# Patient Record
Sex: MALE | Race: WHITE | NOT HISPANIC OR LATINO | URBAN - METROPOLITAN AREA
[De-identification: names, ages, dates, MRNs, and addresses within clinical notes are randomized per-mention and may not be internally consistent; named-entity substitution may affect disease eponyms.]

---

## 2019-04-11 ENCOUNTER — INPATIENT (INPATIENT)
Facility: HOSPITAL | Age: 79
LOS: 0 days | Discharge: HOME | DRG: 812 | End: 2019-04-12
Attending: INTERNAL MEDICINE | Admitting: INTERNAL MEDICINE
Payer: COMMERCIAL

## 2019-04-11 VITALS
WEIGHT: 193.35 LBS | SYSTOLIC BLOOD PRESSURE: 148 MMHG | TEMPERATURE: 98 F | DIASTOLIC BLOOD PRESSURE: 76 MMHG | OXYGEN SATURATION: 100 % | HEIGHT: 68 IN | RESPIRATION RATE: 20 BRPM | HEART RATE: 83 BPM

## 2019-04-11 DIAGNOSIS — I10 ESSENTIAL (PRIMARY) HYPERTENSION: ICD-10-CM

## 2019-04-11 DIAGNOSIS — Z29.9 ENCOUNTER FOR PROPHYLACTIC MEASURES, UNSPECIFIED: ICD-10-CM

## 2019-04-11 DIAGNOSIS — E03.9 HYPOTHYROIDISM, UNSPECIFIED: ICD-10-CM

## 2019-04-11 DIAGNOSIS — D64.9 ANEMIA, UNSPECIFIED: ICD-10-CM

## 2019-04-11 DIAGNOSIS — R19.5 OTHER FECAL ABNORMALITIES: ICD-10-CM

## 2019-04-11 DIAGNOSIS — E11.9 TYPE 2 DIABETES MELLITUS WITHOUT COMPLICATIONS: ICD-10-CM

## 2019-04-11 LAB
ALBUMIN SERPL ELPH-MCNC: 4.4 G/DL — SIGNIFICANT CHANGE UP (ref 3.3–5)
ALP SERPL-CCNC: 44 U/L — SIGNIFICANT CHANGE UP (ref 40–120)
ALT FLD-CCNC: 12 U/L — SIGNIFICANT CHANGE UP (ref 10–45)
ANION GAP SERPL CALC-SCNC: 16 MMOL/L — SIGNIFICANT CHANGE UP (ref 5–17)
APTT BLD: 29.7 SEC — SIGNIFICANT CHANGE UP (ref 27.5–36.3)
AST SERPL-CCNC: 17 U/L — SIGNIFICANT CHANGE UP (ref 10–40)
BASOPHILS # BLD AUTO: 0 K/UL — SIGNIFICANT CHANGE UP (ref 0–0.2)
BASOPHILS NFR BLD AUTO: 0.3 % — SIGNIFICANT CHANGE UP (ref 0–2)
BILIRUB SERPL-MCNC: 0.1 MG/DL — LOW (ref 0.2–1.2)
BLD GP AB SCN SERPL QL: NEGATIVE — SIGNIFICANT CHANGE UP
BUN SERPL-MCNC: 20 MG/DL — SIGNIFICANT CHANGE UP (ref 7–23)
CALCIUM SERPL-MCNC: 9.8 MG/DL — SIGNIFICANT CHANGE UP (ref 8.4–10.5)
CHLORIDE SERPL-SCNC: 102 MMOL/L — SIGNIFICANT CHANGE UP (ref 96–108)
CO2 SERPL-SCNC: 24 MMOL/L — SIGNIFICANT CHANGE UP (ref 22–31)
CREAT SERPL-MCNC: 1 MG/DL — SIGNIFICANT CHANGE UP (ref 0.5–1.3)
EOSINOPHIL # BLD AUTO: 0.2 K/UL — SIGNIFICANT CHANGE UP (ref 0–0.5)
EOSINOPHIL NFR BLD AUTO: 5.7 % — SIGNIFICANT CHANGE UP (ref 0–6)
GLUCOSE BLDC GLUCOMTR-MCNC: 99 MG/DL — SIGNIFICANT CHANGE UP (ref 70–99)
GLUCOSE SERPL-MCNC: 89 MG/DL — SIGNIFICANT CHANGE UP (ref 70–99)
HCT VFR BLD CALC: 21.7 % — LOW (ref 39–50)
HGB BLD-MCNC: 7.1 G/DL — LOW (ref 13–17)
INR BLD: 1.2 RATIO — HIGH (ref 0.88–1.16)
LYMPHOCYTES # BLD AUTO: 1.3 K/UL — SIGNIFICANT CHANGE UP (ref 1–3.3)
LYMPHOCYTES # BLD AUTO: 29.3 % — SIGNIFICANT CHANGE UP (ref 13–44)
MCHC RBC-ENTMCNC: 28.3 PG — SIGNIFICANT CHANGE UP (ref 27–34)
MCHC RBC-ENTMCNC: 32.7 GM/DL — SIGNIFICANT CHANGE UP (ref 32–36)
MCV RBC AUTO: 86.7 FL — SIGNIFICANT CHANGE UP (ref 80–100)
MONOCYTES # BLD AUTO: 0.4 K/UL — SIGNIFICANT CHANGE UP (ref 0–0.9)
MONOCYTES NFR BLD AUTO: 9.3 % — SIGNIFICANT CHANGE UP (ref 2–14)
NEUTROPHILS # BLD AUTO: 2.4 K/UL — SIGNIFICANT CHANGE UP (ref 1.8–7.4)
NEUTROPHILS NFR BLD AUTO: 55.4 % — SIGNIFICANT CHANGE UP (ref 43–77)
OB PNL STL: POSITIVE
PLATELET # BLD AUTO: 180 K/UL — SIGNIFICANT CHANGE UP (ref 150–400)
POTASSIUM SERPL-MCNC: 4.2 MMOL/L — SIGNIFICANT CHANGE UP (ref 3.5–5.3)
POTASSIUM SERPL-SCNC: 4.2 MMOL/L — SIGNIFICANT CHANGE UP (ref 3.5–5.3)
PROT SERPL-MCNC: 7 G/DL — SIGNIFICANT CHANGE UP (ref 6–8.3)
PROTHROM AB SERPL-ACNC: 13.7 SEC — HIGH (ref 10–12.9)
RBC # BLD: 2.5 M/UL — LOW (ref 4.2–5.8)
RBC # FLD: 15.8 % — HIGH (ref 10.3–14.5)
RH IG SCN BLD-IMP: POSITIVE — SIGNIFICANT CHANGE UP
SODIUM SERPL-SCNC: 142 MMOL/L — SIGNIFICANT CHANGE UP (ref 135–145)
WBC # BLD: 4.3 K/UL — SIGNIFICANT CHANGE UP (ref 3.8–10.5)
WBC # FLD AUTO: 4.3 K/UL — SIGNIFICANT CHANGE UP (ref 3.8–10.5)

## 2019-04-11 PROCEDURE — 99285 EMERGENCY DEPT VISIT HI MDM: CPT | Mod: GC

## 2019-04-11 PROCEDURE — 74177 CT ABD & PELVIS W/CONTRAST: CPT | Mod: 26

## 2019-04-11 PROCEDURE — 99223 1ST HOSP IP/OBS HIGH 75: CPT

## 2019-04-11 RX ORDER — LANOLIN ALCOHOL/MO/W.PET/CERES
3 CREAM (GRAM) TOPICAL AT BEDTIME
Qty: 0 | Refills: 0 | Status: DISCONTINUED | OUTPATIENT
Start: 2019-04-11 | End: 2019-04-12

## 2019-04-11 RX ORDER — LEVOTHYROXINE SODIUM 125 MCG
50 TABLET ORAL DAILY
Qty: 0 | Refills: 0 | Status: DISCONTINUED | OUTPATIENT
Start: 2019-04-11 | End: 2019-04-12

## 2019-04-11 RX ORDER — LOSARTAN POTASSIUM 100 MG/1
50 TABLET, FILM COATED ORAL DAILY
Qty: 0 | Refills: 0 | Status: DISCONTINUED | OUTPATIENT
Start: 2019-04-12 | End: 2019-04-12

## 2019-04-11 RX ORDER — DOXAZOSIN MESYLATE 4 MG
2 TABLET ORAL AT BEDTIME
Qty: 0 | Refills: 0 | Status: DISCONTINUED | OUTPATIENT
Start: 2019-04-11 | End: 2019-04-12

## 2019-04-11 RX ORDER — HYDROCHLOROTHIAZIDE 25 MG
12.5 TABLET ORAL DAILY
Qty: 0 | Refills: 0 | Status: DISCONTINUED | OUTPATIENT
Start: 2019-04-12 | End: 2019-04-12

## 2019-04-11 RX ORDER — FENOFIBRATE,MICRONIZED 130 MG
48 CAPSULE ORAL DAILY
Qty: 0 | Refills: 0 | Status: DISCONTINUED | OUTPATIENT
Start: 2019-04-11 | End: 2019-04-12

## 2019-04-11 RX ADMIN — Medication 2 MILLIGRAM(S): at 22:49

## 2019-04-11 NOTE — H&P ADULT - ASSESSMENT
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

## 2019-04-11 NOTE — H&P ADULT - PROBLEM SELECTOR PLAN 2
--extensive GI work up 2 weeks ago unrevealing  --discuss with GI in the morning regarding plans for repeat endoscopy  --regular diet for now as not immediate plan for work up and has been tolerating well

## 2019-04-11 NOTE — ED PROVIDER NOTE - ATTENDING CONTRIBUTION TO CARE
sent in for hb 7, has had full outpt w/u incl upper / lower endoscopy, capsule.   non-tender abdomen, at bedside w res rectal - brown/dark stool  h/h. transfuse to 7-8. likely admit.

## 2019-04-11 NOTE — H&P ADULT - HISTORY OF PRESENT ILLNESS
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 Patient with admission for anemia and GI bleed at the end of March 2019 at [facility]. Had dark stools and slowly downtrending anemia. Had extensive work up including EGD/colonoscopy/capsule/double balloon enteroscopy that was unrevealing. Received a few units of transfusion in the past few months. Since discharge, he has had slowly downtrending H/H, losing about 1 gram per week. He reports feeling fatigued and achy. After ambulating long distances, he becomes SOB.     Per heme note, he has a history of afib but he and his son deny this history. Not currently taking A/C or aspirin.

## 2019-04-11 NOTE — H&P ADULT - NSHPPHYSICALEXAM_GEN_ALL_CORE
Vital Signs Last 24 Hrs  T(C): 36.7 (04-11-19 @ 20:11)  T(F): 98.1 (04-11-19 @ 20:11), Max: 98.1 (04-11-19 @ 20:11)  HR: 77 (04-11-19 @ 20:11) (77 - 83)  BP: 149/71 (04-11-19 @ 20:11)  BP(mean): --  RR: 20 (04-11-19 @ 20:11) (20 - 20)  SpO2: 100% (04-11-19 @ 20:11) (100% - 100%)  Wt(kg): --    PHYSICAL EXAM:  GENERAL: NAD, well-developed  HEAD:  Atraumatic, Normocephalic  EYES: EOMI, PERRLA, conjunctiva and sclera clear  NECK: Supple, No JVD  CHEST/LUNG: Clear to auscultation bilaterally; No wheeze  HEART: Regular rate and rhythm, systolic murmur; No rubs, or gallops  ABDOMEN: Soft, Nontender, Nondistended; Bowel sounds present  EXTREMITIES:  2+ Peripheral Pulses, No clubbing, cyanosis, or edema  PSYCH: AAOx3  NEUROLOGY: non-focal  SKIN: No rashes or lesions, pale

## 2019-04-11 NOTE — ED ADULT NURSE NOTE - OBJECTIVE STATEMENT
Pt presents to ED for transfusion after being found to have a low h/h, which is usual for him.  He notes dyspnea on exertion, fatigue and dark stools.  He has had endoscopies in the past but none have revealed source of bleeding.  Conjunctive pink, cap refill is wnl.  Skin warm and dry.  Denies n/v/d.

## 2019-04-11 NOTE — ED PROVIDER NOTE - PHYSICAL EXAMINATION
Gen: NAD, AOx3, able to make needs known, non-toxic  Head: NCAT  HEENT: EOMI, oral mucosa moist, normal conjunctiva  Lung: CTAB, no respiratory distress, no wheezes/rhonchi/rales B/L, speaking in full sentences  CV: RRR, no murmurs  Abd: soft, NTND, no guarding, no CVA tenderness  MSK: no visible deformities  Neuro: No focal sensory or motor deficits  Skin: Warm, well perfused  Psych: normal affect    Rectal exam chaperoned by Dr. Forrest. Stool grossly brown. Sample sent for guiac testing. Gen: NAD, AOx3, able to make needs known, non-toxic  Head: NCAT  HEENT: EOMI, oral mucosa moist, normal conjunctiva  Lung: CTAB, no respiratory distress, no wheezes/rhonchi/rales B/L, speaking in full sentences  CV: RRR, systolic murmur  Abd: soft, NTND, no guarding, no CVA tenderness  MSK: no visible deformities  Neuro: No focal sensory or motor deficits  Skin: Warm, well perfused  Psych: normal affect    Rectal exam chaperoned by Dr. Forrest. Stool grossly brown. Sample sent for guiac testing.

## 2019-04-11 NOTE — H&P ADULT - NSHPLABSRESULTS_GEN_ALL_CORE
EKG, labs, and imaging personally reviewed and interpreted.     LABS:                        7.1    4.3   )-----------( 180      ( 11 Apr 2019 16:59 )             21.7     142  |  102  |  20  ----------------------------<  89  4.2   |  24  |  1.00    Ca    9.8      11 Apr 2019 16:59    TPro  7.0  /  Alb  4.4  /  TBili  0.1<L>  /  DBili  x   /  AST  17  /  ALT  12  /  AlkPhos  44  04-11    PT/INR - ( 11 Apr 2019 16:59 )   PT: 13.7 sec;   INR: 1.20 ratio    PTT - ( 11 Apr 2019 16:59 )  PTT:29.7 sec    Occult Blood, Feces (04.11.19 @ 19:28)    Occult Blood, Feces: Positive    RADIOLOGY & ADDITIONAL TESTS:  Imaging Personally Reviewed:    EXAM:  CT ABDOMEN AND PELVIS IC                        PROCEDURE DATE:  04/11/2019    ******PRELIMINARY REPORT******        INTERPRETATION:  No CT evidence of GI bleed. No emergent findings.    Consultant(s) Notes Reviewed:  Yes  Care Discussed with Consultants/Other Providers: Yes  Outpatient and prior hospitalization records reviewed: Yes

## 2019-04-11 NOTE — ED PROVIDER NOTE - CLINICAL SUMMARY MEDICAL DECISION MAKING FREE TEXT BOX
79 y/o M w/ PMH of HTN, HLD, DM, hypothyroid, anemia, GI bleed presenting w/ likely symptomatic anemia. Reportedly anemic in outpatient setting, will confirm with labs today. Given pt is symptomatic he will likely require transfusion and admission for further work up. CBC, CMP, coags, T&S. Reassess.

## 2019-04-11 NOTE — ED CLERICAL - NS ED CLERK NOTE PRE-ARRIVAL INFORMATION; ADDITIONAL PRE-ARRIVAL INFORMATION
CC/Reason For referral:   anemia, gi bleed. [facility]  Preferred Consultant(if applicable):  Who admits for you (if needed):  dr alonzo  Do you have documents you would like to fax over?  no  Would you still like to speak to an ED attending?  please call after patient is seen

## 2019-04-11 NOTE — H&P ADULT - PROBLEM SELECTOR PLAN 1
--likely from occult GI bleed, appreciate heme recs.   --transfusing 2 units pRBC  --repeat CBC in the AM  --CT A/P performed, prelim read: no sign of active bleeding. Although done with contrast, f/u official read to rule out RP bleed.

## 2019-04-11 NOTE — H&P ADULT - NSICDXPASTMEDICALHX_GEN_ALL_CORE_FT
PAST MEDICAL HISTORY:  Gastrointestinal bleeding     HLD (hyperlipidemia)     HTN (hypertension)     Hypothyroidism     T2DM (type 2 diabetes mellitus)

## 2019-04-11 NOTE — H&P ADULT - NSHPREVIEWOFSYSTEMS_GEN_ALL_CORE
Review of Systems:   CONSTITUTIONAL: +fatigue; No fever, weight loss  EYES: No eye pain, visual disturbances, or discharge  ENMT:  No difficulty hearing, tinnitus, vertigo; No sinus or throat pain  NECK: No pain or stiffness  RESPIRATORY: No cough, wheezing, chills or hemoptysis; No shortness of breath  CARDIOVASCULAR: +MARTIN with strenuous activity; No chest pain, palpitations, dizziness, or leg swelling  GASTROINTESTINAL: +rare dark stool, no hematochezia. No abdominal or epigastric pain. No nausea, vomiting, or hematemesis; No diarrhea or constipation.  GENITOURINARY: No dysuria, frequency, hematuria, or incontinence  NEUROLOGICAL: No headaches, memory loss, loss of strength, numbness, or tremors  SKIN: No itching, burning, rashes, or lesions   LYMPH NODES: No enlarged glands  ENDOCRINE: No heat or cold intolerance; No hair loss  MUSCULOSKELETAL: No joint pain or swelling; No muscle, back, or extremity pain  PSYCHIATRIC: No depression, anxiety, mood swings, or difficulty sleeping  HEME/LYMPH: No easy bruising, or bleeding gums

## 2019-04-11 NOTE — ED ADULT TRIAGE NOTE - CHIEF COMPLAINT QUOTE
pt sent from md office for blood transfusion pt has weakness with sob 3 month hx of gi bleed with multiple transfusions

## 2019-04-11 NOTE — ED PROVIDER NOTE - PROGRESS NOTE DETAILS
Dr. Gabe العلي, PGY-1: Discussed pt w/ Dr. Humphries who requested 2 units PRBCs as well as a CT A/P to eval for poss etiology of anemia. Informed pt of this information. Pt consented for transfusion.

## 2019-04-11 NOTE — CONSULT NOTE ADULT - SUBJECTIVE AND OBJECTIVE BOX
78 male seen in initial consultation today for iron deficiency //recent multiple transfusions.    HPI  8 years ago  SB avm s/p cauterization//transfusion prbcs           transfusions 2/2019--[facility]            recent admit [facility]  hgb 6.6/ferritin-13         GI workup 3/2019--did not reveal source of GI bleeding despite stool guaiac +                workup included  EGD/colonoscopy/VCE/ double balloon enteroscopy-done 3/22/19          hgb past 2 weeks decreased 8.5 -7.8( 4/8/19)-7.0 in office today//weakness/sob/abd pain    PMH  HTN,DM,lilids.A fib-not on ac, hypothyroid  NKDA  meds-metformin. synthroid,cardura, cozaar, hctz, fenofibrate  SH-  rare etoh//never cigarrettes    A/P  symptomatic iron deficient anemia  recent extensive Gi workup-negative but progressive anemia           h/o SB avm 8 years ago--cauterized    plan  transfuse 2 prbcs--check cbc in am           abd/pelvic ct          has outpatient appt. Mon 4/15/19 in my office--venofer/cbc    case d/w triage and Dr. Humphries

## 2019-04-11 NOTE — ED ADULT NURSE NOTE - CADM POA CENTRAL LINE
Concentration Of Solution Injected (Mg/Ml): 10.0
Kenalog Preparation: Kenalog
Medical Necessity Clause: This procedure was medically necessary because the lesions that were treated were:
Detail Level: Detailed
Include Z78.9 (Other Specified Conditions Influencing Health Status) As An Associated Diagnosis?: No
X Size Of Lesion In Cm (Optional): 0
Total Volume Injected (Ccs- Only Use Numbers And Decimals): 2
No

## 2019-04-11 NOTE — ED PROVIDER NOTE - OBJECTIVE STATEMENT
79 y/o M w/ PMH of HTN, HLD, DM, hypothyroid, anemia, GI bleed sent in by PCP for transfusion. Pt presents with son who contributed to HPI. Pt was found to be anemic in January 2019 and since then has required multiple blood transfusions. Pt reports getting blood work by Dr. Bolden and he was told to come to the ED because he needed a transfusion. Pt reports the source of bleeding is believed to be from the GI tract, but so far all testing has yet to reveal a source of the bleeding. Pt reports feeling short of breath, having increased fatigue, and becoming dyspneic on exertion over the past few weeks. No additional complaints.

## 2019-04-12 ENCOUNTER — TRANSCRIPTION ENCOUNTER (OUTPATIENT)
Age: 79
End: 2019-04-12

## 2019-04-12 VITALS
TEMPERATURE: 98 F | OXYGEN SATURATION: 98 % | SYSTOLIC BLOOD PRESSURE: 134 MMHG | HEART RATE: 68 BPM | RESPIRATION RATE: 18 BRPM | DIASTOLIC BLOOD PRESSURE: 72 MMHG

## 2019-04-12 LAB
GLUCOSE BLDC GLUCOMTR-MCNC: 98 MG/DL — SIGNIFICANT CHANGE UP (ref 70–99)
HCT VFR BLD CALC: 25.9 % — LOW (ref 39–50)
HGB BLD-MCNC: 8 G/DL — LOW (ref 13–17)
MCHC RBC-ENTMCNC: 27.3 PG — SIGNIFICANT CHANGE UP (ref 27–34)
MCHC RBC-ENTMCNC: 30.9 GM/DL — LOW (ref 32–36)
MCV RBC AUTO: 88.4 FL — SIGNIFICANT CHANGE UP (ref 80–100)
PLATELET # BLD AUTO: 163 K/UL — SIGNIFICANT CHANGE UP (ref 150–400)
RBC # BLD: 2.93 M/UL — LOW (ref 4.2–5.8)
RBC # FLD: 16 % — HIGH (ref 10.3–14.5)
WBC # BLD: 4.08 K/UL — SIGNIFICANT CHANGE UP (ref 3.8–10.5)
WBC # FLD AUTO: 4.08 K/UL — SIGNIFICANT CHANGE UP (ref 3.8–10.5)

## 2019-04-12 PROCEDURE — 85610 PROTHROMBIN TIME: CPT

## 2019-04-12 PROCEDURE — 86850 RBC ANTIBODY SCREEN: CPT

## 2019-04-12 PROCEDURE — 99285 EMERGENCY DEPT VISIT HI MDM: CPT

## 2019-04-12 PROCEDURE — 82272 OCCULT BLD FECES 1-3 TESTS: CPT

## 2019-04-12 PROCEDURE — 85027 COMPLETE CBC AUTOMATED: CPT

## 2019-04-12 PROCEDURE — 86901 BLOOD TYPING SEROLOGIC RH(D): CPT

## 2019-04-12 PROCEDURE — 85730 THROMBOPLASTIN TIME PARTIAL: CPT

## 2019-04-12 PROCEDURE — 80053 COMPREHEN METABOLIC PANEL: CPT

## 2019-04-12 PROCEDURE — 86923 COMPATIBILITY TEST ELECTRIC: CPT

## 2019-04-12 PROCEDURE — 86900 BLOOD TYPING SEROLOGIC ABO: CPT

## 2019-04-12 PROCEDURE — 82962 GLUCOSE BLOOD TEST: CPT

## 2019-04-12 PROCEDURE — 36430 TRANSFUSION BLD/BLD COMPNT: CPT

## 2019-04-12 PROCEDURE — 74177 CT ABD & PELVIS W/CONTRAST: CPT

## 2019-04-12 PROCEDURE — P9016: CPT

## 2019-04-12 RX ORDER — LANOLIN ALCOHOL/MO/W.PET/CERES
1 CREAM (GRAM) TOPICAL
Qty: 0 | Refills: 0 | DISCHARGE
Start: 2019-04-12

## 2019-04-12 RX ADMIN — Medication 48 MILLIGRAM(S): at 11:18

## 2019-04-12 RX ADMIN — LOSARTAN POTASSIUM 50 MILLIGRAM(S): 100 TABLET, FILM COATED ORAL at 05:53

## 2019-04-12 RX ADMIN — Medication 3 MILLIGRAM(S): at 00:00

## 2019-04-12 RX ADMIN — Medication 50 MICROGRAM(S): at 05:53

## 2019-04-12 RX ADMIN — Medication 12.5 MILLIGRAM(S): at 05:53

## 2019-04-12 NOTE — DISCHARGE NOTE NURSING/CASE MANAGEMENT/SOCIAL WORK - NSDCDPATPORTLINK_GEN_ALL_CORE
You can access the [facility] Patient Portal, offered by [facility], by registering with the following website: [email or web address]

## 2019-04-12 NOTE — DISCHARGE NOTE PROVIDER - HOSPITAL COURSE
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  Plan: --likely from occult GI bleed, appreciate heme recs.     --transfuse 1 more unit this am     --CT A/P  neg     Occult gastrointestinal hemorrhage.  Plan: --extensive GI work up 2 weeks ago unrevealing    HTN (hypertension).      continue his home medications with hold parameters    Hypothyroidism.  Plan: --c/w synthroid.     T2DM (type 2 diabetes mellitus).  Plan: --consistent carb diet    Need for prophylactic measure. Plan: --VTE PPX SCDs given GI bleeding.    Discharge Planning after prbcs today.

## 2019-04-12 NOTE — DISCHARGE NOTE PROVIDER - PROVIDER TOKENS
PROVIDER:[TOKEN:[[id number]],FOLLOWUP:[1-3 days]],FREE:[LAST:[Latoya],FIRST:[Reina],PHONE:[phone]],FAX:[(   )    -],ADDRESS:[Internist in [place], [place]   Address: [address] [place] [address]   [phone]],FOLLOWUP:[1 week]],FREE:[LAST:[Daisha],FIRST:[Gilbert],PHONE:[phone]],FAX:[(   )    -],ADDRESS:[[address] [place] [address]  Phone: [phone]],FOLLOWUP:[1 week]]

## 2019-04-12 NOTE — PROGRESS NOTE ADULT - ASSESSMENT
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  Plan: --likely from occult GI bleed, appreciate heme recs.   --transfuse 1 more unit this am     --CT A/P  neg       ·  Problem: Occult gastrointestinal hemorrhage.  Plan: --extensive GI work up 2 weeks ago unrevealing        ·  Problem: HTN (hypertension).     continue his home medications with hold parameters      ·  Problem: Hypothyroidism.  Plan: --c/w synthroid.     ·  Problem: T2DM (type 2 diabetes mellitus).  Plan: --consistent carb diet      Problem: Need for prophylactic measure. Plan: --VTE PPX SCDs given GI bleeding.        d/c planning after prbcs  today

## 2019-04-12 NOTE — PROGRESS NOTE ADULT - SUBJECTIVE AND OBJECTIVE BOX
CHIEF COMPLAINT:Patient is a 78y old  Male who presents with a chief complaint of anemia, fatigue (11 Apr 2019 21:22)    	        PAST MEDICAL & SURGICAL HISTORY:  Hypothyroidism  T2DM (type 2 diabetes mellitus)  HLD (hyperlipidemia)  HTN (hypertension)  Gastrointestinal bleeding  No significant past surgical history          REVIEW OF SYSTEMS:  CONSTITUTIONAL: No fever, weight loss, or fatigue  EYES: No eye pain, visual disturbances, or discharge  NECK: No pain or stiffness  RESPIRATORY: No cough, wheezing, chills or hemoptysis; No Shortness of Breath  CARDIOVASCULAR: No chest pain, palpitations, passing out, dizziness, or leg swelling  GASTROINTESTINAL: No abdominal or epigastric pain. No nausea, vomiting, or hematemesis; No diarrhea or constipation. No melena or hematochezia.  GENITOURINARY: No dysuria, frequency, hematuria, or incontinence  NEUROLOGICAL: No headaches, memory loss, loss of strength, numbness, or tremors  SKIN: No itching, burning, rashes, or lesions   LYMPH Nodes: No enlarged glands  ENDOCRINE: No heat or cold intolerance; No hair loss  MUSCULOSKELETAL: No joint pain or swelling; No muscle, back, or extremity pain    Medications:  MEDICATIONS  (STANDING):  doxazosin 2 milliGRAM(s) Oral at bedtime  fenofibrate Tablet 48 milliGRAM(s) Oral daily  hydrochlorothiazide 12.5 milliGRAM(s) Oral daily  levothyroxine 50 MICROGram(s) Oral daily  losartan 50 milliGRAM(s) Oral daily    MEDICATIONS  (PRN):  melatonin 3 milliGRAM(s) Oral at bedtime PRN Insomnia    	    PHYSICAL EXAM:  T(C): 36.6 (04-12-19 @ 08:24), Max: 37.2 (04-12-19 @ 02:26)  HR: 70 (04-12-19 @ 08:24) (63 - 83)  BP: 123/63 (04-12-19 @ 08:24) (99/58 - 149/71)  RR: 17 (04-12-19 @ 08:24) (17 - 20)  SpO2: 95% (04-12-19 @ 08:24) (95% - 100%)  Wt(kg): --  I&O's Summary    11 Apr 2019 07:01  -  12 Apr 2019 07:00  --------------------------------------------------------  IN: 1110 mL / OUT: 0 mL / NET: 1110 mL        Appearance: Normal	  HEENT:   Normal oral mucosa, PERRL, EOMI	  Lymphatic: No lymphadenopathy  Cardiovascular: Normal S1 S2, No JVD, No murmurs, No edema  Respiratory: Lungs clear to auscultation	  Psychiatry: A & O x 3, Mood & affect appropriate  Gastrointestinal:  Soft, Non-tender, + BS	  Skin: No rashes, No ecchymoses, No cyanosis	  Neurologic: Non-focal  Extremities: Normal range of motion, No clubbing, cyanosis or edema  Vascular: Peripheral pulses palpable 2+ bilaterally    TELEMETRY: 	    ECG:  	  RADIOLOGY:  OTHER: 	  	  LABS:	 	    CARDIAC MARKERS:                                8.0    4.08  )-----------( 163      ( 12 Apr 2019 09:30 )             25.9     04-11    142  |  102  |  20  ----------------------------<  89  4.2   |  24  |  1.00    Ca    9.8      11 Apr 2019 16:59    TPro  7.0  /  Alb  4.4  /  TBili  0.1<L>  /  DBili  x   /  AST  17  /  ALT  12  /  AlkPhos  44  04-11    proBNP:   Lipid Profile:   HgA1c:   TSH:

## 2019-04-12 NOTE — DISCHARGE NOTE NURSING/CASE MANAGEMENT/SOCIAL WORK - NSDCPEPTSTRK_GEN_ALL_CORE
Prescribed medications/Risk factors for stroke/Stroke support groups for patients, families, and friends/Call 911 for stroke/Need for follow up after discharge/Stroke warning signs and symptoms/Signs and symptoms of stroke/Stroke education booklet

## 2019-04-12 NOTE — PROGRESS NOTE ADULT - ASSESSMENT
76M with HTN, DM, Afib not on AC, hypothyroid, DM, hx SB AVM in past with recent anemia/iron deficiency    #Anemia, iron deficiency- recent transfusions 2/2019 and 3/2019  - recent GI eval 3/2019- EGD/colonoscopy/VCE/double balloon enteroscopy- negative despite stool guaiac positive  - now s/p 2 unit PRBC this admission, receiving 3rd unit currently  - CT abdomen negative  - scheduled for IV iron in office next week with Dr. Bolden    #DM- on metformin    #hypothyroid- on synthroid    DC planning post transfusion if pt able to ambulate/ lightheadedness resolves    d/w NP

## 2019-04-12 NOTE — DISCHARGE NOTE PROVIDER - NSDCCPCAREPLAN_GEN_ALL_CORE_FT
PRINCIPAL DISCHARGE DIAGNOSIS  Diagnosis: Anemia  Assessment and Plan of Treatment: Follow up with your Hematologist on Monday, April 15 for further monitoring and Iron infusion.  Seek medical attention for any bleeding or extreme tiredness.  Monitor your stools for any bleeding.         SECONDARY DISCHARGE DIAGNOSES  Diagnosis: Occult gastrointestinal hemorrhage  Assessment and Plan of Treatment: Monitor stools for bleeding.  Seek medical attention for any bleeding or extreme tiredness.    Diagnosis: Hypothyroidism  Assessment and Plan of Treatment: Take your medication as prescribed.   Follow up with your medical doctor for routine blood  work monitoring, and to establish long term treatment goals.      Diagnosis: HTN (hypertension)  Assessment and Plan of Treatment: Take your medication as prescribed.  Follow up with your medical doctor for routine blood pressure monitoring, and to establish long term blood pressure treatment goals.  Low salt diet  Activity as tolerated.  Notify your doctor if you have any of the following symptoms:   Dizziness, Lightheadedness, Blurry vision, Headache, Chest pain, Shortness of breath      Diagnosis: T2DM (type 2 diabetes mellitus)  Assessment and Plan of Treatment: HgA1C this admission.  Make sure you get your HgA1c checked every three months.  If you take oral diabetes medications, check your blood glucose two times a day.  If you take insulin, check your blood glucose before meals and at bedtime.  It's important not to skip any meals.  Keep a log of your blood glucose results and always take it with you to your doctor appointments.  Keep a list of your current medications including injectables and over the counter medications and bring this medication list with you to all your doctor appointments.  If you have not seen your ophthalmologist this year call for appointment.  Check your feet daily for redness, sores, or openings. Do not self treat. If no improvement in two days call your primary care physician for an appointment.  Low blood sugar (hypoglycemia) is a blood sugar below 70mg/dl. Check your blood sugar if you feel signs/symptoms of hypoglycemia. If your blood sugar is below 70 take 15 grams of carbohydrates (ex 4 oz of apple juice, 3-4 glucose tablets, or 4-6 oz of regular soda) wait 15 minutes and repeat blood sugar to make sure it comes up above 70.  If your blood sugar is above 70 and you are due for a meal, have a meal.  If you are not due for a meal have a snack.  This snack helps keeps your blood sugar at a safe range.

## 2019-04-12 NOTE — DISCHARGE NOTE NURSING/CASE MANAGEMENT/SOCIAL WORK - NSDCPNINST_GEN_ALL_CORE
call for  follow up  appointment  with primary care   md   diet  meds  activity   as per md   any severe pain nausea fevers  shortness breath   dizzyness   bleeding   chest pain  any  problems  call md  calll [phone] [facility]  EMERGENCY ROOM

## 2019-04-12 NOTE — DISCHARGE NOTE PROVIDER - CARE PROVIDER_API CALL
Gustavo Bolden)  Medicine  [id number]  [place] [address]  Phone: [phone]  Fax: [phone]  Follow Up Time: 1-3 days    Reina Klein  Internist in [place], [place]   Address: [address] [place] [address]   [phone]  Phone: [phone]  Fax: (   )    -  Follow Up Time: 1 week    Gilbert Ashton  [address] [place] [address]  Phone: [phone]  Phone: [phone]  Fax: (   )    -  Follow Up Time: 1 week

## 2019-04-12 NOTE — DISCHARGE NOTE PROVIDER - CARE PROVIDERS DIRECT ADDRESSES
,[email or web address],DirectAddress_Unknown,DirectAddress_Unknown

## 2019-04-12 NOTE — PROGRESS NOTE ADULT - SUBJECTIVE AND OBJECTIVE BOX
Chief Complaint: fu    History of Present Illness: felt slightly lightheaded earlier; no f/c, no cp/palpitations, no dyspnea, no cough, no n/v/abd pain, no melena/brbpr, no hematuria, no le swelling, appetite stable      MEDICATIONS  (STANDING):  doxazosin 2 milliGRAM(s) Oral at bedtime  fenofibrate Tablet 48 milliGRAM(s) Oral daily  hydrochlorothiazide 12.5 milliGRAM(s) Oral daily  levothyroxine 50 MICROGram(s) Oral daily  losartan 50 milliGRAM(s) Oral daily    MEDICATIONS  (PRN):  melatonin 3 milliGRAM(s) Oral at bedtime PRN Insomnia      Allergies    No Known Allergies    Intolerances        Vital Signs Last 24 Hrs  T(C): 36.5 (12 Apr 2019 11:45), Max: 37.2 (12 Apr 2019 02:26)  T(F): 97.7 (12 Apr 2019 11:45), Max: 98.9 (12 Apr 2019 02:26)  HR: 64 (12 Apr 2019 11:45) (63 - 83)  BP: 129/65 (12 Apr 2019 11:45) (99/58 - 149/71)  BP(mean): --  RR: 17 (12 Apr 2019 11:45) (17 - 20)  SpO2: 96% (12 Apr 2019 11:45) (95% - 100%)    PHYSICAL EXAM  General: adult in NAD  HEENT: clear oropharynx, anicteric sclera, pink conjunctiva  Neck: supple  CV: normal S1/S2   Lungs: clear to auscultation, no wheezes, no rales  Abdomen: soft non-tender non-distended, no hepatosplenomegaly, positive bowel sounds  Ext: no clubbing cyanosis or edema  Skin: no rashes and no petechiae  Lymph Nodes: No LAD in axillae, groin, neck  Neuro: alert and oriented X 3, no focal deficits    LABS:                          8.0    4.08  )-----------( 163      ( 12 Apr 2019 09:30 )             25.9         Mean Cell Volume : 88.4 fl  Mean Cell Hemoglobin : 27.3 pg  Mean Cell Hemoglobin Concentration : 30.9 gm/dL  Auto Neutrophil # : x  Auto Lymphocyte # : x  Auto Monocyte # : x  Auto Eosinophil # : x  Auto Basophil # : x  Auto Neutrophil % : x  Auto Lymphocyte % : x  Auto Monocyte % : x  Auto Eosinophil % : x  Auto Basophil % : x      Serial CBC's  04-12 @ 09:30  Hct-25.9 / Hgb-8.0 / Plat-163 / RBC-2.93 / WBC-4.08  Serial CBC's  04-11 @ 16:59  Hct-21.7 / Hgb-7.1 / Plat-180 / RBC-2.50 / WBC-4.3      04-11    142  |  102  |  20  ----------------------------<  89  4.2   |  24  |  1.00    Ca    9.8      11 Apr 2019 16:59    TPro  7.0  /  Alb  4.4  /  TBili  0.1<L>  /  DBili  x   /  AST  17  /  ALT  12  /  AlkPhos  44  04-11      PT/INR - ( 11 Apr 2019 16:59 )   PT: 13.7 sec;   INR: 1.20 ratio         PTT - ( 11 Apr 2019 16:59 )  PTT:29.7 sec                Radiology:      < from: CT Abdomen and Pelvis w/ IV Cont (04.11.19 @ 19:57) >  IMPRESSION:     No evidence of active gastrointestinal bleeding.  No evidence of acute abnormality in the abdomen and pelvis.  Diverticulosis.

## 2019-04-25 ENCOUNTER — APPOINTMENT (OUTPATIENT)
Dept: RADIOLOGY | Facility: IMAGING CENTER | Age: 79
End: 2019-04-25
Payer: MEDICARE

## 2019-04-25 ENCOUNTER — OUTPATIENT (OUTPATIENT)
Dept: OUTPATIENT SERVICES | Facility: HOSPITAL | Age: 79
LOS: 1 days | End: 2019-04-25
Payer: MEDICARE

## 2019-04-25 DIAGNOSIS — D50.0 IRON DEFICIENCY ANEMIA SECONDARY TO BLOOD LOSS (CHRONIC): ICD-10-CM

## 2019-04-25 PROBLEM — E03.9 HYPOTHYROIDISM, UNSPECIFIED: Chronic | Status: ACTIVE | Noted: 2019-04-11

## 2019-04-25 PROBLEM — K92.2 GASTROINTESTINAL HEMORRHAGE, UNSPECIFIED: Chronic | Status: ACTIVE | Noted: 2019-04-11

## 2019-04-25 PROBLEM — E11.9 TYPE 2 DIABETES MELLITUS WITHOUT COMPLICATIONS: Chronic | Status: ACTIVE | Noted: 2019-04-11

## 2019-04-25 PROBLEM — I10 ESSENTIAL (PRIMARY) HYPERTENSION: Chronic | Status: ACTIVE | Noted: 2019-04-11

## 2019-04-25 PROBLEM — E78.5 HYPERLIPIDEMIA, UNSPECIFIED: Chronic | Status: ACTIVE | Noted: 2019-04-11

## 2019-04-25 PROCEDURE — 77074 RADEX OSSEOUS SURVEY LMTD: CPT | Mod: 26

## 2019-04-25 PROCEDURE — 77074 RADEX OSSEOUS SURVEY LMTD: CPT

## 2019-04-29 ENCOUNTER — RESULT REVIEW (OUTPATIENT)
Age: 79
End: 2019-04-29

## 2019-11-29 NOTE — ED ADULT NURSE NOTE - PSH
No significant past surgical history decreased ability to use legs for bridging/pushing/decreased ability to use arms for pushing/pulling/impaired ability to control trunk for mobility

## 2020-03-09 ENCOUNTER — APPOINTMENT (OUTPATIENT)
Dept: HEART AND VASCULAR | Facility: CLINIC | Age: 80
End: 2020-03-09
Payer: MEDICARE

## 2020-03-09 DIAGNOSIS — I35.0 NONRHEUMATIC AORTIC (VALVE) STENOSIS: ICD-10-CM

## 2020-03-09 DIAGNOSIS — Z78.9 OTHER SPECIFIED HEALTH STATUS: ICD-10-CM

## 2020-03-09 DIAGNOSIS — Q27.30 ARTERIOVENOUS MALFORMATION, SITE UNSPECIFIED: ICD-10-CM

## 2020-03-09 DIAGNOSIS — E11.9 TYPE 2 DIABETES MELLITUS W/OUT COMPLICATIONS: ICD-10-CM

## 2020-03-09 DIAGNOSIS — K92.2 GASTROINTESTINAL HEMORRHAGE, UNSPECIFIED: ICD-10-CM

## 2020-03-09 PROCEDURE — 99204 OFFICE O/P NEW MOD 45 MIN: CPT

## 2020-03-18 ENCOUNTER — APPOINTMENT (OUTPATIENT)
Dept: UROLOGY | Facility: CLINIC | Age: 80
End: 2020-03-18

## 2020-03-24 PROBLEM — Q27.30 AVM (ARTERIOVENOUS MALFORMATION): Status: ACTIVE | Noted: 2020-03-24

## 2020-03-24 PROBLEM — K92.2 CHRONIC GI BLEEDING: Status: ACTIVE | Noted: 2020-03-24

## 2020-03-24 PROBLEM — Z78.9 NO PERTINENT PAST MEDICAL HISTORY: Status: RESOLVED | Noted: 2020-03-24 | Resolved: 2020-03-24

## 2020-03-24 PROBLEM — I35.0 AORTIC STENOSIS, MODERATE: Status: ACTIVE | Noted: 2020-03-24

## 2020-03-24 PROBLEM — E11.9 TYPE 2 DIABETES MELLITUS: Status: ACTIVE | Noted: 2020-03-24

## 2020-03-24 NOTE — PHYSICAL EXAM
[Alert] : alert [No Acute Distress] : no acute distress [PERRL] : pupils equal, round and reactive to light [Normal Hearing] : hearing was normal [No Neck Mass] : no neck mass was observed [No Respiratory Distress] : no respiratory distress [Normal Rate] : heart rate was normal  [Regular Rhythm] : with a regular rhythm [No Edema] : there was no peripheral edema [Normal Bowel Sounds] : normal bowel sounds [Not Tender] : non-tender [Not Distended] : not distended [Normal Gait] : normal gait [Normal Strength/Tone] : muscle strength and tone were normal [No Rash] : no rash [No Skin Lesions] : no skin lesions [No Motor Deficits] : the motor exam was normal [No Sensory Deficits] : the sensory exam was normal to light touch and pinprick [Oriented x3] : oriented to person, place, and time

## 2020-03-24 NOTE — ASSESSMENT
[FreeTextEntry1] : This is a 73-year-old male with a history of several months of occult GI bleeding. He has had numerous endoscopies (upper and lower) as well as capsule studies, some of which have been negative and others have shown small angiodysplasia like lesions which have been treated with laser. At best he is only has had temporary control of bleeding with these procedures. He gets transfused regularly as well as receiving iron. His hemoglobin ranges between 5 and 8 and when it is low he is quite symptomatic. He also has a history of aortic valve stenosis and may at some point require a TAVR procedure. His history is notable for diabetes for which he is on metformin. He had a prior situation of upper GI bleeding 10 years ago when Dr. Ashton did an upper endoscopy and treated a lesion which stopped the bleeding for several years. There is no other predisposition to bleeding and he has no family history to suggest HHT. He is referred for angiography and I went through the steps in the procedure as well as the possibility that we could find a lesion amenable to embolization. He would like to proceed with this but his wife was just diagnosed with lymphoma so he may have to wait a few weeks while her situation is sorted out.

## 2020-06-01 ENCOUNTER — LABORATORY RESULT (OUTPATIENT)
Age: 80
End: 2020-06-01

## 2020-06-01 VITALS
HEART RATE: 64 BPM | OXYGEN SATURATION: 98 % | RESPIRATION RATE: 16 BRPM | TEMPERATURE: 98 F | SYSTOLIC BLOOD PRESSURE: 136 MMHG | DIASTOLIC BLOOD PRESSURE: 64 MMHG

## 2020-06-01 RX ORDER — CHLORHEXIDINE GLUCONATE 213 G/1000ML
1 SOLUTION TOPICAL ONCE
Refills: 0 | Status: DISCONTINUED | OUTPATIENT
Start: 2020-06-03 | End: 2020-06-18

## 2020-06-01 NOTE — H&P ADULT - HISTORY OF PRESENT ILLNESS
History obtained from Dr. Stoll office note     72 y/o male with a history of several months of occult GI bleeding. He has had numerous endoscopies (upper and lower) as well ass capsule studies, some of which have been negative and others have shown small angiodysplasia like lesions which have been treated with laser. At best, he only has had temporary control of bleeding with these procedures. He gets transfused regularly as well as receives iron. His hemoglobin ranges between 5 and 8 and when it is low he is quite symptomatic. He also has a history of aortic valve stenosis and may at some point require a TAVR procedure. His history is notable for diabetes for which he is on Metformin. He had a prior situation of upper GI bleeding 10 years ago when Dr. Ashton did an upper endoscopy and treated a lesion which stopped the bleeding bleeding for several years. There is no other predisposition to bleeding and he has no family history to suggest HHT. He is referred for angiography. History obtained from Dr. Stoll office note     COVID PCR negative on 06/01 as per Lopez PIERRE    78 y/o male with a history of several months of occult GI bleeding. He has had numerous endoscopies (upper and lower) as well ass capsule studies, some of which have been negative and others have shown small angiodysplasia like lesions which have been treated with laser. At best, he only has had temporary control of bleeding with these procedures. He gets transfused regularly as well as receives iron. His hemoglobin ranges between 5 and 8 and when it is low he is quite symptomatic. He also has a history of aortic valve stenosis and may at some point require a TAVR procedure. His history is notable for diabetes for which he is on Metformin. He had a prior situation of upper GI bleeding 10 years ago when Dr. Ashton did an upper endoscopy and treated a lesion which stopped the bleeding bleeding for several years. There is no other predisposition to bleeding and he has no family history to suggest HHT. He is referred for angiography.

## 2020-06-01 NOTE — H&P ADULT - ASSESSMENT
80 y/o male with a history of several months of occult GI bleeding. He has had numerous endoscopies (upper and lower) as well ass capsule studies, some of which have been negative and others have shown small angiodysplasia like lesions which have been treated with laser. At best, he only has had temporary control of bleeding with these procedures. He gets transfused regularly as well as receives iron. His hemoglobin ranges between 5 and 8 and when it is low he is quite symptomatic. He also has a history of aortic valve stenosis and may at some point require a TAVR procedure. His history is notable for diabetes for which he is on Metformin. He had a prior situation of upper GI bleeding 10 years ago when Dr. Ashton did an upper endoscopy and treated a lesion which stopped the bleeding bleeding for several years. There is no other predisposition to bleeding and he has no family history to suggest HHT. He is referred for angiography.    COVID PCR negative on 06/01 as per Lopez PIERRE 78 y/o male with a history of several months of occult GI bleeding. He has had numerous endoscopies (upper and lower) as well ass capsule studies, some of which have been negative and others have shown small angiodysplasia like lesions which have been treated with laser. At best, he only has had temporary control of bleeding with these procedures. He gets transfused regularly as well as receives iron. His hemoglobin ranges between 5 and 8 and when it is low he is quite symptomatic. He also has a history of aortic valve stenosis and may at some point require a TAVR procedure. His history is notable for diabetes for which he is on Metformin. He had a prior situation of upper GI bleeding 10 years ago when Dr. Ashton did an upper endoscopy and treated a lesion which stopped the bleeding bleeding for several years. There is no other predisposition to bleeding and he has no family history to suggest HHT. He is referred for angiography.    COVID PCR negative on 06/01 as per Lopez PIERRE    H/H 10.3/33.5    ASA: II                                                      Mallampati: III   Risks & benefits of procedure and alternative therapy have been explained to the patient including but not limited to: allergic reaction, bleeding w/possible need for blood transfusion, infection, renal and vascular compromise, limb damage, emergent surgery. Informed consent obtained and in chart.

## 2020-06-03 ENCOUNTER — OUTPATIENT (OUTPATIENT)
Dept: OUTPATIENT SERVICES | Facility: HOSPITAL | Age: 80
LOS: 1 days | Discharge: HOME | End: 2020-06-03
Payer: MEDICARE

## 2020-06-03 LAB
ALBUMIN SERPL ELPH-MCNC: 4.5 G/DL — SIGNIFICANT CHANGE UP (ref 3.3–5)
ALP SERPL-CCNC: 53 U/L — SIGNIFICANT CHANGE UP (ref 40–120)
ALT FLD-CCNC: 8 U/L — LOW (ref 10–45)
ANION GAP SERPL CALC-SCNC: 11 MMOL/L — SIGNIFICANT CHANGE UP (ref 5–17)
APTT BLD: 34.3 SEC — SIGNIFICANT CHANGE UP (ref 27.5–36.3)
AST SERPL-CCNC: 19 U/L — SIGNIFICANT CHANGE UP (ref 10–40)
BASOPHILS # BLD AUTO: 0.01 K/UL — SIGNIFICANT CHANGE UP (ref 0–0.2)
BASOPHILS NFR BLD AUTO: 0.3 % — SIGNIFICANT CHANGE UP (ref 0–2)
BILIRUB SERPL-MCNC: 0.2 MG/DL — SIGNIFICANT CHANGE UP (ref 0.2–1.2)
BUN SERPL-MCNC: 18 MG/DL — SIGNIFICANT CHANGE UP (ref 7–23)
CALCIUM SERPL-MCNC: 9.8 MG/DL — SIGNIFICANT CHANGE UP (ref 8.4–10.5)
CHLORIDE SERPL-SCNC: 103 MMOL/L — SIGNIFICANT CHANGE UP (ref 96–108)
CO2 SERPL-SCNC: 28 MMOL/L — SIGNIFICANT CHANGE UP (ref 22–31)
CREAT SERPL-MCNC: 0.87 MG/DL — SIGNIFICANT CHANGE UP (ref 0.5–1.3)
EOSINOPHIL # BLD AUTO: 0.19 K/UL — SIGNIFICANT CHANGE UP (ref 0–0.5)
EOSINOPHIL NFR BLD AUTO: 5.4 % — SIGNIFICANT CHANGE UP (ref 0–6)
GLUCOSE BLDC GLUCOMTR-MCNC: 110 MG/DL — HIGH (ref 70–99)
GLUCOSE BLDC GLUCOMTR-MCNC: 94 MG/DL — SIGNIFICANT CHANGE UP (ref 70–99)
GLUCOSE SERPL-MCNC: 104 MG/DL — HIGH (ref 70–99)
HCT VFR BLD CALC: 33.5 % — LOW (ref 39–50)
HGB BLD-MCNC: 10.3 G/DL — LOW (ref 13–17)
IMM GRANULOCYTES NFR BLD AUTO: 2 % — HIGH (ref 0–1.5)
INR BLD: 1.25 — HIGH (ref 0.88–1.16)
LYMPHOCYTES # BLD AUTO: 0.87 K/UL — LOW (ref 1–3.3)
LYMPHOCYTES # BLD AUTO: 24.5 % — SIGNIFICANT CHANGE UP (ref 13–44)
MCHC RBC-ENTMCNC: 28.5 PG — SIGNIFICANT CHANGE UP (ref 27–34)
MCHC RBC-ENTMCNC: 30.7 GM/DL — LOW (ref 32–36)
MCV RBC AUTO: 92.8 FL — SIGNIFICANT CHANGE UP (ref 80–100)
MONOCYTES # BLD AUTO: 0.35 K/UL — SIGNIFICANT CHANGE UP (ref 0–0.9)
MONOCYTES NFR BLD AUTO: 9.9 % — SIGNIFICANT CHANGE UP (ref 2–14)
NEUTROPHILS # BLD AUTO: 2.06 K/UL — SIGNIFICANT CHANGE UP (ref 1.8–7.4)
NEUTROPHILS NFR BLD AUTO: 57.9 % — SIGNIFICANT CHANGE UP (ref 43–77)
NRBC # BLD: 0 /100 WBCS — SIGNIFICANT CHANGE UP (ref 0–0)
PLATELET # BLD AUTO: 160 K/UL — SIGNIFICANT CHANGE UP (ref 150–400)
POTASSIUM SERPL-MCNC: 4.5 MMOL/L — SIGNIFICANT CHANGE UP (ref 3.5–5.3)
POTASSIUM SERPL-SCNC: 4.5 MMOL/L — SIGNIFICANT CHANGE UP (ref 3.5–5.3)
PROT SERPL-MCNC: 7.8 G/DL — SIGNIFICANT CHANGE UP (ref 6–8.3)
PROTHROM AB SERPL-ACNC: 14.3 SEC — HIGH (ref 10–12.9)
RBC # BLD: 3.61 M/UL — LOW (ref 4.2–5.8)
RBC # FLD: 15.4 % — HIGH (ref 10.3–14.5)
SODIUM SERPL-SCNC: 142 MMOL/L — SIGNIFICANT CHANGE UP (ref 135–145)
WBC # BLD: 3.55 K/UL — LOW (ref 3.8–10.5)
WBC # FLD AUTO: 3.55 K/UL — LOW (ref 3.8–10.5)

## 2020-06-03 PROCEDURE — 85730 THROMBOPLASTIN TIME PARTIAL: CPT

## 2020-06-03 PROCEDURE — 75726 ARTERY X-RAYS ABDOMEN: CPT | Mod: 26,59

## 2020-06-03 PROCEDURE — 85025 COMPLETE CBC W/AUTO DIFF WBC: CPT

## 2020-06-03 PROCEDURE — C1894: CPT

## 2020-06-03 PROCEDURE — 93005 ELECTROCARDIOGRAM TRACING: CPT

## 2020-06-03 PROCEDURE — 36245 INS CATH ABD/L-EXT ART 1ST: CPT

## 2020-06-03 PROCEDURE — 85610 PROTHROMBIN TIME: CPT

## 2020-06-03 PROCEDURE — 82962 GLUCOSE BLOOD TEST: CPT

## 2020-06-03 PROCEDURE — 80053 COMPREHEN METABOLIC PANEL: CPT

## 2020-06-03 PROCEDURE — 93010 ELECTROCARDIOGRAM REPORT: CPT

## 2020-06-03 PROCEDURE — C1887: CPT

## 2020-06-03 PROCEDURE — C1769: CPT

## 2020-06-03 RX ORDER — MORPHINE SULFATE 50 MG/1
4 CAPSULE, EXTENDED RELEASE ORAL EVERY 4 HOURS
Refills: 0 | Status: DISCONTINUED | OUTPATIENT
Start: 2020-06-03 | End: 2020-06-03

## 2020-06-03 RX ORDER — LOSARTAN POTASSIUM 100 MG/1
1 TABLET, FILM COATED ORAL
Qty: 0 | Refills: 0 | DISCHARGE

## 2020-06-03 RX ORDER — FENOFIBRATE,MICRONIZED 130 MG
1 CAPSULE ORAL
Qty: 0 | Refills: 0 | DISCHARGE

## 2020-06-03 RX ORDER — ONDANSETRON 8 MG/1
4 TABLET, FILM COATED ORAL EVERY 4 HOURS
Refills: 0 | Status: DISCONTINUED | OUTPATIENT
Start: 2020-06-03 | End: 2020-06-18

## 2020-06-03 NOTE — BRIEF OPERATIVE NOTE - OPERATION/FINDINGS
-SMA angiogram shows no area of extravasation or arterivenous shunting  -Celiac angiogram shows no areas extravasation or arteriovenous shunting  -ADY stenotic at orgin, unable to engage

## 2020-06-04 NOTE — ED CLERICAL - NS ED CLERK NOTE PRE-ARRIVAL INFOMATION; PCP NAME
"    Patient Name:  Emely Solomon  YOB: 1959  MRN:  [id number]  Admit Date:  6/4/2020  Patient Care Team:  RENAY Smith MD as PCP - General (General Practice)      Subjective   History Present Illness     Chief Complaint   Patient presents with   • Diarrhea   • Legs Swollen     History of Present Illness   Ms. Solomon is a 60 y.o. 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 The history is somewhat limited as the patient is a poor historian as to the details leading up to her hospitalization. Her  at bedside helps with history. The patient states she has had new diarrhea for the last couple days. This was non-bloody in nature and not associated with nausea, vomiting or abdominal pain. She states she was able to get some \"pink medicine\" (Pepto-Bismol) for this and it helped.    She has also had some worsening right leg redness and swelling with a new open ulceration to the lower calf. The patient and her  are unsure how long it was been like this, but think maybe a week or so. She has had a prior DVT in the left leg and has chronic venous stasis to that area as a result. She denies any known fever, but has had chills. She denies any chest pain, but is short of breath at baseline. She does not wear any oxygen at home, but has been having worsening dyspnea on exertion per her  as she states the patient can barely move around in their home without getting winded. He states the patient has had some productive sputum as well and states this was \"black.\" The patient denies any reduced urination, but states she has had frequency and foggy urine.    In the ED, the patient's creatinine was noted to be 5.85 (prior 0.8 in 2017) and BUN 77. Sodium 122 and potassium 3.9. Her " DR MCDERMOTT WBC were 19.31 and lactic 4.3. She was given a sepsis fluid bolus and started on IV antibiotics. She was sent to the floor where RRT was called for low blood pressures. It was unclear how accurate these were related to her obesity. CXR was performed and showed cardiomegaly with vascular congestion. Patchy perihilar densities were noted and nonspecific. ABGs were drawn and showed normal pH, CO2 33.8 and P02 of 100. COVID19 testing was negative. She remained stable on the floor, but given her STEFANIA, Nephrology plans on transfer to the ICU for initiation of CRRT.     Review of Systems   Constitutional: Positive for activity change and chills. Negative for fever.   HENT: Negative for congestion and ear pain.    Eyes: Negative for pain and discharge.   Respiratory: Positive for cough and shortness of breath. Negative for choking and chest tightness.    Cardiovascular: Positive for leg swelling. Negative for chest pain.   Gastrointestinal: Positive for diarrhea. Negative for abdominal distention, abdominal pain, constipation, nausea and vomiting.   Endocrine: Negative for cold intolerance and heat intolerance.   Genitourinary: Positive for frequency. Negative for difficulty urinating and dysuria.   Musculoskeletal: Positive for gait problem. Negative for arthralgias and back pain.   Skin: Positive for color change and wound.   Neurological: Positive for weakness. Negative for speech difficulty and headaches.   Psychiatric/Behavioral: Negative for confusion. The patient is not nervous/anxious.       Personal History     Past Medical History:   Diagnosis Date   • Cellulitis     11/2017, with Group B Strep bacteremia and sepsis   • Chronic diastolic congestive heart failure (CMS/HCC)    • Deep venous thrombosis (CMS/HCC)    • Hypertension    • Lipoedema    • Lymphedema    • Morbid obesity (CMS/HCC)    • Paradoxical embolism (CMS/HCC)     to the LLE due to DVT/PE and PFO   • PFO (patent foramen ovale)    • Pulmonary embolism  (CMS/HCC)    • Pulmonary hypertension (CMS/HCC)     multifactorial (dCHF, obesity/ARIEL, hx PE), mild by echo 1/2016     Past Surgical History:   Procedure Laterality Date   • BRONCHOSCOPY N/A 7/21/2017    Procedure: BRONCHOSCOPY with wash;  Surgeon: Caleb aGrcia MD;  Location: [facility] ENDOSCOPY;  Service:    • DILATATION AND CURETTAGE  04/11/2011   • VENA CAVA FILTER PLACEMENT      Inferior Vena Cava Filter Placement w/Fluorosc angiogr guidance     Family History   Problem Relation Age of Onset   • Hypertension Other      Social History     Tobacco Use   • Smoking status: Never Smoker   • Smokeless tobacco: Never Used   Substance Use Topics   • Alcohol use: Yes     Comment: caffeine use:1 cup daily.    • Drug use: No     Medications Prior to Admission   Medication Sig Dispense Refill Last Dose   • acetaminophen (TYLENOL) 325 MG tablet Take 2 tablets by mouth Every 6 (Six) Hours As Needed for Mild Pain  (pain or symptomatic fever).   Taking   • furosemide (LASIX) 40 MG tablet Take 1 tablet by mouth 3 (Three) Times a Week. 45 tablet 3    • hydroCHLOROthiazide (HYDRODIURIL) 25 MG tablet Take 25 mg by mouth Daily.      • ipratropium-albuterol (DUO-NEB) 0.5-2.5 mg/3 ml nebulizer ipratropium 0.5 mg-albuterol 3 mg (2.5 mg base)/3 mL nebulization soln   Taking   • losartan (COZAAR) 50 MG tablet Take 100 mg by mouth Daily.      • metoprolol succinate XL (TOPROL-XL) 50 MG 24 hr tablet Take 1 tablet by mouth 2 (Two) Times a Day.   Taking   • oxybutynin (DITROPAN) 5 MG tablet Take 5 mg by mouth Daily.      • rivaroxaban (XARELTO) 20 MG tablet Take 1 tablet by mouth Daily. 90 tablet 3      Allergies:  No Known Allergies    Objective    Objective     Vital Signs  Temp:  [97.3 °F (36.3 °C)-97.4 °F (36.3 °C)] 97.3 °F (36.3 °C)  Heart Rate:  [] 83  Resp:  [20] 20  BP: ()/(20-74) 95/50  SpO2:  [94 %-100 %] 95 %  on  Flow (L/min):  [3-4] 3;   Device (Oxygen Therapy): nasal cannula  Body mass index is 67.09 kg/m².    Physical  Exam   Constitutional: She is oriented to person, place, and time. She appears well-developed. No distress.   Acutely ill appearing.   HENT:   Head: Normocephalic and atraumatic.   Nose: Nose normal.   Mouth/Throat: Oropharynx is clear and moist.   Eyes: Conjunctivae are normal. Right eye exhibits no discharge. Left eye exhibits no discharge.   Neck: Normal range of motion. Neck supple.   Cardiovascular: Normal rate, regular rhythm, normal heart sounds and intact distal pulses.   Pulmonary/Chest: Effort normal and breath sounds normal. No respiratory distress.   Abdominal: Soft. Bowel sounds are normal. She exhibits no distension. There is no tenderness.   obese   Musculoskeletal: She exhibits edema (R>L swelling noted in lower extremity). She exhibits no tenderness.   Bilateral lower extremity weakness. Can move lower extremities, but does not lift them.   Neurological: She is alert and oriented to person, place, and time. She exhibits normal muscle tone. Coordination normal.   Skin: Skin is warm and dry. She is not diaphoretic. There is erythema.   Ulceration present to RLE with serous drainage on pad. Wound bed beefy red. Significant erythema and warmth on calf and some extension to medial thigh.   Psychiatric: She has a normal mood and affect. Her behavior is normal.   Nursing note and vitals reviewed.     Results Review:  I reviewed the patient's new clinical results.  I reviewed the patient's new imaging results and agree with the interpretation.  I reviewed the patient's other test results and agree with the interpretation  I personally viewed and interpreted the patient's EKG/Telemetry data    Lab Results (last 24 hours)     Procedure Component Value Units Date/Time    Blood Culture - Blood, Arm, Right [[id number]] Collected:  06/04/20 0030    Specimen:  Blood from Arm, Right Updated:  06/04/20 0044    CBC & Differential [[id number]] Collected:  06/04/20 0035    Specimen:  Blood from Arm, Right Updated:   06/04/20 0056    Narrative:       The following orders were created for panel order CBC & Differential.  Procedure                               Abnormality         Status                     ---------                               -----------         ------                     CBC Auto Differential[[id number]]        Abnormal            Final result                 Please view results for these tests on the individual orders.    Comprehensive Metabolic Panel [[id number]]  (Abnormal) Collected:  06/04/20 0035    Specimen:  Blood from Arm, Right Updated:  06/04/20 0111     Glucose 114 mg/dL      BUN 77 mg/dL      Creatinine 5.85 mg/dL      Sodium 122 mmol/L      Potassium 3.9 mmol/L      Chloride 84 mmol/L      CO2 16.4 mmol/L      Calcium 8.8 mg/dL      Total Protein 7.9 g/dL      Albumin 3.50 g/dL      ALT (SGPT) 33 U/L      AST (SGOT) 53 U/L      Alkaline Phosphatase 111 U/L      Total Bilirubin 1.0 mg/dL      eGFR Non African Amer 7 mL/min/1.73      Comment: <15 Indicative of kidney failure.        eGFR   Amer --     Comment: <15 Indicative of kidney failure.        Globulin 4.4 gm/dL      A/G Ratio 0.8 g/dL      BUN/Creatinine Ratio 13.2     Anion Gap 21.6 mmol/L     Narrative:       GFR Normal >60  Chronic Kidney Disease <60  Kidney Failure <15      Lactic Acid, Plasma [[id number]]  (Abnormal) Collected:  06/04/20 0035    Specimen:  Blood from Arm, Right Updated:  06/04/20 0108     Lactate 4.3 mmol/L     CBC Auto Differential [[id number]]  (Abnormal) Collected:  06/04/20 0035    Specimen:  Blood from Arm, Right Updated:  06/04/20 0056     WBC 19.31 10*3/mm3      RBC 3.79 10*6/mm3      Hemoglobin 10.7 g/dL      Hematocrit 31.5 %      MCV 83.1 fL      MCH 28.2 pg      MCHC 34.0 g/dL      RDW 15.0 %      RDW-SD 44.9 fl      MPV 9.4 fL      Platelets 257 10*3/mm3     Manual Differential [[id number]]  (Abnormal) Collected:  06/04/20 0035    Specimen:  Blood from Arm, Right Updated:  06/04/20 0120     Neutrophil %  93.0 %      Lymphocyte % 2.0 %      Monocyte % 4.0 %      Atypical Lymphocyte % 1.0 %      Neutrophils Absolute 17.96 10*3/mm3      Lymphocytes Absolute 0.39 10*3/mm3      Monocytes Absolute 0.77 10*3/mm3      Anisocytosis Mod/2+     Polychromasia Mod/2+     WBC Morphology Normal     Platelet Morphology Normal    Lactic Acid, Reflex Timer (This will reflex a repeat order 3-3:15 hours after ordered.) [[id number]] Collected:  06/04/20 0035    Specimen:  Blood from Arm, Right Updated:  06/04/20 0415     Hold Tube Hold for add-ons.     Comment: Auto resulted.       Wound Culture - Swab, Leg, Right [[id number]] Collected:  06/04/20 0049    Specimen:  Swab from Leg, Right Updated:  06/04/20 0409     Gram Stain No WBCs or organisms seen    Blood Culture - Blood, Arm, Left [[id number]] Collected:  06/04/20 0153    Specimen:  Blood from Arm, Left Updated:  06/04/20 0200    Basic Metabolic Panel [[id number]]  (Abnormal) Collected:  06/04/20 0434    Specimen:  Blood Updated:  06/04/20 0543     Glucose 100 mg/dL      BUN 80 mg/dL      Creatinine 5.69 mg/dL      Sodium 124 mmol/L      Potassium 3.5 mmol/L      Chloride 88 mmol/L      CO2 16.8 mmol/L      Calcium 8.0 mg/dL      eGFR   Amer --     Comment: <15 Indicative of kidney failure.        eGFR Non African Amer 8 mL/min/1.73      Comment: <15 Indicative of kidney failure.        BUN/Creatinine Ratio 14.1     Anion Gap 19.2 mmol/L     Narrative:       GFR Normal >60  Chronic Kidney Disease <60  Kidney Failure <15      CBC Auto Differential [[id number]]  (Abnormal) Collected:  06/04/20 0434    Specimen:  Blood Updated:  06/04/20 0549     WBC 15.89 10*3/mm3      RBC 3.56 10*6/mm3      Hemoglobin 9.8 g/dL      Hematocrit 28.9 %      MCV 81.2 fL      MCH 27.5 pg      MCHC 33.9 g/dL      RDW 14.3 %      RDW-SD 42.3 fl      MPV 8.9 fL      Platelets 215 10*3/mm3     Protime-INR [[id number]]  (Abnormal) Collected:  06/04/20 0434    Specimen:  Blood Updated:  06/04/20 0520      Protime 22.6 Seconds      INR 2.02    Lactic Acid, Reflex [[id number]]  (Normal) Collected:  06/04/20 0434    Specimen:  Blood Updated:  06/04/20 0513     Lactate 1.3 mmol/L     BNP [[id number]]  (Abnormal) Collected:  06/04/20 0434    Specimen:  Blood Updated:  06/04/20 0539     proBNP 1,598.0 pg/mL     Narrative:       Among patients with dyspnea, NT-proBNP is highly sensitive for the detection of acute congestive heart failure. In addition NT-proBNP of <300 pg/ml effectively rules out acute congestive heart failure with 99% negative predictive value.    Results may be falsely decreased if patient taking Biotin.      Manual Differential [[id number]]  (Abnormal) Collected:  06/04/20 0434    Specimen:  Blood Updated:  06/04/20 0549     Neutrophil % 92.9 %      Lymphocyte % 2.0 %      Monocyte % 5.1 %      Neutrophils Absolute 14.76 10*3/mm3      Lymphocytes Absolute 0.32 10*3/mm3      Monocytes Absolute 0.81 10*3/mm3      RBC Morphology Normal     WBC Morphology Normal     Platelet Morphology Normal    POC Glucose Once [[id number]]  (Normal) Collected:  06/04/20 0507    Specimen:  Blood Updated:  06/04/20 0509     Glucose 110 mg/dL     COVID PRE-OP / PRE-PROCEDURE SCREENING ORDER (NO ISOLATION) - Swab, Nasopharynx [[id number]] Collected:  06/04/20 0550    Specimen:  Swab from Nasopharynx Updated:  06/04/20 0730    Narrative:       The following orders were created for panel order COVID PRE-OP / PRE-PROCEDURE SCREENING ORDER (NO ISOLATION) - Swab, Nasopharynx.  Procedure                               Abnormality         Status                     ---------                               -----------         ------                     COVID-19,[facility]-HOUSE...[[id number]]  Normal              Final result                 Please view results for these tests on the individual orders.    COVID-19,[facility]-HOU[facility], NP SWAB IN TRANSPORT MEDIA 8-12 HR TAT - Swab, Nasopharynx [[id number]]  (Normal) Collected:  06/04/20 0559     Specimen:  Swab from Nasopharynx Updated:  06/04/20 0730     COVID19 Not Detected    Blood Gas, Arterial [[id number]]  (Abnormal) Collected:  06/04/20 0556    Specimen:  Arterial Blood Updated:  06/04/20 0558     Site Arterial: left radial     Hammad's Test Positive     pH, Arterial 7.355 pH units      pCO2, Arterial 33.8 mm Hg      pO2, Arterial 100.1 mm Hg      HCO3, Arterial 18.8 mmol/L      Base Excess, Arterial -6.0 mmol/L      O2 Saturation Calculated 97.5 %      Barometric Pressure for Blood Gas 741.5 mmHg      Modality Cannula     Flow Rate 3 lpm      Rate 20 Breaths/minute           Imaging Results (Last 24 Hours)     Procedure Component Value Units Date/Time    XR Chest 1 View [[id number]] Collected:  06/04/20 0504     Updated:  06/04/20 0509    Narrative:       PORTABLE CHEST RADIOGRAPH     HISTORY: Cough and congestion     COMPARISON: 11/11/2017     FINDINGS:  Cardiomegaly is present. There may be some vascular congestion. Patchy  perihilar infiltrates are seen. Appearance is nonspecific, and may  reflect asymmetric edema, although pneumonia is also in the  differential. No pneumothorax is seen. There is some blunting of the  left costophrenic angle, which may reflect a small effusion. Right hilum  does appear enlarged, which can be seen with pulmonary arterial  hypertension.       Impression:          1. Cardiomegaly and vascular congestion.  2. Patchy perihilar densities are nonspecific. Asymmetric edema and  pneumonia would be in the differential.     This report was finalized on 6/4/2020 5:06 AM by Dr. Rose Zamudio M.D.              Assessment/Plan     Active Hospital Problems    Diagnosis POA   • **Sepsis (CMS/HCC) [A41.9] Yes   • History of DVT of lower extremity [Z86.718] Not Applicable   • Cellulitis of right anterior lower leg [L03.115] Yes   • Lymphedema [I89.0] Yes   • Cellulitis of right lower extremity [L03.115] Yes   • Hyponatremia [E87.1] Yes   • Acute renal failure (ARF) (CMS/HCC)  [N17.9] Yes   • Anemia, chronic disease [D63.8] Yes   • Morbid obesity (CMS/HCC) [E66.01] Yes   • ARIEL (obstructive sleep apnea) [G47.33] Yes   • Hypertension [I10] Yes   • PFO (patent foramen ovale) [Q21.1] Not Applicable   • Chronic diastolic CHF (congestive heart failure) (CMS/HCC) [I50.32] Unknown   • Pulmonary hypertension (CMS/HCC) [I27.20] Yes     multifactorial (dCHF, obesity/ARIEL, hx PE), mild by echo 1/2016       Sepsis/cellulitis of right lower extremity  -IV Zosyn and Vancomycin initiated. Pharmacy dosing.  -IVF boluses given on arrival. BP borderline. Given STEFANIA/chronic diastolic heart failure, plans to transfer to ICU for CRRT today. Nephrology consulted and managing.   -Wound culture with no WBCs. Blood cultures pending at this time. History of group B bactermia from cellulitis in the past. Will ask Infectious disease to weigh in.  -WOCN to see for local wound care.  -Diarrhea x 2 days prior. Currently resolved. Monitor.    ARF  -Creatinine 5.85 on admission. Nephrology consulted.  -Transfer to ICU for CRRT today.  -Renally dose medications. Hold ARB and diuretics from home.  -Bladder scan.    History of DVT/PE  -On Xarelto, but kidney function currently impaired for this. Will defer further anticoagulation to intensivist.    Chronic diastolic heart failure/PFO  -Cardiology consulted. Follows with Dr. Rogers.  -Daily weights. Strict intake and output.  -Hold home lasix, HCTZ and ARB given low BP/STEFANIA.    ARIEL/pulmonary HTN  -BiPAP at bedside.  -Wean oxygen as able.    Hyponatremia  -Nephrology to manage.  -Monitor closely with labs.    · I discussed the patients findings and my recommendations with patient, family, nursing staff and Dr. Arrington.    VTE Prophylaxis - Xarelto (home med).  Code Status - Full code. Confirmed with patient and .       LUCÍA Lloyd  [facility]  06/04/20  09:17

## 2020-06-17 DIAGNOSIS — E11.9 TYPE 2 DIABETES MELLITUS WITHOUT COMPLICATIONS: ICD-10-CM

## 2020-06-17 DIAGNOSIS — Z79.84 LONG TERM (CURRENT) USE OF ORAL HYPOGLYCEMIC DRUGS: ICD-10-CM

## 2020-06-17 DIAGNOSIS — I35.0 NONRHEUMATIC AORTIC (VALVE) STENOSIS: ICD-10-CM

## 2020-06-17 DIAGNOSIS — K92.2 GASTROINTESTINAL HEMORRHAGE, UNSPECIFIED: ICD-10-CM

## 2020-06-17 DIAGNOSIS — Q27.33 ARTERIOVENOUS MALFORMATION OF DIGESTIVE SYSTEM VESSEL: ICD-10-CM

## 2020-06-17 DIAGNOSIS — E78.5 HYPERLIPIDEMIA, UNSPECIFIED: ICD-10-CM

## 2020-06-17 DIAGNOSIS — D64.9 ANEMIA, UNSPECIFIED: ICD-10-CM

## 2020-06-17 DIAGNOSIS — I10 ESSENTIAL (PRIMARY) HYPERTENSION: ICD-10-CM

## 2020-12-08 NOTE — H&P ADULT - NSCORESITESY/N_GEN_A_CORE_RD
Facsimile Cover Sheet       TO: [place] Records     FROM: Pediatric Triage Nurse    Fax: [phone]     Please call Dept: [phone] if you have any questions or problems with this information.     Patient: Luis Felipe Andrews  : 2020     Mother's Name: Sandra Andrews     Total number of pages including this page: 2     Patient has a [place] Visit with our office on  with Dr. Saenz.   Please fax the following information:   1. Admission H&P   2. Discharge Summary   3. Hearing Screening Results   4. Immunization Records   5. Lab Results   6. Inpatient Testing Results     Please fax all pertinent information to fax number above.    Thank you,     Pediatric Triage Nurse     Confidentiality Notice   The document accompany this telecopy transmission contain confidential information, belonging to the sender, that is legally privileged. This information is intended only for the use the individual or entity named above. The authorized recipient of this information is prohibited from disclosing this information to any other party and is required to destroy the information after its stated need has been fulfilled.   If you are not the intended recipient, you are hereby notified that any disclosure, copying, distribution, or action taken in reliance on the contents of these documents is strictly prohibited. If you have received this telecopy in error, please notify the sender immediately to arrange for return of these documents.     
No

## 2023-01-07 NOTE — DISCHARGE NOTE NURSING/CASE MANAGEMENT/SOCIAL WORK - NSDCPEPTSTROKESIGNS_GEN_ALL_CORE
POSTPARTUM PROGRESS NOTE    Subjective:     PPD/POD#: 2   Procedure:    EGA: 37w3d   N/V: No   F/C: No   Abd Pain: Mild, well-controlled with oral pain medication   Lochia: Mild   Voiding: Yes   Ambulating: Yes   Bowel fnc: Yes   Breastfeeding: Yes   Contraception:  is getting vasectomy    Circumcision: Done     Objective:      Temp:  [97.6 °F (36.4 °C)-98.3 °F (36.8 °C)] 98 °F (36.7 °C)  Pulse:  [64-90] 71  Resp:  [16] 16  SpO2:  [96 %-98 %] 97 %  BP: (113-125)/(66-75) 113/72    Lung: Normal respiratory effort   Abdomen: Soft, appropriately tender   Uterus: Firm, no fundal tenderness   Incision: N/A   : Deferred   Extremities: Bilateral trace edema     Lab Review    Recent Labs   Lab 23  0110      K 4.0      CO2 17*   BUN 9   CREATININE 0.6   GLU 79   PROT 6.4   BILITOT 0.4   ALKPHOS 103   ALT 15   AST 21         Recent Labs   Lab 23  0110 23  0405   WBC 12.33 14.19*   HGB 13.8 12.7   HCT 39.8 37.0   MCV 84 85    223           I/O  No intake or output data in the 24 hours ending 23 0839       Assessment and Plan:   Postpartum care:  - Patient doing well.  - Continue routine management and advances.    Spinal headache  -resolved this AM     Gestational Diabetes  - Fasting B   - Will need 2 hour gtt at postpartum visit    gHTN  - BP as above  - asymptomatic  - preE labs as above  - Mag: not indicated  - Hypertensive agent not currently indicated    Mood Disorder  - Mood stable  - Medications: n/a  - Will need 1-2 week postpartum mood check    Nina Miranda MD  PGY 2  Obstetrics and Gynecology    Sudden numbness or weakness of the face, arm, or leg, especially on one side of the body. Confusion, trouble speaking or understanding. Trouble seeing in one or both eyes. Trouble walking, dizziness, loss of balance or coordination. Severe headache.

## 2023-07-23 ENCOUNTER — INPATIENT (INPATIENT)
Facility: HOSPITAL | Age: 83
LOS: 2 days | Discharge: HOME | DRG: 378 | End: 2023-07-26
Attending: INTERNAL MEDICINE | Admitting: INTERNAL MEDICINE
Payer: MEDICARE

## 2023-07-23 VITALS
TEMPERATURE: 98 F | HEART RATE: 77 BPM | OXYGEN SATURATION: 100 % | WEIGHT: 175.05 LBS | RESPIRATION RATE: 20 BRPM | SYSTOLIC BLOOD PRESSURE: 127 MMHG | DIASTOLIC BLOOD PRESSURE: 52 MMHG | HEIGHT: 65 IN

## 2023-07-23 DIAGNOSIS — Z95.2 PRESENCE OF PROSTHETIC HEART VALVE: Chronic | ICD-10-CM

## 2023-07-23 DIAGNOSIS — K92.2 GASTROINTESTINAL HEMORRHAGE, UNSPECIFIED: ICD-10-CM

## 2023-07-23 LAB
ALBUMIN SERPL ELPH-MCNC: 3.5 G/DL — SIGNIFICANT CHANGE UP (ref 3.3–5)
ALP SERPL-CCNC: 40 U/L — SIGNIFICANT CHANGE UP (ref 40–120)
ALT FLD-CCNC: 6 U/L — LOW (ref 10–45)
ANION GAP SERPL CALC-SCNC: 13 MMOL/L — SIGNIFICANT CHANGE UP (ref 5–17)
ANISOCYTOSIS BLD QL: SLIGHT — SIGNIFICANT CHANGE UP
APPEARANCE UR: CLEAR — SIGNIFICANT CHANGE UP
APTT BLD: 26.3 SEC — LOW (ref 27.5–35.5)
AST SERPL-CCNC: 13 U/L — SIGNIFICANT CHANGE UP (ref 10–40)
BASOPHILS # BLD AUTO: 0.02 K/UL — SIGNIFICANT CHANGE UP (ref 0–0.2)
BASOPHILS NFR BLD AUTO: 0.9 % — SIGNIFICANT CHANGE UP (ref 0–2)
BILIRUB SERPL-MCNC: <0.1 MG/DL — LOW (ref 0.2–1.2)
BILIRUB UR-MCNC: NEGATIVE — SIGNIFICANT CHANGE UP
BUN SERPL-MCNC: 64 MG/DL — HIGH (ref 7–23)
CALCIUM SERPL-MCNC: 9.2 MG/DL — SIGNIFICANT CHANGE UP (ref 8.4–10.5)
CHLORIDE SERPL-SCNC: 104 MMOL/L — SIGNIFICANT CHANGE UP (ref 96–108)
CO2 SERPL-SCNC: 21 MMOL/L — LOW (ref 22–31)
COLOR SPEC: SIGNIFICANT CHANGE UP
CREAT SERPL-MCNC: 1.16 MG/DL — SIGNIFICANT CHANGE UP (ref 0.5–1.3)
DACRYOCYTES BLD QL SMEAR: SLIGHT — SIGNIFICANT CHANGE UP
DIFF PNL FLD: NEGATIVE — SIGNIFICANT CHANGE UP
EGFR: 63 ML/MIN/1.73M2 — SIGNIFICANT CHANGE UP
ELLIPTOCYTES BLD QL SMEAR: SLIGHT — SIGNIFICANT CHANGE UP
EOSINOPHIL # BLD AUTO: 0 K/UL — SIGNIFICANT CHANGE UP (ref 0–0.5)
EOSINOPHIL NFR BLD AUTO: 0 % — SIGNIFICANT CHANGE UP (ref 0–6)
GIANT PLATELETS BLD QL SMEAR: PRESENT — SIGNIFICANT CHANGE UP
GLUCOSE SERPL-MCNC: 146 MG/DL — HIGH (ref 70–99)
GLUCOSE UR QL: NEGATIVE — SIGNIFICANT CHANGE UP
HAPTOGLOB SERPL-MCNC: 176 MG/DL — SIGNIFICANT CHANGE UP (ref 34–200)
HCT VFR BLD CALC: 15.7 % — CRITICAL LOW (ref 39–50)
HCT VFR BLD CALC: 21.5 % — LOW (ref 39–50)
HGB BLD-MCNC: 4.8 G/DL — CRITICAL LOW (ref 13–17)
HGB BLD-MCNC: 7 G/DL — CRITICAL LOW (ref 13–17)
HYPOCHROMIA BLD QL: SLIGHT — SIGNIFICANT CHANGE UP
INR BLD: 1.2 RATIO — HIGH (ref 0.88–1.16)
IRON SATN MFR SERPL: 15 % — LOW (ref 16–55)
IRON SATN MFR SERPL: 48 UG/DL — SIGNIFICANT CHANGE UP (ref 45–165)
KETONES UR-MCNC: NEGATIVE — SIGNIFICANT CHANGE UP
LDH SERPL L TO P-CCNC: 191 U/L — SIGNIFICANT CHANGE UP (ref 50–242)
LEUKOCYTE ESTERASE UR-ACNC: NEGATIVE — SIGNIFICANT CHANGE UP
LYMPHOCYTES # BLD AUTO: 0.42 K/UL — LOW (ref 1–3.3)
LYMPHOCYTES # BLD AUTO: 21.2 % — SIGNIFICANT CHANGE UP (ref 13–44)
MACROCYTES BLD QL: SLIGHT — SIGNIFICANT CHANGE UP
MANUAL SMEAR VERIFICATION: SIGNIFICANT CHANGE UP
MCHC RBC-ENTMCNC: 27.9 PG — SIGNIFICANT CHANGE UP (ref 27–34)
MCHC RBC-ENTMCNC: 28.8 PG — SIGNIFICANT CHANGE UP (ref 27–34)
MCHC RBC-ENTMCNC: 30.6 GM/DL — LOW (ref 32–36)
MCHC RBC-ENTMCNC: 32.6 GM/DL — SIGNIFICANT CHANGE UP (ref 32–36)
MCV RBC AUTO: 88.5 FL — SIGNIFICANT CHANGE UP (ref 80–100)
MCV RBC AUTO: 91.3 FL — SIGNIFICANT CHANGE UP (ref 80–100)
MICROCYTES BLD QL: SLIGHT — SIGNIFICANT CHANGE UP
MONOCYTES # BLD AUTO: 0.26 K/UL — SIGNIFICANT CHANGE UP (ref 0–0.9)
MONOCYTES NFR BLD AUTO: 13.3 % — SIGNIFICANT CHANGE UP (ref 2–14)
MYELOCYTES NFR BLD: 0.9 % — HIGH (ref 0–0)
NEUTROPHILS # BLD AUTO: 1.25 K/UL — LOW (ref 1.8–7.4)
NEUTROPHILS NFR BLD AUTO: 56.6 % — SIGNIFICANT CHANGE UP (ref 43–77)
NEUTS BAND # BLD: 7.1 % — SIGNIFICANT CHANGE UP (ref 0–8)
NITRITE UR-MCNC: NEGATIVE — SIGNIFICANT CHANGE UP
NRBC # BLD: 0 /100 WBCS — SIGNIFICANT CHANGE UP (ref 0–0)
OB PNL STL: POSITIVE
PH UR: 6 — SIGNIFICANT CHANGE UP (ref 5–8)
PLAT MORPH BLD: ABNORMAL
PLATELET # BLD AUTO: 61 K/UL — LOW (ref 150–400)
PLATELET # BLD AUTO: 62 K/UL — LOW (ref 150–400)
POIKILOCYTOSIS BLD QL AUTO: SLIGHT — SIGNIFICANT CHANGE UP
POTASSIUM SERPL-MCNC: 4.4 MMOL/L — SIGNIFICANT CHANGE UP (ref 3.5–5.3)
POTASSIUM SERPL-SCNC: 4.4 MMOL/L — SIGNIFICANT CHANGE UP (ref 3.5–5.3)
PROT SERPL-MCNC: 6.4 G/DL — SIGNIFICANT CHANGE UP (ref 6–8.3)
PROT UR-MCNC: NEGATIVE — SIGNIFICANT CHANGE UP
PROTHROM AB SERPL-ACNC: 14 SEC — HIGH (ref 10.5–13.4)
RBC # BLD: 1.72 M/UL — LOW (ref 4.2–5.8)
RBC # BLD: 2.43 M/UL — LOW (ref 4.2–5.8)
RBC # FLD: 18.3 % — HIGH (ref 10.3–14.5)
RBC # FLD: 20.7 % — HIGH (ref 10.3–14.5)
RBC BLD AUTO: ABNORMAL
SODIUM SERPL-SCNC: 138 MMOL/L — SIGNIFICANT CHANGE UP (ref 135–145)
SP GR SPEC: 1.02 — SIGNIFICANT CHANGE UP (ref 1.01–1.02)
TIBC SERPL-MCNC: 311 UG/DL — SIGNIFICANT CHANGE UP (ref 220–430)
TROPONIN T, HIGH SENSITIVITY RESULT: 15 NG/L — SIGNIFICANT CHANGE UP (ref 0–51)
UIBC SERPL-MCNC: 264 UG/DL — SIGNIFICANT CHANGE UP (ref 110–370)
URATE SERPL-MCNC: 7.9 MG/DL — SIGNIFICANT CHANGE UP (ref 3.4–8.8)
UROBILINOGEN FLD QL: NEGATIVE — SIGNIFICANT CHANGE UP
WBC # BLD: 1.97 K/UL — LOW (ref 3.8–10.5)
WBC # BLD: 2.21 K/UL — LOW (ref 3.8–10.5)
WBC # FLD AUTO: 1.97 K/UL — LOW (ref 3.8–10.5)
WBC # FLD AUTO: 2.21 K/UL — LOW (ref 3.8–10.5)

## 2023-07-23 PROCEDURE — 70450 CT HEAD/BRAIN W/O DYE: CPT | Mod: 26,MA

## 2023-07-23 PROCEDURE — 71045 X-RAY EXAM CHEST 1 VIEW: CPT | Mod: 26

## 2023-07-23 PROCEDURE — 99291 CRITICAL CARE FIRST HOUR: CPT

## 2023-07-23 RX ORDER — LEVOTHYROXINE SODIUM 125 MCG
50 TABLET ORAL DAILY
Refills: 0 | Status: DISCONTINUED | OUTPATIENT
Start: 2023-07-23 | End: 2023-07-26

## 2023-07-23 RX ORDER — PANTOPRAZOLE SODIUM 20 MG/1
80 TABLET, DELAYED RELEASE ORAL ONCE
Refills: 0 | Status: COMPLETED | OUTPATIENT
Start: 2023-07-23 | End: 2023-07-23

## 2023-07-23 RX ORDER — PANTOPRAZOLE SODIUM 20 MG/1
40 TABLET, DELAYED RELEASE ORAL ONCE
Refills: 0 | Status: DISCONTINUED | OUTPATIENT
Start: 2023-07-23 | End: 2023-07-23

## 2023-07-23 RX ORDER — DOXAZOSIN MESYLATE 4 MG
2 TABLET ORAL AT BEDTIME
Refills: 0 | Status: DISCONTINUED | OUTPATIENT
Start: 2023-07-23 | End: 2023-07-26

## 2023-07-23 RX ORDER — LOSARTAN POTASSIUM 100 MG/1
50 TABLET, FILM COATED ORAL DAILY
Refills: 0 | Status: DISCONTINUED | OUTPATIENT
Start: 2023-07-23 | End: 2023-07-26

## 2023-07-23 RX ORDER — PANTOPRAZOLE SODIUM 20 MG/1
8 TABLET, DELAYED RELEASE ORAL
Qty: 80 | Refills: 0 | Status: DISCONTINUED | OUTPATIENT
Start: 2023-07-23 | End: 2023-07-25

## 2023-07-23 RX ADMIN — PANTOPRAZOLE SODIUM 10 MG/HR: 20 TABLET, DELAYED RELEASE ORAL at 14:58

## 2023-07-23 RX ADMIN — PANTOPRAZOLE SODIUM 80 MILLIGRAM(S): 20 TABLET, DELAYED RELEASE ORAL at 14:46

## 2023-07-23 NOTE — ED PROVIDER NOTE - PROGRESS NOTE DETAILS
Ryder Zhu MD PGY-1  Rectal exam done at bedside chaperoned by Dr. Stanley. Dark stool visualized. Sent stool study. Pt hemodynamically stable. Attending MD Yen: Rectal exam with melena, second IV access point obtained.  Plan for IV PPI bolus and drip.  Patient is hemodynamically stable at this time current blood pressure 130 systolic.  PRBCs ordered to be transfused.  GI consult email placed at this time. Patient signed out to Dr. Nguyen at this time, patient pending packed red blood cell transfusion and IV PPI initiation.  Here with suspected upper GI bleed.  Hemodynamically stable at the moment.  Plan for admission to hospital once PRBC transfusion initiated.  GI email consult placed. Ryder Zhu MD PGY-1  Pt reassessed seen lying comfortably in bed. States he feels better after the blood transfusion. Informed him of GI consult and will update.

## 2023-07-23 NOTE — ED PROVIDER NOTE - OTHER FINDINGS
ECG at 1202 independently interpreted by me and shows normal sinus rhythm left axis deviation, occasional PVCs, LAFB, no ST elevations or depression, when compared with ECG dated 6/3/2020, no change

## 2023-07-23 NOTE — H&P ADULT - NSHPPHYSICALEXAM_GEN_ALL_CORE
pt. seen and examined     Vital Signs Last 24 Hrs  T(C): 36.7 (23 Jul 2023 21:17), Max: 36.7 (23 Jul 2023 21:17)  T(F): 98 (23 Jul 2023 21:17), Max: 98 (23 Jul 2023 21:17)  HR: 68 (23 Jul 2023 21:17) (65 - 77)  BP: 159/69 (23 Jul 2023 21:17) (109/64 - 159/69)  BP(mean): 75 (23 Jul 2023 19:40) (75 - 75)  RR: 18 (23 Jul 2023 21:17) (14 - 20)  SpO2: 98% (23 Jul 2023 21:17) (98% - 100%)    Parameters below as of 23 Jul 2023 21:17  Patient On (Oxygen Delivery Method): room air    heent : nc/at   neck : supple, no JVD  lungs : B/L clear , no w/r/r  heart: s1s2 nml  abd ; soft, NABS, NT/ND  ext : no e/c/c, pulses 2 +  neuro: aaox3 , no focal deficit

## 2023-07-23 NOTE — ED PROVIDER NOTE - OBJECTIVE STATEMENT
82 yom w/ hx of DM, HTN and aortic stenosis s/p TAVR p/w SOB and dizziness on exertion for 2 days. Son at bedside reports pt has been regularly receiving blood transfusion for symptomatic anemia for 5 years, last 3 weeks ago. Pancytopenia being worked up outpatient w/ bone marrow biopsy 1 week ag 82 yom w/ hx of DM, HTN and aortic stenosis s/p TAVR p/w SOB and dizziness for 2 days. Son at bedside reports pt has been "out of it" "not himself" and "couldn't get words out" for the last two days. Pt is AAOx3, states he has been feeling SOB, chest tightness and dizziness on exertion for last two days. Endorsed nausea this AM. Endorsed decreased appetite and 20lb weight loss last 3 mo. Endorsed night sweats last 2w. Denies fever, motor/sensory change.   Of note, pt has been regularly receiving blood transfusion for symptomatic anemia for 5 years, last transfused 3 weeks ago. Pancytopenia being worked up outpatient w/ bone marrow biopsy 1 week ago. 82 yom w/ hx of DM, HTN and aortic stenosis s/p TAVR p/w SOB and dizziness for 2 days. Son at bedside reports pt has been "out of it" "not himself" and "couldn't get words out" for the last two days. Pt is AAOx3, states he has been feeling SOB, chest tightness and dizziness on exertion for last two days. Endorsed nausea and dark stool. Endorsed decreased appetite and 20lb weight loss last 3 mo. Endorsed night sweats last 2w. Denies fever, motor/sensory change.    Of note, pt has been regularly receiving blood transfusion for symptomatic anemia for 5 years, last transfused 3 weeks ago. Pancytopenia being worked up outpatient w/ bone marrow biopsy 1 week ago.

## 2023-07-23 NOTE — ED PROVIDER NOTE - CONSTITUTIONAL, MLM
normal... PALE appearing, awake, alert, oriented to person, place, time/situation and in no apparent distress.

## 2023-07-23 NOTE — H&P ADULT - ASSESSMENT
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 luis called for medical clearance in case he goes for EGD    HTN :   c/w home meds     HLD :  -hold finofibrate for now   can be restarted after scope     Hypothyrodiism  -c/w synthroid     advance care planning : d/w him regarding advance directive, d/w him regarding CPR/Intubation if need arise. he agrees for every thing for now. remain full code. time spend 15 min     DM-2 :   hold metformin   will put him on insulin coverage   check a1c

## 2023-07-23 NOTE — H&P ADULT - BIRTH SEX
Problem: Potential for Falls  Goal: Patient will remain free of falls  INTERVENTIONS:  - Assess patient frequently for physical needs  - Identify cognitive and physical deficits and behaviors that affect risk of falls  - [place] fall precautions as indicated by assessment   - Educate patient/family on patient safety including physical limitations  - Instruct patient to call for assistance with activity based on assessment  - Modify environment to reduce risk of injury  - Consider OT/PT consult to assist with strengthening/mobility   Outcome: Progressing    Problem: SAFETY ADULT  Goal: Patient will remain free of falls  INTERVENTIONS:  - Assess patient frequently for physical needs  - Identify cognitive and physical deficits and behaviors that affect risk of falls  - [place] fall precautions as indicated by assessment   - Educate patient/family on patient safety including physical limitations  - Instruct patient to call for assistance with activity based on assessment  - Modify environment to reduce risk of injury  - Consider OT/PT consult to assist with strengthening/mobility   Outcome: Progressing    Problem: Knowledge Deficit  Goal: Patient/family/caregiver demonstrates understanding of disease process, treatment plan, medications, and discharge instructions  Complete learning assessment and assess knowledge base    Interventions:  - Provide teaching at level of understanding  - Provide teaching via preferred learning methods  Outcome: Progressing
Male

## 2023-07-23 NOTE — ED ADULT NURSE NOTE - EXTENSIONS OF SELF_ADULT
None
Quality 402: Tobacco Use And Help With Quitting Among Adolescents: Patient screened for tobacco and never smoked
Detail Level: Detailed
Quality 431: Preventive Care And Screening: Unhealthy Alcohol Use - Screening: Patient not identified as an unhealthy alcohol user when screened for unhealthy alcohol use using a systematic screening method
Quality 110: Preventive Care And Screening: Influenza Immunization: Influenza immunization was not ordered or administered, reason not given
Quality 226: Preventive Care And Screening: Tobacco Use: Screening And Cessation Intervention: Patient screened for tobacco use and is an ex/non-smoker

## 2023-07-23 NOTE — H&P ADULT - NSHPLABSRESULTS_GEN_ALL_CORE
7.0    2.21  )-----------( 62       ( 23 Jul 2023 21:46 )             21.5   07-23    138  |  104  |  64<H>  ----------------------------<  146<H>  4.4   |  21<L>  |  1.16    Ca    9.2      23 Jul 2023 13:44    TPro  6.4  /  Alb  3.5  /  TBili  <0.1<L>  /  DBili  x   /  AST  13  /  ALT  6<L>  /  AlkPhos  40  07-23

## 2023-07-23 NOTE — ED ADULT NURSE NOTE - OBJECTIVE STATEMENT
82 year old male PMH of chronic anemia, recent loss of spouse - depression since (weight loss), presents to the ED complaining of chest pressure x 2 days, intermittent, pressure like, non-radiating, non-exertional, midsternal. Patient is A&Ox4, VSS, EKG done and presented to Attending MD Yen. Pt. had one unit of PRBC at outside hospital 3 weeks ago for symptomatic anemia. Endorses 20lb weight loss in 2-3 months due to no appetite s/p spouse passing away. 20g peripheral IV placed in L AC labs drawn and sent to lab. Patient undressed and placed into gown, call bell in hand and side rails up with bed in lowest position for safety. blanket provided. Comfort and safety provided.

## 2023-07-23 NOTE — H&P ADULT - HISTORY OF PRESENT ILLNESS
82 yom w/ hx of DM, HTN and aortic stenosis s/p TAVR p/w SOB and dizziness for 2 days. Son at bedside reports pt has been "out of it" "not himself" and "couldn't get words out" for the last two days. Pt is AAOx3, states he has been feeling SOB, chest tightness and dizziness on exertion for last two days. Endorsed nausea and dark stool. Endorsed decreased appetite and 20lb weight loss last 3 mo. Endorsed night sweats last 2w. Denies fever, motor/sensory change.    Of note, pt has been regularly receiving blood transfusion for symptomatic anemia for 5 years, last transfused 3 weeks ago. Pancytopenia being worked up outpatient w/ bone marrow biopsy 1 week ago.

## 2023-07-23 NOTE — CONSULT NOTE ADULT - ASSESSMENT
82y Male with symptomatic anemia c/b unclear pancytopenia along with about 20 pound weight loss in 3 mos (unintentional)s/p BMBx outpatient awaiting results, diverticulosis, DM, HTN, AS s/p TAVR ~2 yrs PTA, AVMs in [place] s/p multiple endoscopies (EGD/colon) requiring cauterization who initially presented to the ED with SOB, episodes of chest tightness and dizziness for a couple of days. Hgb noted to be 4.8. Reported episodes of black stools for which GI was consulted.    # Reported black stools - given reported h/o AVMs, reasonable to consider as potential source of bleeding potentially in SB vs colon. Cannot r/u PUD, erosive esophagitis. Less likely LGIB,  # Pancytopenia, unclear etiology - s/p BMBx outside, pending report    Recommendations:  - Urgent endoscopic evaluation is not indicated at this time and patient is not medically optimized  - Will consider EGD tomorrow, pending final discussion with Attending Monday morning and if medically optimized/cleared by cards  - Keep NPO in meantime while monitoring  - 3 AM CBC, CMP, coags; correct electrolytes  - Transfuse if Hgb < 8  - Obtain cardiac clearance/risk stratification  - PPI 40 IV BID  - If becomes HD unstable with overt bleeding, consult MICU, obtain stat CTA, consider IR consult and inform on-call GI fellow  - f/u outside BMBx  - Rest of care per ED/primary    Preliminary note until signed by Attending.    Thank you for involving us in this patient's care.    Iram Prakash MD  Gastroenterology/Hepatology Fellow, PGY-7  NON-URGENT CONSULTS:  Please email [email or web address]  AT NIGHT AND ON WEEKENDS:  Contact on-call GI fellow via answering service ([phone]) from 5pm-8am and on weekends/holidays  MONDAY-FRIDAY 8AM-5PM:  Pager# [phone] ([facility])      82y Male with symptomatic anemia c/b unclear pancytopenia along with about 20 pound weight loss in 3 mos (unintentional)s/p BMBx outpatient awaiting results, diverticulosis, DM, HTN, AS s/p TAVR ~2 yrs PTA, AVMs in [place] s/p multiple endoscopies (EGD/colon) requiring cauterization who initially presented to the ED with SOB, episodes of chest tightness and dizziness for a couple of days. Hgb noted to be 4.8. Reported episodes of black stools for which GI was consulted.    # Reported black stools - given reported h/o AVMs, reasonable to consider as potential source of bleeding potentially in SB vs colon. Cannot r/u PUD, erosive esophagitis. Less likely LGIB,  # Pancytopenia, unclear etiology - s/p BMBx outside, pending report    Recommendations:  - Urgent endoscopic evaluation is not indicated at this time and patient is not medically optimized  - Will consider EGD/colon Tuesday if medically optimized and cleared by Cards  - Obtain cardiac clearance/risk stratification  - CLD Monday  - GI to order prep if cleared by cards  - 3 AM CBC, CMP, coags; correct electrolytes  - Transfuse if Hgb < 8  - PPI 40 IV BID  - If becomes HD unstable with overt bleeding, consult MICU, obtain stat CTA, consider IR consult and inform on-call GI fellow  - f/u outside BMBx  - Rest of care per ED/primary    Preliminary note until signed by Attending.    Thank you for involving us in this patient's care.    Iram Prakash MD  Gastroenterology/Hepatology Fellow, PGY-7  NON-URGENT CONSULTS:  Please email [email or web address]  [email or web address]  AT NIGHT AND ON WEEKENDS:  Contact on-call GI fellow via answering service ([phone]) from 5pm-8am and on weekends/holidays  MONDAY-FRIDAY 8AM-5PM:  Pager# [phone] ([facility])      82y Male with symptomatic anemia c/b unclear pancytopenia along with about 20 pound weight loss in 3 mos (unintentional)s/p BMBx outpatient awaiting results, diverticulosis, DM, HTN, AS s/p TAVR ~2 yrs PTA, AVMs in [place] s/p multiple endoscopies (EGD/colon) requiring cauterization who initially presented to the ED with SOB, episodes of chest tightness and dizziness for a couple of days. Hgb noted to be 4.8. Reported episodes of black stools for which GI was consulted.    # Reported black stools - given reported h/o AVMs, reasonable to consider as potential source of bleeding potentially in SB vs colon. Cannot r/u PUD, erosive esophagitis. Less likely LGIB,  # Pancytopenia, unclear etiology - s/p BMBx outside, pending report    Recommendations:  - Urgent endoscopic evaluation is not indicated at this time and patient is not medically optimized  - Will consider EGD/colon Tuesday if medically optimized and cleared by Cards  - Obtain cardiac clearance/risk stratification  - CLD Monday  - GI to order prep if cleared by cards  - Check Tuesday 3 AM CBC, CMP, coags; correct electrolytes  - Transfuse if Hgb < 8  - PPI 40 IV BID  - If becomes HD unstable with overt bleeding, consult MICU, obtain stat CTA, consider IR consult and inform on-call GI fellow  - f/u outside BMBx  - Rest of care per ED/primary    Preliminary note until signed by Attending.    Thank you for involving us in this patient's care.    Iram Prakash MD  Gastroenterology/Hepatology Fellow, PGY-7  NON-URGENT CONSULTS:  Please email [email or web address]  [email or web address]  AT NIGHT AND ON WEEKENDS:  Contact on-call GI fellow via answering service ([phone]) from 5pm-8am and on weekends/holidays  MONDAY-FRIDAY 8AM-5PM:  Pager# [phone] ([facility])      82y Male with symptomatic anemia c/b unclear pancytopenia along with about 20 pound weight loss in 3 mos (unintentional)s/p BMBx outpatient awaiting results, diverticulosis, DM, HTN, AS s/p TAVR ~2 yrs PTA, AVMs in [place] s/p multiple endoscopies (EGD/colon) requiring cauterization who initially presented to the ED with SOB, episodes of chest tightness and dizziness for a couple of days. Hgb noted to be 4.8. Reported episodes of black stools for which GI was consulted.    # Reported black stools - given reported h/o AVMs, reasonable to consider as potential source of bleeding potentially in SB vs colon. Cannot r/u PUD, erosive esophagitis. Less likely LGIB,  # Pancytopenia, unclear etiology - s/p BMBx outside, pending report    Recommendations:  - Urgent endoscopic evaluation is not indicated at this time and patient is not medically optimized  - Will consider push enteroscopy Tuesday if medically optimized and cleared by Cards  - Obtain cardiac clearance/risk stratification  - CLD Monday  - GI to order prep if cleared by cards  - Check Tuesday 3 AM CBC, CMP, coags; correct electrolytes  - Transfuse if Hgb < 8  - PPI 40 IV BID  - If becomes HD unstable with overt bleeding, consult MICU, obtain stat CTA, consider IR consult and inform on-call GI fellow  - f/u outside BMBx  - Rest of care per ED/primary    Preliminary note until signed by Attending.    Thank you for involving us in this patient's care.    Iram Prakash MD  Gastroenterology/Hepatology Fellow, PGY-7  NON-URGENT CONSULTS:  Please email [email or web address]  AT NIGHT AND ON WEEKENDS:  Contact on-call GI fellow via answering service ([phone]) from 5pm-8am and on weekends/holidays  MONDAY-FRIDAY 8AM-5PM:  Pager# [phone] ([facility])

## 2023-07-23 NOTE — CONSULT NOTE ADULT - SUBJECTIVE AND OBJECTIVE BOX
Chief Complaint:  Patient is a 82y old  Male who presents with a chief complaint of     HPI:SUSANNA BRAND is a 82y Male with symptomatic anemia c/b unclear pancytopenia along with about 20 pound weight loss in 3 mos (unintentional)s/p BMBx outpatient awaiting results, diverticulosis, DM, HTN, AS s/p TAVR ~2 yrs PTA, AVMs in [place] s/p multiple endoscopies (EGD/colon) requiring cauterization who initially presented to the ED with SOB, episodes of chest tightness and dizziness for a couple of days. Hgb noted to be 4.8. Reported episodes of black stools for which GI was consulted.    Patient states that for the past 3 weeks he has been having episodes of black stool and a little loose, but not watery/solid. No red blood. He had nausea, no vomiting, but retching. Reporting a long history of black stools for which he says he underwent 20+ endoscopies for in [place]. He had small bowel AVMs in [place] that he says were cauterized. He believes he also had some in his colon, but cannot recall. Says after undergoing a TAVR the episodes of black stool decreased significantly but started to  over the past few weeks. Denies heavy use of NSAIDs and no heartburn or reflux/painful swallowing, but on ppi at home. Denies smoking, heavy alcohol use, illicit/IVDU. No fhx of GI disease or malignancy.     He has been HDS and afebrile.     PMHX/PSHX:  Gastrointestinal bleeding    Mild HTN    HTN (hypertension)    HLD (hyperlipidemia)    T2DM (type 2 diabetes mellitus)    Hypothyroidism    No significant past surgical history    S/P TAVR (transcatheter aortic valve replacement)      Allergies:  No Known Allergies      Home Medications: reviewed  Hospital Medications:  pantoprazole Infusion 8 mG/Hr IV Continuous <Continuous>      Social History:   Tob: Denies  EtOH: Denies  Illicit Drugs: Denies    Family history:  No pertinent family history in first degree relatives      Denies family history of colon cancer/polyps, stomach cancer/polyps, pancreatic cancer/masses, liver cancer/disease, ovarian cancer and endometrial cancer.    ROS:   Complete and normal except as mentioned above.    PHYSICAL EXAM:   Vital Signs:  Vital Signs Last 24 Hrs  T(C): 36.7 (23 Jul 2023 21:17), Max: 36.7 (23 Jul 2023 21:17)  T(F): 98 (23 Jul 2023 21:17), Max: 98 (23 Jul 2023 21:17)  HR: 68 (23 Jul 2023 21:17) (65 - 77)  BP: 159/69 (23 Jul 2023 21:17) (109/64 - 159/69)  BP(mean): 75 (23 Jul 2023 19:40) (75 - 75)  RR: 18 (23 Jul 2023 21:17) (14 - 20)  SpO2: 98% (23 Jul 2023 21:17) (98% - 100%)    Parameters below as of 23 Jul 2023 21:17  Patient On (Oxygen Delivery Method): room air      Daily Height in cm: 165.1 (23 Jul 2023 11:57)    Daily     GENERAL: no acute distress  NEURO: aox3  HEENT: anicteric sclera, no conjunctival pallor appreciated  CHEST: no respiratory distress, no accessory muscle use  CARDIAC: regular rate, rhythm  ABDOMEN: soft, non-tender, non-distended, no rebound or guarding  JEFF: patient deferred  EXTREMITIES: warm, well perfused, no edema  SKIN: no lesions noted    LABS: reviewed                        4.8    1.97  )-----------( 61       ( 23 Jul 2023 13:44 )             15.7     07-23    138  |  104  |  64<H>  ----------------------------<  146<H>  4.4   |  21<L>  |  1.16    Ca    9.2      23 Jul 2023 13:44    TPro  6.4  /  Alb  3.5  /  TBili  <0.1<L>  /  DBili  x   /  AST  13  /  ALT  6<L>  /  AlkPhos  40  07-23    LIVER FUNCTIONS - ( 23 Jul 2023 13:44 )  Alb: 3.5 g/dL / Pro: 6.4 g/dL / ALK PHOS: 40 U/L / ALT: 6 U/L / AST: 13 U/L / GGT: x               Diagnostic Studies: see sunrise for full report         Chief Complaint:  Patient is a 82y old  Male who presents with a chief complaint of     HPI:SUSANNA BRAND is a 82y Male with symptomatic anemia c/b unclear pancytopenia along with about 20 pound weight loss in 3 mos (unintentional)s/p BMBx outpatient awaiting results, diverticulosis, DM, HTN, AS s/p TAVR ~2 yrs PTA, AVMs in [place] s/p multiple endoscopies (EGD/colon) requiring cauterization who initially presented to the ED with SOB, episodes of chest tightness and dizziness for a couple of days. Hgb noted to be 4.8. Reported episodes of black stools for which GI was consulted.    Patient states that for the past 3 weeks he has been having episodes of black stool and a little loose, but not watery/solid. No red blood. He had nausea, no vomiting, but retching. Reporting a long history of black stools for which he says he underwent 20+ endoscopies for in [place]. He had small bowel AVMs in [place] that he says were cauterized. He believes he also had some in his colon, but cannot recall. Says after undergoing a TAVR the episodes of black stool decreased significantly but started to  over the past few weeks. Denies heavy use of NSAIDs and no heartburn or reflux/painful swallowing, but on ppi at home. Denies smoking, heavy alcohol use, illicit/IVDU. No fhx of GI disease or malignancy.     He has been HDS and afebrile.   Per [employer] 2016: had cecal nodule removed and 5 mm sessile polyp removed and 1.5 cm lipoma at the hepatic flexure with plans to repeat in 3 years and had "benign path." Per allscripts has had a previous EGD at [facility] with bleeding source identified/treated per patient.    PMHX/PSHX:  Gastrointestinal bleeding    Mild HTN    HTN (hypertension)    HLD (hyperlipidemia)    T2DM (type 2 diabetes mellitus)    Hypothyroidism    No significant past surgical history    S/P TAVR (transcatheter aortic valve replacement)      Allergies:  No Known Allergies      Home Medications: reviewed  Hospital Medications:  pantoprazole Infusion 8 mG/Hr IV Continuous <Continuous>      Social History:   Tob: Denies  EtOH: Denies  Illicit Drugs: Denies    Family history:  No pertinent family history in first degree relatives       ROS:   Complete and normal except as mentioned above.    PHYSICAL EXAM:   Vital Signs:  Vital Signs Last 24 Hrs  T(C): 36.7 (23 Jul 2023 21:17), Max: 36.7 (23 Jul 2023 21:17)  T(F): 98 (23 Jul 2023 21:17), Max: 98 (23 Jul 2023 21:17)  HR: 68 (23 Jul 2023 21:17) (65 - 77)  BP: 159/69 (23 Jul 2023 21:17) (109/64 - 159/69)  BP(mean): 75 (23 Jul 2023 19:40) (75 - 75)  RR: 18 (23 Jul 2023 21:17) (14 - 20)  SpO2: 98% (23 Jul 2023 21:17) (98% - 100%)    Parameters below as of 23 Jul 2023 21:17  Patient On (Oxygen Delivery Method): room air      Daily Height in cm: 165.1 (23 Jul 2023 11:57)    Daily     GENERAL: no acute distress  NEURO: aox3  HEENT: anicteric sclera, no conjunctival pallor appreciated  CHEST: no respiratory distress, no accessory muscle use  CARDIAC: regular rate   ABDOMEN: soft, non-tender, non-distended, no rebound or guarding  JEFF: patient deferred  EXTREMITIES: warm, well perfused, no edema  SKIN: no lesions noted    LABS: reviewed                        4.8    1.97  )-----------( 61       ( 23 Jul 2023 13:44 )             15.7     07-23    138  |  104  |  64<H>  ----------------------------<  146<H>  4.4   |  21<L>  |  1.16    Ca    9.2      23 Jul 2023 13:44    TPro  6.4  /  Alb  3.5  /  TBili  <0.1<L>  /  DBili  x   /  AST  13  /  ALT  6<L>  /  AlkPhos  40  07-23    LIVER FUNCTIONS - ( 23 Jul 2023 13:44 )  Alb: 3.5 g/dL / Pro: 6.4 g/dL / ALK PHOS: 40 U/L / ALT: 6 U/L / AST: 13 U/L / GGT: x               Diagnostic Studies: see sunrise for full report         Chief Complaint:  Patient is a 82y old  Male who presents with a chief complaint of     HPI:SUSANNA BRAND is a 82y Male with symptomatic anemia c/b unclear pancytopenia along with about 20 pound weight loss in 3 mos (unintentional)s/p BMBx outpatient awaiting results, diverticulosis, DM, HTN, AS s/p TAVR ~2 yrs PTA, AVMs in [place] s/p multiple endoscopies (EGD/colon) requiring cauterization who initially presented to the ED with SOB, episodes of chest tightness and dizziness for a couple of days. Hgb noted to be 4.8. Reported episodes of black stools for which GI was consulted.    Patient states that for the past 3 weeks he has been having episodes of black stool and a little loose, but not watery/solid. No red blood. He had nausea, no vomiting, but retching. Reporting a long history of black stools for which he says he underwent 20+ endoscopies for in [place]. He had small bowel AVMs in [place] that he says were cauterized. He believes he also had some in his colon, but cannot recall. Says after undergoing a TAVR the episodes of black stool decreased significantly but started to  over the past few weeks. Denies heavy use of NSAIDs and no heartburn or reflux/painful swallowing, but on ppi at home. Denies smoking, heavy alcohol use, illicit/IVDU. No fhx of GI disease or malignancy.     He has been HDS and afebrile.   Per [employer] 2016: had cecal nodule removed and 5 mm sessile polyp removed and 1.5 cm lipoma at the hepatic flexure with plans to repeat in 3 years and had "benign path." Per allscripts has had a previous EGD at [facility] with bleeding source identified/treated per patient. Reports having had EGD/colonoscopy 6 months ago by Dr. Garzon at [facility] and was told there was angioectasias. Also has tried tamoxifen previously, but ultimately could not tolerate it due to side effects.    PMHX/PSHX:  Gastrointestinal bleeding    Mild HTN    HTN (hypertension)    HLD (hyperlipidemia)    T2DM (type 2 diabetes mellitus)    Hypothyroidism    No significant past surgical history    S/P TAVR (transcatheter aortic valve replacement)      Allergies:  No Known Allergies      Home Medications: reviewed  Hospital Medications:  pantoprazole Infusion 8 mG/Hr IV Continuous <Continuous>      Social History:   Tob: Denies  EtOH: Denies  Illicit Drugs: Denies    Family history:  No pertinent family history in first degree relatives       ROS:   Complete and normal except as mentioned above.    PHYSICAL EXAM:   Vital Signs:  Vital Signs Last 24 Hrs  T(C): 36.7 (23 Jul 2023 21:17), Max: 36.7 (23 Jul 2023 21:17)  T(F): 98 (23 Jul 2023 21:17), Max: 98 (23 Jul 2023 21:17)  HR: 68 (23 Jul 2023 21:17) (65 - 77)  BP: 159/69 (23 Jul 2023 21:17) (109/64 - 159/69)  BP(mean): 75 (23 Jul 2023 19:40) (75 - 75)  RR: 18 (23 Jul 2023 21:17) (14 - 20)  SpO2: 98% (23 Jul 2023 21:17) (98% - 100%)    Parameters below as of 23 Jul 2023 21:17  Patient On (Oxygen Delivery Method): room air      Daily Height in cm: 165.1 (23 Jul 2023 11:57)    Daily     GENERAL: no acute distress  NEURO: aox3  HEENT: anicteric sclera, no conjunctival pallor appreciated  CHEST: no respiratory distress, no accessory muscle use  CARDIAC: regular rate   ABDOMEN: soft, non-tender, non-distended, no rebound or guarding  JEFF: patient deferred  EXTREMITIES: warm, well perfused, no edema  SKIN: no lesions noted  PSYCH: normal affect    LABS: reviewed                        4.8    1.97  )-----------( 61       ( 23 Jul 2023 13:44 )             15.7     07-23    138  |  104  |  64<H>  ----------------------------<  146<H>  4.4   |  21<L>  |  1.16    Ca    9.2      23 Jul 2023 13:44    TPro  6.4  /  Alb  3.5  /  TBili  <0.1<L>  /  DBili  x   /  AST  13  /  ALT  6<L>  /  AlkPhos  40  07-23    LIVER FUNCTIONS - ( 23 Jul 2023 13:44 )  Alb: 3.5 g/dL / Pro: 6.4 g/dL / ALK PHOS: 40 U/L / ALT: 6 U/L / AST: 13 U/L / GGT: x               Diagnostic Studies: see sunrise for full report

## 2023-07-23 NOTE — ED PROVIDER NOTE - CLINICAL SUMMARY MEDICAL DECISION MAKING FREE TEXT BOX
82yom hx of chronic anemia requiring transfusion here w/ SOB, chest tightness and dizziness X 2D. Pale appearing w/ dark stool on rectal exam concerning for GI bleed. AMS per son concerning for intracranial processes, will obtain CT head w/o contrast. 82yom hx of chronic anemia requiring transfusion here w/ SOB, chest tightness and dizziness X 2D. Pale appearing, dark stool on rectal exam concerning for GI bleed. In addition, decreased appetite, weight loss and night sweat in the last 3 mo concerning for malignant process. AMS per son concerning for intracranial processes, will obtain CT head w/o contrast to further evaluate.     Hgb 4.8, consent for transfusion obtained from pt. Pt hemodynamically stable, will start transfusion as soon as type/screen completed.     Stool positive for occult blood. GI consulted.

## 2023-07-23 NOTE — CONSULT NOTE ADULT - ATTENDING COMMENTS
Agree with above. Patient has known angioectasia/bleeding, has had 15 endoscopic evaluations over 10 years, last of which was 6 months ago. Had transfusion at [facility] 3 months ago without endoscopic evaluation. Has failed prior trial of tamoxifen for medical therapy, could not tolerate side effects. Explained to patient that he has recurrent bleeding from angioectasia and endoscopic therapy has not been able to reliably decrease his bleeding. Discussed the options of transfusions only, endoscopic evaluation/intervention which I made clear is unlikely to have sustained/permanent effects, vs another trial of medical therapy. Explained the side effects of anti-angiogenesis drugs including VTE, and that of octreotide for GI upset and issues with insurance approval as it's not FDA approved for this indication. He wants us to try endoscopic hemostasis again. Will try to reach out to his prior GI for records, if mostly proximal GI tract angioectasia were seen previously, will plan for enteroscopy tomorrow. Keep NPO after midnight.

## 2023-07-23 NOTE — ED ADULT NURSE NOTE - NSFALLUNIVINTERV_ED_ALL_ED
Bed/Stretcher in lowest position, wheels locked, appropriate side rails in place/Call bell, personal items and telephone in reach/Instruct patient to call for assistance before getting out of bed/chair/stretcher/Non-slip footwear applied when patient is off stretcher/[place] to call system/Physically safe environment - no spills, clutter or unnecessary equipment/Purposeful proactive rounding/Room/bathroom lighting operational, light cord in reach

## 2023-07-23 NOTE — ED PROVIDER NOTE - ATTENDING CONTRIBUTION TO CARE
Attending MD Yen:  I personally have seen and examined this patient. I have performed a substantive portion of the visit including all aspects of the medical decision making.  Resident note reviewed and agree on plan of care and except where noted.        82-year-old gentleman with history of chronic anemia previously requiring PRBC transfusion, hypertension hyperlipidemia diabetes presenting for evaluation of several days of chest pressure shortness of breath generalized fatigue as well as 1 day of "being out of it".  Some of the history comes from patient as well as son at bedside.  Patient corroborates history of patient being "out of it" today.  Patient states he has the pressure in his chest currently it is anterior sometimes radiates to both shoulders.  Does not radiate to the back.  The pressure is worse with exertion.  He is also short of breath with exertion.  He feels nauseous today as well.  No abdominal pain.  No headache.  The patient and son state that his chest symptoms have been there previously when he required a blood transfusion in the past.  What is unusual is the nausea as well as may be being out of it.    Vital signs notable for systolic blood pressure 109 otherwise nonactionable.  Patient is lying in the stretcher fatigued appearing but nontoxic.  Very pale appearing.  Clear lungs posteriorly regular heart sounds.  Abdomen is soft nontender nondistended.  Extremities warm and well-perfused.  Awake and alert. Symmetric eyebrow raise, symmetric eyelid closure. PERRL b/l, EOMI b/l, symmetric smile, tongue midline.  5/5  strength bilaterally, 5/5 elbow flexion bilaterally, 5/5 elbow extension bilaterally, 5/5 shoulder shrug b/l.  5/5 plantar and dorsiflexion b/l, 5/5 knee flexion and extension b/l, 5/5 hip flexion and extension b/l. Sensation intact and symmetric grossly to light touch throughout face and bilateral upper and lower extremities,  Finger to nose normal bilaterally.     Differential diagnosis includes but is not limited to symptomatic anemia, ACS, pneumonia, acute heart failure, less likely pulmonary embolism.  Also consider metabolic derangements intracranial pathology for AMS.  Will obtain CT head, labs including biomarkers BNP chest x-ray type and screen coagulation profile basic infectious work-up and reassessment.  Patient will require admission for further management regardless of ED diagnostics      *The above represents an initial assessment/impression. Please refer to progress notes for potential changes in patient clinical course*

## 2023-07-23 NOTE — ED ADULT TRIAGE NOTE - CHIEF COMPLAINT QUOTE
chest tightness, shortness of breath and dizziness x2 days   hx of anemia - had blood transfusion 3 weeks ago

## 2023-07-24 LAB
ALBUMIN SERPL ELPH-MCNC: 3.2 G/DL — LOW (ref 3.3–5)
ALP SERPL-CCNC: 36 U/L — LOW (ref 40–120)
ALT FLD-CCNC: 6 U/L — LOW (ref 10–45)
ANION GAP SERPL CALC-SCNC: 13 MMOL/L — SIGNIFICANT CHANGE UP (ref 5–17)
AST SERPL-CCNC: 14 U/L — SIGNIFICANT CHANGE UP (ref 10–40)
BASE EXCESS BLDV CALC-SCNC: -0.2 MMOL/L — SIGNIFICANT CHANGE UP (ref -2–3)
BILIRUB SERPL-MCNC: 0.1 MG/DL — LOW (ref 0.2–1.2)
BUN SERPL-MCNC: 41 MG/DL — HIGH (ref 7–23)
CA-I SERPL-SCNC: 1.21 MMOL/L — SIGNIFICANT CHANGE UP (ref 1.15–1.33)
CALCIUM SERPL-MCNC: 8.8 MG/DL — SIGNIFICANT CHANGE UP (ref 8.4–10.5)
CHLORIDE BLDV-SCNC: 104 MMOL/L — SIGNIFICANT CHANGE UP (ref 96–108)
CHLORIDE SERPL-SCNC: 106 MMOL/L — SIGNIFICANT CHANGE UP (ref 96–108)
CHOLEST SERPL-MCNC: 109 MG/DL — SIGNIFICANT CHANGE UP
CO2 BLDV-SCNC: 26 MMOL/L — SIGNIFICANT CHANGE UP (ref 22–26)
CO2 SERPL-SCNC: 22 MMOL/L — SIGNIFICANT CHANGE UP (ref 22–31)
CREAT SERPL-MCNC: 1 MG/DL — SIGNIFICANT CHANGE UP (ref 0.5–1.3)
CULTURE RESULTS: SIGNIFICANT CHANGE UP
EGFR: 75 ML/MIN/1.73M2 — SIGNIFICANT CHANGE UP
GAS PNL BLDV: 134 MMOL/L — LOW (ref 136–145)
GAS PNL BLDV: SIGNIFICANT CHANGE UP
GAS PNL BLDV: SIGNIFICANT CHANGE UP
GLUCOSE BLDV-MCNC: 131 MG/DL — HIGH (ref 70–99)
GLUCOSE SERPL-MCNC: 103 MG/DL — HIGH (ref 70–99)
HCO3 BLDV-SCNC: 25 MMOL/L — SIGNIFICANT CHANGE UP (ref 22–29)
HCT VFR BLD CALC: 21.1 % — LOW (ref 39–50)
HCT VFR BLD CALC: 28.2 % — LOW (ref 39–50)
HCT VFR BLDA CALC: 36 % — LOW (ref 39–51)
HDLC SERPL-MCNC: 28 MG/DL — LOW
HGB BLD CALC-MCNC: 12 G/DL — LOW (ref 12.6–17.4)
HGB BLD-MCNC: 6.9 G/DL — CRITICAL LOW (ref 13–17)
HGB BLD-MCNC: 9 G/DL — LOW (ref 13–17)
LACTATE BLDV-MCNC: 1.8 MMOL/L — SIGNIFICANT CHANGE UP (ref 0.5–2)
LACTATE SERPL-SCNC: 2.1 MMOL/L — HIGH (ref 0.5–2)
LIPID PNL WITH DIRECT LDL SERPL: 54 MG/DL — SIGNIFICANT CHANGE UP
MCHC RBC-ENTMCNC: 28.5 PG — SIGNIFICANT CHANGE UP (ref 27–34)
MCHC RBC-ENTMCNC: 28.9 PG — SIGNIFICANT CHANGE UP (ref 27–34)
MCHC RBC-ENTMCNC: 31.9 GM/DL — LOW (ref 32–36)
MCHC RBC-ENTMCNC: 32.7 GM/DL — SIGNIFICANT CHANGE UP (ref 32–36)
MCV RBC AUTO: 88.3 FL — SIGNIFICANT CHANGE UP (ref 80–100)
MCV RBC AUTO: 89.2 FL — SIGNIFICANT CHANGE UP (ref 80–100)
NON HDL CHOLESTEROL: 81 MG/DL — SIGNIFICANT CHANGE UP
NRBC # BLD: 0 /100 WBCS — SIGNIFICANT CHANGE UP (ref 0–0)
NRBC # BLD: 0 /100 WBCS — SIGNIFICANT CHANGE UP (ref 0–0)
OTHER CELLS CSF MANUAL: SIGNIFICANT CHANGE UP ML/DL (ref 18–22)
PCO2 BLDV: 41 MMHG — LOW (ref 42–55)
PH BLDV: 7.39 — SIGNIFICANT CHANGE UP (ref 7.32–7.43)
PLATELET # BLD AUTO: 61 K/UL — LOW (ref 150–400)
PLATELET # BLD AUTO: 72 K/UL — LOW (ref 150–400)
PO2 BLDV: 31 MMHG — SIGNIFICANT CHANGE UP (ref 25–45)
POTASSIUM BLDV-SCNC: 4.2 MMOL/L — SIGNIFICANT CHANGE UP (ref 3.5–5.1)
POTASSIUM SERPL-MCNC: 3.8 MMOL/L — SIGNIFICANT CHANGE UP (ref 3.5–5.3)
POTASSIUM SERPL-SCNC: 3.8 MMOL/L — SIGNIFICANT CHANGE UP (ref 3.5–5.3)
PROT SERPL-MCNC: 6.2 G/DL — SIGNIFICANT CHANGE UP (ref 6–8.3)
RBC # BLD: 2.39 M/UL — LOW (ref 4.2–5.8)
RBC # BLD: 3.16 M/UL — LOW (ref 4.2–5.8)
RBC # FLD: 18.3 % — HIGH (ref 10.3–14.5)
RBC # FLD: 18.5 % — HIGH (ref 10.3–14.5)
SAO2 % BLDV: 50.8 % — LOW (ref 67–88)
SODIUM SERPL-SCNC: 141 MMOL/L — SIGNIFICANT CHANGE UP (ref 135–145)
SPECIMEN SOURCE: SIGNIFICANT CHANGE UP
TRIGL SERPL-MCNC: 161 MG/DL — HIGH
TSH SERPL-MCNC: 7.82 UIU/ML — HIGH (ref 0.27–4.2)
WBC # BLD: 1.92 K/UL — LOW (ref 3.8–10.5)
WBC # BLD: 2 K/UL — LOW (ref 3.8–10.5)
WBC # FLD AUTO: 1.92 K/UL — LOW (ref 3.8–10.5)
WBC # FLD AUTO: 2 K/UL — LOW (ref 3.8–10.5)

## 2023-07-24 PROCEDURE — 99223 1ST HOSP IP/OBS HIGH 75: CPT | Mod: GC

## 2023-07-24 RX ORDER — LOSARTAN POTASSIUM 100 MG/1
1 TABLET, FILM COATED ORAL
Qty: 0 | Refills: 0 | DISCHARGE

## 2023-07-24 RX ORDER — ACETAMINOPHEN 500 MG
650 TABLET ORAL ONCE
Refills: 0 | Status: COMPLETED | OUTPATIENT
Start: 2023-07-24 | End: 2023-07-24

## 2023-07-24 RX ORDER — SOD SULF/SODIUM/NAHCO3/KCL/PEG
4000 SOLUTION, RECONSTITUTED, ORAL ORAL ONCE
Refills: 0 | Status: COMPLETED | OUTPATIENT
Start: 2023-07-24 | End: 2023-07-24

## 2023-07-24 RX ADMIN — Medication 650 MILLIGRAM(S): at 17:15

## 2023-07-24 RX ADMIN — LOSARTAN POTASSIUM 50 MILLIGRAM(S): 100 TABLET, FILM COATED ORAL at 05:19

## 2023-07-24 RX ADMIN — Medication 50 MICROGRAM(S): at 05:19

## 2023-07-24 RX ADMIN — Medication 650 MILLIGRAM(S): at 18:11

## 2023-07-24 RX ADMIN — PANTOPRAZOLE SODIUM 10 MG/HR: 20 TABLET, DELAYED RELEASE ORAL at 04:01

## 2023-07-24 RX ADMIN — PANTOPRAZOLE SODIUM 10 MG/HR: 20 TABLET, DELAYED RELEASE ORAL at 17:16

## 2023-07-24 RX ADMIN — Medication 2 MILLIGRAM(S): at 22:10

## 2023-07-24 RX ADMIN — PANTOPRAZOLE SODIUM 10 MG/HR: 20 TABLET, DELAYED RELEASE ORAL at 22:12

## 2023-07-24 NOTE — CONSULT NOTE ADULT - SUBJECTIVE AND OBJECTIVE BOX
Symptomatic anemia, rec transfuse to hb of 8  Should be optimized for Tuesday pending repeat CBC  DATE OF SERVICE: 07-24-23 @ 23:04    CHIEF COMPLAINT:Patient is a 82y old  Male who presents with a chief complaint of GI bleed (24 Jul 2023 10:20)      HISTORY OF PRESENT ILLNESS:HPI:  82 yom w/ hx of DM, HTN and aortic stenosis s/p TAVR p/w SOB and dizziness for 2 days. Son at bedside reports pt has been "out of it" "not himself" and "couldn't get words out" for the last two days. Pt is AAOx3, states he has been feeling SOB, chest tightness and dizziness on exertion for last two days. Endorsed nausea and dark stool. Endorsed decreased appetite and 20lb weight loss last 3 mo. Endorsed night sweats last 2w. Denies fever, motor/sensory change.    Of note, pt has been regularly receiving blood transfusion for symptomatic anemia for 5 years, last transfused 3 weeks ago. Pancytopenia being worked up outpatient w/ bone marrow biopsy 1 week ago. (23 Jul 2023 21:13)      PAST MEDICAL & SURGICAL HISTORY:  Gastrointestinal bleeding      HTN (hypertension)      HLD (hyperlipidemia)      T2DM (type 2 diabetes mellitus)      Hypothyroidism      S/P TAVR (transcatheter aortic valve replacement)  2020              MEDICATIONS:  doxazosin 2 milliGRAM(s) Oral at bedtime  losartan 50 milliGRAM(s) Oral daily          pantoprazole Infusion 8 mG/Hr IV Continuous <Continuous>    levothyroxine 50 MICROGram(s) Oral daily        FAMILY HISTORY:      Non-contributory    SOCIAL HISTORY:    [ ] not current smoker    Allergies    No Known Allergies    Intolerances    	    REVIEW OF SYSTEMS:  CONSTITUTIONAL: No fever  EYES: No eye pain, visual disturbances, or discharge  ENMT:  No difficulty hearing, tinnitus  NECK: No pain or stiffness  RESPIRATORY: No cough, wheezing,  CARDIOVASCULAR: + chest pain, palpitations, no passing out, dizziness, or leg swelling  GASTROINTESTINAL:  No nausea, vomiting, diarrhea or constipation. No melena.  GENITOURINARY: No dysuria, hematuria  NEUROLOGICAL: No stroke like symptoms  SKIN: No burning or lesions   ENDOCRINE: No heat or cold intolerance  MUSCULOSKELETAL: No joint pain or swelling  PSYCHIATRIC: No  anxiety, mood swings  HEME/LYMPH: No bleeding gums  ALLERGY AND IMMUNOLOGIC: No hives or eczema	    All other ROS negative    PHYSICAL EXAM:  T(C): 36.6 (07-24-23 @ 18:11), Max: 36.6 (07-24-23 @ 04:10)  HR: 62 (07-24-23 @ 18:11) (44 - 63)  BP: 144/57 (07-24-23 @ 18:11) (136/54 - 144/57)  RR: 18 (07-24-23 @ 18:11) (18 - 18)  SpO2: 99% (07-24-23 @ 18:11) (97% - 99%)  Wt(kg): --  I&O's Summary      Appearance: Normal	  HEENT:   Normal oral mucosa, EOMI	  Cardiovascular:  S1 S2, No JVD,    Respiratory: Lungs clear to auscultation	  Psychiatry: Alert  Gastrointestinal:  Soft, Non-tender, + BS	  Skin: No rashes   Neurologic: Non-focal  Extremities:  No edema  Vascular: Peripheral pulses palpable    	    	  	  CARDIAC MARKERS:  Labs personally reviewed by me                                  9.0    1.92  )-----------( 72       ( 24 Jul 2023 14:06 )             28.2     07-24    141  |  106  |  41<H>  ----------------------------<  103<H>  3.8   |  22  |  1.00    Ca    8.8      24 Jul 2023 03:18    TPro  6.2  /  Alb  3.2<L>  /  TBili  0.1<L>  /  DBili  x   /  AST  14  /  ALT  6<L>  /  AlkPhos  36<L>  07-24          EKG: Personally reviewed by me - SR with PVCs  Radiology: Personally reviewed by me - CXR clear lungs    Assessment/Plan       1. Preop Risk Stratification   - denies CAD Hx  - Symptomatic anemia resolved with PRBC transfusion   - Mod risk for low risk EGD/colon, no cardiac contraindication to proceed    2. GI bleed / sever anemia 2/2 ac blood loss :  Keep Hb >8 as symptomatic anemia     3. 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 Severe AS s/p TAVR :  -Follows at [facility]    5. HTN :   c/w home meds     6. HLD :  -Resume finofibrate         Differential diagnosis and plan of care discussed with patient after the evaluation. Counseling on diet, nutritional counseling, weight management, exercise and medication compliance was done.   Advanced care planning/advanced directives discussed with patient/family. DNR status including forceful chest compressions to attempt to restart the heart, ventilator support/artificial breathing, electric shock, artificial nutrition, health care proxy, Molst form all discussed with pt. Pt wishes to consider. More than fifteen minutes spent on discussing advanced directives.          Vaughn Doherty DO [facility]  Cardiovascular Medicine  [address] [place] [address], [address]  Office [phone]  Available via call/text on Microsoft Teams

## 2023-07-24 NOTE — PROGRESS NOTE ADULT - ASSESSMENT
A/P     GI bleed / sever anemia 2/2 ac blood loss :  again drop H/H this morning   s/p more transfusion   please have GI f/u , seen by house GI in ED    Hx of AVM : in GI tract :  -has multiple scopes in past   has AVM in small bowel and he thinks in his colon also   - GI following     CAD s/p TAVR :  -stable   cardio following     HTN :   c/w home meds     HLD :  -hold finofibrate for now   can be restarted after scope     Hypothyrodiism  -c/w synthroid     advance care planning : d/w him regarding advance directive, d/w him regarding CPR/Intubation if need arise. he agrees for every thing for now. remain full code. time spend 15 min     DM-2 :   hold metformin   will put him on insulin coverage   check a1c      A/P     GI bleed / sever anemia 2/2 ac blood loss :  again drop H/H this morning   s/p more transfusion   please have GI f/u , seen by house GI in ED    Hx of AVM : in GI tract :  -has multiple scopes in past   has AVM in small bowel and he thinks in his colon also   - GI following     CAD s/p TAVR :  -stable   cardio following     HTN :   c/w home meds     HLD :  -hold finofibrate for now   can be restarted after scope     Hypothyrodiism  -c/w synthroid     DM-2 :   hold metformin   will put him on insulin coverage   check a1c

## 2023-07-24 NOTE — PATIENT PROFILE ADULT - NS PRO AD ANY ON CHART
No pt informed to have health care agent bring advance directive to hospital, pt verbalized understanding/No

## 2023-07-24 NOTE — PATIENT PROFILE ADULT - OVER THE PAST TWO WEEKS HAVE YOU FELT DOWN, DEPRESSED OR HOPELESS?
Show Aperture Variable?: Yes Consent: The patient's consent was obtained including but not limited to risks of crusting, scabbing, blistering, scarring, darker or lighter pigmentary change, recurrence, incomplete removal and infection. Render Post-Care Instructions In Note?: no Number Of Freeze-Thaw Cycles: 2 freeze-thaw cycles Detail Level: Zone Post-Care Instructions: I reviewed with the patient in detail post-care instructions. Patient is to wear sunprotection, and avoid picking at any of the treated lesions. Pt may apply Vaseline to crusted or scabbing areas. Duration Of Freeze Thaw-Cycle (Seconds): 5 yes

## 2023-07-24 NOTE — PATIENT PROFILE ADULT - FALL HARM RISK - HARM RISK INTERVENTIONS

## 2023-07-24 NOTE — PROGRESS NOTE ADULT - SUBJECTIVE AND OBJECTIVE BOX
Patient is a 82y old  Male who presents with a chief complaint of GI bleed (24 Jul 2023 10:20)      INTERVAL HPI/OVERNIGHT EVENTS: again drop in H/H , s/p transfusion     T(C): 36.6 (07-24-23 @ 18:11), Max: 36.6 (07-24-23 @ 04:10)  HR: 62 (07-24-23 @ 18:11) (44 - 63)  BP: 144/57 (07-24-23 @ 18:11) (136/54 - 144/57)  RR: 18 (07-24-23 @ 18:11) (18 - 18)  SpO2: 99% (07-24-23 @ 18:11) (97% - 99%)  Wt(kg): --  I&O's Summary      PAST MEDICAL & SURGICAL HISTORY:  Gastrointestinal bleeding      HTN (hypertension)      HLD (hyperlipidemia)      T2DM (type 2 diabetes mellitus)      Hypothyroidism      S/P TAVR (transcatheter aortic valve replacement)  2020          SOCIAL HISTORY  Alcohol:  Tobacco:  Illicit substance use:    FAMILY HISTORY:    REVIEW OF SYSTEMS:  CONSTITUTIONAL: No fever, weight loss, or fatigue  EYES: No eye pain, visual disturbances, or discharge  ENMT:  No difficulty hearing, tinnitus, vertigo; No sinus or throat pain  NECK: No pain or stiffness  RESPIRATORY: No cough, wheezing, chills or hemoptysis; No shortness of breath  CARDIOVASCULAR: No chest pain, palpitations, dizziness, or leg swelling  GASTROINTESTINAL: No abdominal or epigastric pain. No nausea, vomiting, or hematemesis; No diarrhea or constipation. No melena or hematochezia.  GENITOURINARY: No dysuria, frequency, hematuria, or incontinence  NEUROLOGICAL: No headaches, memory loss, loss of strength, numbness, or tremors  SKIN: No itching, burning, rashes, or lesions   LYMPH NODES: No enlarged glands  ENDOCRINE: No heat or cold intolerance; No hair loss  MUSCULOSKELETAL: No joint pain or swelling; No muscle, back, or extremity pain  PSYCHIATRIC: No depression, anxiety, mood swings, or difficulty sleeping  HEME/LYMPH: No easy bruising, or bleeding gums  ALLERY AND IMMUNOLOGIC: No hives or eczema    RADIOLOGY & ADDITIONAL TESTS:    Imaging Personally Reviewed:  [ ] YES  [ ] NO    Consultant(s) Notes Reviewed:  [ ] YES  [ ] NO    PHYSICAL EXAM:  GENERAL: NAD, well-groomed, well-developed  HEAD:  Atraumatic, Normocephalic  EYES: EOMI, PERRLA, conjunctiva and sclera clear  ENMT: No tonsillar erythema, exudates, or enlargement; Moist mucous membranes, Good dentition, No lesions  NECK: Supple, No JVD, Normal thyroid  NERVOUS SYSTEM:  Alert & Oriented X3, Good concentration; Motor Strength 5/5 B/L upper and lower extremities; DTRs 2+ intact and symmetric  CHEST/LUNG: Clear to percussion bilaterally; No rales, rhonchi, wheezing, or rubs  HEART: Regular rate and rhythm; No murmurs, rubs, or gallops  ABDOMEN: Soft, Nontender, Nondistended; Bowel sounds present  EXTREMITIES:  2+ Peripheral Pulses, No clubbing, cyanosis, or edema  LYMPH: No lymphadenopathy noted  SKIN: No rashes or lesions    LABS:                        9.0    1.92  )-----------( 72       ( 24 Jul 2023 14:06 )             28.2     07-24    141  |  106  |  41<H>  ----------------------------<  103<H>  3.8   |  22  |  1.00    Ca    8.8      24 Jul 2023 03:18    TPro  6.2  /  Alb  3.2<L>  /  TBili  0.1<L>  /  DBili  x   /  AST  14  /  ALT  6<L>  /  AlkPhos  36<L>  07-24    PT/INR - ( 23 Jul 2023 13:44 )   PT: 14.0 sec;   INR: 1.20 ratio         PTT - ( 23 Jul 2023 13:44 )  PTT:26.3 sec  Urinalysis Basic - ( 24 Jul 2023 03:18 )    Color: x / Appearance: x / SG: x / pH: x  Gluc: 103 mg/dL / Ketone: x  / Bili: x / Urobili: x   Blood: x / Protein: x / Nitrite: x   Leuk Esterase: x / RBC: x / WBC x   Sq Epi: x / Non Sq Epi: x / Bacteria: x      CAPILLARY BLOOD GLUCOSE            Urinalysis Basic - ( 24 Jul 2023 03:18 )    Color: x / Appearance: x / SG: x / pH: x  Gluc: 103 mg/dL / Ketone: x  / Bili: x / Urobili: x   Blood: x / Protein: x / Nitrite: x   Leuk Esterase: x / RBC: x / WBC x   Sq Epi: x / Non Sq Epi: x / Bacteria: x        MEDICATIONS  (STANDING):  doxazosin 2 milliGRAM(s) Oral at bedtime  levothyroxine 50 MICROGram(s) Oral daily  losartan 50 milliGRAM(s) Oral daily  pantoprazole Infusion 8 mG/Hr (10 mL/Hr) IV Continuous <Continuous>    MEDICATIONS  (PRN):      Care Discussed with Consultants/Other Providers [ ] YES  [ ] NO

## 2023-07-25 LAB
ANION GAP SERPL CALC-SCNC: 12 MMOL/L — SIGNIFICANT CHANGE UP (ref 5–17)
APTT BLD: 27 SEC — LOW (ref 27.5–35.5)
BUN SERPL-MCNC: 22 MG/DL — SIGNIFICANT CHANGE UP (ref 7–23)
CALCIUM SERPL-MCNC: 8.9 MG/DL — SIGNIFICANT CHANGE UP (ref 8.4–10.5)
CHLORIDE SERPL-SCNC: 107 MMOL/L — SIGNIFICANT CHANGE UP (ref 96–108)
CO2 SERPL-SCNC: 22 MMOL/L — SIGNIFICANT CHANGE UP (ref 22–31)
CREAT SERPL-MCNC: 0.92 MG/DL — SIGNIFICANT CHANGE UP (ref 0.5–1.3)
EGFR: 83 ML/MIN/1.73M2 — SIGNIFICANT CHANGE UP
GLUCOSE SERPL-MCNC: 92 MG/DL — SIGNIFICANT CHANGE UP (ref 70–99)
HCT VFR BLD CALC: 23.8 % — LOW (ref 39–50)
HGB BLD-MCNC: 7.8 G/DL — LOW (ref 13–17)
INR BLD: 1.24 RATIO — HIGH (ref 0.88–1.16)
LACTATE SERPL-SCNC: 2 MMOL/L — SIGNIFICANT CHANGE UP (ref 0.5–2)
MCHC RBC-ENTMCNC: 28.9 PG — SIGNIFICANT CHANGE UP (ref 27–34)
MCHC RBC-ENTMCNC: 32.8 GM/DL — SIGNIFICANT CHANGE UP (ref 32–36)
MCV RBC AUTO: 88.1 FL — SIGNIFICANT CHANGE UP (ref 80–100)
NRBC # BLD: 0 /100 WBCS — SIGNIFICANT CHANGE UP (ref 0–0)
PLATELET # BLD AUTO: 68 K/UL — LOW (ref 150–400)
POTASSIUM SERPL-MCNC: 3.7 MMOL/L — SIGNIFICANT CHANGE UP (ref 3.5–5.3)
POTASSIUM SERPL-SCNC: 3.7 MMOL/L — SIGNIFICANT CHANGE UP (ref 3.5–5.3)
PROTHROM AB SERPL-ACNC: 14.3 SEC — HIGH (ref 10.5–13.4)
RBC # BLD: 2.7 M/UL — LOW (ref 4.2–5.8)
RBC # FLD: 18.3 % — HIGH (ref 10.3–14.5)
SARS-COV-2 RNA SPEC QL NAA+PROBE: SIGNIFICANT CHANGE UP
SODIUM SERPL-SCNC: 141 MMOL/L — SIGNIFICANT CHANGE UP (ref 135–145)
WBC # BLD: 1.42 K/UL — LOW (ref 3.8–10.5)
WBC # FLD AUTO: 1.42 K/UL — LOW (ref 3.8–10.5)

## 2023-07-25 PROCEDURE — 44360 SMALL BOWEL ENDOSCOPY: CPT | Mod: GC

## 2023-07-25 DEVICE — NET RETRV ROT ROTH 2.5MMX230CM: Type: IMPLANTABLE DEVICE | Status: FUNCTIONAL

## 2023-07-25 RX ORDER — LIDOCAINE HCL 20 MG/ML
4 VIAL (ML) INJECTION ONCE
Refills: 0 | Status: DISCONTINUED | OUTPATIENT
Start: 2023-07-25 | End: 2023-07-26

## 2023-07-25 RX ORDER — SOD SULF/SODIUM/NAHCO3/KCL/PEG
4000 SOLUTION, RECONSTITUTED, ORAL ORAL ONCE
Refills: 0 | Status: COMPLETED | OUTPATIENT
Start: 2023-07-25 | End: 2023-07-25

## 2023-07-25 RX ORDER — ACETAMINOPHEN 500 MG
650 TABLET ORAL ONCE
Refills: 0 | Status: COMPLETED | OUTPATIENT
Start: 2023-07-25 | End: 2023-07-25

## 2023-07-25 RX ORDER — BENZOCAINE AND MENTHOL 5; 1 G/100ML; G/100ML
1 LIQUID ORAL ONCE
Refills: 0 | Status: COMPLETED | OUTPATIENT
Start: 2023-07-25 | End: 2023-07-25

## 2023-07-25 RX ADMIN — PANTOPRAZOLE SODIUM 10 MG/HR: 20 TABLET, DELAYED RELEASE ORAL at 05:15

## 2023-07-25 RX ADMIN — Medication 650 MILLIGRAM(S): at 17:39

## 2023-07-25 RX ADMIN — Medication 4000 MILLILITER(S): at 18:41

## 2023-07-25 RX ADMIN — Medication 2 MILLIGRAM(S): at 21:40

## 2023-07-25 RX ADMIN — BENZOCAINE AND MENTHOL 1 LOZENGE: 5; 1 LIQUID ORAL at 16:31

## 2023-07-25 RX ADMIN — Medication 650 MILLIGRAM(S): at 18:43

## 2023-07-25 RX ADMIN — LOSARTAN POTASSIUM 50 MILLIGRAM(S): 100 TABLET, FILM COATED ORAL at 05:14

## 2023-07-25 RX ADMIN — Medication 50 MICROGRAM(S): at 05:14

## 2023-07-25 NOTE — PROGRESS NOTE ADULT - SUBJECTIVE AND OBJECTIVE BOX
Patient is a 82y old  Male who presents with a chief complaint of GI bleed (25 Jul 2023 16:26)      INTERVAL HPI/OVERNIGHT EVENTS: seen and examined, s/p EGD with push enteroscopy today   T(C): 36.5 (07-25-23 @ 21:06), Max: 36.7 (07-25-23 @ 08:30)  HR: 68 (07-25-23 @ 21:06) (51 - 70)  BP: 124/65 (07-25-23 @ 21:06) (114/60 - 152/90)  RR: 18 (07-25-23 @ 21:06) (16 - 20)  SpO2: 97% (07-25-23 @ 21:06) (96% - 100%)  Wt(kg): --  I&O's Summary    25 Jul 2023 07:01  -  25 Jul 2023 22:25  --------------------------------------------------------  IN: 2160 mL / OUT: 0 mL / NET: 2160 mL        PAST MEDICAL & SURGICAL HISTORY:  Gastrointestinal bleeding      HTN (hypertension)      HLD (hyperlipidemia)      T2DM (type 2 diabetes mellitus)      Hypothyroidism      S/P TAVR (transcatheter aortic valve replacement)  2020          SOCIAL HISTORY  Alcohol:  Tobacco:  Illicit substance use:    FAMILY HISTORY:    REVIEW OF SYSTEMS:  CONSTITUTIONAL: No fever, weight loss, or fatigue  EYES: No eye pain, visual disturbances, or discharge  ENMT:  No difficulty hearing, tinnitus, vertigo; No sinus or throat pain  NECK: No pain or stiffness  RESPIRATORY: No cough, wheezing, chills or hemoptysis; No shortness of breath  CARDIOVASCULAR: No chest pain, palpitations, dizziness, or leg swelling  GASTROINTESTINAL: No abdominal or epigastric pain. No nausea, vomiting, or hematemesis; No diarrhea or constipation. No melena or hematochezia.  GENITOURINARY: No dysuria, frequency, hematuria, or incontinence  NEUROLOGICAL: No headaches, memory loss, loss of strength, numbness, or tremors  SKIN: No itching, burning, rashes, or lesions   LYMPH NODES: No enlarged glands  ENDOCRINE: No heat or cold intolerance; No hair loss  MUSCULOSKELETAL: No joint pain or swelling; No muscle, back, or extremity pain  PSYCHIATRIC: No depression, anxiety, mood swings, or difficulty sleeping  HEME/LYMPH: No easy bruising, or bleeding gums  ALLERY AND IMMUNOLOGIC: No hives or eczema    RADIOLOGY & ADDITIONAL TESTS:    Imaging Personally Reviewed:  [ ] YES  [ ] NO    Consultant(s) Notes Reviewed:  [ ] YES  [ ] NO    PHYSICAL EXAM:  GENERAL: NAD, well-groomed, well-developed  HEAD:  Atraumatic, Normocephalic  EYES: EOMI, PERRLA, conjunctiva and sclera clear  ENMT: No tonsillar erythema, exudates, or enlargement; Moist mucous membranes, Good dentition, No lesions  NECK: Supple, No JVD, Normal thyroid  NERVOUS SYSTEM:  Alert & Oriented X3, Good concentration; Motor Strength 5/5 B/L upper and lower extremities; DTRs 2+ intact and symmetric  CHEST/LUNG: Clear to percussion bilaterally; No rales, rhonchi, wheezing, or rubs  HEART: Regular rate and rhythm; No murmurs, rubs, or gallops  ABDOMEN: Soft, Nontender, Nondistended; Bowel sounds present  EXTREMITIES:  2+ Peripheral Pulses, No clubbing, cyanosis, or edema  LYMPH: No lymphadenopathy noted  SKIN: No rashes or lesions    LABS:                        7.8    1.42  )-----------( 68       ( 25 Jul 2023 03:13 )             23.8     07-25    141  |  107  |  22  ----------------------------<  92  3.7   |  22  |  0.92    Ca    8.9      25 Jul 2023 03:13    TPro  6.2  /  Alb  3.2<L>  /  TBili  0.1<L>  /  DBili  x   /  AST  14  /  ALT  6<L>  /  AlkPhos  36<L>  07-24    PT/INR - ( 25 Jul 2023 03:13 )   PT: 14.3 sec;   INR: 1.24 ratio         PTT - ( 25 Jul 2023 03:13 )  PTT:27.0 sec  Urinalysis Basic - ( 25 Jul 2023 03:13 )    Color: x / Appearance: x / SG: x / pH: x  Gluc: 92 mg/dL / Ketone: x  / Bili: x / Urobili: x   Blood: x / Protein: x / Nitrite: x   Leuk Esterase: x / RBC: x / WBC x   Sq Epi: x / Non Sq Epi: x / Bacteria: x      CAPILLARY BLOOD GLUCOSE            Urinalysis Basic - ( 25 Jul 2023 03:13 )    Color: x / Appearance: x / SG: x / pH: x  Gluc: 92 mg/dL / Ketone: x  / Bili: x / Urobili: x   Blood: x / Protein: x / Nitrite: x   Leuk Esterase: x / RBC: x / WBC x   Sq Epi: x / Non Sq Epi: x / Bacteria: x        MEDICATIONS  (STANDING):  doxazosin 2 milliGRAM(s) Oral at bedtime  levothyroxine 50 MICROGram(s) Oral daily  lidocaine 4% Injection for Nebulization 4 milliLiter(s) Nebulizer once  losartan 50 milliGRAM(s) Oral daily    MEDICATIONS  (PRN):      Care Discussed with Consultants/Other Providers [ ] YES  [ ] NO

## 2023-07-25 NOTE — PRE PROCEDURE NOTE - PRE PROCEDURE EVALUATION
Attending Physician:  Dr Wood                        Procedure: EGD and push enteroscopy    Indication for Procedure: GIB   ________________________________________________________  PAST MEDICAL & SURGICAL HISTORY:  Gastrointestinal bleeding      HTN (hypertension)      HLD (hyperlipidemia)      T2DM (type 2 diabetes mellitus)      Hypothyroidism      S/P TAVR (transcatheter aortic valve replacement)  2020        ALLERGIES:  No Known Allergies    HOME MEDICATIONS:  Cardura 2 mg oral tablet: 1 tab(s) orally once a day  fenofibrate 160 mg oral tablet: 1 tab(s) orally once a day  ferrous sulfate 325 mg (65 mg elemental iron) oral delayed release tablet: 1 tab(s) orally 2 times a day  folic acid 1 mg oral tablet: 1 tab(s) orally once a day  Lexapro 10 mg oral tablet: 1 tab(s) orally once a day  losartan 100 mg oral tablet: 1 tab(s) orally once a day  metFORMIN 500 mg oral tablet, extended release: 1 tab(s) orally 2 times a day  Norvasc 5 mg oral tablet: 1 tab(s) orally once a day  pantoprazole 40 mg oral delayed release tablet: 1 tab(s) orally once a day  Synthroid 50 mcg (0.05 mg) oral tablet: 1 tab(s) orally once a day  tamsulosin 0.4 mg oral capsule: 1 cap(s) orally once a day  Toprol-XL 50 mg oral tablet, extended release: 1 tab(s) orally once a day  Vitamin D3 2000 intl units (50 mcg) oral capsule: 1 cap(s) orally once a day    AICD/PPM: [ ] yes   [X] no    PERTINENT LAB DATA:                        7.8    1.42  )-----------( 68       ( 25 Jul 2023 03:13 )             23.8     07-25    141  |  107  |  22  ----------------------------<  92  3.7   |  22  |  0.92    Ca    8.9      25 Jul 2023 03:13    TPro  6.2  /  Alb  3.2<L>  /  TBili  0.1<L>  /  DBili  x   /  AST  14  /  ALT  6<L>  /  AlkPhos  36<L>  07-24    PT/INR - ( 25 Jul 2023 03:13 )   PT: 14.3 sec;   INR: 1.24 ratio         PTT - ( 25 Jul 2023 03:13 )  PTT:27.0 sec            PHYSICAL EXAMINATION:    Height (cm): 165.1  Weight (kg): 76.7  BMI (kg/m2): 28.1  BSA (m2): 1.84T(C): 36.2  HR: 70  BP: 152/90  RR: 20  SpO2: 100%    Constitutional: NAD    Neck:  No JVD  Respiratory: no respiratory distress   Cardiovascular: RRR  Extremities: No peripheral edema  Neurological: A/O x 3, no focal deficits        COMMENTS:    The patient is a suitable candidate for the planned procedure unless box checked [ ]  No, explain:

## 2023-07-25 NOTE — PROGRESS NOTE ADULT - SUBJECTIVE AND OBJECTIVE BOX
DATE OF SERVICE: 07-25-23 @ 16:27    Patient is a 82y old  Male who presents with a chief complaint of GI bleed (24 Jul 2023 23:53)      INTERVAL HISTORY: Feels well, denies chest pain and SOB    REVIEW OF SYSTEMS:  CONSTITUTIONAL: No weakness  EYES/ENT: No visual changes;  No throat pain   NECK: No pain or stiffness  RESPIRATORY: No cough, wheezing; No shortness of breath  CARDIOVASCULAR: No chest pain or palpitations  GASTROINTESTINAL: No abdominal  pain. No nausea, vomiting, or hematemesis  GENITOURINARY: No dysuria, frequency or hematuria  NEUROLOGICAL: No stroke like symptoms  SKIN: No rashes      	  MEDICATIONS:  doxazosin 2 milliGRAM(s) Oral at bedtime  losartan 50 milliGRAM(s) Oral daily        PHYSICAL EXAM:  T(C): 36.7 (07-25-23 @ 12:36), Max: 36.7 (07-25-23 @ 08:30)  HR: 57 (07-25-23 @ 12:36) (50 - 70)  BP: 145/65 (07-25-23 @ 12:36) (114/60 - 152/90)  RR: 18 (07-25-23 @ 12:36) (16 - 20)  SpO2: 96% (07-25-23 @ 12:36) (96% - 100%)  Wt(kg): --  I&O's Summary    25 Jul 2023 07:01  -  25 Jul 2023 16:27  --------------------------------------------------------  IN: 480 mL / OUT: 0 mL / NET: 480 mL      Height (cm): 165.1 (07-25 @ 09:34)  Weight (kg): 76.7 (07-25 @ 09:34)  BMI (kg/m2): 28.1 (07-25 @ 09:34)  BSA (m2): 1.84 (07-25 @ 09:34)    Appearance: In no distress	  HEENT:    PERRL, EOMI	  Cardiovascular:  S1 S2, No JVD  Respiratory: Lungs clear to auscultation	  Gastrointestinal:  Soft, Non-tender, + BS	  Vascularature:  No edema of LE  Psychiatric: Appropriate affect   Neuro: no acute focal deficits                               7.8    1.42  )-----------( 68       ( 25 Jul 2023 03:13 )             23.8     07-25    141  |  107  |  22  ----------------------------<  92  3.7   |  22  |  0.92    Ca    8.9      25 Jul 2023 03:13    TPro  6.2  /  Alb  3.2<L>  /  TBili  0.1<L>  /  DBili  x   /  AST  14  /  ALT  6<L>  /  AlkPhos  36<L>  07-24        Labs personally reviewed      ASSESSMENT/PLAN: 	  82 yom w/ hx of DM, HTN and aortic stenosis s/p TAVR p/w SOB and dizziness for 2 days.    1. Preop Risk Stratification   - denies CAD Hx  - Symptomatic anemia resolved with PRBC transfusion   - Mod risk for low risk EGD/colon, no cardiac contraindication to proceed  - S/p EGD tolerated procedure well    2. GI bleed / sever anemia 2/2 ac blood loss :  Keep Hb >8 as symptomatic anemia     3. 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 Severe AS s/p TAVR :  -Follows at [facility]    5. HTN :   c/w home meds     6. HLD :  -Resume finofibrate       NAOMY Rabago DO [facility]  Cardiovascular Medicine  [address] [place] [address], [address]  Available via call or text on [employer]  Office: [phone]   DATE OF SERVICE: 07-25-23 @ 16:27    Patient is a 82y old  Male who presents with a chief complaint of GI bleed (24 Jul 2023 23:53)      INTERVAL HISTORY: Feels well, denies chest pain and SOB    REVIEW OF SYSTEMS:  CONSTITUTIONAL: No weakness  EYES/ENT: No visual changes;  No throat pain   NECK: No pain or stiffness  RESPIRATORY: No cough, wheezing; No shortness of breath  CARDIOVASCULAR: No chest pain or palpitations  GASTROINTESTINAL: No abdominal  pain. No nausea, vomiting, or hematemesis  GENITOURINARY: No dysuria, frequency or hematuria  NEUROLOGICAL: No stroke like symptoms  SKIN: No rashes      	  MEDICATIONS:  doxazosin 2 milliGRAM(s) Oral at bedtime  losartan 50 milliGRAM(s) Oral daily        PHYSICAL EXAM:  T(C): 36.7 (07-25-23 @ 12:36), Max: 36.7 (07-25-23 @ 08:30)  HR: 57 (07-25-23 @ 12:36) (50 - 70)  BP: 145/65 (07-25-23 @ 12:36) (114/60 - 152/90)  RR: 18 (07-25-23 @ 12:36) (16 - 20)  SpO2: 96% (07-25-23 @ 12:36) (96% - 100%)  Wt(kg): --  I&O's Summary    25 Jul 2023 07:01  -  25 Jul 2023 16:27  --------------------------------------------------------  IN: 480 mL / OUT: 0 mL / NET: 480 mL      Height (cm): 165.1 (07-25 @ 09:34)  Weight (kg): 76.7 (07-25 @ 09:34)  BMI (kg/m2): 28.1 (07-25 @ 09:34)  BSA (m2): 1.84 (07-25 @ 09:34)    Appearance: In no distress	  HEENT:    PERRL, EOMI	  Cardiovascular:  S1 S2, No JVD  Respiratory: Lungs clear to auscultation	  Gastrointestinal:  Soft, Non-tender, + BS	  Vascularature:  No edema of LE  Psychiatric: Appropriate affect   Neuro: no acute focal deficits                               7.8    1.42  )-----------( 68       ( 25 Jul 2023 03:13 )             23.8     07-25    141  |  107  |  22  ----------------------------<  92  3.7   |  22  |  0.92    Ca    8.9      25 Jul 2023 03:13    TPro  6.2  /  Alb  3.2<L>  /  TBili  0.1<L>  /  DBili  x   /  AST  14  /  ALT  6<L>  /  AlkPhos  36<L>  07-24        Labs personally reviewed      ASSESSMENT/PLAN: 	  82 yom w/ hx of DM, HTN and aortic stenosis s/p TAVR p/w SOB and dizziness for 2 days.    1. Preop Risk Stratification   - denies CAD Hx  - Symptomatic anemia resolved with PRBC transfusion   - Mod risk for low risk EGD/colon, no cardiac contraindication to proceed  - S/p EGD tolerated procedure well    2. GI bleed / sever anemia 2/2 ac blood loss :  Keep Hb >8 as symptomatic anemia     3. 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 Severe AS s/p TAVR :  -Follows at [facility]    5. HTN :   c/w home meds     6. HLD :  -Resume finofibrate             NAOMY Rabago DO [facility]  Cardiovascular Medicine  [address] [place] [address], [address]  Available via call or text on [employer] TE[employer]  Office: [phone]

## 2023-07-25 NOTE — PROGRESS NOTE ADULT - ASSESSMENT
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

## 2023-07-26 ENCOUNTER — TRANSCRIPTION ENCOUNTER (OUTPATIENT)
Age: 83
End: 2023-07-26

## 2023-07-26 VITALS
RESPIRATION RATE: 19 BRPM | DIASTOLIC BLOOD PRESSURE: 64 MMHG | TEMPERATURE: 99 F | OXYGEN SATURATION: 97 % | HEART RATE: 75 BPM | SYSTOLIC BLOOD PRESSURE: 138 MMHG

## 2023-07-26 LAB
ANION GAP SERPL CALC-SCNC: 12 MMOL/L — SIGNIFICANT CHANGE UP (ref 5–17)
BUN SERPL-MCNC: 11 MG/DL — SIGNIFICANT CHANGE UP (ref 7–23)
CALCIUM SERPL-MCNC: 9 MG/DL — SIGNIFICANT CHANGE UP (ref 8.4–10.5)
CHLORIDE SERPL-SCNC: 106 MMOL/L — SIGNIFICANT CHANGE UP (ref 96–108)
CO2 SERPL-SCNC: 23 MMOL/L — SIGNIFICANT CHANGE UP (ref 22–31)
CREAT SERPL-MCNC: 0.93 MG/DL — SIGNIFICANT CHANGE UP (ref 0.5–1.3)
EGFR: 82 ML/MIN/1.73M2 — SIGNIFICANT CHANGE UP
GLUCOSE SERPL-MCNC: 97 MG/DL — SIGNIFICANT CHANGE UP (ref 70–99)
HCT VFR BLD CALC: 28.4 % — LOW (ref 39–50)
HGB BLD-MCNC: 9.3 G/DL — LOW (ref 13–17)
INR BLD: 1.09 RATIO — SIGNIFICANT CHANGE UP (ref 0.85–1.18)
MCHC RBC-ENTMCNC: 29.1 PG — SIGNIFICANT CHANGE UP (ref 27–34)
MCHC RBC-ENTMCNC: 32.7 GM/DL — SIGNIFICANT CHANGE UP (ref 32–36)
MCV RBC AUTO: 88.8 FL — SIGNIFICANT CHANGE UP (ref 80–100)
NRBC # BLD: 0 /100 WBCS — SIGNIFICANT CHANGE UP (ref 0–0)
PLATELET # BLD AUTO: 74 K/UL — LOW (ref 150–400)
POTASSIUM SERPL-MCNC: 3.9 MMOL/L — SIGNIFICANT CHANGE UP (ref 3.5–5.3)
POTASSIUM SERPL-SCNC: 3.9 MMOL/L — SIGNIFICANT CHANGE UP (ref 3.5–5.3)
PROTHROM AB SERPL-ACNC: 12.7 SEC — SIGNIFICANT CHANGE UP (ref 9.5–13)
RBC # BLD: 3.2 M/UL — LOW (ref 4.2–5.8)
RBC # FLD: 18.1 % — HIGH (ref 10.3–14.5)
SODIUM SERPL-SCNC: 141 MMOL/L — SIGNIFICANT CHANGE UP (ref 135–145)
WBC # BLD: 1.41 K/UL — LOW (ref 3.8–10.5)
WBC # FLD AUTO: 1.41 K/UL — LOW (ref 3.8–10.5)

## 2023-07-26 PROCEDURE — 86901 BLOOD TYPING SEROLOGIC RH(D): CPT

## 2023-07-26 PROCEDURE — 84550 ASSAY OF BLOOD/URIC ACID: CPT

## 2023-07-26 PROCEDURE — 93005 ELECTROCARDIOGRAM TRACING: CPT

## 2023-07-26 PROCEDURE — 84484 ASSAY OF TROPONIN QUANT: CPT

## 2023-07-26 PROCEDURE — 86900 BLOOD TYPING SEROLOGIC ABO: CPT

## 2023-07-26 PROCEDURE — 85018 HEMOGLOBIN: CPT

## 2023-07-26 PROCEDURE — 85610 PROTHROMBIN TIME: CPT

## 2023-07-26 PROCEDURE — 84443 ASSAY THYROID STIM HORMONE: CPT

## 2023-07-26 PROCEDURE — 82947 ASSAY GLUCOSE BLOOD QUANT: CPT

## 2023-07-26 PROCEDURE — 83540 ASSAY OF IRON: CPT

## 2023-07-26 PROCEDURE — 82330 ASSAY OF CALCIUM: CPT

## 2023-07-26 PROCEDURE — 87086 URINE CULTURE/COLONY COUNT: CPT

## 2023-07-26 PROCEDURE — 83550 IRON BINDING TEST: CPT

## 2023-07-26 PROCEDURE — 85014 HEMATOCRIT: CPT

## 2023-07-26 PROCEDURE — 84132 ASSAY OF SERUM POTASSIUM: CPT

## 2023-07-26 PROCEDURE — 86850 RBC ANTIBODY SCREEN: CPT

## 2023-07-26 PROCEDURE — 86923 COMPATIBILITY TEST ELECTRIC: CPT

## 2023-07-26 PROCEDURE — 82272 OCCULT BLD FECES 1-3 TESTS: CPT

## 2023-07-26 PROCEDURE — 71045 X-RAY EXAM CHEST 1 VIEW: CPT

## 2023-07-26 PROCEDURE — 82803 BLOOD GASES ANY COMBINATION: CPT

## 2023-07-26 PROCEDURE — 85025 COMPLETE CBC W/AUTO DIFF WBC: CPT

## 2023-07-26 PROCEDURE — 83605 ASSAY OF LACTIC ACID: CPT

## 2023-07-26 PROCEDURE — 83010 ASSAY OF HAPTOGLOBIN QUANT: CPT

## 2023-07-26 PROCEDURE — 81003 URINALYSIS AUTO W/O SCOPE: CPT

## 2023-07-26 PROCEDURE — P9040: CPT

## 2023-07-26 PROCEDURE — 84295 ASSAY OF SERUM SODIUM: CPT

## 2023-07-26 PROCEDURE — 85027 COMPLETE CBC AUTOMATED: CPT

## 2023-07-26 PROCEDURE — 80048 BASIC METABOLIC PNL TOTAL CA: CPT

## 2023-07-26 PROCEDURE — 80053 COMPREHEN METABOLIC PANEL: CPT

## 2023-07-26 PROCEDURE — 96374 THER/PROPH/DIAG INJ IV PUSH: CPT

## 2023-07-26 PROCEDURE — 83615 LACTATE (LD) (LDH) ENZYME: CPT

## 2023-07-26 PROCEDURE — 85730 THROMBOPLASTIN TIME PARTIAL: CPT

## 2023-07-26 PROCEDURE — 45378 DIAGNOSTIC COLONOSCOPY: CPT | Mod: GC

## 2023-07-26 PROCEDURE — 36430 TRANSFUSION BLD/BLD COMPNT: CPT

## 2023-07-26 PROCEDURE — 87635 SARS-COV-2 COVID-19 AMP PRB: CPT

## 2023-07-26 PROCEDURE — 36415 COLL VENOUS BLD VENIPUNCTURE: CPT

## 2023-07-26 PROCEDURE — 82435 ASSAY OF BLOOD CHLORIDE: CPT

## 2023-07-26 PROCEDURE — 80061 LIPID PANEL: CPT

## 2023-07-26 PROCEDURE — 70450 CT HEAD/BRAIN W/O DYE: CPT | Mod: MA

## 2023-07-26 PROCEDURE — P9016: CPT

## 2023-07-26 PROCEDURE — 99291 CRITICAL CARE FIRST HOUR: CPT | Mod: 25

## 2023-07-26 RX ORDER — METOPROLOL TARTRATE 50 MG
1 TABLET ORAL
Refills: 0 | DISCHARGE

## 2023-07-26 RX ORDER — DOXAZOSIN MESYLATE 4 MG
1 TABLET ORAL
Qty: 0 | Refills: 0 | DISCHARGE
Start: 2023-07-26

## 2023-07-26 RX ORDER — DOXAZOSIN MESYLATE 4 MG
1 TABLET ORAL
Qty: 0 | Refills: 0 | DISCHARGE

## 2023-07-26 RX ORDER — LOSARTAN POTASSIUM 100 MG/1
1 TABLET, FILM COATED ORAL
Refills: 0 | DISCHARGE

## 2023-07-26 RX ORDER — LEVOTHYROXINE SODIUM 125 MCG
1 TABLET ORAL
Qty: 0 | Refills: 0 | DISCHARGE

## 2023-07-26 RX ORDER — LEVOTHYROXINE SODIUM 125 MCG
1 TABLET ORAL
Qty: 0 | Refills: 0 | DISCHARGE
Start: 2023-07-26

## 2023-07-26 RX ORDER — LOSARTAN POTASSIUM 100 MG/1
1 TABLET, FILM COATED ORAL
Qty: 0 | Refills: 0 | DISCHARGE
Start: 2023-07-26

## 2023-07-26 RX ORDER — FERROUS SULFATE 325(65) MG
1 TABLET ORAL
Refills: 0 | DISCHARGE

## 2023-07-26 RX ADMIN — LOSARTAN POTASSIUM 50 MILLIGRAM(S): 100 TABLET, FILM COATED ORAL at 04:39

## 2023-07-26 RX ADMIN — Medication 50 MICROGRAM(S): at 04:39

## 2023-07-26 NOTE — PROGRESS NOTE ADULT - SUBJECTIVE AND OBJECTIVE BOX
DATE OF SERVICE: 07-26-23 @ 13:19    Patient is a 82y old  Male who presents with a chief complaint of GI bleed (25 Jul 2023 22:24)      INTERVAL HISTORY: Feels well, s/p colonoscopy     REVIEW OF SYSTEMS:  CONSTITUTIONAL: No weakness  EYES/ENT: No visual changes;  No throat pain   NECK: No pain or stiffness  RESPIRATORY: No cough, wheezing; No shortness of breath  CARDIOVASCULAR: No chest pain or palpitations  GASTROINTESTINAL: No abdominal  pain. No nausea, vomiting, or hematemesis  GENITOURINARY: No dysuria, frequency or hematuria  NEUROLOGICAL: No stroke like symptoms  SKIN: No rashes      	  MEDICATIONS:  doxazosin 2 milliGRAM(s) Oral at bedtime  losartan 50 milliGRAM(s) Oral daily        PHYSICAL EXAM:  T(C): 36.3 (07-26-23 @ 09:07), Max: 36.6 (07-26-23 @ 04:36)  HR: 63 (07-26-23 @ 11:11) (62 - 69)  BP: 134/63 (07-26-23 @ 11:11) (124/65 - 179/77)  RR: 20 (07-26-23 @ 11:11) (14 - 20)  SpO2: 100% (07-26-23 @ 11:11) (96% - 100%)  Wt(kg): --  I&O's Summary    25 Jul 2023 07:01  -  26 Jul 2023 07:00  --------------------------------------------------------  IN: 3500 mL / OUT: 0 mL / NET: 3500 mL      Height (cm): 165.1 (07-26 @ 09:07)  Weight (kg): 76.7 (07-26 @ 09:07)  BMI (kg/m2): 28.1 (07-26 @ 09:07)  BSA (m2): 1.84 (07-26 @ 09:07)    Appearance: In no distress	  HEENT:    PERRL, EOMI	  Cardiovascular:  S1 S2, No JVD  Respiratory: Lungs clear to auscultation	  Gastrointestinal:  Soft, Non-tender, + BS	  Vascularature:  No edema of LE  Psychiatric: Appropriate affect   Neuro: no acute focal deficits                               9.3    1.41  )-----------( 74       ( 26 Jul 2023 04:27 )             28.4     07-26    141  |  106  |  11  ----------------------------<  97  3.9   |  23  |  0.93    Ca    9.0      26 Jul 2023 04:27          Labs personally reviewed      ASSESSMENT/PLAN: 	  82 yom w/ hx of DM, HTN and aortic stenosis s/p TAVR p/w SOB and dizziness for 2 days.    1. Preop Risk Stratification   - denies CAD Hx  - Symptomatic anemia resolved with PRBC transfusion   - Mod risk for low risk EGD/colon, no cardiac contraindication to proceed  - S/p EGD tolerated procedure well  - S/p colonoscopy  7/26    2. GI bleed / sever anemia 2/2 ac blood loss :  Keep Hb >8 as symptomatic anemia     3. 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 Severe AS s/p TAVR :  -Follows at [facility]    5. HTN :   c/w home meds     6. HLD :  -Resume finofibrate               NAOMY Rabago DO [facility]  Cardiovascular Medicine  [address], [address]  Available via call or text on [employer]  Office: [phone]

## 2023-07-26 NOTE — DISCHARGE NOTE PROVIDER - NSDCMRMEDTOKEN_GEN_ALL_CORE_FT
doxazosin 2 mg oral tablet: 1 tab(s) orally once a day (at bedtime)  fenofibrate 160 mg oral tablet: 1 tab(s) orally once a day  folic acid 1 mg oral tablet: 1 tab(s) orally once a day  levothyroxine 50 mcg (0.05 mg) oral tablet: 1 tab(s) orally once a day  Lexapro 10 mg oral tablet: 1 tab(s) orally once a day  losartan 50 mg oral tablet: 1 tab(s) orally once a day  metFORMIN 500 mg oral tablet, extended release: 1 tab(s) orally 2 times a day  Norvasc 5 mg oral tablet: 1 tab(s) orally once a day  pantoprazole 40 mg oral delayed release tablet: 1 tab(s) orally once a day  tamsulosin 0.4 mg oral capsule: 1 cap(s) orally once a day  Vitamin D3 2000 intl units (50 mcg) oral capsule: 1 cap(s) orally once a day   uvula midline, no vesicles, no redness, and no oropharyngeal exudate.

## 2023-07-26 NOTE — DISCHARGE NOTE PROVIDER - DISCHARGE DATE
----- Message from Joan Manzano sent at 1/20/2017  4:03 PM CST -----  Os returning nurse call. Please call back at [phone]. Thanks//cdb   26-Jul-2023

## 2023-07-26 NOTE — DISCHARGE NOTE NURSING/CASE MANAGEMENT/SOCIAL WORK - PATIENT PORTAL LINK FT
You can access the FollowMyHealth Patient Portal offered by [facility] by registering at the following website: [email or web address]. By joining [employer]’s FollowMyHealth portal, you will also be able to view your health information using other applications (apps) compatible with our system.

## 2023-07-26 NOTE — DISCHARGE NOTE NURSING/CASE MANAGEMENT/SOCIAL WORK - NSDCPEFALRISK_GEN_ALL_CORE
For information on Fall & Injury Prevention, visit: [email or web address] OR  [email or web address] OR  https://www.cdc.gov/steadi/patient.html

## 2023-07-26 NOTE — PRE-ANESTHESIA EVALUATION ADULT - NSANTHOSAYNRD_GEN_A_CORE
No. NOLVIA screening performed.  STOP BANG Legend: 0-2 = LOW Risk; 3-4 = INTERMEDIATE Risk; 5-8 = HIGH Risk
No. NOLVIA screening performed.  STOP BANG Legend: 0-2 = LOW Risk; 3-4 = INTERMEDIATE Risk; 5-8 = HIGH Risk

## 2023-07-26 NOTE — DISCHARGE NOTE PROVIDER - HOSPITAL COURSE
82y Male with symptomatic anemia c/b unclear pancytopenia along with about 20 pound weight loss in 3 mos (unintentional)s/p BMBx outpatient awaiting results, diverticulosis, DM, HTN, AS s/p TAVR ~2 yrs PTA, AVMs in [place] s/p multiple endoscopies (EGD/colon) requiring cauterization who initially presented to the ED with SOB, episodes of chest tightness and dizziness for a couple of days. Hgb noted to be 4.8. Reported episodes of black stools for which GI was consulted.    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    CAD s/p TAVR :  -stable     HTN :   c/w home meds     HLD :  -finofibrate     Hypothyrodiism  -c/w synthroid     DM-2 :   metformin

## 2023-07-26 NOTE — DISCHARGE NOTE PROVIDER - NSDCCPCAREPLAN_GEN_ALL_CORE_FT
PRINCIPAL DISCHARGE DIAGNOSIS  Diagnosis: Acute upper GI bleeding  Assessment and Plan of Treatment: There are 2 common types of GI Bleed, Upper GI Bleed and Lower GI Bleed.  Upper GI Bleed affects the esophagus, stomach, and first part of the small intestine. Lower GI Bleed affects the colon and rectum.  Upper GI Bleed signs and symptoms to notify your Health Care Provider are vomiting blood, or coffee ground vomitus, and bowel movements that look like black tar.  Lower GI Bleed signs and symptoms to notify your health care provider are bright red bloody bowel movements.   Take your medications as prescribed by your Gastroenterologist.  If you have had an Endoscopy or Colonoscopy, follow up with your Gastroenterologist for Pathology results.  Avoid NSAIDs unless your Health Care Provider tells you that it is ok (Aspirin, Ibuprofen, Advil, Motrin, Aleve).  Follow up with your Gastroenterologist within 1-2 weeks of discharge.

## 2023-07-26 NOTE — PRE PROCEDURE NOTE - PRE PROCEDURE EVALUATION
Pre-Endoscopy Evaluation    Attending Physician:  Dr. Wood    Procedure: Colonoscopy    Indication for Procedure & Pertinent History: GIB    PAST MEDICAL & SURGICAL HISTORY:  Gastrointestinal bleeding      HTN (hypertension)      HLD (hyperlipidemia)      T2DM (type 2 diabetes mellitus)      Hypothyroidism      S/P TAVR (transcatheter aortic valve replacement)  2020          Allergies    No Known Allergies    Intolerances        Medications: MEDICATIONS  (STANDING):  doxazosin 2 milliGRAM(s) Oral at bedtime  levothyroxine 50 MICROGram(s) Oral daily  lidocaine 4% Injection for Nebulization 4 milliLiter(s) Nebulizer once  losartan 50 milliGRAM(s) Oral daily    MEDICATIONS  (PRN):      Pertinent lab data:                        9.3    1.41  )-----------( 74       ( 26 Jul 2023 04:27 )             28.4     07-26    141  |  106  |  11  ----------------------------<  97  3.9   |  23  |  0.93    Ca    9.0      26 Jul 2023 04:27      PT/INR - ( 26 Jul 2023 04:27 )   PT: 12.7 sec;   INR: 1.09 ratio         PTT - ( 25 Jul 2023 03:13 )  PTT:27.0 sec            Physical Examination:  See Physical Exam in H&P and/or Progress notes.    Comments:    ASA Class: I []  II [X]  III []  IV []    The patient is a suitable candidate for the planned procedure unless box checked [ ]  No, explain:

## 2023-08-09 ENCOUNTER — INPATIENT (INPATIENT)
Facility: HOSPITAL | Age: 83
LOS: 4 days | Discharge: HOME | DRG: 378 | End: 2023-08-14
Attending: INTERNAL MEDICINE | Admitting: INTERNAL MEDICINE
Payer: MEDICARE

## 2023-08-09 VITALS
HEIGHT: 65 IN | RESPIRATION RATE: 18 BRPM | WEIGHT: 169.09 LBS | HEART RATE: 74 BPM | SYSTOLIC BLOOD PRESSURE: 151 MMHG | TEMPERATURE: 98 F | OXYGEN SATURATION: 97 % | DIASTOLIC BLOOD PRESSURE: 71 MMHG

## 2023-08-09 DIAGNOSIS — Z95.2 PRESENCE OF PROSTHETIC HEART VALVE: Chronic | ICD-10-CM

## 2023-08-09 DIAGNOSIS — K92.2 GASTROINTESTINAL HEMORRHAGE, UNSPECIFIED: ICD-10-CM

## 2023-08-09 LAB
ALBUMIN SERPL ELPH-MCNC: 3.4 G/DL — SIGNIFICANT CHANGE UP (ref 3.3–5)
ALP SERPL-CCNC: 51 U/L — SIGNIFICANT CHANGE UP (ref 40–120)
ALT FLD-CCNC: 5 U/L — LOW (ref 10–45)
ANION GAP SERPL CALC-SCNC: 14 MMOL/L — SIGNIFICANT CHANGE UP (ref 5–17)
ANISOCYTOSIS BLD QL: SLIGHT — SIGNIFICANT CHANGE UP
APTT BLD: 26 SEC — SIGNIFICANT CHANGE UP (ref 24.5–35.6)
AST SERPL-CCNC: 14 U/L — SIGNIFICANT CHANGE UP (ref 10–40)
BASE EXCESS BLDV CALC-SCNC: 0.6 MMOL/L — SIGNIFICANT CHANGE UP (ref -2–3)
BASOPHILS # BLD AUTO: 0.02 K/UL — SIGNIFICANT CHANGE UP (ref 0–0.2)
BASOPHILS NFR BLD AUTO: 0.9 % — SIGNIFICANT CHANGE UP (ref 0–2)
BILIRUB SERPL-MCNC: 0.1 MG/DL — LOW (ref 0.2–1.2)
BLD GP AB SCN SERPL QL: NEGATIVE — SIGNIFICANT CHANGE UP
BUN SERPL-MCNC: 41 MG/DL — HIGH (ref 7–23)
CA-I SERPL-SCNC: 1.33 MMOL/L — SIGNIFICANT CHANGE UP (ref 1.15–1.33)
CALCIUM SERPL-MCNC: 9.5 MG/DL — SIGNIFICANT CHANGE UP (ref 8.4–10.5)
CHLORIDE BLDV-SCNC: 105 MMOL/L — SIGNIFICANT CHANGE UP (ref 96–108)
CHLORIDE SERPL-SCNC: 102 MMOL/L — SIGNIFICANT CHANGE UP (ref 96–108)
CO2 BLDV-SCNC: 27 MMOL/L — HIGH (ref 22–26)
CO2 SERPL-SCNC: 22 MMOL/L — SIGNIFICANT CHANGE UP (ref 22–31)
CREAT SERPL-MCNC: 1.13 MG/DL — SIGNIFICANT CHANGE UP (ref 0.5–1.3)
EGFR: 65 ML/MIN/1.73M2 — SIGNIFICANT CHANGE UP
ELLIPTOCYTES BLD QL SMEAR: SLIGHT — SIGNIFICANT CHANGE UP
EOSINOPHIL # BLD AUTO: 0.02 K/UL — SIGNIFICANT CHANGE UP (ref 0–0.5)
EOSINOPHIL NFR BLD AUTO: 0.9 % — SIGNIFICANT CHANGE UP (ref 0–6)
GAS PNL BLDV: 138 MMOL/L — SIGNIFICANT CHANGE UP (ref 136–145)
GAS PNL BLDV: SIGNIFICANT CHANGE UP
GAS PNL BLDV: SIGNIFICANT CHANGE UP
GIANT PLATELETS BLD QL SMEAR: PRESENT — SIGNIFICANT CHANGE UP
GLUCOSE BLDC GLUCOMTR-MCNC: 116 MG/DL — HIGH (ref 70–99)
GLUCOSE BLDC GLUCOMTR-MCNC: 148 MG/DL — HIGH (ref 70–99)
GLUCOSE BLDV-MCNC: 191 MG/DL — HIGH (ref 70–99)
GLUCOSE SERPL-MCNC: 185 MG/DL — HIGH (ref 70–99)
HCO3 BLDV-SCNC: 26 MMOL/L — SIGNIFICANT CHANGE UP (ref 22–29)
HCT VFR BLD CALC: 20.2 % — CRITICAL LOW (ref 39–50)
HCT VFR BLD CALC: 21.5 % — LOW (ref 39–50)
HCT VFR BLD CALC: 21.5 % — LOW (ref 39–50)
HCT VFR BLD CALC: 22.3 % — LOW (ref 39–50)
HCT VFR BLDA CALC: 20 % — CRITICAL LOW (ref 39–51)
HGB BLD CALC-MCNC: 6.7 G/DL — CRITICAL LOW (ref 12.6–17.4)
HGB BLD-MCNC: 6.4 G/DL — CRITICAL LOW (ref 13–17)
HGB BLD-MCNC: 6.7 G/DL — CRITICAL LOW (ref 13–17)
HGB BLD-MCNC: 7 G/DL — CRITICAL LOW (ref 13–17)
HGB BLD-MCNC: 7 G/DL — CRITICAL LOW (ref 13–17)
HOROWITZ INDEX BLDV+IHG-RTO: SIGNIFICANT CHANGE UP
INR BLD: 1.26 RATIO — HIGH (ref 0.85–1.18)
LACTATE BLDV-MCNC: 2.9 MMOL/L — HIGH (ref 0.5–2)
LYMPHOCYTES # BLD AUTO: 0.6 K/UL — LOW (ref 1–3.3)
LYMPHOCYTES # BLD AUTO: 22.3 % — SIGNIFICANT CHANGE UP (ref 13–44)
MANUAL SMEAR VERIFICATION: SIGNIFICANT CHANGE UP
MCHC RBC-ENTMCNC: 28.7 PG — SIGNIFICANT CHANGE UP (ref 27–34)
MCHC RBC-ENTMCNC: 28.8 PG — SIGNIFICANT CHANGE UP (ref 27–34)
MCHC RBC-ENTMCNC: 28.9 PG — SIGNIFICANT CHANGE UP (ref 27–34)
MCHC RBC-ENTMCNC: 29 PG — SIGNIFICANT CHANGE UP (ref 27–34)
MCHC RBC-ENTMCNC: 31.2 GM/DL — LOW (ref 32–36)
MCHC RBC-ENTMCNC: 31.4 GM/DL — LOW (ref 32–36)
MCHC RBC-ENTMCNC: 31.7 GM/DL — LOW (ref 32–36)
MCHC RBC-ENTMCNC: 32.6 GM/DL — SIGNIFICANT CHANGE UP (ref 32–36)
MCV RBC AUTO: 88.8 FL — SIGNIFICANT CHANGE UP (ref 80–100)
MCV RBC AUTO: 91.4 FL — SIGNIFICANT CHANGE UP (ref 80–100)
MCV RBC AUTO: 91.4 FL — SIGNIFICANT CHANGE UP (ref 80–100)
MCV RBC AUTO: 92.3 FL — SIGNIFICANT CHANGE UP (ref 80–100)
METAMYELOCYTES # FLD: 2.7 % — HIGH (ref 0–0)
MICROCYTES BLD QL: SLIGHT — SIGNIFICANT CHANGE UP
MONOCYTES # BLD AUTO: 0.38 K/UL — SIGNIFICANT CHANGE UP (ref 0–0.9)
MONOCYTES NFR BLD AUTO: 14.3 % — HIGH (ref 2–14)
MYELOCYTES NFR BLD: 3.6 % — HIGH (ref 0–0)
NEUTROPHILS # BLD AUTO: 1.48 K/UL — LOW (ref 1.8–7.4)
NEUTROPHILS NFR BLD AUTO: 51.8 % — SIGNIFICANT CHANGE UP (ref 43–77)
NEUTS BAND # BLD: 3.5 % — SIGNIFICANT CHANGE UP (ref 0–8)
NRBC # BLD: 0 /100 WBCS — SIGNIFICANT CHANGE UP (ref 0–0)
OVALOCYTES BLD QL SMEAR: SLIGHT — SIGNIFICANT CHANGE UP
PCO2 BLDV: 45 MMHG — SIGNIFICANT CHANGE UP (ref 42–55)
PH BLDV: 7.37 — SIGNIFICANT CHANGE UP (ref 7.32–7.43)
PLAT MORPH BLD: NORMAL — SIGNIFICANT CHANGE UP
PLATELET # BLD AUTO: 101 K/UL — LOW (ref 150–400)
PLATELET # BLD AUTO: 106 K/UL — LOW (ref 150–400)
PLATELET # BLD AUTO: 93 K/UL — LOW (ref 150–400)
PLATELET # BLD AUTO: 97 K/UL — LOW (ref 150–400)
PLATELET COUNT - ESTIMATE: ABNORMAL
PO2 BLDV: 20 MMHG — LOW (ref 25–45)
POIKILOCYTOSIS BLD QL AUTO: SLIGHT — SIGNIFICANT CHANGE UP
POTASSIUM BLDV-SCNC: 4.6 MMOL/L — SIGNIFICANT CHANGE UP (ref 3.5–5.1)
POTASSIUM SERPL-MCNC: 4.2 MMOL/L — SIGNIFICANT CHANGE UP (ref 3.5–5.3)
POTASSIUM SERPL-SCNC: 4.2 MMOL/L — SIGNIFICANT CHANGE UP (ref 3.5–5.3)
PROT SERPL-MCNC: 6.7 G/DL — SIGNIFICANT CHANGE UP (ref 6–8.3)
PROTHROM AB SERPL-ACNC: 13.1 SEC — HIGH (ref 9.5–13)
RBC # BLD: 2.21 M/UL — LOW (ref 4.2–5.8)
RBC # BLD: 2.33 M/UL — LOW (ref 4.2–5.8)
RBC # BLD: 2.42 M/UL — LOW (ref 4.2–5.8)
RBC # BLD: 2.44 M/UL — LOW (ref 4.2–5.8)
RBC # FLD: 16.8 % — HIGH (ref 10.3–14.5)
RBC # FLD: 17.2 % — HIGH (ref 10.3–14.5)
RBC # FLD: 17.3 % — HIGH (ref 10.3–14.5)
RBC # FLD: 18.3 % — HIGH (ref 10.3–14.5)
RBC BLD AUTO: ABNORMAL
RH IG SCN BLD-IMP: POSITIVE — SIGNIFICANT CHANGE UP
SAO2 % BLDV: 24.4 % — LOW (ref 67–88)
SODIUM SERPL-SCNC: 138 MMOL/L — SIGNIFICANT CHANGE UP (ref 135–145)
SPHEROCYTES BLD QL SMEAR: SLIGHT — SIGNIFICANT CHANGE UP
WBC # BLD: 1.79 K/UL — LOW (ref 3.8–10.5)
WBC # BLD: 1.92 K/UL — LOW (ref 3.8–10.5)
WBC # BLD: 2.46 K/UL — LOW (ref 3.8–10.5)
WBC # BLD: 2.68 K/UL — LOW (ref 3.8–10.5)
WBC # FLD AUTO: 1.79 K/UL — LOW (ref 3.8–10.5)
WBC # FLD AUTO: 1.92 K/UL — LOW (ref 3.8–10.5)
WBC # FLD AUTO: 2.46 K/UL — LOW (ref 3.8–10.5)
WBC # FLD AUTO: 2.68 K/UL — LOW (ref 3.8–10.5)

## 2023-08-09 PROCEDURE — 99291 CRITICAL CARE FIRST HOUR: CPT | Mod: GC

## 2023-08-09 PROCEDURE — 71045 X-RAY EXAM CHEST 1 VIEW: CPT | Mod: 26

## 2023-08-09 PROCEDURE — 74177 CT ABD & PELVIS W/CONTRAST: CPT | Mod: 26,MA

## 2023-08-09 PROCEDURE — 99223 1ST HOSP IP/OBS HIGH 75: CPT | Mod: GC

## 2023-08-09 RX ORDER — ESCITALOPRAM OXALATE 10 MG/1
10 TABLET, FILM COATED ORAL DAILY
Refills: 0 | Status: DISCONTINUED | OUTPATIENT
Start: 2023-08-09 | End: 2023-08-14

## 2023-08-09 RX ORDER — INSULIN LISPRO 100/ML
VIAL (ML) SUBCUTANEOUS
Refills: 0 | Status: DISCONTINUED | OUTPATIENT
Start: 2023-08-09 | End: 2023-08-14

## 2023-08-09 RX ORDER — GLUCAGON INJECTION, SOLUTION 0.5 MG/.1ML
1 INJECTION, SOLUTION SUBCUTANEOUS ONCE
Refills: 0 | Status: DISCONTINUED | OUTPATIENT
Start: 2023-08-09 | End: 2023-08-14

## 2023-08-09 RX ORDER — DEXTROSE 50 % IN WATER 50 %
12.5 SYRINGE (ML) INTRAVENOUS ONCE
Refills: 0 | Status: DISCONTINUED | OUTPATIENT
Start: 2023-08-09 | End: 2023-08-14

## 2023-08-09 RX ORDER — SODIUM CHLORIDE 9 MG/ML
1000 INJECTION, SOLUTION INTRAVENOUS
Refills: 0 | Status: DISCONTINUED | OUTPATIENT
Start: 2023-08-09 | End: 2023-08-14

## 2023-08-09 RX ORDER — LEVOTHYROXINE SODIUM 125 MCG
50 TABLET ORAL DAILY
Refills: 0 | Status: DISCONTINUED | OUTPATIENT
Start: 2023-08-09 | End: 2023-08-14

## 2023-08-09 RX ORDER — DEXTROSE 50 % IN WATER 50 %
15 SYRINGE (ML) INTRAVENOUS ONCE
Refills: 0 | Status: DISCONTINUED | OUTPATIENT
Start: 2023-08-09 | End: 2023-08-14

## 2023-08-09 RX ORDER — FOLIC ACID 0.8 MG
1 TABLET ORAL DAILY
Refills: 0 | Status: DISCONTINUED | OUTPATIENT
Start: 2023-08-09 | End: 2023-08-14

## 2023-08-09 RX ORDER — PANTOPRAZOLE SODIUM 20 MG/1
80 TABLET, DELAYED RELEASE ORAL ONCE
Refills: 0 | Status: COMPLETED | OUTPATIENT
Start: 2023-08-09 | End: 2023-08-09

## 2023-08-09 RX ORDER — DEXTROSE 50 % IN WATER 50 %
25 SYRINGE (ML) INTRAVENOUS ONCE
Refills: 0 | Status: DISCONTINUED | OUTPATIENT
Start: 2023-08-09 | End: 2023-08-14

## 2023-08-09 RX ORDER — PANTOPRAZOLE SODIUM 20 MG/1
8 TABLET, DELAYED RELEASE ORAL
Qty: 80 | Refills: 0 | Status: DISCONTINUED | OUTPATIENT
Start: 2023-08-09 | End: 2023-08-11

## 2023-08-09 RX ORDER — TAMSULOSIN HYDROCHLORIDE 0.4 MG/1
0.4 CAPSULE ORAL AT BEDTIME
Refills: 0 | Status: DISCONTINUED | OUTPATIENT
Start: 2023-08-09 | End: 2023-08-14

## 2023-08-09 RX ADMIN — PANTOPRAZOLE SODIUM 10 MG/HR: 20 TABLET, DELAYED RELEASE ORAL at 13:41

## 2023-08-09 RX ADMIN — PANTOPRAZOLE SODIUM 80 MILLIGRAM(S): 20 TABLET, DELAYED RELEASE ORAL at 03:13

## 2023-08-09 RX ADMIN — PANTOPRAZOLE SODIUM 10 MG/HR: 20 TABLET, DELAYED RELEASE ORAL at 04:26

## 2023-08-09 RX ADMIN — TAMSULOSIN HYDROCHLORIDE 0.4 MILLIGRAM(S): 0.4 CAPSULE ORAL at 21:08

## 2023-08-09 NOTE — CONSULT NOTE ADULT - ATTENDING COMMENTS
Agree with above. Pt with reported AVMs, and has had many endoscopies before in [place] and here in the [place]. Has reportedly tried tamoxifen but unable to tolerate it. He sees. Dr. Donato Bernal at [facility], I called their office myself to request records this morning but nothing yet has been sent over. Since he presents with recurrent acute blood loss with hematemesis in ER of bright red blood, will plan for repeat UGI evaluation +/- VCE inpatient. Transfuse for Hgb <8, will plan for endoscopic evaluation once he is adequate resuscitated.

## 2023-08-09 NOTE — ED ADULT NURSE NOTE - NS PRO AD ANY ON CHART
pt informed to have health care agent bring advance directive to hospital, pt verbalized understanding/No

## 2023-08-09 NOTE — ED PROVIDER NOTE - OBJECTIVE STATEMENT
82-year-old male, history of TAVR on aspirin, GI bleed that required multiple blood transfusion, presenting with 1 day onset of fatigue, dizziness, shortness of breath, hematemesis at triage.  Patient reports that he feels like he is going to need blood transfusion today. Patient had recent endoscopy on July 26, no active bleeding was noted during the procedure. Currently denies any nausea, abdominal pain, chest pain, shortness of breath. Denies alcohol use.

## 2023-08-09 NOTE — ED ADULT NURSE REASSESSMENT NOTE - NS ED NURSE REASSESS COMMENT FT1
received report from night RN Ty. Upon assessment, A&Ox4, denies chest pain, SOB, nausea, vomiting. Protonix drip @ 8 ml/hr, PRBCs running @100 ml/hr. Admission band placed, awaiting H&P.

## 2023-08-09 NOTE — ED PROVIDER NOTE - CLINICAL SUMMARY MEDICAL DECISION MAKING FREE TEXT BOX
82-year-old male, history of TAVR on aspirin, GI bleed that required multiple blood transfusion, presenting with 1 day onset of fatigue, dizziness, shortness of breath, hematemesis at triage.  Patient reports that he feels like he is going to need blood transfusion today. Patient had recent endoscopy on July 26, no active bleeding was noted during the procedure. Currently denies any nausea, abdominal pain, chest pain, shortness of breath. Denies alcohol use. Vital signs within normal limits on arrival.  Physical exam patient's not in acute distress, patient is pale appearing, normal respiratory effort, clear lung exam bilaterally, normal S1-S2, abdomen is soft, nontender, no leg swelling.  Differentials include but not limited to upper GI bleed versus anemia from chronic illnesses.  Will get labs, type and screen, CT angio abdomen pelvis, Protonix drip, reassess

## 2023-08-09 NOTE — H&P ADULT - ASSESSMENT
A/P     Upper GI bleed : hx of AVM   -had EGD/ colonoscopy 3 weeks ago by house GI   seen by [facility]   plan for endocscopy once stable today or tomorrow   c/w protnix IV bid   NPO  IV fluids     ac blood loss anemia   -transfuse 2 units PRBC   monitor H/H q 8 for 48 hrs     CAD s/p TAVR   -on asa , will hold     Pancytopenia :  -had BM biopsy as out pt. recently about 1 month ago   will try to get result     Hypothyroidism   c/w synthroid     BPH:  -c/w doxazosin    depression  -stable on lexapro     DM-2 :   hold oral meds   NPO   will put him on insulin with coverage     HTN :   mentating well , BP is controlled   will hold off on BP meds in presence of active GI bleed   if BP is elevated , restart BP medication     advance care planning : d/w patinet regarding advance directive. d/w him regarding intubation/ CPR if need arise. He agrees for everything. Remain full code. time spend 15 min

## 2023-08-09 NOTE — CHART NOTE - NSCHARTNOTEFT_GEN_A_CORE
Risks of video capsule endoscopy explained, including risk of retained capsule, potentially requiring surgery for removal.  Patient understands and agrees to risks.    Capsule ID: SURESH - MDD- N    Capsule swallowed at: 16:00     NPO now.   Resume Clear liquid diet at (after 2 hours):  18:00   Resume regular consistency diet at (after 4 hours): 20:00   NPO after midnight.    Equipment can be removed at: 6 AM on 8/10    GI fellow will retrieve equipment in the morning.    NO MRI UNTIL CAPSULE CLEARANCE CONFIRMED. CAPSULE IS NOT MRI COMPATIBLE.

## 2023-08-09 NOTE — ED ADULT TRIAGE NOTE - MEANS OF ARRIVAL
"              After Visit Summary   2017    Mert Mahan    MRN: [id number]           Patient Information     Date Of Birth          1984        Visit Information        Provider Department      2017 11:10 AM James Galdamez PA-C [facility]        Today's Diagnoses     Finger laceration, initial encounter    -  1       Follow-ups after your visit        Who to contact     If you have questions or need follow up information about today's clinic visit or your schedule please contact [facility] directly at [phone].  Normal or non-critical lab and imaging results will be communicated to you by Judys Bookhart, letter or phone within 4 business days after the clinic has received the results. If you do not hear from us within 7 days, please contact the clinic through Judys Bookhart or phone. If you have a critical or abnormal lab result, we will notify you by phone as soon as possible.  Submit refill requests through [employer] or call your pharmacy and they will forward the refill request to us. Please allow 3 business days for your refill to be completed.          Additional Information About Your Visit        MyChart Information     [facility] lets you send messages to your doctor, view your test results, renew your prescriptions, schedule appointments and more. To sign up, go to [email or web address] . Click on \"Log in\" on the left side of the screen, which will take you to the Welcome page. Then click on \"Sign up Now\" on the right side of the page.     You will be asked to enter the access code listed below, as well as some personal information. Please follow the directions to create your username and password.     Your access code is: [email or web address]  Expires: 2017 12:09 PM     Your access code will  in 90 days. If you need help or a new code, please call your [place] clinic or [phone].        Care EveryWhere ID     This is your Care " EveryWhere ID. This could be used by other organizations to access your [facility] medical records  [id number]        Your Vitals Were     Pulse Temperature Pulse Oximetry BMI (Body Mass Index)          79 98.8  F (37.1  C) (Oral) 99% 19.94 kg/m2         Blood Pressure from Last 3 Encounters:   06/06/17 110/68   12/23/15 110/64    Weight from Last 3 Encounters:   06/06/17 147 lb (66.7 kg)   12/23/15 148 lb (67.1 kg)   11/04/10 141 lb 8 oz (64.2 kg)              We Performed the Following     REPAIR SUPERFICIAL, WOUND BODY < =2.5CM     TD PRESERV FREE >=7 YRS ADS IM        Primary Care Provider    None Specified       No primary provider on file.        Thank you!     Thank you for choosing [facility]  for your care. Our goal is always to provide you with excellent care. Hearing back from our patients is one way we can continue to improve our services. Please take a few minutes to complete the written survey that you may receive in the mail after your visit with us. Thank you!             Your Updated Medication List - Protect others around you: Learn how to safely use, store and throw away your medicines at www.disposemymeds.org.          This list is accurate as of: 6/6/17 12:09 PM.  Always use your most recent med list.                   Brand Name Dispense Instructions for use    ADVIL 200 MG capsule   Generic drug:  ibuprofen      Take 200 mg by mouth every 4 hours as needed. PRN          wheelchair

## 2023-08-09 NOTE — ED ADULT NURSE NOTE - NSFALLUNIVINTERV_ED_ALL_ED
Bed/Stretcher in lowest position, wheels locked, appropriate side rails in place/Call bell, personal items and telephone in reach/Instruct patient to call for assistance before getting out of bed/chair/stretcher/Non-slip footwear applied when patient is off stretcher/[place] to call system/Physically safe environment - no spills, clutter or unnecessary equipment/Purposeful proactive rounding/Room/bathroom lighting operational, light cord in reach

## 2023-08-09 NOTE — ED PROVIDER NOTE - ATTENDING CONTRIBUTION TO CARE
pt worked up for suspected GIB. pt received PPI bolus, required transfusion due to low Hgb. GI contacted. CT a/p without active bleed. pt admitted for further care of suspected GIB.

## 2023-08-09 NOTE — ED ADULT NURSE REASSESSMENT NOTE - NS ED NURSE REASSESS COMMENT FT1
Patient OOB to bathroom with minimal assist. Patient ambulates with steady gait. Patient denies complaints @ this time.

## 2023-08-09 NOTE — CONSULT NOTE ADULT - SUBJECTIVE AND OBJECTIVE BOX
Chief Complaint:  Patient is a 82y old  Male who presents with a chief complaint of     HPI: 82-year-old male, history of TAVR on aspirin, GI bleed that required multiple blood transfusion, presenting with 1 day onset of fatigue, dizziness, shortness of breath, hematemesis at triage.     HPI:SUSANNA BRAND is a 82y Male with symptomatic anemia c/b unclear pancytopenia along with about 20 pound weight loss in 3 mos (unintentional)s/p BMBx outpatient awaiting results, diverticulosis, DM, HTN, AS s/p TAVR 2021, AVMs in [place] s/p multiple endoscopies (EGD/colon) requiring cauterization who initially presented to the ED with SOB, fatigued and dizzinessx1 day and 1x hematemesis on arrival to ED 8/8. Hgb noted to be 6.4. Of note, patient was recently admitted with melena 07/2023. Had push enteroscopy (7/25)- normal esophagus; ultiple gastric polyps; normal duodenum; normal examined jejunum. Had c-scope (7/26)- fair prep, pan-diverticulosis, normal TI, normal rectal retroflexion, no findings to explain melena. Was recommended to have OP capsule. GI consulted for anemia.    Per Allscripts 2016: had cecal nodule removed and 5 mm sessile polyp removed and 1.5 cm lipoma at the hepatic flexure with plans to repeat in 3 years and had "benign path." Per allscripts has had a previous EGD at [facility] with bleeding source identified/treated per patient. Reports having had EGD/colonoscopy 6 months ago by Dr. Garzon at [facility] and was told there was angioectasias. Also has tried tamoxifen previously, but ultimately could not tolerate it due to side effects.          Allergies:  No Known Allergies      Home Medications:    Hospital Medications:  pantoprazole Infusion 8 mG/Hr IV Continuous <Continuous>      PMHX/PSHX:  Gastrointestinal bleeding    Mild HTN    HTN (hypertension)    HLD (hyperlipidemia)    T2DM (type 2 diabetes mellitus)    Hypothyroidism    No significant past surgical history    S/P TAVR (transcatheter aortic valve replacement)        Family history:  No pertinent family history in first degree relatives     Denies any family history of GI-related disease or cancers.    Social History:   ETOH: denies  Tobacco: denies  Illicit drug use: denies    ROS: 14 point ROS negative unless otherwise stated in HPI      Vital Signs:  Vital Signs Last 24 Hrs  T(C): 36.8 (09 Aug 2023 05:10), Max: 36.8 (09 Aug 2023 05:10)  T(F): 98.3 (09 Aug 2023 05:10), Max: 98.3 (09 Aug 2023 05:10)  HR: 62 (09 Aug 2023 05:10) (62 - 82)  BP: 108/70 (09 Aug 2023 05:10) (108/70 - 151/71)  BP(mean): 67 (09 Aug 2023 04:45) (67 - 70)  RR: 20 (09 Aug 2023 05:10) (17 - 20)  SpO2: 98% (09 Aug 2023 05:10) (97% - 100%)    Parameters below as of 09 Aug 2023 05:10  Patient On (Oxygen Delivery Method): room air      Daily Height in cm: 165.1 (09 Aug 2023 02:43)    Daily     PHYSICAL EXAM:     GENERAL:  Appears stated age, well-groomed, well-nourished, no distress  HEENT:  NC/AT,  conjunctivae clear and pink  CHEST:  Full & symmetric excursion, no increased effort, breath sounds clear  HEART:  Regular rhythm, S1, S2, no murmur/rub/S3/S4  ABDOMEN:  Soft, non-tender, non-distended, normoactive bowel sounds,    EXTREMITIES:  no cyanosis,clubbing or edema  SKIN:  No rash/erythema/ecchymoses/petechiae/wounds/abscess/warm/dry  NEURO:  Alert, orientedx3      LABS:                        6.4    2.68  )-----------( 106      ( 09 Aug 2023 03:23 )             20.2     08-09    138  |  102  |  41<H>  ----------------------------<  185<H>  4.2   |  22  |  1.13    Ca    9.5      09 Aug 2023 03:23    TPro  6.7  /  Alb  3.4  /  TBili  0.1<L>  /  DBili  x   /  AST  14  /  ALT  5<L>  /  AlkPhos  51  08-09    LIVER FUNCTIONS - ( 09 Aug 2023 03:23 )  Alb: 3.4 g/dL / Pro: 6.7 g/dL / ALK PHOS: 51 U/L / ALT: 5 U/L / AST: 14 U/L / GGT: x           PT/INR - ( 09 Aug 2023 03:23 )   PT: 13.1 sec;   INR: 1.26 ratio         PTT - ( 09 Aug 2023 03:23 )  PTT:26.0 sec  Urinalysis Basic - ( 09 Aug 2023 03:23 )    Color: x / Appearance: x / SG: x / pH: x  Gluc: 185 mg/dL / Ketone: x  / Bili: x / Urobili: x   Blood: x / Protein: x / Nitrite: x   Leuk Esterase: x / RBC: x / WBC x   Sq Epi: x / Non Sq Epi: x / Bacteria: x          Imaging:           ACC: [id number] EXAM:  CT ABDOMEN AND PELVIS IC   ORDERED BY: CHANCE BRIGHT     PROCEDURE DATE:  08/09/2023          INTERPRETATION:  CLINICAL INFORMATION: Bright right red blood per rectum   for one day    COMPARISON: CT from April 11, 2019    CONTRAST/COMPLICATIONS:  IV Contrast: Omnipaque 350  90 cc administered   10 cc discarded  Oral Contrast: NONE  Complications: None reported at time of study completion    PROCEDURE:  CT of the Abdomen and Pelvis was performed.  Precontrast, Arterial and Delayed phases were performed.  Sagittal and coronal reformats were performed.    FINDINGS:  LOWER CHEST: Aortic valve replacement. Coronary artery calcifications.    LIVER: Within normal limits.  BILE DUCTS: Normal caliber.  GALLBLADDER: Within normal limits.  SPLEEN: Within normal limits.  PANCREAS: Within normal limits.  ADRENALS: Nodular thickening of the left adrenal gland, likely   adenomatous hypertrophy, unchanged.  KIDNEYS/URETERS: Cysts in the lower pole left kidney.    BLADDER: Within normallimits.  REPRODUCTIVE ORGANS: Enlarged prostate    BOWEL: No bowel obstruction. Normal appendix, extends into a right   inguinal hernia.Diverticulosis coli  PERITONEUM: No ascites.  VESSELS: Atherosclerotic changes.  RETROPERITONEUM/LYMPH NODES: No lymphadenopathy.  ABDOMINAL WALL: Additional fat-containing left inguinal hernia.  BONES: Unremarkable    IMPRESSION:    1. No active GI bleed on this scan.  2. Bilateral inguinal hernias, the right sided hernia containing a   portion of the normal appendix.       Chief Complaint:  Patient is a 82y old  Male who presents with a chief complaint of     HPI: 82-year-old male, history of TAVR on aspirin, GI bleed that required multiple blood transfusion, presenting with 1 day onset of fatigue, dizziness, shortness of breath, hematemesis at triage.     HPI:SUSANNA BRAND is a 82y Male with symptomatic anemia c/b unclear pancytopenia along with about 20 pound weight loss in 3 mos (unintentional)s/p BMBx outpatient awaiting results, diverticulosis, DM, HTN, AS s/p TAVR 2021, AVMs in [place] s/p multiple endoscopies (EGD/colon) requiring cauterization who initially presented to the ED with 2-3 days of dark stools, SOB, fatigued and dizziness x1 day and 1x hematemesis on arrival to ED 8/8. Hgb noted to be 6.4. Of note, patient was recently admitted with melena 07/2023. Had push enteroscopy (7/25)- normal esophagus; ultiple gastric polyps; normal duodenum; normal examined jejunum. Had c-scope (7/26)- fair prep, pan-diverticulosis, normal TI, normal rectal retroflexion, no findings to explain melena. Was recommended to have OP capsule. GI consulted for anemia.    Per Allscripts 2016: had cecal nodule removed and 5 mm sessile polyp removed and 1.5 cm lipoma at the hepatic flexure with plans to repeat in 3 years and had "benign path." Per allscripts has had a previous EGD at [facility] with bleeding source identified/treated per patient. Reports having had EGD/colonoscopy 6 months ago by Dr. Garzon at [facility] and was told there was angioectasias. Also has tried tamoxifen previously, but ultimately could not tolerate it due to side effects.          Allergies:  No Known Allergies      Home Medications:    Hospital Medications:  pantoprazole Infusion 8 mG/Hr IV Continuous <Continuous>      PMHX/PSHX:  Gastrointestinal bleeding    Mild HTN    HTN (hypertension)    HLD (hyperlipidemia)    T2DM (type 2 diabetes mellitus)    Hypothyroidism    No significant past surgical history    S/P TAVR (transcatheter aortic valve replacement)        Family history:  No pertinent family history in first degree relatives     Denies any family history of GI-related disease or cancers.    Social History:   ETOH: denies  Tobacco: denies  Illicit drug use: denies    ROS: 14 point ROS negative unless otherwise stated in HPI      Vital Signs:  Vital Signs Last 24 Hrs  T(C): 36.8 (09 Aug 2023 05:10), Max: 36.8 (09 Aug 2023 05:10)  T(F): 98.3 (09 Aug 2023 05:10), Max: 98.3 (09 Aug 2023 05:10)  HR: 62 (09 Aug 2023 05:10) (62 - 82)  BP: 108/70 (09 Aug 2023 05:10) (108/70 - 151/71)  BP(mean): 67 (09 Aug 2023 04:45) (67 - 70)  RR: 20 (09 Aug 2023 05:10) (17 - 20)  SpO2: 98% (09 Aug 2023 05:10) (97% - 100%)    Parameters below as of 09 Aug 2023 05:10  Patient On (Oxygen Delivery Method): room air      Daily Height in cm: 165.1 (09 Aug 2023 02:43)    Daily     PHYSICAL EXAM:     GENERAL:  Appears stated age, well-groomed, well-nourished, no distress  HEENT:  NC/AT,  conjunctivae clear and pink  CHEST:  Full & symmetric excursion, no increased effor  HEART:  Regular rhythm, S1, S2, no murmur/rub/S3/S4  ABDOMEN:  Soft, non-tender, non-distended  RECTAL: +melena on smear; no hematochezia  EXTREMITIES:  no cyanosis,clubbing or edema  SKIN:  No rash/erythema/ecchymoses/petechiae/wounds/abscess/warm/dry  NEURO:  Alert, orientedx3      LABS:                        6.4    2.68  )-----------( 106      ( 09 Aug 2023 03:23 )             20.2     08-09    138  |  102  |  41<H>  ----------------------------<  185<H>  4.2   |  22  |  1.13    Ca    9.5      09 Aug 2023 03:23    TPro  6.7  /  Alb  3.4  /  TBili  0.1<L>  /  DBili  x   /  AST  14  /  ALT  5<L>  /  AlkPhos  51  08-09    LIVER FUNCTIONS - ( 09 Aug 2023 03:23 )  Alb: 3.4 g/dL / Pro: 6.7 g/dL / ALK PHOS: 51 U/L / ALT: 5 U/L / AST: 14 U/L / GGT: x           PT/INR - ( 09 Aug 2023 03:23 )   PT: 13.1 sec;   INR: 1.26 ratio         PTT - ( 09 Aug 2023 03:23 )  PTT:26.0 sec  Urinalysis Basic - ( 09 Aug 2023 03:23 )    Color: x / Appearance: x / SG: x / pH: x  Gluc: 185 mg/dL / Ketone: x  / Bili: x / Urobili: x   Blood: x / Protein: x / Nitrite: x   Leuk Esterase: x / RBC: x / WBC x   Sq Epi: x / Non Sq Epi: x / Bacteria: x          Imaging:           ACC: [id number] EXAM:  CT ABDOMEN AND PELVIS IC   ORDERED BY: CHANCE BRIGHT     PROCEDURE DATE:  08/09/2023          INTERPRETATION:  CLINICAL INFORMATION: Bright right red blood per rectum   for one day    COMPARISON: CT from April 11, 2019    CONTRAST/COMPLICATIONS:  IV Contrast: Omnipaque 350  90 cc administered   10 cc discarded  Oral Contrast: NONE  Complications: None reported at time of study completion    PROCEDURE:  CT of the Abdomen and Pelvis was performed.  Precontrast, Arterial and Delayed phases were performed.  Sagittal and coronal reformats were performed.    FINDINGS:  LOWER CHEST: Aortic valve replacement. Coronary artery calcifications.    LIVER: Within normal limits.  BILE DUCTS: Normal caliber.  GALLBLADDER: Within normal limits.  SPLEEN: Within normal limits.  PANCREAS: Within normal limits.  ADRENALS: Nodular thickening of the left adrenal gland, likely   adenomatous hypertrophy, unchanged.  KIDNEYS/URETERS: Cysts in the lower pole left kidney.    BLADDER: Within normallimits.  REPRODUCTIVE ORGANS: Enlarged prostate    BOWEL: No bowel obstruction. Normal appendix, extends into a right   inguinal hernia.Diverticulosis coli  PERITONEUM: No ascites.  VESSELS: Atherosclerotic changes.  RETROPERITONEUM/LYMPH NODES: No lymphadenopathy.  ABDOMINAL WALL: Additional fat-containing left inguinal hernia.  BONES: Unremarkable    IMPRESSION:    1. No active GI bleed on this scan.  2. Bilateral inguinal hernias, the right sided hernia containing a   portion of the normal appendix.       Chief Complaint:  Patient is a 82y old  Male who presents with a chief complaint of     HPI: 82-year-old male, history of TAVR on aspirin, GI bleed that required multiple blood transfusion, presenting with 1 day onset of fatigue, dizziness, shortness of breath, hematemesis at triage.     HPI:SUSANNA BRAND is a 82y Male with symptomatic anemia c/b unclear pancytopenia along with about 20 pound weight loss in 3 mos (unintentional)s/p BMBx outpatient awaiting results, diverticulosis, DM, HTN, AS s/p TAVR 2021, AVMs in [place] s/p multiple endoscopies (EGD/colon) requiring cauterization who initially presented to the ED with 2-3 days of dark stools, SOB, fatigued and dizziness x1 day and 1x hematemesis on arrival to ED 8/8. Hgb noted to be 6.4. Of note, patient was recently admitted with melena 07/2023. Had push enteroscopy (7/25)- normal esophagus; ultiple gastric polyps; normal duodenum; normal examined jejunum. Had c-scope (7/26)- fair prep, pan-diverticulosis, normal TI, normal rectal retroflexion, no findings to explain melena. Was recommended to have OP capsule. GI consulted for anemia. No abdominal pain. No nSAIDs.     Per Allscripts 2016: had cecal nodule removed and 5 mm sessile polyp removed and 1.5 cm lipoma at the hepatic flexure with plans to repeat in 3 years and had "benign path." Per allscripts has had a previous EGD at [facility] with bleeding source identified/treated per patient. Reports having had EGD/colonoscopy 6 months ago by Dr. Garzon at [facility] and was told there was angioectasias. Also has tried tamoxifen previously, but ultimately could not tolerate it due to side effects.          Allergies:  No Known Allergies      Home Medications:    Hospital Medications:  pantoprazole Infusion 8 mG/Hr IV Continuous <Continuous>      PMHX/PSHX:  Gastrointestinal bleeding    Mild HTN    HTN (hypertension)    HLD (hyperlipidemia)    T2DM (type 2 diabetes mellitus)    Hypothyroidism    No significant past surgical history    S/P TAVR (transcatheter aortic valve replacement)        Family history:  No pertinent family history in first degree relatives     Denies any family history of GI-related disease or cancers.    Social History:   ETOH: denies  Tobacco: denies  Illicit drug use: denies    ROS: 14 point ROS negative unless otherwise stated in HPI      Vital Signs:  Vital Signs Last 24 Hrs  T(C): 36.8 (09 Aug 2023 05:10), Max: 36.8 (09 Aug 2023 05:10)  T(F): 98.3 (09 Aug 2023 05:10), Max: 98.3 (09 Aug 2023 05:10)  HR: 62 (09 Aug 2023 05:10) (62 - 82)  BP: 108/70 (09 Aug 2023 05:10) (108/70 - 151/71)  BP(mean): 67 (09 Aug 2023 04:45) (67 - 70)  RR: 20 (09 Aug 2023 05:10) (17 - 20)  SpO2: 98% (09 Aug 2023 05:10) (97% - 100%)    Parameters below as of 09 Aug 2023 05:10  Patient On (Oxygen Delivery Method): room air      Daily Height in cm: 165.1 (09 Aug 2023 02:43)    Daily     PHYSICAL EXAM:     GENERAL:  Appears stated age, well-groomed, well-nourished, no distress  HEENT:  NC/AT,  conjunctivae clear and pink  CHEST:  Full & symmetric excursion, no increased effor  HEART:  Regular rhythm, S1, S2   ABDOMEN:  Soft, non-tender, non-distended  RECTAL: +melena on smear; no hematochezia  EXTREMITIES:  no cyanosis,clubbing or edema  SKIN:  No rash/erythema/ecchymoses/petechiae/wounds/abscess/warm/dry  NEURO:  Alert, orientedx3  PSYCH: normal affect      LABS:                        6.4    2.68  )-----------( 106      ( 09 Aug 2023 03:23 )             20.2     08-09    138  |  102  |  41<H>  ----------------------------<  185<H>  4.2   |  22  |  1.13    Ca    9.5      09 Aug 2023 03:23    TPro  6.7  /  Alb  3.4  /  TBili  0.1<L>  /  DBili  x   /  AST  14  /  ALT  5<L>  /  AlkPhos  51  08-09    LIVER FUNCTIONS - ( 09 Aug 2023 03:23 )  Alb: 3.4 g/dL / Pro: 6.7 g/dL / ALK PHOS: 51 U/L / ALT: 5 U/L / AST: 14 U/L / GGT: x           PT/INR - ( 09 Aug 2023 03:23 )   PT: 13.1 sec;   INR: 1.26 ratio         PTT - ( 09 Aug 2023 03:23 )  PTT:26.0 sec  Urinalysis Basic - ( 09 Aug 2023 03:23 )    Color: x / Appearance: x / SG: x / pH: x  Gluc: 185 mg/dL / Ketone: x  / Bili: x / Urobili: x   Blood: x / Protein: x / Nitrite: x   Leuk Esterase: x / RBC: x / WBC x   Sq Epi: x / Non Sq Epi: x / Bacteria: x          Imaging:           ACC: [id number] EXAM:  CT ABDOMEN AND PELVIS IC   ORDERED BY: CHANCE BRIGHT     PROCEDURE DATE:  08/09/2023          INTERPRETATION:  CLINICAL INFORMATION: Bright right red blood per rectum   for one day    COMPARISON: CT from April 11, 2019    CONTRAST/COMPLICATIONS:  IV Contrast: Omnipaque 350  90 cc administered   10 cc discarded  Oral Contrast: NONE  Complications: None reported at time of study completion    PROCEDURE:  CT of the Abdomen and Pelvis was performed.  Precontrast, Arterial and Delayed phases were performed.  Sagittal and coronal reformats were performed.    FINDINGS:  LOWER CHEST: Aortic valve replacement. Coronary artery calcifications.    LIVER: Within normal limits.  BILE DUCTS: Normal caliber.  GALLBLADDER: Within normal limits.  SPLEEN: Within normal limits.  PANCREAS: Within normal limits.  ADRENALS: Nodular thickening of the left adrenal gland, likely   adenomatous hypertrophy, unchanged.  KIDNEYS/URETERS: Cysts in the lower pole left kidney.    BLADDER: Within normallimits.  REPRODUCTIVE ORGANS: Enlarged prostate    BOWEL: No bowel obstruction. Normal appendix, extends into a right   inguinal hernia.Diverticulosis coli  PERITONEUM: No ascites.  VESSELS: Atherosclerotic changes.  RETROPERITONEUM/LYMPH NODES: No lymphadenopathy.  ABDOMINAL WALL: Additional fat-containing left inguinal hernia.  BONES: Unremarkable    IMPRESSION:    1. No active GI bleed on this scan.  2. Bilateral inguinal hernias, the right sided hernia containing a   portion of the normal appendix.

## 2023-08-09 NOTE — H&P ADULT - NSHPPHYSICALEXAM_GEN_ALL_CORE
Vital Signs Last 24 Hrs  T(C): 36.7 (09 Aug 2023 11:45), Max: 36.8 (09 Aug 2023 05:10)  T(F): 98.1 (09 Aug 2023 11:45), Max: 98.3 (09 Aug 2023 05:10)  HR: 64 (09 Aug 2023 11:45) (61 - 82)  BP: 136/67 (09 Aug 2023 11:45) (108/70 - 151/71)  BP(mean): 91 (09 Aug 2023 11:45) (65 - 91)  RR: 16 (09 Aug 2023 11:45) (14 - 20)  SpO2: 97% (09 Aug 2023 11:45) (95% - 100%)    Parameters below as of 09 Aug 2023 11:45  Patient On (Oxygen Delivery Method): room air    heent : nc/at , pallor +  neck: supple, no JVD  lungs : B/L clear , no w/r/r  heart: s1s2 nml  abd : soft, NABS, NT/ND  ext : no e/c/c, pulses 2 +  neuro: aaox3 , no focal deficit

## 2023-08-09 NOTE — CONSULT NOTE ADULT - ASSESSMENT
# chronic intermittent acute blood loss anemia  # Pancytopenia, unclear etiology - s/p BMBx OSH, pending report  Most likely from AVMs/Angioectasis given h/o severe AS s/p TAVR and chronic intermittent acute blood loss anemia    Recommendations:  -admit to medicine  -trend vitals, CBC, and monitor for clinical signs of bleeding  -maintain active type and screen  -transfusion goal to maintain hemoglobin >/= 8.0  -rule out other causes for anemia [order PRE-transfusion iron studies, ferritin, vitamin B12, folate, retic, LDH, haptoglobin]  -check post-transfusion CBC; maintain HCT > 24 per endoscopy anesthesia req  -avoid NSAIDs  -PPI IV BID acceptable for now  -keep NPO  -plan for push enteroscopy today +/- VCE    **THIS NOTE IS NOT FINALIZED UNTIL SIGNED BY THE ATTENDING**    Pamela Paiz MD  GI Fellow, PGY-6  Available via Microsoft Teams    NON-URGENT CONSULTS:  Please email [email or web address]  AT NIGHT AND ON WEEKENDS:  Contact on-call GI fellow via answering service ([phone]) from 5pm-8am and on weekends/holidays  MONDAY-FRIDAY 8AM-5PM:  Pager# [phone] ([facility]) or [phone] ([facility])       SUSANNA BRAND is a 82y Male with symptomatic anemia c/b unclear pancytopenia along with about 20 pound weight loss in 3 mos (unintentional)s/p BMBx outpatient awaiting results, diverticulosis, DM, HTN, AS s/p TAVR 2021, AVMs in [place] s/p multiple endoscopies (EGD/colon) requiring cauterization who initially presented to the ED with 2-3 days of dark stools, SOB, fatigued and dizziness x1 day and 1x hematemesis on arrival to ED 8/8. Hgb noted to be 6.4. Of note, patient was recently admitted with melena 07/2023. Had push enteroscopy (7/25)- normal esophagus; ultiple gastric polyps; normal duodenum; normal examined jejunum. Had c-scope (7/26)- fair prep, pan-diverticulosis, normal TI, normal rectal retroflexion, no findings to explain melena. Was recommended to have OP capsule. GI consulted for anemia.    # chronic intermittent acute blood loss anemia  # Pancytopenia, unclear etiology - s/p BMBx OSH, pending report  Most likely from AVMs/Angioectasis given h/o severe AS s/p TAVR and chronic intermittent acute blood loss anemia    Recommendations:  -admit to medicine  -trend vitals, CBC, and monitor for clinical signs of bleeding  -maintain active type and screen  -transfusion goal to maintain hemoglobin >/= 8.0  -rule out other causes for anemia [order PRE-transfusion iron studies, ferritin, vitamin B12, folate, retic, LDH, haptoglobin]  -check post-transfusion CBC; maintain HCT > 24 per endoscopy anesthesia req  -avoid NSAIDs  -PPI IV BID acceptable for now  -keep NPO  -plan for EGD/single balloon today pending post-transfusion Hgb/resuscitation; otherwise if not adequately resuscitated today, will plan for endoscopy tomorrow (8/10)    **THIS NOTE IS NOT FINALIZED UNTIL SIGNED BY THE ATTENDING**    Pamela Paiz MD  GI Fellow, PGY-6  Available via [employer] Teams    NON-URGENT CONSULTS:  Please email [email or web address]  AT NIGHT AND ON WEEKENDS:  Contact on-call GI fellow via answering service ([phone]) from 5pm-8am and on weekends/holidays  MONDAY-FRIDAY 8AM-5PM:  Pager# [phone] ([facility]) or [phone] ([facility])

## 2023-08-09 NOTE — ED PROVIDER NOTE - PHYSICAL EXAMINATION
Gen: Patient is pale-appearing, NAD, AAOx3  HEENT: NCAT, normal conjunctiva, tongue midline, oral mucosa moist  Lung: CTAB, no respiratory distress, no wheezes/rhonchi/rales B/L, speaking in full sentences  CV: RRR, no murmurs, rubs or gallops, distal pulses 2+ b/l  Abd: soft, NT, ND, no guarding, no rigidity, no rebound tenderness  MSK: no visible deformities, ROM normal in UE/LE  Neuro: No focal sensory or motor deficits  Skin: Warm, well perfused, no rash, no leg swelling  Psych: normal affect, calm

## 2023-08-09 NOTE — H&P ADULT - NSHPLABSRESULTS_GEN_ALL_CORE
6.7    1.79  )-----------( 97       ( 09 Aug 2023 11:00 )             21.5     08-09    138  |  102  |  41<H>  ----------------------------<  185<H>  4.2   |  22  |  1.13    Ca    9.5      09 Aug 2023 03:23    TPro  6.7  /  Alb  3.4  /  TBili  0.1<L>  /  DBili  x   /  AST  14  /  ALT  5<L>  /  AlkPhos  51  08-09

## 2023-08-09 NOTE — ED ADULT NURSE NOTE - OBJECTIVE STATEMENT
82 y.o M PMH/PSH TAVR on aspirin, GI bleed requiring multiple blood transfusions, type 2 diabetes, hypothyroidism, HLD, HTN, presenting to the ED for fatigue, weakness and mild abdominal pain. Pt states that he "felt like he needed another blood transfusion." While in triage, pt had 1 episode of vomiting blood. Pt endorses having dark stools at home and had a positive occult feces test at home. Pt son at the bedside stating the pt appears more pale than normal. Denies c/p, sob, numbness/tingling, HA, visual changes, dysuria, hematuria. Aox4, MAEx4, respirations even and unlabored, abd soft nondistended, skin warm dry and normal for race. Patient safety maintained, bed is in lowest position, wheels locked, and side rails raised. Patient oriented to call bell, and call bell is within reach.

## 2023-08-09 NOTE — ED PROVIDER NOTE - NS ED ROS FT
Constitutional:  (-) fever, (-) chills, (+) lethargy  Eyes:  (-) eye pain (-) visual changes  ENMT: (-) nasal discharge, (-) sore throat. (-) neck pain or stiffness  Cardiac: (-) chest pain (-) palpitations  Respiratory:  (-) cough (+) respiratory distress.   GI:  (-) nausea (+) vomiting (-) diarrhea (-) abdominal pain.  :  (-) dysuria (-) frequency (-) burning.  MS:  (-) back pain (-) joint pain.  Neuro:  (-) headache (-) numbness (-) tingling (-) focal weakness  Skin:  (-) rash  Except as documented in the HPI,  all other systems are negative

## 2023-08-09 NOTE — CONSULT NOTE ADULT - SUBJECTIVE AND OBJECTIVE BOX
DATE OF SERVICE: 08-09-23 @ 18:51    CHIEF COMPLAINT:Patient is a 82y old  Male who presents with a chief complaint of Hematemesis (09 Aug 2023 13:07)      HISTORY OF PRESENT ILLNESS:  82-year-old male, history of TAVR on aspirin, GI bleed that required multiple blood transfusion, presenting with 1 day onset of fatigue, dizziness, shortness of breath, hematemesis at triage.  Patient reports that he feels like he is going to need blood transfusion today. Patient had recent endoscopy on July 26, no active bleeding was noted during the procedure. Currently denies any nausea, abdominal pain, chest pain, shortness of breath. Denies alcohol use.  he was here 3 weeks ago and had EGD/ colonoscopy , and was suppose to f/u with his Out pt. GI for capsule endoscopy      Of note, pt has been regularly receiving blood transfusion for symptomatic anemia for 5 years, last transfused 3 weeks ago. Pancytopenia being worked up outpatient w/ bone marrow biopsy (09 Aug 2023 13:07)      PAST MEDICAL & SURGICAL HISTORY:  Gastrointestinal bleeding      HTN (hypertension)      HLD (hyperlipidemia)      T2DM (type 2 diabetes mellitus)      Hypothyroidism      S/P TAVR (transcatheter aortic valve replacement)  2020              MEDICATIONS:        escitalopram 10 milliGRAM(s) Oral daily    pantoprazole Infusion 8 mG/Hr IV Continuous <Continuous>    dextrose 50% Injectable 12.5 Gram(s) IV Push once  dextrose 50% Injectable 25 Gram(s) IV Push once  dextrose 50% Injectable 25 Gram(s) IV Push once  dextrose Oral Gel 15 Gram(s) Oral once PRN  glucagon  Injectable 1 milliGRAM(s) IntraMuscular once  insulin lispro (ADMELOG) corrective regimen sliding scale   SubCutaneous three times a day before meals  levothyroxine 50 MICROGram(s) Oral daily    dextrose 5%. 1000 milliLiter(s) IV Continuous <Continuous>  dextrose 5%. 1000 milliLiter(s) IV Continuous <Continuous>  folic acid 1 milliGRAM(s) Oral daily  tamsulosin 0.4 milliGRAM(s) Oral at bedtime      FAMILY HISTORY:      Non-contributory    SOCIAL HISTORY:    [ ] Tobacco  [ ] Drugs  [ ] Alcohol    Allergies    No Known Allergies    Intolerances    	    REVIEW OF SYSTEMS:  CONSTITUTIONAL: No fever  EYES: No eye pain, visual disturbances, or discharge  ENMT:  No difficulty hearing, tinnitus  NECK: No pain or stiffness  RESPIRATORY: No cough, wheezing,  CARDIOVASCULAR: No chest pain, palpitations, passing out, dizziness, or leg swelling  GASTROINTESTINAL:  No nausea, vomiting, diarrhea or constipation. No melena.  GENITOURINARY: No dysuria, hematuria  NEUROLOGICAL: No stroke like symptoms  SKIN: No burning or lesions   ENDOCRINE: No heat or cold intolerance  MUSCULOSKELETAL: No joint pain or swelling  PSYCHIATRIC: No  anxiety, mood swings  HEME/LYMPH: No bleeding gums  ALLERGY AND IMMUNOLOGIC: No hives or eczema	    All other ROS negative    PHYSICAL EXAM:  T(C): 36.5 (08-09-23 @ 18:25), Max: 36.9 (08-09-23 @ 13:40)  HR: 81 (08-09-23 @ 18:25) (61 - 82)  BP: 127/72 (08-09-23 @ 18:25) (108/70 - 151/71)  RR: 16 (08-09-23 @ 18:25) (14 - 20)  SpO2: 98% (08-09-23 @ 18:25) (95% - 100%)  Wt(kg): --  I&O's Summary      Appearance: Normal	  HEENT:   Normal oral mucosa, EOMI	  Cardiovascular:  S1 S2, No JVD,    Respiratory: Lungs clear to auscultation	  Psychiatry: Alert  Gastrointestinal:  Soft, Non-tender, + BS	  Skin: No rashes   Neurologic: Non-focal  Extremities:  No edema  Vascular: Peripheral pulses palpable    	    	  	  CARDIAC MARKERS:  Labs personally reviewed by me                                  7.0    1.92  )-----------( 101      ( 09 Aug 2023 14:01 )             22.3     08-09    138  |  102  |  41<H>  ----------------------------<  185<H>  4.2   |  22  |  1.13    Ca    9.5      09 Aug 2023 03:23    TPro  6.7  /  Alb  3.4  /  TBili  0.1<L>  /  DBili  x   /  AST  14  /  ALT  5<L>  /  AlkPhos  51  08-09          EKG: Personally reviewed by me -   Radiology: Personally reviewed by me -     < from: Xray Chest 1 View- PORTABLE-Urgent (Xray Chest 1 View- PORTABLE-Urgent .) (08.09.23 @ 05:03) >  Clear lungs.        Assessment /Plan:     82-year-old male, history of TAVR on aspirin, GI bleed that required multiple blood transfusion, presenting with 1 day onset of fatigue, dizziness, shortness of breath, hematemesis at triage. Patient had recent endoscopy on July 26, no active bleeding was noted during the procedure. Currently denies any nausea, abdominal pain, chest pain, shortness of breath. Denies alcohol use.      1. Preop Risk Stratification   - denies CAD Hx  - Symptomatic anemia resolved with PRBC transfusion   - Mod risk for low risk EGD/colon, no cardiac contraindication to proceed    2. GI bleed / sever anemia 2/2 ac blood loss :  Keep Hb >8 as symptomatic anemia   GI consult appreciated. Plan for VCE    3. 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 Severe AS s/p TAVR :  - Follows at [facility]  - Repeat TTE    5. HTN :   c/w home: losartan 50mg PO daily, amlodipine 5mg PO daily    6. HLD :  - Cont fenofibrate 160mg PO daily    Differential diagnosis and plan of care discussed with patient after the evaluation. Counseling on diet, nutritional counseling, weight management, exercise and medication compliance was done.   Advanced care planning/advanced directives discussed with patient/family. DNR status including forceful chest compressions to attempt to restart the heart, ventilator support/artificial breathing, electric shock, artificial nutrition, health care proxy, Molst form all discussed with pt. Pt wishes to consider. More than fifteen minutes spent on discussing advanced directives.       Vaughn Doherty DO [facility]  Cardiovascular Medicine  [address] [place] [address] Dr, [address]  Available for call or text via [employer] TE[employer]  Office [phone]   DATE OF SERVICE: 08-09-23 @ 18:51    CHIEF COMPLAINT:Patient is a 82y old  Male who presents with a chief complaint of Hematemesis (09 Aug 2023 13:07)      HISTORY OF PRESENT ILLNESS:  82-year-old male, history of TAVR on aspirin, GI bleed that required multiple blood transfusion, presenting with 1 day onset of fatigue, dizziness, shortness of breath, hematemesis at triage.  Patient reports that he feels like he is going to need blood transfusion today. Patient had recent endoscopy on July 26, no active bleeding was noted during the procedure. Currently denies any nausea, abdominal pain, chest pain, shortness of breath. Denies alcohol use.  he was here 3 weeks ago and had EGD/ colonoscopy , and was suppose to f/u with his Out pt. GI for capsule endoscopy      Of note, pt has been regularly receiving blood transfusion for symptomatic anemia for 5 years, last transfused 3 weeks ago. Pancytopenia being worked up outpatient w/ bone marrow biopsy (09 Aug 2023 13:07)      PAST MEDICAL & SURGICAL HISTORY:  Gastrointestinal bleeding      HTN (hypertension)      HLD (hyperlipidemia)      T2DM (type 2 diabetes mellitus)      Hypothyroidism      S/P TAVR (transcatheter aortic valve replacement)  2020              MEDICATIONS:        escitalopram 10 milliGRAM(s) Oral daily    pantoprazole Infusion 8 mG/Hr IV Continuous <Continuous>    dextrose 50% Injectable 12.5 Gram(s) IV Push once  dextrose 50% Injectable 25 Gram(s) IV Push once  dextrose 50% Injectable 25 Gram(s) IV Push once  dextrose Oral Gel 15 Gram(s) Oral once PRN  glucagon  Injectable 1 milliGRAM(s) IntraMuscular once  insulin lispro (ADMELOG) corrective regimen sliding scale   SubCutaneous three times a day before meals  levothyroxine 50 MICROGram(s) Oral daily    dextrose 5%. 1000 milliLiter(s) IV Continuous <Continuous>  dextrose 5%. 1000 milliLiter(s) IV Continuous <Continuous>  folic acid 1 milliGRAM(s) Oral daily  tamsulosin 0.4 milliGRAM(s) Oral at bedtime      FAMILY HISTORY:      Non-contributory    SOCIAL HISTORY:    [ ] not a smoker    Allergies    No Known Allergies    Intolerances    	    REVIEW OF SYSTEMS:  CONSTITUTIONAL: No fever  EYES: No eye pain, visual disturbances, or discharge  ENMT:  No difficulty hearing, tinnitus  NECK: No pain or stiffness  RESPIRATORY: No cough, wheezing,  CARDIOVASCULAR: No chest pain, palpitations, passing out, dizziness, or leg swelling  GASTROINTESTINAL:  No nausea, vomiting, diarrhea or constipation. No melena.  GENITOURINARY: No dysuria, hematuria  NEUROLOGICAL: No stroke like symptoms  SKIN: No burning or lesions   ENDOCRINE: No heat or cold intolerance  MUSCULOSKELETAL: No joint pain or swelling  PSYCHIATRIC: No  anxiety, mood swings  HEME/LYMPH: No bleeding gums  ALLERGY AND IMMUNOLOGIC: No hives or eczema	    All other ROS negative    PHYSICAL EXAM:  T(C): 36.5 (08-09-23 @ 18:25), Max: 36.9 (08-09-23 @ 13:40)  HR: 81 (08-09-23 @ 18:25) (61 - 82)  BP: 127/72 (08-09-23 @ 18:25) (108/70 - 151/71)  RR: 16 (08-09-23 @ 18:25) (14 - 20)  SpO2: 98% (08-09-23 @ 18:25) (95% - 100%)  Wt(kg): --  I&O's Summary      Appearance: Normal	  HEENT:   Normal oral mucosa, EOMI	  Cardiovascular:  S1 S2, No JVD,    Respiratory: Lungs clear to auscultation	  Psychiatry: Alert  Gastrointestinal:  Soft, Non-tender, + BS	  Skin: No rashes   Neurologic: Non-focal  Extremities:  No edema  Vascular: Peripheral pulses palpable    	    	  	  CARDIAC MARKERS:  Labs personally reviewed by me                                  7.0    1.92  )-----------( 101      ( 09 Aug 2023 14:01 )             22.3     08-09    138  |  102  |  41<H>  ----------------------------<  185<H>  4.2   |  22  |  1.13    Ca    9.5      09 Aug 2023 03:23    TPro  6.7  /  Alb  3.4  /  TBili  0.1<L>  /  DBili  x   /  AST  14  /  ALT  5<L>  /  AlkPhos  51  08-09          EKG: Personally reviewed by me -   Radiology: Personally reviewed by me -     < from: Xray Chest 1 View- PORTABLE-Urgent (Xray Chest 1 View- PORTABLE-Urgent .) (08.09.23 @ 05:03) >  Clear lungs.        Assessment /Plan:     82-year-old male, history of TAVR on aspirin, GI bleed that required multiple blood transfusion, presenting with 1 day onset of fatigue, dizziness, shortness of breath, hematemesis at triage. Patient had recent endoscopy on July 26, no active bleeding was noted during the procedure. Currently denies any nausea, abdominal pain, chest pain, shortness of breath. Denies alcohol use.      1. Preop Risk Stratification   - denies CAD Hx  - Symptomatic anemia resolved with PRBC transfusion   - Mod risk for low risk EGD/colon, no cardiac contraindication to proceed    2. GI bleed / sever anemia 2/2 ac blood loss :  Keep Hb >8 as symptomatic anemia   GI consult appreciated. Plan for VCE    3. 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 Severe AS s/p TAVR :  - Follows at [facility]    5. HTN :   c/w home: losartan 50mg PO daily, amlodipine 5mg PO daily    6. HLD :  - Cont fenofibrate 160mg PO daily          Differential diagnosis and plan of care discussed with patient after the evaluation. Counseling on diet, nutritional counseling, weight management, exercise and medication compliance was done.   Advanced care planning/advanced directives discussed with patient/family. DNR status including forceful chest compressions to attempt to restart the heart, ventilator support/artificial breathing, electric shock, artificial nutrition, health care proxy, Molst form all discussed with pt. Pt wishes to consider. More than fifteen minutes spent on discussing advanced directives.       Vaughn Doherty DO [facility]  Cardiovascular Medicine  [address] [place] [address] Dr, [address]  Available for call or text via [employer] TEAM[employer]  Office [phone]

## 2023-08-09 NOTE — ED ADULT NURSE REASSESSMENT NOTE - NS ED NURSE REASSESS COMMENT FT1
Unit of PRBCs started. 2RNs present at bedside.  consent present in chart. pt educated of risks of reactions as well as signs and symptoms of a reaction. VS to be taken in 15 minutes

## 2023-08-09 NOTE — PROVIDER CONTACT NOTE (CRITICAL VALUE NOTIFICATION) - BACKGROUND
Pt admitted for GI bleed. Hx of TAVR on aspirin. Post transfusion CBC is 7.0. Pt had video capsule endoscopy at 1600. pt received 3rd unit PRBC, previous hgb before was 7.0.

## 2023-08-10 ENCOUNTER — TRANSCRIPTION ENCOUNTER (OUTPATIENT)
Age: 83
End: 2023-08-10

## 2023-08-10 LAB
A1C WITH ESTIMATED AVERAGE GLUCOSE RESULT: 5.6 % — SIGNIFICANT CHANGE UP (ref 4–5.6)
ALBUMIN SERPL ELPH-MCNC: 3.1 G/DL — LOW (ref 3.3–5)
ALP SERPL-CCNC: 32 U/L — LOW (ref 40–120)
ALT FLD-CCNC: 6 U/L — LOW (ref 10–45)
ANION GAP SERPL CALC-SCNC: 14 MMOL/L — SIGNIFICANT CHANGE UP (ref 5–17)
AST SERPL-CCNC: 15 U/L — SIGNIFICANT CHANGE UP (ref 10–40)
BILIRUB SERPL-MCNC: 0.2 MG/DL — SIGNIFICANT CHANGE UP (ref 0.2–1.2)
BUN SERPL-MCNC: 69 MG/DL — HIGH (ref 7–23)
CALCIUM SERPL-MCNC: 9 MG/DL — SIGNIFICANT CHANGE UP (ref 8.4–10.5)
CHLORIDE SERPL-SCNC: 108 MMOL/L — SIGNIFICANT CHANGE UP (ref 96–108)
CO2 SERPL-SCNC: 21 MMOL/L — LOW (ref 22–31)
CREAT SERPL-MCNC: 1 MG/DL — SIGNIFICANT CHANGE UP (ref 0.5–1.3)
EGFR: 75 ML/MIN/1.73M2 — SIGNIFICANT CHANGE UP
ESTIMATED AVERAGE GLUCOSE: 114 MG/DL — SIGNIFICANT CHANGE UP (ref 68–114)
GLUCOSE BLDC GLUCOMTR-MCNC: 128 MG/DL — HIGH (ref 70–99)
GLUCOSE BLDC GLUCOMTR-MCNC: 129 MG/DL — HIGH (ref 70–99)
GLUCOSE BLDC GLUCOMTR-MCNC: 139 MG/DL — HIGH (ref 70–99)
GLUCOSE SERPL-MCNC: 125 MG/DL — HIGH (ref 70–99)
HCT VFR BLD CALC: 22.8 % — LOW (ref 39–50)
HCT VFR BLD CALC: 24 % — LOW (ref 39–50)
HGB BLD-MCNC: 7.4 G/DL — LOW (ref 13–17)
HGB BLD-MCNC: 8 G/DL — LOW (ref 13–17)
MCHC RBC-ENTMCNC: 29 PG — SIGNIFICANT CHANGE UP (ref 27–34)
MCHC RBC-ENTMCNC: 29.9 PG — SIGNIFICANT CHANGE UP (ref 27–34)
MCHC RBC-ENTMCNC: 32.5 GM/DL — SIGNIFICANT CHANGE UP (ref 32–36)
MCHC RBC-ENTMCNC: 33.3 GM/DL — SIGNIFICANT CHANGE UP (ref 32–36)
MCV RBC AUTO: 89.4 FL — SIGNIFICANT CHANGE UP (ref 80–100)
MCV RBC AUTO: 89.6 FL — SIGNIFICANT CHANGE UP (ref 80–100)
NRBC # BLD: 0 /100 WBCS — SIGNIFICANT CHANGE UP (ref 0–0)
NRBC # BLD: 0 /100 WBCS — SIGNIFICANT CHANGE UP (ref 0–0)
PLATELET # BLD AUTO: 83 K/UL — LOW (ref 150–400)
PLATELET # BLD AUTO: 95 K/UL — LOW (ref 150–400)
POTASSIUM SERPL-MCNC: 4.4 MMOL/L — SIGNIFICANT CHANGE UP (ref 3.5–5.3)
POTASSIUM SERPL-SCNC: 4.4 MMOL/L — SIGNIFICANT CHANGE UP (ref 3.5–5.3)
PROT SERPL-MCNC: 6 G/DL — SIGNIFICANT CHANGE UP (ref 6–8.3)
RBC # BLD: 2.55 M/UL — LOW (ref 4.2–5.8)
RBC # BLD: 2.68 M/UL — LOW (ref 4.2–5.8)
RBC # FLD: 16.5 % — HIGH (ref 10.3–14.5)
RBC # FLD: 16.9 % — HIGH (ref 10.3–14.5)
SODIUM SERPL-SCNC: 143 MMOL/L — SIGNIFICANT CHANGE UP (ref 135–145)
WBC # BLD: 2.08 K/UL — LOW (ref 3.8–10.5)
WBC # BLD: 2.48 K/UL — LOW (ref 3.8–10.5)
WBC # FLD AUTO: 2.08 K/UL — LOW (ref 3.8–10.5)
WBC # FLD AUTO: 2.48 K/UL — LOW (ref 3.8–10.5)

## 2023-08-10 PROCEDURE — 43255 EGD CONTROL BLEEDING ANY: CPT | Mod: GC

## 2023-08-10 PROCEDURE — 91110 GI TRC IMG INTRAL ESOPH-ILE: CPT | Mod: 26,GC

## 2023-08-10 RX ORDER — METOCLOPRAMIDE HCL 10 MG
10 TABLET ORAL ONCE
Refills: 0 | Status: COMPLETED | OUTPATIENT
Start: 2023-08-10 | End: 2023-08-10

## 2023-08-10 RX ORDER — ONDANSETRON 8 MG/1
4 TABLET, FILM COATED ORAL ONCE
Refills: 0 | Status: COMPLETED | OUTPATIENT
Start: 2023-08-10 | End: 2023-08-10

## 2023-08-10 RX ORDER — METOCLOPRAMIDE HCL 10 MG
10 TABLET ORAL ONCE
Refills: 0 | Status: DISCONTINUED | OUTPATIENT
Start: 2023-08-10 | End: 2023-08-10

## 2023-08-10 RX ADMIN — TAMSULOSIN HYDROCHLORIDE 0.4 MILLIGRAM(S): 0.4 CAPSULE ORAL at 22:07

## 2023-08-10 RX ADMIN — Medication 10 MILLIGRAM(S): at 14:12

## 2023-08-10 RX ADMIN — ONDANSETRON 4 MILLIGRAM(S): 8 TABLET, FILM COATED ORAL at 01:22

## 2023-08-10 RX ADMIN — PANTOPRAZOLE SODIUM 10 MG/HR: 20 TABLET, DELAYED RELEASE ORAL at 19:06

## 2023-08-10 RX ADMIN — Medication 50 MICROGRAM(S): at 06:13

## 2023-08-10 RX ADMIN — Medication 10 MILLIGRAM(S): at 09:24

## 2023-08-10 NOTE — PROGRESS NOTE ADULT - SUBJECTIVE AND OBJECTIVE BOX
DATE OF SERVICE: 08-10-23 @ 11:50    Patient is a 82y old  Male who presents with a chief complaint of Hematemesis (09 Aug 2023 18:51)      INTERVAL HISTORY: Feels ok.     REVIEW OF SYSTEMS:  CONSTITUTIONAL: No weakness  EYES/ENT: No visual changes;  No throat pain   NECK: No pain or stiffness  RESPIRATORY: No cough, wheezing; No shortness of breath  CARDIOVASCULAR: No chest pain or palpitations  GASTROINTESTINAL: No abdominal  pain. No nausea, vomiting, or hematemesis  GENITOURINARY: No dysuria, frequency or hematuria  NEUROLOGICAL: No stroke like symptoms  SKIN: No rashes    	  MEDICATIONS:        PHYSICAL EXAM:  T(C): 36.4 (08-10-23 @ 04:41), Max: 36.9 (08-09-23 @ 13:40)  HR: 68 (08-10-23 @ 04:41) (61 - 81)  BP: 135/66 (08-10-23 @ 04:41) (107/62 - 135/66)  RR: 18 (08-10-23 @ 04:41) (16 - 18)  SpO2: 98% (08-10-23 @ 04:41) (97% - 99%)  Wt(kg): --  I&O's Summary    09 Aug 2023 07:01  -  10 Aug 2023 07:00  --------------------------------------------------------  IN: 0 mL / OUT: 200 mL / NET: -200 mL          Appearance: In no distress	  HEENT:    PERRL, EOMI	  Cardiovascular:  S1 S2, No JVD  Respiratory: Lungs clear to auscultation	  Gastrointestinal:  Soft, Non-tender, + BS	  Vascularature:  No edema of LE  Psychiatric: Appropriate affect   Neuro: no acute focal deficits                               8.0    2.48  )-----------( 95       ( 10 Aug 2023 07:07 )             24.0     08-10    143  |  108  |  69<H>  ----------------------------<  125<H>  4.4   |  21<L>  |  1.00    Ca    9.0      10 Aug 2023 07:09    TPro  6.0  /  Alb  3.1<L>  /  TBili  0.2  /  DBili  x   /  AST  15  /  ALT  6<L>  /  AlkPhos  32<L>  08-10        Labs personally reviewed      ASSESSMENT/PLAN: 	      82-year-old male, history of TAVR on aspirin, GI bleed that required multiple blood transfusion, presenting with 1 day onset of fatigue, dizziness, shortness of breath, hematemesis at triage. Patient had recent endoscopy on July 26, no active bleeding was noted during the procedure. Currently denies any nausea, abdominal pain, chest pain, shortness of breath. Denies alcohol use.      1. Preop Risk Stratification   - denies CAD Hx  - Symptomatic anemia resolved with PRBC transfusion   - Mod risk for low risk EGD/colon, no cardiac contraindication to proceed    2. GI bleed / sever anemia 2/2 ac blood loss :  Keep Hb >8 as symptomatic anemia   GI consult appreciated. VCE in progress.     3. 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 Severe AS s/p TAVR :  - Follows at [facility]    5. HTN :   Home meds include: losartan 50mg PO daily, amlodipine 5mg PO daily  Currently on hold. BP stable.     6. HLD :  - Cont fenofibrate 160mg PO daily        PERRY Lizarraga-CHAVEZ Doherty DO [facility]  Cardiovascular Medicine  [address] [place] [address], [address]  Available through call or text on [employer] TEAM[employer]  Office: [phone]

## 2023-08-10 NOTE — PRE-ANESTHESIA EVALUATION ADULT - NSANTHOSAYNRD_GEN_A_CORE
No. NOLVIA screening performed.  STOP BANG Legend: 0-2 = LOW Risk; 3-4 = INTERMEDIATE Risk; 5-8 = HIGH Risk

## 2023-08-10 NOTE — PROGRESS NOTE ADULT - SUBJECTIVE AND OBJECTIVE BOX
Patient is a 82y old  Male who presents with a chief complaint of Hematemesis (10 Aug 2023 11:50)      INTERVAL HPI/OVERNIGHT EVENTS: seen and examined, s/p EGD   T(C): 36 (08-10-23 @ 16:48), Max: 36.6 (08-10-23 @ 10:58)  HR: 88 (08-10-23 @ 17:50) (68 - 93)  BP: 180/60 (08-10-23 @ 17:50) (107/62 - 180/60)  RR: 16 (08-10-23 @ 17:50) (11 - 18)  SpO2: 98% (08-10-23 @ 17:50) (94% - 99%)  Wt(kg): --  I&O's Summary    09 Aug 2023 07:01  -  10 Aug 2023 07:00  --------------------------------------------------------  IN: 0 mL / OUT: 200 mL / NET: -200 mL        PAST MEDICAL & SURGICAL HISTORY:  Gastrointestinal bleeding      HTN (hypertension)      HLD (hyperlipidemia)      T2DM (type 2 diabetes mellitus)      Hypothyroidism      S/P TAVR (transcatheter aortic valve replacement)  2020          SOCIAL HISTORY  Alcohol:  Tobacco:  Illicit substance use:    FAMILY HISTORY:    REVIEW OF SYSTEMS:  CONSTITUTIONAL: No fever, weight loss, or fatigue  EYES: No eye pain, visual disturbances, or discharge  ENMT:  No difficulty hearing, tinnitus, vertigo; No sinus or throat pain  NECK: No pain or stiffness  RESPIRATORY: No cough, wheezing, chills or hemoptysis; No shortness of breath  CARDIOVASCULAR: No chest pain, palpitations, dizziness, or leg swelling  GASTROINTESTINAL: No abdominal or epigastric pain. No nausea, vomiting, or hematemesis; No diarrhea or constipation. No melena or hematochezia.  GENITOURINARY: No dysuria, frequency, hematuria, or incontinence  NEUROLOGICAL: No headaches, memory loss, loss of strength, numbness, or tremors  SKIN: No itching, burning, rashes, or lesions   LYMPH NODES: No enlarged glands  ENDOCRINE: No heat or cold intolerance; No hair loss  MUSCULOSKELETAL: No joint pain or swelling; No muscle, back, or extremity pain  PSYCHIATRIC: No depression, anxiety, mood swings, or difficulty sleeping  HEME/LYMPH: No easy bruising, or bleeding gums  ALLERY AND IMMUNOLOGIC: No hives or eczema    RADIOLOGY & ADDITIONAL TESTS:    Imaging Personally Reviewed:  [ ] YES  [ ] NO    Consultant(s) Notes Reviewed:  [ ] YES  [ ] NO    PHYSICAL EXAM:  GENERAL: NAD, well-groomed, well-developed  HEAD:  Atraumatic, Normocephalic  EYES: EOMI, PERRLA, conjunctiva and sclera clear  ENMT: No tonsillar erythema, exudates, or enlargement; Moist mucous membranes, Good dentition, No lesions  NECK: Supple, No JVD, Normal thyroid  NERVOUS SYSTEM:  Alert & Oriented X3, Good concentration; Motor Strength 5/5 B/L upper and lower extremities; DTRs 2+ intact and symmetric  CHEST/LUNG: Clear to percussion bilaterally; No rales, rhonchi, wheezing, or rubs  HEART: Regular rate and rhythm; No murmurs, rubs, or gallops  ABDOMEN: Soft, Nontender, Nondistended; Bowel sounds present  EXTREMITIES:  2+ Peripheral Pulses, No clubbing, cyanosis, or edema  LYMPH: No lymphadenopathy noted  SKIN: No rashes or lesions    LABS:                        8.0    2.48  )-----------( 95       ( 10 Aug 2023 07:07 )             24.0     08-10    143  |  108  |  69<H>  ----------------------------<  125<H>  4.4   |  21<L>  |  1.00    Ca    9.0      10 Aug 2023 07:09    TPro  6.0  /  Alb  3.1<L>  /  TBili  0.2  /  DBili  x   /  AST  15  /  ALT  6<L>  /  AlkPhos  32<L>  08-10    PT/INR - ( 09 Aug 2023 03:23 )   PT: 13.1 sec;   INR: 1.26 ratio         PTT - ( 09 Aug 2023 03:23 )  PTT:26.0 sec  Urinalysis Basic - ( 10 Aug 2023 07:09 )    Color: x / Appearance: x / SG: x / pH: x  Gluc: 125 mg/dL / Ketone: x  / Bili: x / Urobili: x   Blood: x / Protein: x / Nitrite: x   Leuk Esterase: x / RBC: x / WBC x   Sq Epi: x / Non Sq Epi: x / Bacteria: x      CAPILLARY BLOOD GLUCOSE      POCT Blood Glucose.: 139 mg/dL (10 Aug 2023 18:59)  POCT Blood Glucose.: 128 mg/dL (10 Aug 2023 12:12)  POCT Blood Glucose.: 129 mg/dL (10 Aug 2023 08:46)  POCT Blood Glucose.: 148 mg/dL (09 Aug 2023 22:14)        Urinalysis Basic - ( 10 Aug 2023 07:09 )    Color: x / Appearance: x / SG: x / pH: x  Gluc: 125 mg/dL / Ketone: x  / Bili: x / Urobili: x   Blood: x / Protein: x / Nitrite: x   Leuk Esterase: x / RBC: x / WBC x   Sq Epi: x / Non Sq Epi: x / Bacteria: x        MEDICATIONS  (STANDING):  dextrose 5%. 1000 milliLiter(s) (50 mL/Hr) IV Continuous <Continuous>  dextrose 5%. 1000 milliLiter(s) (100 mL/Hr) IV Continuous <Continuous>  dextrose 50% Injectable 12.5 Gram(s) IV Push once  dextrose 50% Injectable 25 Gram(s) IV Push once  dextrose 50% Injectable 25 Gram(s) IV Push once  escitalopram 10 milliGRAM(s) Oral daily  folic acid 1 milliGRAM(s) Oral daily  glucagon  Injectable 1 milliGRAM(s) IntraMuscular once  insulin lispro (ADMELOG) corrective regimen sliding scale   SubCutaneous three times a day before meals  levothyroxine 50 MICROGram(s) Oral daily  pantoprazole Infusion 8 mG/Hr (10 mL/Hr) IV Continuous <Continuous>  tamsulosin 0.4 milliGRAM(s) Oral at bedtime    MEDICATIONS  (PRN):  dextrose Oral Gel 15 Gram(s) Oral once PRN Blood Glucose LESS THAN 70 milliGRAM(s)/deciliter      Care Discussed with Consultants/Other Providers [ ] YES  [ ] NO

## 2023-08-10 NOTE — PRE PROCEDURE NOTE - PRE PROCEDURE EVALUATION
Attending Physician:   [place]                         Procedure:  EGD    Indication for Procedure: UGIB  ________________________________________________________  PAST MEDICAL & SURGICAL HISTORY:  Gastrointestinal bleeding      HTN (hypertension)      HLD (hyperlipidemia)      T2DM (type 2 diabetes mellitus)      Hypothyroidism      S/P TAVR (transcatheter aortic valve replacement)  2020        ALLERGIES:  No Known Allergies    HOME MEDICATIONS:  doxazosin 2 mg oral tablet: 1 tab(s) orally once a day (at bedtime)  fenofibrate 160 mg oral tablet: 1 tab(s) orally once a day  ferrous sulfate 325 mg (65 mg elemental iron) oral tablet: 1 tab(s) orally once a day  folic acid 1 mg oral tablet: 1 tab(s) orally once a day  levothyroxine 50 mcg (0.05 mg) oral tablet: 1 tab(s) orally once a day  Lexapro 10 mg oral tablet: 1 tab(s) orally once a day  losartan 100 mg oral tablet: 1 tab(s) orally once a day Note pt was D/C on 50mg but pharmacy has no record &amp; pt unsure of meds.  losartan 50 mg oral tablet: 1 tab(s) orally once a day  metFORMIN 500 mg oral tablet, extended release: 1 tab(s) orally 2 times a day  Norvasc 5 mg oral tablet: 1 tab(s) orally once a day  pantoprazole 40 mg oral delayed release tablet: 1 tab(s) orally once a day  tamsulosin 0.4 mg oral capsule: 1 cap(s) orally once a day  Vitamin D3 2000 intl units (50 mcg) oral capsule: 1 cap(s) orally once a day    AICD/PPM: [ ] yes   [x] no    PERTINENT LAB DATA:                        8.0    2.48  )-----------( 95       ( 10 Aug 2023 07:07 )             24.0     08-10    143  |  108  |  69<H>  ----------------------------<  125<H>  4.4   |  21<L>  |  1.00    Ca    9.0      10 Aug 2023 07:09    TPro  6.0  /  Alb  3.1<L>  /  TBili  0.2  /  DBili  x   /  AST  15  /  ALT  6<L>  /  AlkPhos  32<L>  08-10    PT/INR - ( 09 Aug 2023 03:23 )   PT: 13.1 sec;   INR: 1.26 ratio         PTT - ( 09 Aug 2023 03:23 )  PTT:26.0 sec            PHYSICAL EXAMINATION:    T(C): 36.3  HR: 77  BP: 169/74  RR: 11  SpO2: 98%    Constitutional: NAD    Respiratory: CTAB/L  Cardiovascular: S1 and S2  Gastrointestinal: BS+, soft, NT/ND  Extremities: No peripheral edema  Neurological: A/O x 3, no focal deficits        COMMENTS:    The patient is a suitable candidate for the planned procedure unless box checked [ ]  No, explain:

## 2023-08-10 NOTE — PROGRESS NOTE ADULT - ASSESSMENT
A/P     Upper GI bleed : hx of AVM   s/p EGD : found single AVM in stomach with overlying clot   s/p cautery   c/w PPI , change to protonix bid and d/c GTT   today clear liquid diet , and advnce to cardiac diet from am     ac blood loss anemia   -transfuse 2 units PRBC   monitor H/H     CAD s/p TAVR   -on asa , will hold     Pancytopenia :  -had BM biopsy as out pt. recently about 1 month ago   will try to get result     Hypothyroidism   c/w synthroid     BPH:  -c/w doxazosin    depression  -stable on lexapro     DM-2 :   hold oral meds   NPO   will put him on insulin with coverage     HTN :   mentating well , BP is controlled   will hold off on BP meds in presence of active GI bleed   if BP is elevated , restart BP medication     dispo: adv diet from am , restart his BP medication if BP is elevated ( at present BP is well controlled )   also ask GI rgerading restarting asa or not on d/c   once tolerating diet and GI clears , will plan for d/c

## 2023-08-10 NOTE — PATIENT PROFILE ADULT - FALL HARM RISK - RISK INTERVENTIONS

## 2023-08-11 LAB
GLUCOSE BLDC GLUCOMTR-MCNC: 100 MG/DL — HIGH (ref 70–99)
GLUCOSE BLDC GLUCOMTR-MCNC: 101 MG/DL — HIGH (ref 70–99)
GLUCOSE BLDC GLUCOMTR-MCNC: 122 MG/DL — HIGH (ref 70–99)
GLUCOSE BLDC GLUCOMTR-MCNC: 91 MG/DL — SIGNIFICANT CHANGE UP (ref 70–99)
HCT VFR BLD CALC: 21.6 % — LOW (ref 39–50)
HCT VFR BLD CALC: 23.8 % — LOW (ref 39–50)
HGB BLD-MCNC: 6.9 G/DL — CRITICAL LOW (ref 13–17)
HGB BLD-MCNC: 7.7 G/DL — LOW (ref 13–17)
MCHC RBC-ENTMCNC: 29.2 PG — SIGNIFICANT CHANGE UP (ref 27–34)
MCHC RBC-ENTMCNC: 29.3 PG — SIGNIFICANT CHANGE UP (ref 27–34)
MCHC RBC-ENTMCNC: 31.9 GM/DL — LOW (ref 32–36)
MCHC RBC-ENTMCNC: 32.4 GM/DL — SIGNIFICANT CHANGE UP (ref 32–36)
MCV RBC AUTO: 90.5 FL — SIGNIFICANT CHANGE UP (ref 80–100)
MCV RBC AUTO: 91.5 FL — SIGNIFICANT CHANGE UP (ref 80–100)
NRBC # BLD: 0 /100 WBCS — SIGNIFICANT CHANGE UP (ref 0–0)
NRBC # BLD: 0 /100 WBCS — SIGNIFICANT CHANGE UP (ref 0–0)
PLATELET # BLD AUTO: 89 K/UL — LOW (ref 150–400)
PLATELET # BLD AUTO: 94 K/UL — LOW (ref 150–400)
RBC # BLD: 2.36 M/UL — LOW (ref 4.2–5.8)
RBC # BLD: 2.63 M/UL — LOW (ref 4.2–5.8)
RBC # FLD: 17 % — HIGH (ref 10.3–14.5)
RBC # FLD: 17.2 % — HIGH (ref 10.3–14.5)
WBC # BLD: 1.76 K/UL — LOW (ref 3.8–10.5)
WBC # BLD: 2.81 K/UL — LOW (ref 3.8–10.5)
WBC # FLD AUTO: 1.76 K/UL — LOW (ref 3.8–10.5)
WBC # FLD AUTO: 2.81 K/UL — LOW (ref 3.8–10.5)

## 2023-08-11 PROCEDURE — 99232 SBSQ HOSP IP/OBS MODERATE 35: CPT | Mod: GC

## 2023-08-11 RX ORDER — PANTOPRAZOLE SODIUM 20 MG/1
40 TABLET, DELAYED RELEASE ORAL
Refills: 0 | Status: DISCONTINUED | OUTPATIENT
Start: 2023-08-11 | End: 2023-08-14

## 2023-08-11 RX ADMIN — TAMSULOSIN HYDROCHLORIDE 0.4 MILLIGRAM(S): 0.4 CAPSULE ORAL at 21:03

## 2023-08-11 RX ADMIN — Medication 50 MICROGRAM(S): at 05:44

## 2023-08-11 RX ADMIN — ESCITALOPRAM OXALATE 10 MILLIGRAM(S): 10 TABLET, FILM COATED ORAL at 11:06

## 2023-08-11 RX ADMIN — Medication 1 MILLIGRAM(S): at 11:06

## 2023-08-11 NOTE — PROGRESS NOTE ADULT - ASSESSMENT
83 yo male presents w hematemesis, low HGB, s/p VCE prior to arrival and EGD while inptn. had BM Bx done on outptn basis 2/2 pancytopenia    Upper GI bleed : hx of AVM   s/p EGD : found single AVM in stomach with overlying clot   s/p cautery   c/w PPI , change to protonix bid and d/c GTT   on clears  and advnce to cardiac diet from am     ac blood loss anemia   -s/p 5 units PRBC : 4 on 8/9 and 1 on 8/11  - trend H/H  - on PPI drip    CAD s/p TAVR   -ASA on hold    Pancytopenia :  -had BM biopsy as out pt. recently about 1 month ago   - results not available    Hypothyroidism   c/w synthroid     BPH:  -c/w doxazosin    depression  -stable on lexapro     DM-2 :   hold oral meds    insulin with coverage     HTN :   BP is controlled    hold off on BP     GI and Card following  HEME called

## 2023-08-11 NOTE — PROVIDER CONTACT NOTE (CRITICAL VALUE NOTIFICATION) - ACTION/TREATMENT ORDERED:
PA aware, 1 unit PRBC ordered. Continue to monitor and notify of any changes
CARLA Baumann aware. Awaiting new orders

## 2023-08-11 NOTE — PROGRESS NOTE ADULT - SUBJECTIVE AND OBJECTIVE BOX
DATE OF SERVICE: 08-11-23 @ 10:28    Patient is a 82y old  Male who presents with a chief complaint of Hematemesis (10 Aug 2023 19:21)      INTERVAL HISTORY: Feels ok.     REVIEW OF SYSTEMS:  CONSTITUTIONAL: No weakness  EYES/ENT: No visual changes;  No throat pain   NECK: No pain or stiffness  RESPIRATORY: No cough, wheezing; No shortness of breath  CARDIOVASCULAR: No chest pain or palpitations  GASTROINTESTINAL: No abdominal  pain. No nausea, vomiting, or hematemesis  GENITOURINARY: No dysuria, frequency or hematuria  NEUROLOGICAL: No stroke like symptoms  SKIN: No rashes    	  MEDICATIONS:        PHYSICAL EXAM:  T(C): 36.6 (08-11-23 @ 04:53), Max: 36.6 (08-10-23 @ 10:58)  HR: 76 (08-11-23 @ 04:53) (73 - 93)  BP: 148/67 (08-11-23 @ 04:53) (122/80 - 180/60)  RR: 18 (08-11-23 @ 04:53) (11 - 18)  SpO2: 100% (08-11-23 @ 04:53) (94% - 100%)  Wt(kg): --  I&O's Summary    Height (cm): 165.1 (08-10 @ 15:01)  Weight (kg): 76.7 (08-10 @ 15:01)  BMI (kg/m2): 28.1 (08-10 @ 15:01)  BSA (m2): 1.84 (08-10 @ 15:01)    Appearance: In no distress	  HEENT:    PERRL, EOMI	  Cardiovascular:  S1 S2, No JVD  Respiratory: Lungs clear to auscultation	  Gastrointestinal:  Soft, Non-tender, + BS	  Vascularature:  No edema of LE  Psychiatric: Appropriate affect   Neuro: no acute focal deficits                               6.9    1.76  )-----------( 94       ( 11 Aug 2023 07:14 )             21.6     08-10    143  |  108  |  69<H>  ----------------------------<  125<H>  4.4   |  21<L>  |  1.00    Ca    9.0      10 Aug 2023 07:09    TPro  6.0  /  Alb  3.1<L>  /  TBili  0.2  /  DBili  x   /  AST  15  /  ALT  6<L>  /  AlkPhos  32<L>  08-10        Labs personally reviewed  < from: Upper Endoscopy (08.10.23 @ 14:11) >       A few small angioectasias with stigmata of recent bleeding were found in the gastric body,        one with minor oozing. Coagulation for hemostasis using argon plasma was successful.        Estimated blood loss was minimal.       The examined duodenum was normal.                                                    Impression:          - Normal esophagus.                       - A single bleeding angioectasia with adherent clot in the stomach. Treated                        with argon plasma coagulation (APC). This is likely the main source of                        bleeding and cause for blood clots seen on video capsule endoscopy.                       - A few recently bleeding angioectasias in the stomach. Treated with argon                        plasma coagulation (APC).                       - Normal examined duodenum.                       - No specimens collected.    < end of copied text >      ASSESSMENT/PLAN: 	    82-year-old male, history of TAVR on aspirin, GI bleed that required multiple blood transfusion, presenting with 1 day onset of fatigue, dizziness, shortness of breath, hematemesis at triage. Patient had recent endoscopy on July 26, no active bleeding was noted during the procedure. Currently denies any nausea, abdominal pain, chest pain, shortness of breath. Denies alcohol use.      1. Preop Risk Stratification   - denies CAD Hx  - Symptomatic anemia resolved with PRBC transfusion   - Mod risk for low risk EGD/colon, no cardiac contraindication to proceed  - Tolerated procedure well.     2. GI bleed / sever anemia 2/2 ac blood loss :  Keep Hb >8 as symptomatic anemia   GI consult appreciated. s/p VCE and upper endoscopy wit andioectasia treated with APC  CLD as per GI    3. 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 Severe AS s/p TAVR :  - Follows at [facility]    5. HTN :   Home meds include: losartan 50mg PO daily, amlodipine 5mg PO daily  BP now recovered. Restart BP meds slowly.     6. HLD :  - Cont fenofibrate 160mg PO daily        PERRY Lizarraga-CHAVEZ Doherty,  [facility]  Cardiovascular Medicine  [address], [address]  Available through call or text on [employer] TEAM[employer]  Office: [phone]   DATE OF SERVICE: 08-11-23 @ 10:28    Patient is a 82y old  Male who presents with a chief complaint of Hematemesis (10 Aug 2023 19:21)      INTERVAL HISTORY: Feels ok.     REVIEW OF SYSTEMS:  CONSTITUTIONAL: No weakness  EYES/ENT: No visual changes;  No throat pain   NECK: No pain or stiffness  RESPIRATORY: No cough, wheezing; No shortness of breath  CARDIOVASCULAR: No chest pain or palpitations  GASTROINTESTINAL: No abdominal  pain. No nausea, vomiting, or hematemesis  GENITOURINARY: No dysuria, frequency or hematuria  NEUROLOGICAL: No stroke like symptoms  SKIN: No rashes    	  MEDICATIONS:        PHYSICAL EXAM:  T(C): 36.6 (08-11-23 @ 04:53), Max: 36.6 (08-10-23 @ 10:58)  HR: 76 (08-11-23 @ 04:53) (73 - 93)  BP: 148/67 (08-11-23 @ 04:53) (122/80 - 180/60)  RR: 18 (08-11-23 @ 04:53) (11 - 18)  SpO2: 100% (08-11-23 @ 04:53) (94% - 100%)  Wt(kg): --  I&O's Summary    Height (cm): 165.1 (08-10 @ 15:01)  Weight (kg): 76.7 (08-10 @ 15:01)  BMI (kg/m2): 28.1 (08-10 @ 15:01)  BSA (m2): 1.84 (08-10 @ 15:01)    Appearance: In no distress	  HEENT:    PERRL, EOMI	  Cardiovascular:  S1 S2, No JVD  Respiratory: Lungs clear to auscultation	  Gastrointestinal:  Soft, Non-tender, + BS	  Vascularature:  No edema of LE  Psychiatric: Appropriate affect   Neuro: no acute focal deficits                               6.9    1.76  )-----------( 94       ( 11 Aug 2023 07:14 )             21.6     08-10    143  |  108  |  69<H>  ----------------------------<  125<H>  4.4   |  21<L>  |  1.00    Ca    9.0      10 Aug 2023 07:09    TPro  6.0  /  Alb  3.1<L>  /  TBili  0.2  /  DBili  x   /  AST  15  /  ALT  6<L>  /  AlkPhos  32<L>  08-10        Labs personally reviewed  < from: Upper Endoscopy (08.10.23 @ 14:11) >       A few small angioectasias with stigmata of recent bleeding were found in the gastric body,        one with minor oozing. Coagulation for hemostasis using argon plasma was successful.        Estimated blood loss was minimal.       The examined duodenum was normal.                                                    Impression:          - Normal esophagus.                       - A single bleeding angioectasia with adherent clot in the stomach. Treated                        with argon plasma coagulation (APC). This is likely the main source of                        bleeding and cause for blood clots seen on video capsule endoscopy.                       - A few recently bleeding angioectasias in the stomach. Treated with argon                        plasma coagulation (APC).                       - Normal examined duodenum.                       - No specimens collected.    < end of copied text >      ASSESSMENT/PLAN: 	  82-year-old male, history of TAVR on aspirin, GI bleed that required multiple blood transfusion, presenting with 1 day onset of fatigue, dizziness, shortness of breath, hematemesis at triage. Patient had recent endoscopy on July 26, no active bleeding was noted during the procedure. Currently denies any nausea, abdominal pain, chest pain, shortness of breath. Denies alcohol use.      1. Preop Risk Stratification   - denies CAD Hx  - Symptomatic anemia resolved with PRBC transfusion   - Mod risk for low risk EGD/colon, no cardiac contraindication to proceed  - Tolerated procedure well.     2. GI bleed / sever anemia 2/2 ac blood loss :  Keep Hb >8 as symptomatic anemia   GI consult appreciated. s/p VCE and upper endoscopy wit andioectasia treated with APC  CLD as per GI    3. 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 Severe AS s/p TAVR :  - Follows at [facility]    5. HTN :   Home meds include: losartan 50mg PO daily, amlodipine 5mg PO daily  BP now recovered. Restart BP meds slowly.     6. HLD :  - Cont fenofibrate 160mg PO daily        PERRY Lizarraga-CHAVEZ Doherty,  [facility]  Cardiovascular Medicine  [address], [address]  Available through call or text on [employer] TEAMs  Office: [phone]

## 2023-08-11 NOTE — PROVIDER CONTACT NOTE (CRITICAL VALUE NOTIFICATION) - ASSESSMENT
Pt Aox4, VSS, no s/s of active bleeding. Pt denies any complaints currently.
Pt A/ox4, VSs denies dizziness, lighteheadness. Pt states no Bm for 24hours.

## 2023-08-11 NOTE — PROGRESS NOTE ADULT - SUBJECTIVE AND OBJECTIVE BOX
Patient is a 82y old  Male who presents with a chief complaint of Hematemesis (11 Aug 2023 10:44)      SUBJECTIVE / OVERNIGHT EVENTS: ptn denies BRBPR/hematemesis    MEDICATIONS  (STANDING):  dextrose 5%. 1000 milliLiter(s) (50 mL/Hr) IV Continuous <Continuous>  dextrose 5%. 1000 milliLiter(s) (100 mL/Hr) IV Continuous <Continuous>  dextrose 50% Injectable 25 Gram(s) IV Push once  dextrose 50% Injectable 25 Gram(s) IV Push once  dextrose 50% Injectable 12.5 Gram(s) IV Push once  escitalopram 10 milliGRAM(s) Oral daily  folic acid 1 milliGRAM(s) Oral daily  glucagon  Injectable 1 milliGRAM(s) IntraMuscular once  insulin lispro (ADMELOG) corrective regimen sliding scale   SubCutaneous three times a day before meals  levothyroxine 50 MICROGram(s) Oral daily  pantoprazole Infusion 8 mG/Hr (10 mL/Hr) IV Continuous <Continuous>  tamsulosin 0.4 milliGRAM(s) Oral at bedtime    MEDICATIONS  (PRN):  dextrose Oral Gel 15 Gram(s) Oral once PRN Blood Glucose LESS THAN 70 milliGRAM(s)/deciliter      Vital Signs Last 24 Hrs  T(F): 97.6 (08-11-23 @ 12:26), Max: 97.8 (08-11-23 @ 04:53)  HR: 64 (08-11-23 @ 12:26) (64 - 80)  BP: 154/70 (08-11-23 @ 12:26) (125/65 - 154/70)  RR: 18 (08-11-23 @ 12:26) (18 - 18)  SpO2: 100% (08-11-23 @ 12:26) (96% - 100%)  Telemetry:   CAPILLARY BLOOD GLUCOSE      POCT Blood Glucose.: 91 mg/dL (11 Aug 2023 17:24)  POCT Blood Glucose.: 122 mg/dL (11 Aug 2023 12:41)  POCT Blood Glucose.: 100 mg/dL (11 Aug 2023 07:50)    I&O's Summary      PHYSICAL EXAM:  GENERAL: NAD, well-developed  HEAD:  Atraumatic, Normocephalic  EYES: EOMI, PERRLA, conjunctiva and sclera clear  NECK: Supple, No JVD  CHEST/LUNG: Clear to auscultation bilaterally; No wheeze  HEART: Regular rate and rhythm; No murmurs, rubs, or gallops  ABDOMEN: Soft, Nontender, Nondistended; Bowel sounds present  EXTREMITIES:  2+ Peripheral Pulses, No clubbing, cyanosis, or edema  PSYCH: AAOx3  NEUROLOGY: non-focal  SKIN: No rashes or lesions    LABS:                        6.9    1.76  )-----------( 94       ( 11 Aug 2023 07:14 )             21.6     08-10    143  |  108  |  69<H>  ----------------------------<  125<H>  4.4   |  21<L>  |  1.00    Ca    9.0      10 Aug 2023 07:09    TPro  6.0  /  Alb  3.1<L>  /  TBili  0.2  /  DBili  x   /  AST  15  /  ALT  6<L>  /  AlkPhos  32<L>  08-10          Urinalysis Basic - ( 10 Aug 2023 07:09 )    Color: x / Appearance: x / SG: x / pH: x  Gluc: 125 mg/dL / Ketone: x  / Bili: x / Urobili: x   Blood: x / Protein: x / Nitrite: x   Leuk Esterase: x / RBC: x / WBC x   Sq Epi: x / Non Sq Epi: x / Bacteria: x        RADIOLOGY & ADDITIONAL TESTS:    Imaging Personally Reviewed:    Consultant(s) Notes Reviewed:      Care Discussed with Consultants/Other Providers:

## 2023-08-11 NOTE — PROGRESS NOTE ADULT - SUBJECTIVE AND OBJECTIVE BOX
Chief Complaint:  Patient is a 82y old  Male who presents with a chief complaint of Hematemesis (11 Aug 2023 10:28)      Interval Events:   Reports feeling well. Denies any N/V/D/C, abd pain, melena or hematochezia.  Hgb 6.9 this AM from 8 yesterday.   - s/p EGD (8/10)-  Normal esophagus; single bleeding angioectasia with adherent clot in the stomach- s/p treatment with APC likely the main source of bleeding and cause for blood clots seen on VCE; few recently bleeding angioectasias in the stomach- s/p treatment with APC; normal examined duodenum.    Hospital Medications:  dextrose 5%. 1000 milliLiter(s) IV Continuous <Continuous>  dextrose 5%. 1000 milliLiter(s) IV Continuous <Continuous>  dextrose 50% Injectable 12.5 Gram(s) IV Push once  dextrose 50% Injectable 25 Gram(s) IV Push once  dextrose 50% Injectable 25 Gram(s) IV Push once  dextrose Oral Gel 15 Gram(s) Oral once PRN  escitalopram 10 milliGRAM(s) Oral daily  folic acid 1 milliGRAM(s) Oral daily  glucagon  Injectable 1 milliGRAM(s) IntraMuscular once  insulin lispro (ADMELOG) corrective regimen sliding scale   SubCutaneous three times a day before meals  levothyroxine 50 MICROGram(s) Oral daily  pantoprazole Infusion 8 mG/Hr IV Continuous <Continuous>  tamsulosin 0.4 milliGRAM(s) Oral at bedtime      PMHX/PSHX:  Gastrointestinal bleeding    Mild HTN    HTN (hypertension)    HLD (hyperlipidemia)    T2DM (type 2 diabetes mellitus)    Hypothyroidism    No significant past surgical history    S/P TAVR (transcatheter aortic valve replacement)            ROS: 14 point ROS negative unless otherwise stated in subjective      PHYSICAL EXAM:     GENERAL:  Well developed, no distress  HEENT:  NC/AT,  conjunctivae clear, sclera anicteric  CHEST:  Full & symmetric excursion, no increased effort w/ respirations  HEART:  Regular rhythm & rate  ABDOMEN:  Soft, non-tender, non-distended  EXTREMITIES:  no LE  edema  SKIN:  No rash/erythema/ecchymoses/petechiae/wounds/jaundice  NEURO:  Alert, orientedx3    Vital Signs:  Vital Signs Last 24 Hrs  T(C): 36.6 (11 Aug 2023 04:53), Max: 36.6 (10 Aug 2023 10:58)  T(F): 97.8 (11 Aug 2023 04:53), Max: 97.8 (10 Aug 2023 10:58)  HR: 76 (11 Aug 2023 04:53) (73 - 93)  BP: 148/67 (11 Aug 2023 04:53) (122/80 - 180/60)  BP(mean): 82 (10 Aug 2023 15:01) (82 - 82)  RR: 18 (11 Aug 2023 04:53) (11 - 18)  SpO2: 100% (11 Aug 2023 04:53) (94% - 100%)    Parameters below as of 11 Aug 2023 04:53  Patient On (Oxygen Delivery Method): room air      Daily Height in cm: 165.1 (10 Aug 2023 15:01)    Daily     LABS:                        6.9    1.76  )-----------( 94       ( 11 Aug 2023 07:14 )             21.6     08-10    143  |  108  |  69<H>  ----------------------------<  125<H>  4.4   |  21<L>  |  1.00    Ca    9.0      10 Aug 2023 07:09    TPro  6.0  /  Alb  3.1<L>  /  TBili  0.2  /  DBili  x   /  AST  15  /  ALT  6<L>  /  AlkPhos  32<L>  08-10    LIVER FUNCTIONS - ( 10 Aug 2023 07:09 )  Alb: 3.1 g/dL / Pro: 6.0 g/dL / ALK PHOS: 32 U/L / ALT: 6 U/L / AST: 15 U/L / GGT: x             Urinalysis Basic - ( 10 Aug 2023 07:09 )    Color: x / Appearance: x / SG: x / pH: x  Gluc: 125 mg/dL / Ketone: x  / Bili: x / Urobili: x   Blood: x / Protein: x / Nitrite: x   Leuk Esterase: x / RBC: x / WBC x   Sq Epi: x / Non Sq Epi: x / Bacteria: x          Imaging: No new abdominal imaging

## 2023-08-11 NOTE — PROGRESS NOTE ADULT - ASSESSMENT
SUSANNA BRAND is a 82y Male with symptomatic anemia c/b unclear pancytopenia along with about 20 pound weight loss in 3 mos (unintentional)s/p BMBx outpatient awaiting results, diverticulosis, DM, HTN, AS s/p TAVR 2021, AVMs in [place] s/p multiple endoscopies (EGD/colon) requiring cauterization who initially presented to the ED with 2-3 days of dark stools, SOB, fatigued and dizziness x1 day and 1x hematemesis on arrival to ED 8/8. Hgb noted to be 6.4. Of note, patient was recently admitted with melena 07/2023. Had push enteroscopy (7/25)- normal esophagus; ultiple gastric polyps; normal duodenum; normal examined jejunum. Had c-scope (7/26)- fair prep, pan-diverticulosis, normal TI, normal rectal retroflexion, no findings to explain melena. Was recommended to have OP capsule. GI consulted for anemia.    # chronic intermittent acute blood loss anemia  # Pancytopenia, unclear etiology - s/p BMBx OSH, pending report  From AVMs/Angioectasis given h/o severe AS s/p TAVR and chronic intermittent acute blood loss anemia  - s/p VCE (8/10)-  red blood in the stomach; hematin/melena (altered blood) in the small bowel; non-diagnostic small bowel study as the capsule terminates in the small bowel at the end of the study  - s/p EGD (8/10)-  Normal esophagus; single bleeding angioectasia with adherent clot in the stomach- s/p treatment with APC likely the main source of bleeding and cause for blood clots seen on VCE; few recently bleeding angioectasias in the stomach- s/p treatment with APC; normal examined duodenum.    Recommendations:  -trend vitals, CBC, and monitor for clinical signs of bleeding  -maintain active type and screen  -transfusion goal to maintain hemoglobin >/= 8.0  -f/u post-transfusion Hgb  -avoid NSAIDs  -PPI 40 mg daily  -diet as tolerated    **THIS NOTE IS NOT FINALIZED UNTIL SIGNED BY THE ATTENDING**    Pamela Paiz MD  GI Fellow, PGY-6  Available via [employer] Teams    NON-URGENT CONSULTS:  Please email [email or web address]  AT NIGHT AND ON WEEKENDS:  Contact on-call GI fellow via answering service ([phone]) from 5pm-8am and on weekends/holidays  MONDAY-FRIDAY 8AM-5PM:  Pager# [phone] ([facility]) or [phone] ([facility])

## 2023-08-12 LAB
ANION GAP SERPL CALC-SCNC: 15 MMOL/L — SIGNIFICANT CHANGE UP (ref 5–17)
BASOPHILS # BLD AUTO: 0 K/UL — SIGNIFICANT CHANGE UP (ref 0–0.2)
BASOPHILS NFR BLD AUTO: 0 % — SIGNIFICANT CHANGE UP (ref 0–2)
BLD GP AB SCN SERPL QL: NEGATIVE — SIGNIFICANT CHANGE UP
BUN SERPL-MCNC: 28 MG/DL — HIGH (ref 7–23)
CALCIUM SERPL-MCNC: 8.8 MG/DL — SIGNIFICANT CHANGE UP (ref 8.4–10.5)
CHLORIDE SERPL-SCNC: 106 MMOL/L — SIGNIFICANT CHANGE UP (ref 96–108)
CO2 SERPL-SCNC: 24 MMOL/L — SIGNIFICANT CHANGE UP (ref 22–31)
CREAT SERPL-MCNC: 1.02 MG/DL — SIGNIFICANT CHANGE UP (ref 0.5–1.3)
EGFR: 73 ML/MIN/1.73M2 — SIGNIFICANT CHANGE UP
EOSINOPHIL # BLD AUTO: 0 K/UL — SIGNIFICANT CHANGE UP (ref 0–0.5)
EOSINOPHIL NFR BLD AUTO: 0 % — SIGNIFICANT CHANGE UP (ref 0–6)
GLUCOSE BLDC GLUCOMTR-MCNC: 127 MG/DL — HIGH (ref 70–99)
GLUCOSE BLDC GLUCOMTR-MCNC: 96 MG/DL — SIGNIFICANT CHANGE UP (ref 70–99)
GLUCOSE BLDC GLUCOMTR-MCNC: 97 MG/DL — SIGNIFICANT CHANGE UP (ref 70–99)
GLUCOSE BLDC GLUCOMTR-MCNC: 98 MG/DL — SIGNIFICANT CHANGE UP (ref 70–99)
GLUCOSE SERPL-MCNC: 86 MG/DL — SIGNIFICANT CHANGE UP (ref 70–99)
HCT VFR BLD CALC: 23.2 % — LOW (ref 39–50)
HCT VFR BLD CALC: 24.2 % — LOW (ref 39–50)
HGB BLD-MCNC: 7.5 G/DL — LOW (ref 13–17)
HGB BLD-MCNC: 7.8 G/DL — LOW (ref 13–17)
LYMPHOCYTES # BLD AUTO: 0.27 K/UL — LOW (ref 1–3.3)
LYMPHOCYTES # BLD AUTO: 13.9 % — SIGNIFICANT CHANGE UP (ref 13–44)
MAGNESIUM SERPL-MCNC: 1.9 MG/DL — SIGNIFICANT CHANGE UP (ref 1.6–2.6)
MANUAL SMEAR VERIFICATION: SIGNIFICANT CHANGE UP
MCHC RBC-ENTMCNC: 29.2 PG — SIGNIFICANT CHANGE UP (ref 27–34)
MCHC RBC-ENTMCNC: 29.5 PG — SIGNIFICANT CHANGE UP (ref 27–34)
MCHC RBC-ENTMCNC: 32.2 GM/DL — SIGNIFICANT CHANGE UP (ref 32–36)
MCHC RBC-ENTMCNC: 32.3 GM/DL — SIGNIFICANT CHANGE UP (ref 32–36)
MCV RBC AUTO: 90.6 FL — SIGNIFICANT CHANGE UP (ref 80–100)
MCV RBC AUTO: 91.3 FL — SIGNIFICANT CHANGE UP (ref 80–100)
MONOCYTES # BLD AUTO: 0.19 K/UL — SIGNIFICANT CHANGE UP (ref 0–0.9)
MONOCYTES NFR BLD AUTO: 9.6 % — SIGNIFICANT CHANGE UP (ref 2–14)
MYELOCYTES NFR BLD: 5.2 % — HIGH (ref 0–0)
NEUTROPHILS # BLD AUTO: 1.4 K/UL — LOW (ref 1.8–7.4)
NEUTROPHILS NFR BLD AUTO: 71.3 % — SIGNIFICANT CHANGE UP (ref 43–77)
NRBC # BLD: 0 /100 WBCS — SIGNIFICANT CHANGE UP (ref 0–0)
PHOSPHATE SERPL-MCNC: 3.2 MG/DL — SIGNIFICANT CHANGE UP (ref 2.5–4.5)
PLAT MORPH BLD: NORMAL — SIGNIFICANT CHANGE UP
PLATELET # BLD AUTO: 84 K/UL — LOW (ref 150–400)
PLATELET # BLD AUTO: 93 K/UL — LOW (ref 150–400)
POTASSIUM SERPL-MCNC: 3.7 MMOL/L — SIGNIFICANT CHANGE UP (ref 3.5–5.3)
POTASSIUM SERPL-SCNC: 3.7 MMOL/L — SIGNIFICANT CHANGE UP (ref 3.5–5.3)
RBC # BLD: 2.54 M/UL — LOW (ref 4.2–5.8)
RBC # BLD: 2.67 M/UL — LOW (ref 4.2–5.8)
RBC # FLD: 16.8 % — HIGH (ref 10.3–14.5)
RBC # FLD: 17.2 % — HIGH (ref 10.3–14.5)
RBC BLD AUTO: SIGNIFICANT CHANGE UP
RH IG SCN BLD-IMP: POSITIVE — SIGNIFICANT CHANGE UP
SODIUM SERPL-SCNC: 145 MMOL/L — SIGNIFICANT CHANGE UP (ref 135–145)
WBC # BLD: 1.96 K/UL — LOW (ref 3.8–10.5)
WBC # BLD: 2.41 K/UL — LOW (ref 3.8–10.5)
WBC # FLD AUTO: 1.96 K/UL — LOW (ref 3.8–10.5)
WBC # FLD AUTO: 2.41 K/UL — LOW (ref 3.8–10.5)

## 2023-08-12 RX ORDER — ACETAMINOPHEN 500 MG
650 TABLET ORAL ONCE
Refills: 0 | Status: COMPLETED | OUTPATIENT
Start: 2023-08-12 | End: 2023-08-12

## 2023-08-12 RX ADMIN — Medication 650 MILLIGRAM(S): at 23:54

## 2023-08-12 RX ADMIN — PANTOPRAZOLE SODIUM 40 MILLIGRAM(S): 20 TABLET, DELAYED RELEASE ORAL at 05:12

## 2023-08-12 RX ADMIN — Medication 50 MICROGRAM(S): at 05:12

## 2023-08-12 RX ADMIN — TAMSULOSIN HYDROCHLORIDE 0.4 MILLIGRAM(S): 0.4 CAPSULE ORAL at 20:44

## 2023-08-12 RX ADMIN — Medication 1 MILLIGRAM(S): at 11:10

## 2023-08-12 RX ADMIN — ESCITALOPRAM OXALATE 10 MILLIGRAM(S): 10 TABLET, FILM COATED ORAL at 11:10

## 2023-08-12 NOTE — CONSULT NOTE ADULT - ASSESSMENT
SUSANNA BRAND is a 82y Male who presents with a chief complaint of hematemesis    Pancytopenia  ·	Patient with long-standing hematological issues; previously followed at [facility] and now at [facility].  ·	He reports bone marrow biopsy done two weeks ago. Please obtain outside records from [facility].  ·	Previous bone marrow in 2019 was unremarkable.  ·	He has history of recurrent GI bleed requiring infusions; previously received thalidomide for GAVE.  ·	Monitor and maintain hemoglobin > 7, platelet > 10 while inpatient.  ·	No systemic therapy indicated at this time given current counts.    Gastrointestinal Bleed  ·	EGD showed AV malformation; treated.  ·	Monitor for any recurrent bleed.  ·	Follow-up gastroenterology evaluations.    Will continue to follow.    Shar Sesay MD  Hematology/Oncology  O: [phone] SUSANNA BRAND is a 82y Male who presents with a chief complaint of hematemesis    Pancytopenia  ·	Patient with long-standing hematological issues; previously followed at [facility] and now at [facility].  ·	He reports bone marrow biopsy done two weeks ago. Please obtain outside records from [facility].  ·	Previous bone marrow in 2019 was unremarkable. Prior testing did not show plasma cell neoplasm or hemolysis.  ·	He has history of recurrent GI bleed requiring infusions; previously received thalidomide for GAVE.  ·	Monitor and maintain hemoglobin > 7, platelet > 10 while inpatient.  ·	No systemic therapy indicated at this time given current counts.    Gastrointestinal Bleed  ·	EGD showed AV malformation; treated.  ·	Monitor for any recurrent bleed.  ·	Follow-up gastroenterology evaluations.  ·	In the setting of prior aortic stenosis s/p TAVR, angioectasia; possible acquired von Willebrand disease. Will check panel. Intermittently elevated PT throughout the past few years per [facility] chart review.     Will continue to follow.    Shar Sesay MD  Hematology/Oncology  O: [phone]

## 2023-08-12 NOTE — PROGRESS NOTE ADULT - SUBJECTIVE AND OBJECTIVE BOX
DATE OF SERVICE: 08-12-23 @ 11:17    Patient is a 82y old  Male who presents with a chief complaint of Hematemesis (11 Aug 2023 19:38)      INTERVAL HISTORY: no complaints     REVIEW OF SYSTEMS:  CONSTITUTIONAL: No weakness  EYES/ENT: No visual changes;  No throat pain   NECK: No pain or stiffness  RESPIRATORY: No cough, wheezing; No shortness of breath  CARDIOVASCULAR: No chest pain or palpitations  GASTROINTESTINAL: No abdominal  pain. No nausea, vomiting, or hematemesis  GENITOURINARY: No dysuria, frequency or hematuria  NEUROLOGICAL: No stroke like symptoms  SKIN: No rashes      	  MEDICATIONS:        PHYSICAL EXAM:  T(C): 36.4 (08-12-23 @ 04:25), Max: 36.6 (08-11-23 @ 20:46)  HR: 58 (08-12-23 @ 04:25) (58 - 69)  BP: 153/70 (08-12-23 @ 04:25) (153/70 - 171/69)  RR: 18 (08-12-23 @ 04:25) (18 - 18)  SpO2: 100% (08-12-23 @ 04:25) (99% - 100%)  Wt(kg): --  I&O's Summary    11 Aug 2023 07:01  -  12 Aug 2023 07:00  --------------------------------------------------------  IN: 0 mL / OUT: 650 mL / NET: -650 mL    12 Aug 2023 07:01  -  12 Aug 2023 11:17  --------------------------------------------------------  IN: 298 mL / OUT: 0 mL / NET: 298 mL          Appearance: In no distress	  HEENT:    PERRL, EOMI	  Cardiovascular:  S1 S2, No JVD  Respiratory: Lungs clear to auscultation	  Gastrointestinal:  Soft, Non-tender, + BS	  Vascularature:  No edema of LE  Psychiatric: Appropriate affect   Neuro: no acute focal deficits                               7.5    1.96  )-----------( 84       ( 12 Aug 2023 07:25 )             23.2     08-12    145  |  106  |  28<H>  ----------------------------<  86  3.7   |  24  |  1.02    Ca    8.8      12 Aug 2023 07:25  Phos  3.2     08-12  Mg     1.9     08-12          Labs personally reviewed      ASSESSMENT/PLAN: 	    82-year-old male, history of TAVR on aspirin, GI bleed that required multiple blood transfusion, presenting with 1 day onset of fatigue, dizziness, shortness of breath, hematemesis at triage. Patient had recent endoscopy on July 26, no active bleeding was noted during the procedure. Currently denies any nausea, abdominal pain, chest pain, shortness of breath. Denies alcohol use.      1. Preop Risk Stratification   - denies CAD Hx  - Symptomatic anemia resolved with PRBC transfusion   - Mod risk for low risk EGD/colon, no cardiac contraindication to proceed  - Tolerated procedure well.     2. GI bleed / sever anemia 2/2 ac blood loss :  Keep Hb >8 as symptomatic anemia   GI consult appreciated. s/p VCE and upper endoscopy wit andioectasia treated with APC  CLD as per GI    3. 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 Severe AS s/p TAVR :  - Follows at [facility]    5. HTN :   Home meds include: losartan 50mg PO daily, amlodipine 5mg PO daily  BP now recovered. Restart BP meds slowly.     6. HLD :  - Cont fenofibrate 160mg PO daily            KEARA Sarkar DO [facility]  Cardiovascular Medicine  [address], [address]  Office: [phone]  Available via call/text on [employer] Teams

## 2023-08-12 NOTE — CONSULT NOTE ADULT - SUBJECTIVE AND OBJECTIVE BOX
CHIEF COMPLAINT  Hematemesis    HISTORY OF PRESENT ILLNESS  SUSANNA BRAND is a 82y Male who presents with a chief complaint of hematemesis    Patient was admitted on August 9th after presenting to the Emergency Department with fatigue, dizziness, and dyspnea. He reports requiring frequent blood transfusions, and recently had bone marrow biopsy two weeks ago at [facility] that showed he "wasn't making enough blood cells". Patient was admitted for further evaluation.    PAST MEDICAL AND SURGICAL HISTORY  Hyperlipidemia  Hypertension  Hypothyroidism  Diabetes Mellitus    FAMILY HISTORY  Non-contributory    SOCIAL HISTORY  Denies tobacco use    REVIEW OF SYSTEMS  A complete review of systems was performed; negative except per HPI    PHYSICAL EXAM  T(C): 36.5 (08-12-23 @ 13:02), Max: 36.6 (08-11-23 @ 20:46)  HR: 71 (08-12-23 @ 13:02) (58 - 71)  BP: 140/71 (08-12-23 @ 13:02) (140/71 - 171/69)  RR: 18 (08-12-23 @ 13:02) (18 - 18)  SpO2: 99% (08-12-23 @ 13:02) (99% - 100%)  Constitutional: alert, awake, in no acute distress  Eyes: PERRL, EOMI  HEENT: normocephalic, atraumatic  Neck: supple, non-tender  Cardiovascular: normal perfusion, no peripheral edema  Respiratory: normal respiratory efforts; no increased use of accessory muscles  Gastrointestinal: soft, non-tender  Musculoskeletal: normal range of motion, no deformities noted  Neurological: alert, CN II to XI grossly intact  Skin: warm, dry    LABORATORY DATA                        7.5    1.96  )-----------( 84       ( 12 Aug 2023 07:25 )             23.2     08-12    145  |  106  |  28<H>  ----------------------------<  86  3.7   |  24  |  1.02    Ca    8.8      12 Aug 2023 07:25  Phos  3.2     08-12  Mg     1.9     08-12    RADIOLOGY REVIEW    IMPRESSION:    1. No active GI bleed on this scan.  2. Bilateral inguinal hernias, the right sided hernia containing a   portion of the normal appendix.   CHIEF COMPLAINT  Hematemesis    HISTORY OF PRESENT ILLNESS  SUSANNA BRAND is a 82y Male who presents with a chief complaint of hematemesis    Patient was admitted on August 9th after presenting to the Emergency Department with fatigue, dizziness, and dyspnea. He reports requiring frequent blood transfusions, and recently had bone marrow biopsy two weeks ago at [facility] that showed he "wasn't making enough blood cells". Patient was admitted for further evaluation.    PAST MEDICAL AND SURGICAL HISTORY  Hyperlipidemia  Hypertension  Hypothyroidism  Diabetes Mellitus  Aortic Stenosis    FAMILY HISTORY  Non-contributory    SOCIAL HISTORY  Denies tobacco use    REVIEW OF SYSTEMS  A complete review of systems was performed; negative except per HPI    PHYSICAL EXAM  T(C): 36.5 (08-12-23 @ 13:02), Max: 36.6 (08-11-23 @ 20:46)  HR: 71 (08-12-23 @ 13:02) (58 - 71)  BP: 140/71 (08-12-23 @ 13:02) (140/71 - 171/69)  RR: 18 (08-12-23 @ 13:02) (18 - 18)  SpO2: 99% (08-12-23 @ 13:02) (99% - 100%)  Constitutional: alert, awake, in no acute distress  Eyes: PERRL, EOMI  HEENT: normocephalic, atraumatic  Neck: supple, non-tender  Cardiovascular: normal perfusion, no peripheral edema  Respiratory: normal respiratory efforts; no increased use of accessory muscles  Gastrointestinal: soft, non-tender  Musculoskeletal: normal range of motion, no deformities noted  Neurological: alert, CN II to XI grossly intact  Skin: warm, dry    LABORATORY DATA                        7.5    1.96  )-----------( 84       ( 12 Aug 2023 07:25 )             23.2     08-12    145  |  106  |  28<H>  ----------------------------<  86  3.7   |  24  |  1.02    Ca    8.8      12 Aug 2023 07:25  Phos  3.2     08-12  Mg     1.9     08-12    RADIOLOGY REVIEW    IMPRESSION:    1. No active GI bleed on this scan.  2. Bilateral inguinal hernias, the right sided hernia containing a   portion of the normal appendix.

## 2023-08-12 NOTE — PROVIDER CONTACT NOTE (OTHER) - REASON
BM x1 with melena
Hgb 7.0
No Repair - Repaired With Adjacent Surgical Defect Text (Leave Blank If You Do Not Want): After obtaining clear surgical margins the defect was repaired concurrently with another surgical defect which was in close approximation.

## 2023-08-12 NOTE — PROGRESS NOTE ADULT - SUBJECTIVE AND OBJECTIVE BOX
Patient is a 82y old  Male who presents with a chief complaint of Hematemesis (12 Aug 2023 15:51)      SUBJECTIVE / OVERNIGHT EVENTS: no new events, will upgrade his diet, Hgb is stable, tolerating full liquids    MEDICATIONS  (STANDING):  dextrose 5%. 1000 milliLiter(s) (50 mL/Hr) IV Continuous <Continuous>  dextrose 5%. 1000 milliLiter(s) (100 mL/Hr) IV Continuous <Continuous>  dextrose 50% Injectable 12.5 Gram(s) IV Push once  dextrose 50% Injectable 25 Gram(s) IV Push once  dextrose 50% Injectable 25 Gram(s) IV Push once  escitalopram 10 milliGRAM(s) Oral daily  folic acid 1 milliGRAM(s) Oral daily  glucagon  Injectable 1 milliGRAM(s) IntraMuscular once  insulin lispro (ADMELOG) corrective regimen sliding scale   SubCutaneous three times a day before meals  levothyroxine 50 MICROGram(s) Oral daily  pantoprazole    Tablet 40 milliGRAM(s) Oral before breakfast  tamsulosin 0.4 milliGRAM(s) Oral at bedtime    MEDICATIONS  (PRN):  dextrose Oral Gel 15 Gram(s) Oral once PRN Blood Glucose LESS THAN 70 milliGRAM(s)/deciliter      Vital Signs Last 24 Hrs  T(F): 97.7 (08-12-23 @ 13:02), Max: 97.8 (08-11-23 @ 20:46)  HR: 71 (08-12-23 @ 13:02) (58 - 71)  BP: 140/71 (08-12-23 @ 13:02) (140/71 - 171/69)  RR: 18 (08-12-23 @ 13:02) (18 - 18)  SpO2: 99% (08-12-23 @ 13:02) (99% - 100%)  Telemetry:   CAPILLARY BLOOD GLUCOSE      POCT Blood Glucose.: 96 mg/dL (12 Aug 2023 17:03)  POCT Blood Glucose.: 127 mg/dL (12 Aug 2023 11:48)  POCT Blood Glucose.: 98 mg/dL (12 Aug 2023 08:15)  POCT Blood Glucose.: 101 mg/dL (11 Aug 2023 22:16)    I&O's Summary    11 Aug 2023 07:01  -  12 Aug 2023 07:00  --------------------------------------------------------  IN: 0 mL / OUT: 650 mL / NET: -650 mL    12 Aug 2023 07:01  -  12 Aug 2023 19:56  --------------------------------------------------------  IN: 534 mL / OUT: 500 mL / NET: 34 mL        PHYSICAL EXAM:  GENERAL: NAD, well-developed  HEAD:  Atraumatic, Normocephalic  EYES: EOMI, PERRLA, conjunctiva and sclera clear  NECK: Supple, No JVD  CHEST/LUNG: Clear to auscultation bilaterally; No wheeze  HEART: Regular rate and rhythm; No murmurs, rubs, or gallops  ABDOMEN: Soft, Nontender, Nondistended; Bowel sounds present  EXTREMITIES:  2+ Peripheral Pulses, No clubbing, cyanosis, or edema  PSYCH: AAOx3  NEUROLOGY: non-focal  SKIN: No rashes or lesions    LABS:                        7.5    1.96  )-----------( 84       ( 12 Aug 2023 07:25 )             23.2     08-12    145  |  106  |  28<H>  ----------------------------<  86  3.7   |  24  |  1.02    Ca    8.8      12 Aug 2023 07:25  Phos  3.2     08-12  Mg     1.9     08-12            Urinalysis Basic - ( 12 Aug 2023 07:25 )    Color: x / Appearance: x / SG: x / pH: x  Gluc: 86 mg/dL / Ketone: x  / Bili: x / Urobili: x   Blood: x / Protein: x / Nitrite: x   Leuk Esterase: x / RBC: x / WBC x   Sq Epi: x / Non Sq Epi: x / Bacteria: x        RADIOLOGY & ADDITIONAL TESTS:    Imaging Personally Reviewed:    Consultant(s) Notes Reviewed:      Care Discussed with Consultants/Other Providers:

## 2023-08-12 NOTE — PROGRESS NOTE ADULT - ASSESSMENT
81 yo male presents w hematemesis, low HGB, s/p VCE prior to arrival and EGD while inptn. had BM Bx done on outptn basis 2/2 pancytopenia    Upper GI bleed : hx of AVM   s/p EGD : found single AVM in stomach with overlying clot   s/p cautery   c/w PPI , change to protonix bid and d/c GTT   no new events, will upgrade his diet, Hgb is stable, tolerating full liquids      ac blood loss anemia   -s/p 5 units PRBC : 4 on 8/9 and 1 on 8/11  - trend H/H  - on PPI drip    CAD s/p TAVR   -ASA on hold    Pancytopenia :  -had BM biopsy as out pt. recently about 1 month ago   - results not available  - seen by Willie. does he need a work up for bleeding tendencies?     Hypothyroidism   c/w synthroid     BPH:  -c/w doxazosin    depression  -stable on lexapro     DM-2 :   hold oral meds    insulin with coverage     HTN :   BP is controlled    hold off on BP     GI , Heme and Card following

## 2023-08-13 LAB
ANION GAP SERPL CALC-SCNC: 12 MMOL/L — SIGNIFICANT CHANGE UP (ref 5–17)
BASOPHILS # BLD AUTO: 0.02 K/UL — SIGNIFICANT CHANGE UP (ref 0–0.2)
BASOPHILS NFR BLD AUTO: 0.8 % — SIGNIFICANT CHANGE UP (ref 0–2)
BUN SERPL-MCNC: 15 MG/DL — SIGNIFICANT CHANGE UP (ref 7–23)
CALCIUM SERPL-MCNC: 8.8 MG/DL — SIGNIFICANT CHANGE UP (ref 8.4–10.5)
CHLORIDE SERPL-SCNC: 105 MMOL/L — SIGNIFICANT CHANGE UP (ref 96–108)
CO2 SERPL-SCNC: 23 MMOL/L — SIGNIFICANT CHANGE UP (ref 22–31)
CREAT SERPL-MCNC: 0.89 MG/DL — SIGNIFICANT CHANGE UP (ref 0.5–1.3)
EGFR: 86 ML/MIN/1.73M2 — SIGNIFICANT CHANGE UP
EOSINOPHIL # BLD AUTO: 0 K/UL — SIGNIFICANT CHANGE UP (ref 0–0.5)
EOSINOPHIL NFR BLD AUTO: 0 % — SIGNIFICANT CHANGE UP (ref 0–6)
GLUCOSE BLDC GLUCOMTR-MCNC: 100 MG/DL — HIGH (ref 70–99)
GLUCOSE BLDC GLUCOMTR-MCNC: 100 MG/DL — HIGH (ref 70–99)
GLUCOSE BLDC GLUCOMTR-MCNC: 107 MG/DL — HIGH (ref 70–99)
GLUCOSE BLDC GLUCOMTR-MCNC: 110 MG/DL — HIGH (ref 70–99)
GLUCOSE SERPL-MCNC: 79 MG/DL — SIGNIFICANT CHANGE UP (ref 70–99)
HCT VFR BLD CALC: 28.4 % — LOW (ref 39–50)
HGB BLD-MCNC: 9.2 G/DL — LOW (ref 13–17)
LYMPHOCYTES # BLD AUTO: 0.5 K/UL — LOW (ref 1–3.3)
LYMPHOCYTES # BLD AUTO: 21.7 % — SIGNIFICANT CHANGE UP (ref 13–44)
MAGNESIUM SERPL-MCNC: 1.9 MG/DL — SIGNIFICANT CHANGE UP (ref 1.6–2.6)
MANUAL SMEAR VERIFICATION: SIGNIFICANT CHANGE UP
MCHC RBC-ENTMCNC: 29.6 PG — SIGNIFICANT CHANGE UP (ref 27–34)
MCHC RBC-ENTMCNC: 32.4 GM/DL — SIGNIFICANT CHANGE UP (ref 32–36)
MCV RBC AUTO: 91.3 FL — SIGNIFICANT CHANGE UP (ref 80–100)
METAMYELOCYTES # FLD: 0.8 % — HIGH (ref 0–0)
MONOCYTES # BLD AUTO: 0.33 K/UL — SIGNIFICANT CHANGE UP (ref 0–0.9)
MONOCYTES NFR BLD AUTO: 14.2 % — HIGH (ref 2–14)
MYELOCYTES NFR BLD: 2.5 % — HIGH (ref 0–0)
NEUTROPHILS # BLD AUTO: 1.38 K/UL — LOW (ref 1.8–7.4)
NEUTROPHILS NFR BLD AUTO: 60 % — SIGNIFICANT CHANGE UP (ref 43–77)
PHOSPHATE SERPL-MCNC: 3.2 MG/DL — SIGNIFICANT CHANGE UP (ref 2.5–4.5)
PLAT MORPH BLD: NORMAL — SIGNIFICANT CHANGE UP
PLATELET # BLD AUTO: 83 K/UL — LOW (ref 150–400)
POTASSIUM SERPL-MCNC: 3.8 MMOL/L — SIGNIFICANT CHANGE UP (ref 3.5–5.3)
POTASSIUM SERPL-SCNC: 3.8 MMOL/L — SIGNIFICANT CHANGE UP (ref 3.5–5.3)
RBC # BLD: 3.11 M/UL — LOW (ref 4.2–5.8)
RBC # FLD: 16.1 % — HIGH (ref 10.3–14.5)
RBC BLD AUTO: SIGNIFICANT CHANGE UP
SODIUM SERPL-SCNC: 140 MMOL/L — SIGNIFICANT CHANGE UP (ref 135–145)
WBC # BLD: 2.3 K/UL — LOW (ref 3.8–10.5)
WBC # FLD AUTO: 2.3 K/UL — LOW (ref 3.8–10.5)

## 2023-08-13 RX ORDER — IRON SUCROSE 20 MG/ML
200 INJECTION, SOLUTION INTRAVENOUS EVERY 24 HOURS
Refills: 0 | Status: DISCONTINUED | OUTPATIENT
Start: 2023-08-13 | End: 2023-08-14

## 2023-08-13 RX ORDER — LANOLIN ALCOHOL/MO/W.PET/CERES
3 CREAM (GRAM) TOPICAL AT BEDTIME
Refills: 0 | Status: DISCONTINUED | OUTPATIENT
Start: 2023-08-13 | End: 2023-08-14

## 2023-08-13 RX ADMIN — ESCITALOPRAM OXALATE 10 MILLIGRAM(S): 10 TABLET, FILM COATED ORAL at 11:35

## 2023-08-13 RX ADMIN — PANTOPRAZOLE SODIUM 40 MILLIGRAM(S): 20 TABLET, DELAYED RELEASE ORAL at 05:44

## 2023-08-13 RX ADMIN — Medication 650 MILLIGRAM(S): at 03:48

## 2023-08-13 RX ADMIN — IRON SUCROSE 200 MILLIGRAM(S): 20 INJECTION, SOLUTION INTRAVENOUS at 18:03

## 2023-08-13 RX ADMIN — Medication 3 MILLIGRAM(S): at 22:54

## 2023-08-13 RX ADMIN — Medication 1 MILLIGRAM(S): at 11:34

## 2023-08-13 RX ADMIN — TAMSULOSIN HYDROCHLORIDE 0.4 MILLIGRAM(S): 0.4 CAPSULE ORAL at 22:54

## 2023-08-13 RX ADMIN — Medication 50 MICROGRAM(S): at 05:44

## 2023-08-13 NOTE — PROGRESS NOTE ADULT - SUBJECTIVE AND OBJECTIVE BOX
DATE OF SERVICE: 08-13-23 @ 12:46    Patient is a 82y old  Male who presents with a chief complaint of Hematemesis (12 Aug 2023 19:55)      INTERVAL HISTORY: no complaints     REVIEW OF SYSTEMS:  CONSTITUTIONAL: No weakness  EYES/ENT: No visual changes;  No throat pain   NECK: No pain or stiffness  RESPIRATORY: No cough, wheezing; No shortness of breath  CARDIOVASCULAR: No chest pain or palpitations  GASTROINTESTINAL: No abdominal  pain. No nausea, vomiting, or hematemesis  GENITOURINARY: No dysuria, frequency or hematuria  NEUROLOGICAL: No stroke like symptoms  SKIN: No rashes        	  MEDICATIONS:        PHYSICAL EXAM:  T(C): 36.9 (08-13-23 @ 11:43), Max: 36.9 (08-13-23 @ 11:43)  HR: 63 (08-13-23 @ 11:43) (58 - 71)  BP: 143/76 (08-13-23 @ 11:43) (140/71 - 183/70)  RR: 18 (08-13-23 @ 11:43) (18 - 18)  SpO2: 94% (08-13-23 @ 11:43) (94% - 100%)  Wt(kg): --  I&O's Summary    12 Aug 2023 07:01  -  13 Aug 2023 07:00  --------------------------------------------------------  IN: 534 mL / OUT: 500 mL / NET: 34 mL          Appearance: In no distress	  HEENT:    PERRL, EOMI	  Cardiovascular:  S1 S2, No JVD  Respiratory: Lungs clear to auscultation	  Gastrointestinal:  Soft, Non-tender, + BS	  Vascularature:  No edema of LE  Psychiatric: Appropriate affect   Neuro: no acute focal deficits                               9.2    2.30  )-----------( 83       ( 13 Aug 2023 09:24 )             28.4     08-13    140  |  105  |  15  ----------------------------<  79  3.8   |  23  |  0.89    Ca    8.8      13 Aug 2023 09:24  Phos  3.2     08-13  Mg     1.9     08-13          Labs personally reviewed      ASSESSMENT/PLAN: 	    82-year-old male, history of TAVR on aspirin, GI bleed that required multiple blood transfusion, presenting with 1 day onset of fatigue, dizziness, shortness of breath, hematemesis at triage. Patient had recent endoscopy on July 26, no active bleeding was noted during the procedure. Currently denies any nausea, abdominal pain, chest pain, shortness of breath. Denies alcohol use.      1. Preop Risk Stratification   - denies CAD Hx  - Symptomatic anemia resolved with PRBC transfusion   - Mod risk for low risk EGD/colon, no cardiac contraindication to proceed  - Tolerated procedure well.     2. GI bleed / sever anemia 2/2 ac blood loss :  Keep Hb >8 as symptomatic anemia   GI consult appreciated. s/p VCE and upper endoscopy wit andioectasia treated with APC  CLD as per GI    3. 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 Severe AS s/p TAVR :  - Follows at [facility]    5. HTN :   Home meds include: losartan 50mg PO daily, amlodipine 5mg PO daily  BP now recovered. Restart BP meds slowly.     6. HLD :  - Cont fenofibrate 160mg PO daily            KEARA Sarkar DO [facility]  Cardiovascular Medicine  [address], [address]  Office: [phone]  Available via call/text on Microsoft Teams

## 2023-08-13 NOTE — PROGRESS NOTE ADULT - SUBJECTIVE AND OBJECTIVE BOX
Patient is a 82y old  Male who presents with a chief complaint of Hematemesis (13 Aug 2023 12:46)      SUBJECTIVE / OVERNIGHT EVENTS: no new c/o ambulating, HGB is stable, iron level is on the low side, will give 2-3 doses IV Iron. dc planning.    MEDICATIONS  (STANDING):  dextrose 5%. 1000 milliLiter(s) (50 mL/Hr) IV Continuous <Continuous>  dextrose 5%. 1000 milliLiter(s) (100 mL/Hr) IV Continuous <Continuous>  dextrose 50% Injectable 25 Gram(s) IV Push once  dextrose 50% Injectable 25 Gram(s) IV Push once  dextrose 50% Injectable 12.5 Gram(s) IV Push once  escitalopram 10 milliGRAM(s) Oral daily  folic acid 1 milliGRAM(s) Oral daily  glucagon  Injectable 1 milliGRAM(s) IntraMuscular once  insulin lispro (ADMELOG) corrective regimen sliding scale   SubCutaneous three times a day before meals  iron sucrose Injectable 200 milliGRAM(s) IV Push every 24 hours  levothyroxine 50 MICROGram(s) Oral daily  melatonin 3 milliGRAM(s) Oral at bedtime  pantoprazole    Tablet 40 milliGRAM(s) Oral before breakfast  tamsulosin 0.4 milliGRAM(s) Oral at bedtime    MEDICATIONS  (PRN):  dextrose Oral Gel 15 Gram(s) Oral once PRN Blood Glucose LESS THAN 70 milliGRAM(s)/deciliter      Vital Signs Last 24 Hrs  T(F): 98.5 (08-13-23 @ 11:43), Max: 98.5 (08-13-23 @ 11:43)  HR: 63 (08-13-23 @ 11:43) (58 - 63)  BP: 143/76 (08-13-23 @ 11:43) (143/76 - 183/70)  RR: 18 (08-13-23 @ 11:43) (18 - 18)  SpO2: 94% (08-13-23 @ 11:43) (94% - 100%)  Telemetry:   CAPILLARY BLOOD GLUCOSE      POCT Blood Glucose.: 107 mg/dL (13 Aug 2023 16:59)  POCT Blood Glucose.: 100 mg/dL (13 Aug 2023 12:22)  POCT Blood Glucose.: 100 mg/dL (13 Aug 2023 08:07)  POCT Blood Glucose.: 97 mg/dL (12 Aug 2023 22:23)    I&O's Summary    12 Aug 2023 07:01  -  13 Aug 2023 07:00  --------------------------------------------------------  IN: 534 mL / OUT: 500 mL / NET: 34 mL        PHYSICAL EXAM:  GENERAL: NAD, well-developed  HEAD:  Atraumatic, Normocephalic  EYES: EOMI, PERRLA, conjunctiva and sclera clear  NECK: Supple, No JVD  CHEST/LUNG: Clear to auscultation bilaterally; No wheeze  HEART: Regular rate and rhythm; No murmurs, rubs, or gallops  ABDOMEN: Soft, Nontender, Nondistended; Bowel sounds present  EXTREMITIES:  2+ Peripheral Pulses, No clubbing, cyanosis, or edema  PSYCH: AAOx3  NEUROLOGY: non-focal  SKIN: No rashes or lesions    LABS:                        9.2    2.30  )-----------( 83       ( 13 Aug 2023 09:24 )             28.4     08-13    140  |  105  |  15  ----------------------------<  79  3.8   |  23  |  0.89    Ca    8.8      13 Aug 2023 09:24  Phos  3.2     08-13  Mg     1.9     08-13            Urinalysis Basic - ( 13 Aug 2023 09:24 )    Color: x / Appearance: x / SG: x / pH: x  Gluc: 79 mg/dL / Ketone: x  / Bili: x / Urobili: x   Blood: x / Protein: x / Nitrite: x   Leuk Esterase: x / RBC: x / WBC x   Sq Epi: x / Non Sq Epi: x / Bacteria: x        RADIOLOGY & ADDITIONAL TESTS:    Imaging Personally Reviewed:    Consultant(s) Notes Reviewed:      Care Discussed with Consultants/Other Providers:

## 2023-08-13 NOTE — PROGRESS NOTE ADULT - ASSESSMENT
81 yo male presents w hematemesis, low HGB, s/p VCE prior to arrival and EGD while inptn. had BM Bx done on outptn basis 2/2 pancytopenia    Upper GI bleed : hx of AVM   s/p EGD : found single AVM in stomach with overlying clot   s/p cautery   c/w PPI , change to protonix bid and d/c GTT   no new events, will upgrade his diet, Hgb is stable, tolerating full liquids      ac blood loss anemia   -s/p 5 units PRBC : 4 on 8/9 and 1 on 8/11  - hgb is stable  - on PPI po  - iron level on the low side, will give a couple doses of IV iron. recommended to F/U w Dr. Fabian on outptn basis    CAD s/p TAVR   -ASA on hold    Pancytopenia :  -had BM biopsy as out pt. recently about 1 month ago   - results not available  - seen by Heme. does he need a work up for bleeding tendencies?     Hypothyroidism   c/w synthroid     BPH:  -c/w doxazosin    depression  -stable on lexapro     DM-2 :   hold oral meds    insulin with coverage     HTN :   BP is controlled    hold off on BP     GI , Heme and Card following

## 2023-08-14 ENCOUNTER — TRANSCRIPTION ENCOUNTER (OUTPATIENT)
Age: 83
End: 2023-08-14

## 2023-08-14 VITALS
RESPIRATION RATE: 18 BRPM | DIASTOLIC BLOOD PRESSURE: 68 MMHG | TEMPERATURE: 98 F | SYSTOLIC BLOOD PRESSURE: 150 MMHG | OXYGEN SATURATION: 96 % | HEART RATE: 70 BPM

## 2023-08-14 LAB
ANION GAP SERPL CALC-SCNC: 13 MMOL/L — SIGNIFICANT CHANGE UP (ref 5–17)
BUN SERPL-MCNC: 17 MG/DL — SIGNIFICANT CHANGE UP (ref 7–23)
CALCIUM SERPL-MCNC: 8.8 MG/DL — SIGNIFICANT CHANGE UP (ref 8.4–10.5)
CHLORIDE SERPL-SCNC: 105 MMOL/L — SIGNIFICANT CHANGE UP (ref 96–108)
CO2 SERPL-SCNC: 22 MMOL/L — SIGNIFICANT CHANGE UP (ref 22–31)
CREAT SERPL-MCNC: 1 MG/DL — SIGNIFICANT CHANGE UP (ref 0.5–1.3)
EGFR: 75 ML/MIN/1.73M2 — SIGNIFICANT CHANGE UP
FACT VIII ACT/NOR PPP: 195 % — HIGH (ref 60–125)
GLUCOSE BLDC GLUCOMTR-MCNC: 104 MG/DL — HIGH (ref 70–99)
GLUCOSE BLDC GLUCOMTR-MCNC: 95 MG/DL — SIGNIFICANT CHANGE UP (ref 70–99)
GLUCOSE BLDC GLUCOMTR-MCNC: 99 MG/DL — SIGNIFICANT CHANGE UP (ref 70–99)
GLUCOSE SERPL-MCNC: 94 MG/DL — SIGNIFICANT CHANGE UP (ref 70–99)
HCT VFR BLD CALC: 26.3 % — LOW (ref 39–50)
HCT VFR BLD CALC: 30.2 % — LOW (ref 39–50)
HGB BLD-MCNC: 8.6 G/DL — LOW (ref 13–17)
HGB BLD-MCNC: 9.8 G/DL — LOW (ref 13–17)
MCHC RBC-ENTMCNC: 29.4 PG — SIGNIFICANT CHANGE UP (ref 27–34)
MCHC RBC-ENTMCNC: 29.6 PG — SIGNIFICANT CHANGE UP (ref 27–34)
MCHC RBC-ENTMCNC: 32.5 GM/DL — SIGNIFICANT CHANGE UP (ref 32–36)
MCHC RBC-ENTMCNC: 32.7 GM/DL — SIGNIFICANT CHANGE UP (ref 32–36)
MCV RBC AUTO: 90.4 FL — SIGNIFICANT CHANGE UP (ref 80–100)
MCV RBC AUTO: 90.7 FL — SIGNIFICANT CHANGE UP (ref 80–100)
NRBC # BLD: 0 /100 WBCS — SIGNIFICANT CHANGE UP (ref 0–0)
NRBC # BLD: 0 /100 WBCS — SIGNIFICANT CHANGE UP (ref 0–0)
PLATELET # BLD AUTO: 86 K/UL — LOW (ref 150–400)
PLATELET # BLD AUTO: 96 K/UL — LOW (ref 150–400)
POTASSIUM SERPL-MCNC: 3.6 MMOL/L — SIGNIFICANT CHANGE UP (ref 3.5–5.3)
POTASSIUM SERPL-SCNC: 3.6 MMOL/L — SIGNIFICANT CHANGE UP (ref 3.5–5.3)
RBC # BLD: 2.91 M/UL — LOW (ref 4.2–5.8)
RBC # BLD: 3.33 M/UL — LOW (ref 4.2–5.8)
RBC # FLD: 16.5 % — HIGH (ref 10.3–14.5)
RBC # FLD: 17.1 % — HIGH (ref 10.3–14.5)
SODIUM SERPL-SCNC: 140 MMOL/L — SIGNIFICANT CHANGE UP (ref 135–145)
VWF AG ACT/NOR PPP IA: 195 % — HIGH (ref 63–170)
VWF:RCO ACT/NOR PPP PL AGG: 171 % — HIGH (ref 45–133)
WBC # BLD: 1.98 K/UL — LOW (ref 3.8–10.5)
WBC # BLD: 2.87 K/UL — LOW (ref 3.8–10.5)
WBC # FLD AUTO: 1.98 K/UL — LOW (ref 3.8–10.5)
WBC # FLD AUTO: 2.87 K/UL — LOW (ref 3.8–10.5)

## 2023-08-14 PROCEDURE — 74177 CT ABD & PELVIS W/CONTRAST: CPT | Mod: MA

## 2023-08-14 PROCEDURE — 36415 COLL VENOUS BLD VENIPUNCTURE: CPT

## 2023-08-14 PROCEDURE — 86923 COMPATIBILITY TEST ELECTRIC: CPT

## 2023-08-14 PROCEDURE — 82330 ASSAY OF CALCIUM: CPT

## 2023-08-14 PROCEDURE — 85027 COMPLETE CBC AUTOMATED: CPT

## 2023-08-14 PROCEDURE — 83735 ASSAY OF MAGNESIUM: CPT

## 2023-08-14 PROCEDURE — 85610 PROTHROMBIN TIME: CPT

## 2023-08-14 PROCEDURE — 80048 BASIC METABOLIC PNL TOTAL CA: CPT

## 2023-08-14 PROCEDURE — 96376 TX/PRO/DX INJ SAME DRUG ADON: CPT

## 2023-08-14 PROCEDURE — 84295 ASSAY OF SERUM SODIUM: CPT

## 2023-08-14 PROCEDURE — 96374 THER/PROPH/DIAG INJ IV PUSH: CPT

## 2023-08-14 PROCEDURE — 85245 CLOT FACTOR VIII VW RISTOCTN: CPT

## 2023-08-14 PROCEDURE — 85730 THROMBOPLASTIN TIME PARTIAL: CPT

## 2023-08-14 PROCEDURE — 83036 HEMOGLOBIN GLYCOSYLATED A1C: CPT

## 2023-08-14 PROCEDURE — 85240 CLOT FACTOR VIII AHG 1 STAGE: CPT

## 2023-08-14 PROCEDURE — 99232 SBSQ HOSP IP/OBS MODERATE 35: CPT | Mod: GC

## 2023-08-14 PROCEDURE — 99285 EMERGENCY DEPT VISIT HI MDM: CPT | Mod: 25

## 2023-08-14 PROCEDURE — 85246 CLOT FACTOR VIII VW ANTIGEN: CPT

## 2023-08-14 PROCEDURE — 36430 TRANSFUSION BLD/BLD COMPNT: CPT

## 2023-08-14 PROCEDURE — 71045 X-RAY EXAM CHEST 1 VIEW: CPT

## 2023-08-14 PROCEDURE — 84100 ASSAY OF PHOSPHORUS: CPT

## 2023-08-14 PROCEDURE — 86850 RBC ANTIBODY SCREEN: CPT

## 2023-08-14 PROCEDURE — 85247 CLOT FACTOR VIII MULTIMETRIC: CPT

## 2023-08-14 PROCEDURE — 86900 BLOOD TYPING SEROLOGIC ABO: CPT

## 2023-08-14 PROCEDURE — 85014 HEMATOCRIT: CPT

## 2023-08-14 PROCEDURE — 84132 ASSAY OF SERUM POTASSIUM: CPT

## 2023-08-14 PROCEDURE — 80053 COMPREHEN METABOLIC PANEL: CPT

## 2023-08-14 PROCEDURE — 85025 COMPLETE CBC W/AUTO DIFF WBC: CPT

## 2023-08-14 PROCEDURE — P9016: CPT

## 2023-08-14 PROCEDURE — 82803 BLOOD GASES ANY COMBINATION: CPT

## 2023-08-14 PROCEDURE — 85018 HEMOGLOBIN: CPT

## 2023-08-14 PROCEDURE — 82962 GLUCOSE BLOOD TEST: CPT

## 2023-08-14 PROCEDURE — 86901 BLOOD TYPING SEROLOGIC RH(D): CPT

## 2023-08-14 PROCEDURE — 83605 ASSAY OF LACTIC ACID: CPT

## 2023-08-14 PROCEDURE — 82435 ASSAY OF BLOOD CHLORIDE: CPT

## 2023-08-14 PROCEDURE — 82947 ASSAY GLUCOSE BLOOD QUANT: CPT

## 2023-08-14 RX ORDER — LOSARTAN POTASSIUM 100 MG/1
1 TABLET, FILM COATED ORAL
Refills: 0 | DISCHARGE

## 2023-08-14 RX ORDER — POLYETHYLENE GLYCOL 3350 17 G/17G
17 POWDER, FOR SOLUTION ORAL
Refills: 0 | Status: DISCONTINUED | OUTPATIENT
Start: 2023-08-14 | End: 2023-08-14

## 2023-08-14 RX ORDER — POLYETHYLENE GLYCOL 3350 17 G/17G
17 POWDER, FOR SOLUTION ORAL
Qty: 0 | Refills: 0 | DISCHARGE
Start: 2023-08-14

## 2023-08-14 RX ADMIN — ESCITALOPRAM OXALATE 10 MILLIGRAM(S): 10 TABLET, FILM COATED ORAL at 11:14

## 2023-08-14 RX ADMIN — POLYETHYLENE GLYCOL 3350 17 GRAM(S): 17 POWDER, FOR SOLUTION ORAL at 14:34

## 2023-08-14 RX ADMIN — Medication 1 MILLIGRAM(S): at 11:14

## 2023-08-14 RX ADMIN — IRON SUCROSE 200 MILLIGRAM(S): 20 INJECTION, SOLUTION INTRAVENOUS at 16:10

## 2023-08-14 RX ADMIN — PANTOPRAZOLE SODIUM 40 MILLIGRAM(S): 20 TABLET, DELAYED RELEASE ORAL at 06:29

## 2023-08-14 RX ADMIN — Medication 50 MICROGRAM(S): at 06:29

## 2023-08-14 RX ADMIN — Medication 10 MILLIGRAM(S): at 13:57

## 2023-08-14 NOTE — DISCHARGE NOTE PROVIDER - CARE PROVIDER_API CALL
Carl Fabian  Internal Medicine  [address] [place] [address]  Phone: [phone]  Fax: [phone]  Follow Up Time:     Albin Cook  Gastroenterology  [address] [place] [id number]  Phone: [phone]  Fax: [phone]  Follow Up Time:

## 2023-08-14 NOTE — DISCHARGE NOTE NURSING/CASE MANAGEMENT/SOCIAL WORK - NSDCPEFALRISK_GEN_ALL_CORE
For information on Fall & Injury Prevention, visit: [email or web address] OR  [email or web address] OR  https://www.cdc.gov/steadi/patient.html

## 2023-08-14 NOTE — DISCHARGE NOTE PROVIDER - NSDCCPCAREPLAN_GEN_ALL_CORE_FT
PRINCIPAL DISCHARGE DIAGNOSIS  Diagnosis: Upper GI bleed  Assessment and Plan of Treatment: From AVMs/Angioectasis given h/o severe AS s/p TAVR and chronic intermittent acute blood loss anemia  - s/p VCE (8/10)-  red blood in the stomach; hematin/melena (altered blood) in the small bowel; non-diagnostic small bowel study as the capsule terminates in the small bowel at the end of the study  - s/p EGD (8/10)-  Normal esophagus; single bleeding angioectasia with adherent clot in the stomach- s/p treatment with APC likely the main source of bleeding and cause for blood clots seen on VCE; few recently bleeding angioectasias in the stomach- s/p treatment with APC; normal examined duodenum.  Continue with protonix  Please follow up with your GI Doctor         SECONDARY DISCHARGE DIAGNOSES  Diagnosis: Anemia  Assessment and Plan of Treatment: Anemia is when you have a low blood count of hemoglobin (HGB) and/or hematocrit (HCT), or RBC (red blood cells).If your hgb is <13 (women) or <12 (men), then you are considered to be anemic.Losing a moderate to large amount of blood cause anemia.Although, there are several different types of anemia that are not due to blood loss.You may have received a blood transfusion during your hospitalization.Your last blood count was_9.8/30.2___.You will need to follow up with your healthcare provider in one week for a blood to check your count.Call for an appointment.The primary symptoms of anemia is difficulty breathing with exertion or at rest,fatigue,bounding pulses, and/or palpitations.If you are persistantly having these symptroms, you will need to seek help from your healthcare provider.      Diagnosis: CAD (coronary artery disease)  Assessment and Plan of Treatment: Coronary artery disease is a condition where the arteries the supply the heart muscle get clogges with fatty deposits & puts you at risk for a heart attack  Call your doctor if you have any new pain, pressure, or discomfort in the center of your chest, pain, tingling or discomfort in arms, back, neck, jaw, or stomach, shortness of breath, nausea, vomiting, burping or heartburn, sweating, cold and clammy skin, racing or abnormal heartbeat for more than 10 minutes or if they keep coming & going.  Call 911 and do not tr to get to hospital by care  You can help yourself with lefestyle changes (quitting smoking if you smoke), eat lots of fruits & vegetables & low fat dairy products, not a lot of meat & fatty foods, walk or some form of physical activity most days of the week, lose weight if you are overweight  Take your cardiac medication as prescribed to lower cholesterol, to lower blood pressure, aspirin to prevent blood clots, and diabetes control  Make sure to keep appointments with doctor for cardiac follow up care      Diagnosis: Diabetes  Assessment and Plan of Treatment: HgA1C this admission. (5.6)  Make sure you get your HgA1c checked every three months.  If you take oral diabetes medications, check your blood glucose two times a day.  If you take insulin, check your blood glucose before meals and at bedtime.  It's important not to skip any meals.  Keep a log of your blood glucose results and always take it with you to your doctor appointments.  Keep a list of your current medications including injectables and over the counter medications and bring this medication list with you to all your doctor appointments.  If you have not seen your ophthalmologist this year call for appointment.  Check your feet daily for redness, sores, or openings. Do not self treat. If no improvement in two days call your primary care physician for an appointment.  Low blood sugar (hypoglycemia) is a blood sugar below 70mg/dl. Check your blood sugar if you feel signs/symptoms of hypoglycemia. If your blood sugar is below 70 take 15 grams of carbohydrates (ex 4 oz of apple juice, 3-4 glucose tablets, or 4-6 oz of regular soda) wait 15 minutes and repeat blood sugar to make sure it comes up above 70.  If your blood sugar is above 70 and you are due for a meal, have a meal.  If you are not due for a meal have a snack.  This snack helps keeps your blood sugar at a safe range.      Diagnosis: Hypothyroid  Assessment and Plan of Treatment: you do not make enough thyroid hormone  signs & symptoms of low levels of thyroid hormone - tired, getting cold easily, coarse or thin hair, constipation, shortness of breath, swelling, irregular periods  your doctor will do thyroid hormone blood tests at least once a year to monitor if medication dose is adequate  take your thyroid medicine as directed by your doctor & on empty stomach      Diagnosis: History of BPH  Assessment and Plan of Treatment: You have an enlarged prostate gland which gets bigger as men get older - it is a very common problem and has nothing to do with prostate cancer  Call your doctor if you are urinating more frequently, have trouble starting to urinate, have weak stream, urine leaking or dribbling, and feeling as though bladder is not empty after urination  Your doctor will monitor your prostate with a rectal exam as well as urine or blood testing  You can help yourself by reducing the amount of fluid you drink before going to bed, limiting the amount of alcohol & caffeine you drink   Avoid cold & allergy medication that contain decongestants or antihistamines which make BPH symptoms worse  You can also "double void" by waiting a moment after urinating & trying again  Take your medication as prescribed - one medication helps to relax the muscle around the urethra and the other medication you may take prevents the prostate from growing more or even shrinking the prostate      Diagnosis: Pancytopenia  Assessment and Plan of Treatment: had BM biopsy as out pt. recently about 1 month ago   - results not available- please follow up with your PMD or Hematologist    Diagnosis: Other depression  Assessment and Plan of Treatment: -stable on lexapro    Diagnosis: Benign essential HTN  Assessment and Plan of Treatment: Low salt diet  Activity as tolerated.  Take all medication as prescribed.  Follow up with your medical doctor for routine blood pressure monitoring at your next visit.  Notify your doctor if you have any of the following symptoms:   Dizziness, Lightheadedness, Blurry vision, Headache, Chest pain, Shortness of breath

## 2023-08-14 NOTE — PROGRESS NOTE ADULT - ATTENDING COMMENTS
Agree with above. Pt with no further hematemesis/bleeding. Receiving additional unit of PRBC now, likely lower Hgb today from equilibration. Discussed with him again that since he keeps getting readmissions for bleeding and the angioectasia are recurring over time, endoscopic management is only temporary and he should still consider medical therapy i.e. octreotide. Explained to him these medications are not FDA approved for this indication but has been shown in small series to decrease need for transfusion, explained to him potential GI side effects. He wants to wait and talk to his family and his primary gastroenterologist Dr. Bernal before agreeing.     Would f/u post-transfusion CBC, advance diet as tolerated.
Hematemesis resolved  Stools lighter in color  Resolving GI bleed from recurrent AVM (last hospitalization about one year ago)  Continue PO  No immediate plans for repeat EGD  PPI daily  Agree with plans for dc home if blood counts stable

## 2023-08-14 NOTE — DISCHARGE NOTE NURSING/CASE MANAGEMENT/SOCIAL WORK - PATIENT PORTAL LINK FT
You can access the FollowMyHealth Patient Portal offered by [facility] by registering at the following website: [email or web address]. By joining [facility]’s FollowMyHealth portal, you will also be able to view your health information using other applications (apps) compatible with our system.

## 2023-08-14 NOTE — PROGRESS NOTE ADULT - PROVIDER SPECIALTY LIST ADULT
Cardiology
Cardiology
Internal Medicine
Cardiology
Gastroenterology
Internal Medicine
Internal Medicine
Cardiology
Cardiology
Gastroenterology
Internal Medicine

## 2023-08-14 NOTE — PROGRESS NOTE ADULT - ASSESSMENT
SUSANNA BRAND is a 82y Male with symptomatic anemia c/b unclear pancytopenia along with about 20 pound weight loss in 3 mos (unintentional)s/p BMBx outpatient awaiting results, diverticulosis, DM, HTN, AS s/p TAVR 2021, AVMs in [place] s/p multiple endoscopies (EGD/colon) requiring cauterization who initially presented to the ED with 2-3 days of dark stools, SOB, fatigued and dizziness x1 day and 1x hematemesis on arrival to ED 8/8. Hgb noted to be 6.4. Of note, patient was recently admitted with melena 07/2023. Had push enteroscopy (7/25)- normal esophagus; ultiple gastric polyps; normal duodenum; normal examined jejunum. Had c-scope (7/26)- fair prep, pan-diverticulosis, normal TI, normal rectal retroflexion, no findings to explain melena. Was recommended to have OP capsule. GI consulted for anemia.    # chronic intermittent acute blood loss anemia  # Pancytopenia, unclear etiology - s/p BMBx OSH, pending report  From AVMs/Angioectasis given h/o severe AS s/p TAVR and chronic intermittent acute blood loss anemia  - s/p VCE (8/10)-  red blood in the stomach; hematin/melena (altered blood) in the small bowel; non-diagnostic small bowel study as the capsule terminates in the small bowel at the end of the study  - s/p EGD (8/10)-  Normal esophagus; single bleeding angioectasia with adherent clot in the stomach- s/p treatment with APC likely the main source of bleeding and cause for blood clots seen on VCE; few recently bleeding angioectasias in the stomach- s/p treatment with APC; normal examined duodenum.    Recommendations:  -trend vitals, CBC, and monitor for clinical signs of bleeding  -maintain active type and screen  -transfusion goal to maintain hemoglobin >/= 8.0  -avoid NSAIDs  -PPI 40 mg daily  -diet as tolerated    **THIS NOTE IS NOT FINALIZED UNTIL SIGNED BY THE ATTENDING**    Pamela Paiz MD  GI Fellow, PGY-6  Available via [employer] Teams    NON-URGENT CONSULTS:  Please email [email or web address]  AT NIGHT AND ON WEEKENDS:  Contact on-call GI fellow via answering service ([phone]) from 5pm-8am and on weekends/holidays  MONDAY-FRIDAY 8AM-5PM:  Pager# [phone] (LESLIE) or [phone] ([facility])       SUSANNA BRAND is a 82y Male with symptomatic anemia c/b unclear pancytopenia along with about 20 pound weight loss in 3 mos (unintentional)s/p BMBx outpatient awaiting results, diverticulosis, DM, HTN, AS s/p TAVR 2021, AVMs in [place] s/p multiple endoscopies (EGD/colon) requiring cauterization who initially presented to the ED with 2-3 days of dark stools, SOB, fatigued and dizziness x1 day and 1x hematemesis on arrival to ED 8/8. Hgb noted to be 6.4. Of note, patient was recently admitted with melena 07/2023. Had push enteroscopy (7/25)- normal esophagus; ultiple gastric polyps; normal duodenum; normal examined jejunum. Had c-scope (7/26)- fair prep, pan-diverticulosis, normal TI, normal rectal retroflexion, no findings to explain melena. Was recommended to have OP capsule. GI consulted for anemia.    # chronic intermittent acute blood loss anemia  # Pancytopenia, unclear etiology - s/p BMBx OSH, pending report  From AVMs/Angioectasis given h/o severe AS s/p TAVR and chronic intermittent acute blood loss anemia  - s/p VCE (8/10)-  red blood in the stomach; hematin/melena (altered blood) in the small bowel; non-diagnostic small bowel study as the capsule terminates in the small bowel at the end of the study  - s/p EGD (8/10)-  Normal esophagus; single bleeding angioectasia with adherent clot in the stomach- s/p treatment with APC likely the main source of bleeding and cause for blood clots seen on VCE; few recently bleeding angioectasias in the stomach- s/p treatment with APC; normal examined duodenum.    #constipation- last BM few days ago    Recommendations:  -add miralax 17 gm BID  -trend vitals, CBC, and monitor for clinical signs of bleeding  -maintain active type and screen  -transfusion goal to maintain hemoglobin >/= 8.0  -avoid NSAIDs  -PPI 40 mg daily  -diet as tolerated      **THIS NOTE IS NOT FINALIZED UNTIL SIGNED BY THE ATTENDING**    Pamela Paiz MD  GI Fellow, PGY-6  Available via Microsoft Teams    NON-URGENT CONSULTS:  Please email [email or web address]  AT NIGHT AND ON WEEKENDS:  Contact on-call GI fellow via answering service ([phone]) from 5pm-8am and on weekends/holidays  MONDAY-FRIDAY 8AM-5PM:  Pager# [phone] (LESLIE) or [phone] ([facility])

## 2023-08-14 NOTE — PROGRESS NOTE ADULT - SUBJECTIVE AND OBJECTIVE BOX
Chief Complaint:  Patient is a 82y old  Male who presents with a chief complaint of Hematemesis (13 Aug 2023 20:19)      Interval Events: Reports feeling well. Denies any N/V/D/C, abd pain, melena or hematochezia.      Hospital Medications:  dextrose 5%. 1000 milliLiter(s) IV Continuous <Continuous>  dextrose 5%. 1000 milliLiter(s) IV Continuous <Continuous>  dextrose 50% Injectable 25 Gram(s) IV Push once  dextrose 50% Injectable 25 Gram(s) IV Push once  dextrose 50% Injectable 12.5 Gram(s) IV Push once  dextrose Oral Gel 15 Gram(s) Oral once PRN  escitalopram 10 milliGRAM(s) Oral daily  folic acid 1 milliGRAM(s) Oral daily  glucagon  Injectable 1 milliGRAM(s) IntraMuscular once  insulin lispro (ADMELOG) corrective regimen sliding scale   SubCutaneous three times a day before meals  iron sucrose Injectable 200 milliGRAM(s) IV Push every 24 hours  levothyroxine 50 MICROGram(s) Oral daily  melatonin 3 milliGRAM(s) Oral at bedtime  pantoprazole    Tablet 40 milliGRAM(s) Oral before breakfast  tamsulosin 0.4 milliGRAM(s) Oral at bedtime      PMHX/PSHX:  Gastrointestinal bleeding    Mild HTN    HTN (hypertension)    HLD (hyperlipidemia)    T2DM (type 2 diabetes mellitus)    Hypothyroidism    No significant past surgical history    S/P TAVR (transcatheter aortic valve replacement)            ROS: 14 point ROS negative unless otherwise stated in subjective      PHYSICAL EXAM:     GENERAL:  Well developed, no distress  HEENT:  NC/AT,  conjunctivae clear, sclera anicteric  CHEST:  Full & symmetric excursion, no increased effort w/ respirations  HEART:  Regular rhythm & rate  ABDOMEN:  Soft, non-tender, non-distended  EXTREMITIES:  no LE  edema  SKIN:  No rash/erythema/ecchymoses/petechiae/wounds/jaundice  NEURO:  Alert, orientedx3    Vital Signs:  Vital Signs Last 24 Hrs  T(C): 36.4 (14 Aug 2023 04:27), Max: 36.9 (13 Aug 2023 11:43)  T(F): 97.6 (14 Aug 2023 04:27), Max: 98.5 (13 Aug 2023 11:43)  HR: 61 (14 Aug 2023 04:27) (57 - 63)  BP: 134/62 (14 Aug 2023 04:27) (134/62 - 164/89)  BP(mean): --  RR: 18 (14 Aug 2023 04:27) (18 - 18)  SpO2: 95% (14 Aug 2023 04:27) (94% - 98%)    Parameters below as of 14 Aug 2023 04:27  Patient On (Oxygen Delivery Method): room air      Daily     Daily     LABS:                        8.6    1.98  )-----------( 86       ( 14 Aug 2023 05:54 )             26.3     08-14    140  |  105  |  17  ----------------------------<  94  3.6   |  22  |  1.00    Ca    8.8      14 Aug 2023 05:53  Phos  3.2     08-13  Mg     1.9     08-13          Urinalysis Basic - ( 14 Aug 2023 05:53 )    Color: x / Appearance: x / SG: x / pH: x  Gluc: 94 mg/dL / Ketone: x  / Bili: x / Urobili: x   Blood: x / Protein: x / Nitrite: x   Leuk Esterase: x / RBC: x / WBC x   Sq Epi: x / Non Sq Epi: x / Bacteria: x          Imaging: No new abdominal imaging              Chief Complaint:  Patient is a 82y old  Male who presents with a chief complaint of Hematemesis (13 Aug 2023 20:19)      Interval Events:   Hgb 8.6 from 9.2. Has not had a BM in a few days. No N/V/abd pain.    Hospital Medications:  dextrose 5%. 1000 milliLiter(s) IV Continuous <Continuous>  dextrose 5%. 1000 milliLiter(s) IV Continuous <Continuous>  dextrose 50% Injectable 25 Gram(s) IV Push once  dextrose 50% Injectable 25 Gram(s) IV Push once  dextrose 50% Injectable 12.5 Gram(s) IV Push once  dextrose Oral Gel 15 Gram(s) Oral once PRN  escitalopram 10 milliGRAM(s) Oral daily  folic acid 1 milliGRAM(s) Oral daily  glucagon  Injectable 1 milliGRAM(s) IntraMuscular once  insulin lispro (ADMELOG) corrective regimen sliding scale   SubCutaneous three times a day before meals  iron sucrose Injectable 200 milliGRAM(s) IV Push every 24 hours  levothyroxine 50 MICROGram(s) Oral daily  melatonin 3 milliGRAM(s) Oral at bedtime  pantoprazole    Tablet 40 milliGRAM(s) Oral before breakfast  tamsulosin 0.4 milliGRAM(s) Oral at bedtime      PMHX/PSHX:  Gastrointestinal bleeding    Mild HTN    HTN (hypertension)    HLD (hyperlipidemia)    T2DM (type 2 diabetes mellitus)    Hypothyroidism    No significant past surgical history    S/P TAVR (transcatheter aortic valve replacement)            ROS: 14 point ROS negative unless otherwise stated in subjective      PHYSICAL EXAM:     GENERAL:  Well developed, no distress  HEENT:  NC/AT,  conjunctivae clear, sclera anicteric  CHEST:  Full & symmetric excursion, no increased effort w/ respirations  HEART:  Regular rhythm & rate  ABDOMEN:  Soft, non-tender, non-distended  EXTREMITIES:  no LE  edema  SKIN:  No rash/erythema/ecchymoses/petechiae/wounds/jaundice  NEURO:  Alert, orientedx3    Vital Signs:  Vital Signs Last 24 Hrs  T(C): 36.4 (14 Aug 2023 04:27), Max: 36.9 (13 Aug 2023 11:43)  T(F): 97.6 (14 Aug 2023 04:27), Max: 98.5 (13 Aug 2023 11:43)  HR: 61 (14 Aug 2023 04:27) (57 - 63)  BP: 134/62 (14 Aug 2023 04:27) (134/62 - 164/89)  BP(mean): --  RR: 18 (14 Aug 2023 04:27) (18 - 18)  SpO2: 95% (14 Aug 2023 04:27) (94% - 98%)    Parameters below as of 14 Aug 2023 04:27  Patient On (Oxygen Delivery Method): room air      Daily     Daily     LABS:                        8.6    1.98  )-----------( 86       ( 14 Aug 2023 05:54 )             26.3     08-14    140  |  105  |  17  ----------------------------<  94  3.6   |  22  |  1.00    Ca    8.8      14 Aug 2023 05:53  Phos  3.2     08-13  Mg     1.9     08-13          Urinalysis Basic - ( 14 Aug 2023 05:53 )    Color: x / Appearance: x / SG: x / pH: x  Gluc: 94 mg/dL / Ketone: x  / Bili: x / Urobili: x   Blood: x / Protein: x / Nitrite: x   Leuk Esterase: x / RBC: x / WBC x   Sq Epi: x / Non Sq Epi: x / Bacteria: x          Imaging: No new abdominal imaging

## 2023-08-14 NOTE — PROGRESS NOTE ADULT - SUBJECTIVE AND OBJECTIVE BOX
DATE OF SERVICE: 08-14-23 @ 13:42    Patient is a 82y old  Male who presents with a chief complaint of Hematemesis (14 Aug 2023 07:11)      INTERVAL HISTORY: No acute events, feels well    REVIEW OF SYSTEMS:  CONSTITUTIONAL: No weakness  EYES/ENT: No visual changes;  No throat pain   NECK: No pain or stiffness  RESPIRATORY: No cough, wheezing; No shortness of breath  CARDIOVASCULAR: No chest pain or palpitations  GASTROINTESTINAL: No abdominal  pain. No nausea, vomiting, or hematemesis  GENITOURINARY: No dysuria, frequency or hematuria  NEUROLOGICAL: No stroke like symptoms  SKIN: No rashes      	  MEDICATIONS:        PHYSICAL EXAM:  T(C): 36.3 (08-14-23 @ 11:30), Max: 36.8 (08-13-23 @ 20:57)  HR: 78 (08-14-23 @ 11:30) (57 - 78)  BP: 148/70 (08-14-23 @ 11:30) (134/62 - 164/89)  RR: 18 (08-14-23 @ 11:30) (18 - 18)  SpO2: 97% (08-14-23 @ 11:30) (95% - 98%)  Wt(kg): --  I&O's Summary        Appearance: In no distress	  HEENT:    PERRL, EOMI	  Cardiovascular:  S1 S2, No JVD  Respiratory: Lungs clear to auscultation	  Gastrointestinal:  Soft, Non-tender, + BS	  Vascularature:  No edema of LE  Psychiatric: Appropriate affect   Neuro: no acute focal deficits                               8.6    1.98  )-----------( 86       ( 14 Aug 2023 05:54 )             26.3     08-14    140  |  105  |  17  ----------------------------<  94  3.6   |  22  |  1.00    Ca    8.8      14 Aug 2023 05:53  Phos  3.2     08-13  Mg     1.9     08-13          Labs personally reviewed      ASSESSMENT/PLAN: 	  82-year-old male, history of TAVR on aspirin, GI bleed that required multiple blood transfusion, presenting with 1 day onset of fatigue, dizziness, shortness of breath, hematemesis at triage. Patient had recent endoscopy on July 26, no active bleeding was noted during the procedure. Currently denies any nausea, abdominal pain, chest pain, shortness of breath. Denies alcohol use.      1. Preop Risk Stratification   - denies CAD Hx  - Symptomatic anemia resolved with PRBC transfusion   - Mod risk for low risk EGD/colon, no cardiac contraindication to proceed  - Tolerated procedure well.     2. GI bleed / sever anemia 2/2 ac blood loss :  Keep Hb >8 as symptomatic anemia   GI consult appreciated. s/p VCE and upper endoscopy wit andioectasia treated with APC  CLD as per GI    3. 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 Severe AS s/p TAVR :  - Follows at [facility]    5. HTN :   Home meds include: losartan 50mg PO daily, amlodipine 5mg PO daily  BP now recovered.   - Restart Amlodipine 5mg PO daily given elevated BP  - Can restart Losartan 50mg PO daily tomorrow      6. HLD :  - Cont fenofibrate 160mg PO daily            NAOMY Rabago DO [facility]  Cardiovascular Medicine  [address], [address]  Available via call or text on [employer]  Office: [phone]

## 2023-08-14 NOTE — PROGRESS NOTE ADULT - REASON FOR ADMISSION
Hematemesis

## 2023-08-14 NOTE — DISCHARGE NOTE PROVIDER - CARE PROVIDERS DIRECT ADDRESSES
[email or web address]

## 2023-08-14 NOTE — DISCHARGE NOTE PROVIDER - HOSPITAL COURSE
83 yo male presents w hematemesis, low HGB, s/p VCE prior to arrival and EGD while inptn. had BM Bx done on outptn basis 2/2 pancytopenia    Upper GI bleed    hx of AVM   s/p EGD : found single AVM in stomach with overlying clot   From AVMs/Angioectasis given h/o severe AS s/p TAVR and chronic intermittent acute blood loss anemia  - s/p VCE (8/10)-  red blood in the stomach; hematin/melena (altered blood) in the small bowel; non-diagnostic small bowel study as the capsule terminates in the small bowel at the end of the study  - s/p EGD (8/10)-  Normal esophagus; single bleeding angioectasia with adherent clot in the stomach- s/p treatment with APC likely the main source of bleeding and cause for blood clots seen on VCE; few recently bleeding angioectasias in the stomach- s/p treatment with APC; normal examined duodenum.    c/w PPI , change to protonix bid and d/c GTT   no new events, will upgrade his diet, Hgb is stable,        anemia   -s/p 5 units PRBC : 4 on 8/9 and 1 on 8/11  - hgb is stable  - on PPI po  - iron level on the low side, will give a couple doses of IV iron. recommended to F/U w Dr. Fabian on outptn basis    CAD   s/p TAVR   -ASA on hold    Pancytopenia   -had BM biopsy as out pt. recently about 1 month ago   - results not available  - seen by Heme->will have to follow up outpt     Hypothyroidism   c/w synthroid     BPH  -c/w doxazosin    depression  -stable on lexapro     DM-2 :   hold oral meds    insulin with coverage - will resume oral on discharge     HTN :   BP is controlled    hold off on BP - will resume on discharge     Pt is medically cleared for discharge

## 2023-08-14 NOTE — DISCHARGE NOTE PROVIDER - NSDCMRMEDTOKEN_GEN_ALL_CORE_FT
doxazosin 2 mg oral tablet: 1 tab(s) orally once a day (at bedtime)  fenofibrate 160 mg oral tablet: 1 tab(s) orally once a day  ferrous sulfate 325 mg (65 mg elemental iron) oral tablet: 1 tab(s) orally once a day  folic acid 1 mg oral tablet: 1 tab(s) orally once a day  levothyroxine 50 mcg (0.05 mg) oral tablet: 1 tab(s) orally once a day  Lexapro 10 mg oral tablet: 1 tab(s) orally once a day  losartan 50 mg oral tablet: 1 tab(s) orally once a day  metFORMIN 500 mg oral tablet, extended release: 1 tab(s) orally 2 times a day  Norvasc 5 mg oral tablet: 1 tab(s) orally once a day  pantoprazole 40 mg oral delayed release tablet: 1 tab(s) orally once a day  polyethylene glycol 3350 oral powder for reconstitution: 17 gram(s) orally 2 times a day  tamsulosin 0.4 mg oral capsule: 1 cap(s) orally once a day  Vitamin D3 2000 intl units (50 mcg) oral capsule: 1 cap(s) orally once a day

## 2023-08-23 ENCOUNTER — HOSPITAL ENCOUNTER (OUTPATIENT)
Dept: HOSPITAL 74 - JONCNONCHE | Age: 83
Discharge: HOME | End: 2023-08-23
Attending: INTERNAL MEDICINE
Payer: COMMERCIAL

## 2023-08-23 VITALS — TEMPERATURE: 98.2 F

## 2023-08-23 VITALS — HEART RATE: 52 BPM | SYSTOLIC BLOOD PRESSURE: 120 MMHG | RESPIRATION RATE: 18 BRPM | DIASTOLIC BLOOD PRESSURE: 66 MMHG

## 2023-08-23 DIAGNOSIS — D50.0: Primary | ICD-10-CM

## 2023-08-23 PROCEDURE — 3E033GC INTRODUCTION OF OTHER THERAPEUTIC SUBSTANCE INTO PERIPHERAL VEIN, PERCUTANEOUS APPROACH: ICD-10-PCS | Performed by: INTERNAL MEDICINE

## 2023-08-25 LAB — VWF CBA/VWF AG PPP IA-RTO: SIGNIFICANT CHANGE UP

## 2023-08-30 ENCOUNTER — HOSPITAL ENCOUNTER (OUTPATIENT)
Dept: HOSPITAL 74 - JONCNONCHE | Age: 83
Discharge: HOME | End: 2023-08-30
Attending: INTERNAL MEDICINE
Payer: COMMERCIAL

## 2023-08-30 VITALS
TEMPERATURE: 98.3 F | SYSTOLIC BLOOD PRESSURE: 136 MMHG | DIASTOLIC BLOOD PRESSURE: 91 MMHG | HEART RATE: 60 BPM | RESPIRATION RATE: 18 BRPM

## 2023-08-30 DIAGNOSIS — D50.9: Primary | ICD-10-CM

## 2023-08-30 LAB
ANISOCYTOSIS BLD QL: (no result)
DEPRECATED RDW RBC AUTO: 19.1 % (ref 11.9–15.9)
HCT VFR BLD CALC: 23.2 % (ref 35.4–49)
HGB BLD-MCNC: 7.6 GM/DL (ref 11.7–16.9)
MACROCYTES BLD QL: 0
MCH RBC QN AUTO: 29.8 PG (ref 25.7–33.7)
MCHC RBC AUTO-ENTMCNC: 32.9 G/DL (ref 32–35.9)
MCV RBC: 90.8 FL (ref 80–96)
PLATELET # BLD AUTO: 103 10^3/UL (ref 134–434)
PMV BLD: 10.8 FL (ref 7.5–11.1)
RBC # BLD AUTO: 2.56 M/MM3 (ref 4–5.6)
WBC # BLD AUTO: 1.5 K/MM3 (ref 4–10)

## 2023-08-30 PROCEDURE — 3E033GC INTRODUCTION OF OTHER THERAPEUTIC SUBSTANCE INTO PERIPHERAL VEIN, PERCUTANEOUS APPROACH: ICD-10-PCS | Performed by: INTERNAL MEDICINE

## 2023-12-07 ENCOUNTER — HOSPITAL ENCOUNTER (OUTPATIENT)
Dept: HOSPITAL 74 - JONCBLOOD | Age: 83
Discharge: HOME | End: 2023-12-07
Attending: INTERNAL MEDICINE
Payer: COMMERCIAL

## 2023-12-07 VITALS
TEMPERATURE: 98.3 F | SYSTOLIC BLOOD PRESSURE: 140 MMHG | RESPIRATION RATE: 20 BRPM | HEART RATE: 58 BPM | DIASTOLIC BLOOD PRESSURE: 41 MMHG

## 2023-12-07 DIAGNOSIS — D46.9: Primary | ICD-10-CM

## 2023-12-07 DIAGNOSIS — D50.9: ICD-10-CM

## 2023-12-07 PROCEDURE — P9058 RBC, L/R, CMV-NEG, IRRAD: HCPCS

## 2023-12-07 PROCEDURE — 30233N1 TRANSFUSION OF NONAUTOLOGOUS RED BLOOD CELLS INTO PERIPHERAL VEIN, PERCUTANEOUS APPROACH: ICD-10-PCS | Performed by: INTERNAL MEDICINE

## 2024-01-30 ENCOUNTER — HOSPITAL ENCOUNTER (OUTPATIENT)
Dept: HOSPITAL 74 - JONCNONCHE | Age: 84
Discharge: HOME | End: 2024-01-30
Attending: INTERNAL MEDICINE
Payer: COMMERCIAL

## 2024-01-30 VITALS — HEART RATE: 52 BPM | SYSTOLIC BLOOD PRESSURE: 108 MMHG | TEMPERATURE: 98.8 F | DIASTOLIC BLOOD PRESSURE: 74 MMHG

## 2024-01-30 VITALS — RESPIRATION RATE: 20 BRPM

## 2024-01-30 DIAGNOSIS — D46.9: Primary | ICD-10-CM

## 2024-01-30 LAB
ALBUMIN SERPL-MCNC: 3.5 G/DL (ref 3.4–5)
ALP SERPL-CCNC: 60 U/L (ref 45–117)
ALT SERPL-CCNC: 11 U/L (ref 13–61)
ANION GAP SERPL CALC-SCNC: 6 MMOL/L (ref 4–13)
AST SERPL-CCNC: 12 U/L (ref 15–37)
BILIRUB CONJ SERPL-MCNC: 0.1 MG/DL (ref 0–0.2)
BILIRUB DIRECT SERPL-MCNC: 224 U/L (ref 87–246)
BILIRUB SERPL-MCNC: 0.2 MG/DL (ref 0.2–1)
BUN SERPL-MCNC: 34.1 MG/DL (ref 7–18)
CALCIUM SERPL-MCNC: 9.4 MG/DL (ref 8.5–10.1)
CHLORIDE SERPL-SCNC: 108 MMOL/L (ref 98–107)
CO2 SERPL-SCNC: 26 MMOL/L (ref 21–32)
CREAT SERPL-MCNC: 1.2 MG/DL (ref 0.55–1.3)
DEPRECATED RDW RBC AUTO: 19.4 % (ref 11.9–15.9)
GLUCOSE SERPL-MCNC: 100 MG/DL (ref 74–106)
HCT VFR BLD CALC: 19.4 % (ref 35.4–49)
HGB BLD-MCNC: 6.3 GM/DL (ref 11.7–16.9)
MCH RBC QN AUTO: 30.2 PG (ref 25.7–33.7)
MCHC RBC AUTO-ENTMCNC: 32.2 G/DL (ref 32–35.9)
MCV RBC: 93.8 FL (ref 80–96)
PLATELET # BLD AUTO: 83 10^3/UL (ref 134–434)
PMV BLD: 10.2 FL (ref 7.5–11.1)
POTASSIUM SERPLBLD-SCNC: 4.8 MMOL/L (ref 3.5–5.1)
PROT SERPL-MCNC: 8 G/DL (ref 6.4–8.2)
RBC # BLD AUTO: 2.07 M/MM3 (ref 4–5.6)
RETICS # AUTO: 2.6 % (ref 0.5–1.5)
SODIUM SERPL-SCNC: 141 MMOL/L (ref 136–145)
WBC # BLD AUTO: 0.7 K/MM3 (ref 4–10)

## 2024-01-30 PROCEDURE — P9058 RBC, L/R, CMV-NEG, IRRAD: HCPCS

## 2024-02-06 ENCOUNTER — HOSPITAL ENCOUNTER (OUTPATIENT)
Dept: HOSPITAL 74 - JONCNONCHE | Age: 84
Discharge: HOME | End: 2024-02-06
Attending: INTERNAL MEDICINE
Payer: COMMERCIAL

## 2024-02-06 VITALS
HEART RATE: 59 BPM | TEMPERATURE: 97.6 F | DIASTOLIC BLOOD PRESSURE: 60 MMHG | SYSTOLIC BLOOD PRESSURE: 136 MMHG | RESPIRATION RATE: 18 BRPM

## 2024-02-06 DIAGNOSIS — D46.9: ICD-10-CM

## 2024-02-06 DIAGNOSIS — Z51.11: Primary | ICD-10-CM

## 2024-02-06 LAB
ALBUMIN SERPL-MCNC: 3.4 G/DL (ref 3.4–5)
ALP SERPL-CCNC: 66 U/L (ref 45–117)
ALT SERPL-CCNC: 10 U/L (ref 13–61)
ANION GAP SERPL CALC-SCNC: 6 MMOL/L (ref 4–13)
ANISOCYTOSIS BLD QL: 0
AST SERPL-CCNC: 18 U/L (ref 15–37)
BILIRUB CONJ SERPL-MCNC: 0.1 MG/DL (ref 0–0.2)
BILIRUB DIRECT SERPL-MCNC: 233 U/L (ref 87–246)
BILIRUB SERPL-MCNC: 0.3 MG/DL (ref 0.2–1)
BUN SERPL-MCNC: 27 MG/DL (ref 7–18)
CALCIUM SERPL-MCNC: 9.4 MG/DL (ref 8.5–10.1)
CHLORIDE SERPL-SCNC: 108 MMOL/L (ref 98–107)
CO2 SERPL-SCNC: 27 MMOL/L (ref 21–32)
CREAT SERPL-MCNC: 1.2 MG/DL (ref 0.55–1.3)
DEPRECATED RDW RBC AUTO: 19.7 % (ref 11.9–15.9)
GLUCOSE SERPL-MCNC: 92 MG/DL (ref 74–106)
HCT VFR BLD CALC: 23.5 % (ref 35.4–49)
HGB BLD-MCNC: 7.8 GM/DL (ref 11.7–16.9)
MACROCYTES BLD QL: 0
MCH RBC QN AUTO: 30.2 PG (ref 25.7–33.7)
MCHC RBC AUTO-ENTMCNC: 33 G/DL (ref 32–35.9)
MCV RBC: 91.4 FL (ref 80–96)
PLATELET # BLD AUTO: 61 10^3/UL (ref 134–434)
PMV BLD: 9.6 FL (ref 7.5–11.1)
POTASSIUM SERPLBLD-SCNC: 4.6 MMOL/L (ref 3.5–5.1)
PROT SERPL-MCNC: 7.9 G/DL (ref 6.4–8.2)
RBC # BLD AUTO: 2.57 M/MM3 (ref 4–5.6)
RETICS # AUTO: 2.22 % (ref 0.5–1.5)
ROULEAUX BLD QL SMEAR: (no result)
SODIUM SERPL-SCNC: 141 MMOL/L (ref 136–145)
WBC # BLD AUTO: 0.7 K/MM3 (ref 4–10)

## 2024-02-06 RX ADMIN — DECITABINE ONE MG: 50 INJECTION, POWDER, LYOPHILIZED, FOR SOLUTION INTRAVENOUS at 12:04

## 2024-02-12 ENCOUNTER — HOSPITAL ENCOUNTER (EMERGENCY)
Dept: HOSPITAL 74 - JER | Age: 84
Discharge: ANOTHER HOSPITAL | End: 2024-02-12
Payer: COMMERCIAL

## 2024-02-12 VITALS
HEART RATE: 71 BPM | SYSTOLIC BLOOD PRESSURE: 146 MMHG | DIASTOLIC BLOOD PRESSURE: 60 MMHG | TEMPERATURE: 97.2 F | RESPIRATION RATE: 17 BRPM

## 2024-02-12 VITALS — BODY MASS INDEX: 28.4 KG/M2

## 2024-02-12 DIAGNOSIS — Z20.822: ICD-10-CM
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
## 2024-02-12 DIAGNOSIS — S22.039A: ICD-10-CM

## 2024-02-12 DIAGNOSIS — S00.03XA: ICD-10-CM

## 2024-02-12 DIAGNOSIS — M25.512: ICD-10-CM

## 2024-02-12 DIAGNOSIS — S22.42XA: ICD-10-CM

## 2024-02-12 DIAGNOSIS — M54.50: ICD-10-CM

## 2024-02-12 DIAGNOSIS — R07.89: ICD-10-CM

## 2024-02-12 DIAGNOSIS — S27.1XXA: Primary | ICD-10-CM

## 2024-02-12 LAB
ALBUMIN SERPL-MCNC: 3.2 G/DL (ref 3.4–5)
ALP SERPL-CCNC: 54 U/L (ref 45–117)
ALT SERPL-CCNC: 17 U/L (ref 13–61)
ANION GAP SERPL CALC-SCNC: 7 MMOL/L (ref 4–13)
ANISOCYTOSIS BLD QL: (no result)
AST SERPL-CCNC: 36 U/L (ref 15–37)
BILIRUB SERPL-MCNC: 0.4 MG/DL (ref 0.2–1)
BUN SERPL-MCNC: 25 MG/DL (ref 7–18)
CALCIUM SERPL-MCNC: 9.6 MG/DL (ref 8.5–10.1)
CHLORIDE SERPL-SCNC: 106 MMOL/L (ref 98–107)
CO2 SERPL-SCNC: 28 MMOL/L (ref 21–32)
CREAT SERPL-MCNC: 1.1 MG/DL (ref 0.55–1.3)
DEPRECATED RDW RBC AUTO: 20.5 % (ref 11.9–15.9)
GLUCOSE SERPL-MCNC: 132 MG/DL (ref 74–106)
HCT VFR BLD CALC: 21.1 % (ref 35.4–49)
HGB BLD-MCNC: 7 GM/DL (ref 11.7–16.9)
MACROCYTES BLD QL: (no result)
MCH RBC QN AUTO: 30.2 PG (ref 25.7–33.7)
MCHC RBC AUTO-ENTMCNC: 33 G/DL (ref 32–35.9)
MCV RBC: 91.3 FL (ref 80–96)
PLATELET # BLD AUTO: 58 10^3/UL (ref 134–434)
PMV BLD: 9.8 FL (ref 7.5–11.1)
POTASSIUM SERPLBLD-SCNC: 4.4 MMOL/L (ref 3.5–5.1)
PROT SERPL-MCNC: 8 G/DL (ref 6.4–8.2)
RBC # BLD AUTO: 2.31 M/MM3 (ref 4–5.6)
SODIUM SERPL-SCNC: 141 MMOL/L (ref 136–145)
WBC # BLD AUTO: 0.6 K/MM3 (ref 4–10)

## 2024-02-12 RX ADMIN — ACETAMINOPHEN ONE MG: 500 TABLET, FILM COATED ORAL at 05:48

## 2024-03-12 ENCOUNTER — APPOINTMENT (OUTPATIENT)
Dept: PODIATRY | Facility: CLINIC | Age: 84
End: 2024-03-12

## 2024-03-25 ENCOUNTER — HOSPITAL ENCOUNTER (OUTPATIENT)
Dept: HOSPITAL 74 - J7W | Age: 84
Discharge: HOME | End: 2024-03-25
Attending: INTERNAL MEDICINE
Payer: COMMERCIAL

## 2024-03-25 VITALS
DIASTOLIC BLOOD PRESSURE: 44 MMHG | HEART RATE: 57 BPM | SYSTOLIC BLOOD PRESSURE: 110 MMHG | TEMPERATURE: 98.7 F | RESPIRATION RATE: 20 BRPM

## 2024-03-25 DIAGNOSIS — D46.9: Primary | ICD-10-CM

## 2024-03-25 PROCEDURE — P9058 RBC, L/R, CMV-NEG, IRRAD: HCPCS

## 2024-03-25 PROCEDURE — 30233N1 TRANSFUSION OF NONAUTOLOGOUS RED BLOOD CELLS INTO PERIPHERAL VEIN, PERCUTANEOUS APPROACH: ICD-10-PCS | Performed by: INTERNAL MEDICINE

## 2024-03-30 ENCOUNTER — INPATIENT (INPATIENT)
Facility: HOSPITAL | Age: 84
LOS: 1 days | Discharge: HOME | DRG: 812 | End: 2024-04-01
Attending: STUDENT IN AN ORGANIZED HEALTH CARE EDUCATION/TRAINING PROGRAM | Admitting: STUDENT IN AN ORGANIZED HEALTH CARE EDUCATION/TRAINING PROGRAM
Payer: MEDICARE

## 2024-03-30 VITALS
SYSTOLIC BLOOD PRESSURE: 99 MMHG | HEART RATE: 78 BPM | RESPIRATION RATE: 18 BRPM | DIASTOLIC BLOOD PRESSURE: 56 MMHG | TEMPERATURE: 98 F | OXYGEN SATURATION: 97 %

## 2024-03-30 DIAGNOSIS — Z95.2 PRESENCE OF PROSTHETIC HEART VALVE: Chronic | ICD-10-CM

## 2024-03-30 DIAGNOSIS — D64.9 ANEMIA, UNSPECIFIED: ICD-10-CM

## 2024-03-30 LAB
ADD ON TEST-SPECIMEN IN LAB: SIGNIFICANT CHANGE UP
ALBUMIN SERPL ELPH-MCNC: 3.5 G/DL — SIGNIFICANT CHANGE UP (ref 3.3–5)
ALP SERPL-CCNC: 44 U/L — SIGNIFICANT CHANGE UP (ref 40–120)
ALT FLD-CCNC: 8 U/L — LOW (ref 10–45)
ANION GAP SERPL CALC-SCNC: 16 MMOL/L — SIGNIFICANT CHANGE UP (ref 5–17)
ANISOCYTOSIS BLD QL: SLIGHT — SIGNIFICANT CHANGE UP
APTT BLD: 27.4 SEC — SIGNIFICANT CHANGE UP (ref 24.5–35.6)
AST SERPL-CCNC: 14 U/L — SIGNIFICANT CHANGE UP (ref 10–40)
BASE EXCESS BLDV CALC-SCNC: -0.6 MMOL/L — SIGNIFICANT CHANGE UP (ref -2–3)
BASOPHILS # BLD AUTO: 0 K/UL — SIGNIFICANT CHANGE UP (ref 0–0.2)
BASOPHILS NFR BLD AUTO: 0 % — SIGNIFICANT CHANGE UP (ref 0–2)
BILIRUB SERPL-MCNC: 0.1 MG/DL — LOW (ref 0.2–1.2)
BLD GP AB SCN SERPL QL: NEGATIVE — SIGNIFICANT CHANGE UP
BUN SERPL-MCNC: 56 MG/DL — HIGH (ref 7–23)
CA-I SERPL-SCNC: 1.23 MMOL/L — SIGNIFICANT CHANGE UP (ref 1.15–1.33)
CALCIUM SERPL-MCNC: 9.4 MG/DL — SIGNIFICANT CHANGE UP (ref 8.4–10.5)
CHLORIDE BLDV-SCNC: 104 MMOL/L — SIGNIFICANT CHANGE UP (ref 96–108)
CHLORIDE SERPL-SCNC: 101 MMOL/L — SIGNIFICANT CHANGE UP (ref 96–108)
CO2 BLDV-SCNC: 25 MMOL/L — SIGNIFICANT CHANGE UP (ref 22–26)
CO2 SERPL-SCNC: 22 MMOL/L — SIGNIFICANT CHANGE UP (ref 22–31)
CREAT SERPL-MCNC: 1.59 MG/DL — HIGH (ref 0.5–1.3)
DACRYOCYTES BLD QL SMEAR: SLIGHT — SIGNIFICANT CHANGE UP
EGFR: 43 ML/MIN/1.73M2 — LOW
EOSINOPHIL # BLD AUTO: 0 K/UL — SIGNIFICANT CHANGE UP (ref 0–0.5)
EOSINOPHIL NFR BLD AUTO: 0 % — SIGNIFICANT CHANGE UP (ref 0–6)
GAS PNL BLDV: 137 MMOL/L — SIGNIFICANT CHANGE UP (ref 136–145)
GAS PNL BLDV: SIGNIFICANT CHANGE UP
GLUCOSE BLDV-MCNC: 127 MG/DL — HIGH (ref 70–99)
GLUCOSE SERPL-MCNC: 126 MG/DL — HIGH (ref 70–99)
HCO3 BLDV-SCNC: 24 MMOL/L — SIGNIFICANT CHANGE UP (ref 22–29)
HCT VFR BLD CALC: 11.1 % — CRITICAL LOW (ref 39–50)
HCT VFR BLDA CALC: 11 % — CRITICAL LOW (ref 39–51)
HGB BLD CALC-MCNC: <4 G/DL — CRITICAL LOW (ref 12.6–17.4)
HGB BLD-MCNC: 3.4 G/DL — CRITICAL LOW (ref 13–17)
HYPOCHROMIA BLD QL: SLIGHT — SIGNIFICANT CHANGE UP
INR BLD: 1.31 RATIO — HIGH (ref 0.85–1.18)
LACTATE BLDV-MCNC: 4.3 MMOL/L — CRITICAL HIGH (ref 0.5–2)
LACTATE SERPL-SCNC: 2.4 MMOL/L — HIGH (ref 0.5–2)
LYMPHOCYTES # BLD AUTO: 0.27 K/UL — LOW (ref 1–3.3)
LYMPHOCYTES # BLD AUTO: 48 % — HIGH (ref 13–44)
MACROCYTES BLD QL: SLIGHT — SIGNIFICANT CHANGE UP
MAGNESIUM SERPL-MCNC: 2 MG/DL — SIGNIFICANT CHANGE UP (ref 1.6–2.6)
MANUAL SMEAR VERIFICATION: SIGNIFICANT CHANGE UP
MCHC RBC-ENTMCNC: 30.6 GM/DL — LOW (ref 32–36)
MCHC RBC-ENTMCNC: 31.8 PG — SIGNIFICANT CHANGE UP (ref 27–34)
MCV RBC AUTO: 103.7 FL — HIGH (ref 80–100)
MICROCYTES BLD QL: SLIGHT — SIGNIFICANT CHANGE UP
MONOCYTES # BLD AUTO: 0.02 K/UL — SIGNIFICANT CHANGE UP (ref 0–0.9)
MONOCYTES NFR BLD AUTO: 4 % — SIGNIFICANT CHANGE UP (ref 2–14)
NEUTROPHILS # BLD AUTO: 0.27 K/UL — LOW (ref 1.8–7.4)
NEUTROPHILS NFR BLD AUTO: 44 % — SIGNIFICANT CHANGE UP (ref 43–77)
NEUTS BAND # BLD: 4 % — SIGNIFICANT CHANGE UP (ref 0–8)
NRBC # BLD: 0 /100 WBCS — SIGNIFICANT CHANGE UP (ref 0–0)
OVALOCYTES BLD QL SMEAR: SLIGHT — SIGNIFICANT CHANGE UP
PCO2 BLDV: 39 MMHG — LOW (ref 42–55)
PH BLDV: 7.4 — SIGNIFICANT CHANGE UP (ref 7.32–7.43)
PHOSPHATE SERPL-MCNC: 4.4 MG/DL — SIGNIFICANT CHANGE UP (ref 2.5–4.5)
PLAT MORPH BLD: NORMAL — SIGNIFICANT CHANGE UP
PLATELET # BLD AUTO: 30 K/UL — LOW (ref 150–400)
PO2 BLDV: 27 MMHG — SIGNIFICANT CHANGE UP (ref 25–45)
POIKILOCYTOSIS BLD QL AUTO: SLIGHT — SIGNIFICANT CHANGE UP
POTASSIUM BLDV-SCNC: 5.1 MMOL/L — SIGNIFICANT CHANGE UP (ref 3.5–5.1)
POTASSIUM SERPL-MCNC: 4.7 MMOL/L — SIGNIFICANT CHANGE UP (ref 3.5–5.3)
POTASSIUM SERPL-SCNC: 4.7 MMOL/L — SIGNIFICANT CHANGE UP (ref 3.5–5.3)
PROT SERPL-MCNC: 6.5 G/DL — SIGNIFICANT CHANGE UP (ref 6–8.3)
PROTHROM AB SERPL-ACNC: 13.6 SEC — HIGH (ref 9.5–13)
RBC # BLD: 1.07 M/UL — LOW (ref 4.2–5.8)
RBC # FLD: 24 % — HIGH (ref 10.3–14.5)
RBC BLD AUTO: ABNORMAL
RH IG SCN BLD-IMP: POSITIVE — SIGNIFICANT CHANGE UP
SAO2 % BLDV: 34.2 % — LOW (ref 67–88)
SODIUM SERPL-SCNC: 139 MMOL/L — SIGNIFICANT CHANGE UP (ref 135–145)
TROPONIN T, HIGH SENSITIVITY RESULT: 39 NG/L — SIGNIFICANT CHANGE UP (ref 0–51)
WBC # BLD: 0.57 K/UL — CRITICAL LOW (ref 3.8–10.5)
WBC # FLD AUTO: 0.57 K/UL — CRITICAL LOW (ref 3.8–10.5)

## 2024-03-30 PROCEDURE — 99285 EMERGENCY DEPT VISIT HI MDM: CPT

## 2024-03-30 PROCEDURE — 71045 X-RAY EXAM CHEST 1 VIEW: CPT | Mod: 26

## 2024-03-30 PROCEDURE — 70450 CT HEAD/BRAIN W/O DYE: CPT | Mod: 26

## 2024-03-30 PROCEDURE — 93010 ELECTROCARDIOGRAM REPORT: CPT

## 2024-03-30 RX ORDER — SODIUM CHLORIDE 9 MG/ML
500 INJECTION, SOLUTION INTRAVENOUS ONCE
Refills: 0 | Status: COMPLETED | OUTPATIENT
Start: 2024-03-30 | End: 2024-03-30

## 2024-03-30 RX ADMIN — SODIUM CHLORIDE 500 MILLILITER(S): 9 INJECTION, SOLUTION INTRAVENOUS at 20:04

## 2024-03-30 RX ADMIN — SODIUM CHLORIDE 500 MILLILITER(S): 9 INJECTION, SOLUTION INTRAVENOUS at 21:04

## 2024-03-30 NOTE — ED PROVIDER NOTE - CLINICAL SUMMARY MEDICAL DECISION MAKING FREE TEXT BOX
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 Denies blood in stool. BP 80s/40s on arrival. Otherwise VSWNL. PE as noted above. DDx include but not limited to anemia vs infection. Will get labs, type and screen, IV hydration for now. Will consider emergent blood transfusion if no improvement of BP.

## 2024-03-30 NOTE — ED PROVIDER NOTE - HIV OFFER
Previously Declined (within the last year) -Possible sources of infection include medial malleolus wound and picc line  -Started on vanco in the ED; continue. Start on cefepime; both renally dosed   -UA not concerning for a UTI  -Osteomyelitis unlikely given negative X-ray   -F/U Blood and Urine cultures  -Podiatry consult for foot wounds

## 2024-03-30 NOTE — ED PROVIDER NOTE - PHYSICAL EXAMINATION
Gen: Patient is tired-appearing, NAD, AAOx2, able to answer questions, follow commands   HEENT: NCAT, EOMI, PERRLA, normal conjunctiva, tongue midline, oral mucosa dry   Lung: CTAB, no respiratory distress, no wheezes/rhonchi/rales B/L, speaking in full sentences  CV: RRR, no murmurs, rubs or gallops, distal pulses 2+ b/l  Abd: soft, NT, ND, no guarding, no rigidity, no rebound tenderness  MSK: no visible deformities, ROM normal in UE/LE  Neuro: No focal sensory or motor deficits  Skin: Warm, well perfused, no rash, no leg swelling  Psych: normal affect, calm

## 2024-03-30 NOTE — ED ADULT NURSE NOTE - OBJECTIVE STATEMENT
83y Male presents to the ED c/o weakness. Pts son at bedside states Pt was confused yesterday (unsure if it was night or day). Pt upon arrival is A&Ox3. Pt is ambulatory at baseline, as of yesterday Pt was having increased weakness, and unable to stand and walk independently. Pt had 1 unit transfused of PRBC's on Monday for Hemoglobin of 6.3. Respirations spontaneous, unlabored, and equal bilaterally. Patient safety maintained, bed is in lowest position, wheels locked, and side rails raised. Patient oriented to call bell, and call bell is within reach. 18 gauge IV placed in LAC. Pt on cardiac monitor.

## 2024-03-30 NOTE — ED PROVIDER NOTE - PROGRESS NOTE DETAILS
Attending MD Palmer: Hb noted on VBG, call placed to lab, reported 3 min to result from CBC, requested 2U pRBC immediate release blood to be pressure bagged, while RN went to get blood, Hb resulted 3.4, 2nd IV placed, had already discussed with patient and son possible need for immediate release blood, still amenable, first unit going in, will give second unit, patient reiterates has had no bloody or black stools, no abdominal pain, denies, hematuria, epistaxis, gingival bleeding. Will need admission. Attending MD Palmer: Spoke with Dr. Vince Manzano ([facility]), aware of patient, will see while inpatient.  Patient Heme is Bay Velazco MD at [facility] ([place]) usually.  PMD is Reno Klein MD. Attending MD Palmer: Hb noted on VBG, call placed to lab, reported 3 min to result from CBC, requested 2U pRBC immediate release blood to be pressure bagged, while RN went to get blood, Hb resulted 3.4, 2nd IV placed, had already discussed with patient and son possible need for immediate release blood, still amenable, first unit going in, will give second unit, patient reiterates has had no bloody or black stools, no abdominal pain, denies, hematuria, epistaxis, gingival bleeding. Will need admission.  Patient perked up as compared to initial assessment, son agrees.  Patient more talkative and interactive. Attending MD Palmer: Trops noted, suspect likely demand ischemia and Cr

## 2024-03-30 NOTE — ED ADULT NURSE REASSESSMENT NOTE - NS ED NURSE REASSESS COMMENT FT1
1 unit PRBCs administered through L 18GAC. 2 RN at bedside for verification. Consent checked and placed in pt chart. Pt educated and understands risks and benefits of administration. Safety and comfort measures maintained, call bell within reach.

## 2024-03-30 NOTE — ED PROVIDER NOTE - NS ED ROS FT
Constitutional:  (-) fever, (-) chills, (+) lethargy  Eyes:  (-) eye pain (-) visual changes  ENMT: (-) nasal discharge, (-) sore throat. (-) neck pain or stiffness  Cardiac: (+) chest pain (-) palpitations  Respiratory:  (-) cough (+) respiratory distress.   GI:  (-) nausea (-) vomiting (-) diarrhea (-) abdominal pain.  :  (-) dysuria (-) frequency (-) burning.  MS:  (-) back pain (-) joint pain.  Neuro:  (-) headache (-) numbness (-) tingling (-) focal weakness  Skin:  (-) rash  Except as documented in the HPI,  all other systems are negative

## 2024-03-30 NOTE — ED PROVIDER NOTE - OBJECTIVE STATEMENT
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 Patient is accompanied by his son. Reports that patient was at his usual state of health yesterday. Today, he felt weak. Not able to walk. Appears confused today. Denies blood in stool, urine. Not on AC. PMD: [facility] Payam Klein ([facility]). His last blood transfusion was 5 days ago. No fever, cough, congestion, infectious symptoms. Associated symptoms of chest pain, shortness of breath.

## 2024-03-30 NOTE — ED PROVIDER NOTE - ATTENDING CONTRIBUTION TO CARE
Attending MD Palmer: I personally have seen and examined this patient.  Resident note reviewed and agree on plan of care and except where noted.  See below for details.     Seen in [place] [facility], accompanied by son    83M with PMH/PSH including MDS on chemo (is supposed to get "pill tomorrow and infusion the following day", reports last tx was about 6 wks ago), CAD on ASA, s/p TAVR, DM, GIB presents to the ED with weakness, confusion.  Patient reports that earlier had chest tightness with associated shortness of breath.  Reports sensation resolved.  Reports has been very weak, unable to walk which is not his typical baseline.  Son reports patient is confused which is also different than baseline.  Patient knows name, place, reason for visit but does not know date, thinks it is May.  Denies abdominal pain, nausea, vomiting, diarrhea, bloody or black stools. Denies dysuria, hematuria, change in urinary habits.  Denies chest pain and shortness of breath at present.      Exam:   General: NAD, AAOx2  HENT: head NCAT, airway patent  Eyes: anicteric, ++conjunctival pallor  Lungs: lungs CTAB with good inspiratory effort, no wheezing, no rhonchi, no rales  Cardiac: +S1S2, no obvious m/r/g  GI: abdomen soft with +BS, NT, ND  : no CVAT  MSK: ranging neck and extremities freely  Neuro: moving all extremities spontaneously, nonfocal  Psych: normal mood and affect    A/P: 83M with concern for symptomatic anemia, discussed risks/benefits/alternatives of transfusion with son given AAOx2, explained may need immediate release blood, suspect chest pain likely demand, will obtain trop, EKG, keep on monitor, also suspect confusion is related to same, will obtain CTH once patient blood pressure improves, will give IVFs at this time.  Son reports last transfusion was Monday and has never needed a transfusion this soon after another transfusion.  Reports previously had gotten transfusion q2wks.

## 2024-03-30 NOTE — ED PROVIDER NOTE - ED STEMI HIDDEN
hide [As Noted in HPI] : as noted in HPI [Chest Pain] : no chest pain [Shortness Of Breath] : no shortness of breath

## 2024-03-31 DIAGNOSIS — N17.9 ACUTE KIDNEY FAILURE, UNSPECIFIED: ICD-10-CM

## 2024-03-31 DIAGNOSIS — D64.9 ANEMIA, UNSPECIFIED: ICD-10-CM

## 2024-03-31 DIAGNOSIS — Z87.438 PERSONAL HISTORY OF OTHER DISEASES OF MALE GENITAL ORGANS: ICD-10-CM

## 2024-03-31 DIAGNOSIS — Z29.9 ENCOUNTER FOR PROPHYLACTIC MEASURES, UNSPECIFIED: ICD-10-CM

## 2024-03-31 DIAGNOSIS — D46.9 MYELODYSPLASTIC SYNDROME, UNSPECIFIED: ICD-10-CM

## 2024-03-31 DIAGNOSIS — I25.10 ATHEROSCLEROTIC HEART DISEASE OF NATIVE CORONARY ARTERY WITHOUT ANGINA PECTORIS: ICD-10-CM

## 2024-03-31 DIAGNOSIS — E03.9 HYPOTHYROIDISM, UNSPECIFIED: ICD-10-CM

## 2024-03-31 DIAGNOSIS — D61.818 OTHER PANCYTOPENIA: ICD-10-CM

## 2024-03-31 DIAGNOSIS — I10 ESSENTIAL (PRIMARY) HYPERTENSION: ICD-10-CM

## 2024-03-31 DIAGNOSIS — E78.5 HYPERLIPIDEMIA, UNSPECIFIED: ICD-10-CM

## 2024-03-31 DIAGNOSIS — E11.9 TYPE 2 DIABETES MELLITUS WITHOUT COMPLICATIONS: ICD-10-CM

## 2024-03-31 LAB
ALBUMIN SERPL ELPH-MCNC: 3.5 G/DL — SIGNIFICANT CHANGE UP (ref 3.3–5)
ALP SERPL-CCNC: 47 U/L — SIGNIFICANT CHANGE UP (ref 40–120)
ALT FLD-CCNC: 6 U/L — LOW (ref 10–45)
ANION GAP SERPL CALC-SCNC: 12 MMOL/L — SIGNIFICANT CHANGE UP (ref 5–17)
APPEARANCE UR: CLEAR — SIGNIFICANT CHANGE UP
AST SERPL-CCNC: 12 U/L — SIGNIFICANT CHANGE UP (ref 10–40)
BILIRUB SERPL-MCNC: 0.2 MG/DL — SIGNIFICANT CHANGE UP (ref 0.2–1.2)
BILIRUB UR-MCNC: NEGATIVE — SIGNIFICANT CHANGE UP
BUN SERPL-MCNC: 38 MG/DL — HIGH (ref 7–23)
CALCIUM SERPL-MCNC: 8.8 MG/DL — SIGNIFICANT CHANGE UP (ref 8.4–10.5)
CHLORIDE SERPL-SCNC: 105 MMOL/L — SIGNIFICANT CHANGE UP (ref 96–108)
CO2 SERPL-SCNC: 21 MMOL/L — LOW (ref 22–31)
COLOR SPEC: YELLOW — SIGNIFICANT CHANGE UP
CREAT ?TM UR-MCNC: 32 MG/DL — SIGNIFICANT CHANGE UP
CREAT SERPL-MCNC: 1.12 MG/DL — SIGNIFICANT CHANGE UP (ref 0.5–1.3)
DIFF PNL FLD: NEGATIVE — SIGNIFICANT CHANGE UP
EGFR: 65 ML/MIN/1.73M2 — SIGNIFICANT CHANGE UP
GLUCOSE BLDC GLUCOMTR-MCNC: 103 MG/DL — HIGH (ref 70–99)
GLUCOSE BLDC GLUCOMTR-MCNC: 92 MG/DL — SIGNIFICANT CHANGE UP (ref 70–99)
GLUCOSE BLDC GLUCOMTR-MCNC: 95 MG/DL — SIGNIFICANT CHANGE UP (ref 70–99)
GLUCOSE BLDC GLUCOMTR-MCNC: 96 MG/DL — SIGNIFICANT CHANGE UP (ref 70–99)
GLUCOSE SERPL-MCNC: 95 MG/DL — SIGNIFICANT CHANGE UP (ref 70–99)
GLUCOSE UR QL: NEGATIVE MG/DL — SIGNIFICANT CHANGE UP
HAPTOGLOB SERPL-MCNC: 62 MG/DL — SIGNIFICANT CHANGE UP (ref 34–200)
HCT VFR BLD CALC: 18.5 % — CRITICAL LOW (ref 39–50)
HCT VFR BLD CALC: 22.4 % — LOW (ref 39–50)
HGB BLD-MCNC: 6.1 G/DL — CRITICAL LOW (ref 13–17)
HGB BLD-MCNC: 7.4 G/DL — LOW (ref 13–17)
KETONES UR-MCNC: NEGATIVE MG/DL — SIGNIFICANT CHANGE UP
LDH SERPL L TO P-CCNC: 220 U/L — SIGNIFICANT CHANGE UP (ref 50–242)
LEUKOCYTE ESTERASE UR-ACNC: NEGATIVE — SIGNIFICANT CHANGE UP
MAGNESIUM SERPL-MCNC: 1.9 MG/DL — SIGNIFICANT CHANGE UP (ref 1.6–2.6)
MCHC RBC-ENTMCNC: 30.2 PG — SIGNIFICANT CHANGE UP (ref 27–34)
MCHC RBC-ENTMCNC: 30.5 PG — SIGNIFICANT CHANGE UP (ref 27–34)
MCHC RBC-ENTMCNC: 33 GM/DL — SIGNIFICANT CHANGE UP (ref 32–36)
MCHC RBC-ENTMCNC: 33 GM/DL — SIGNIFICANT CHANGE UP (ref 32–36)
MCV RBC AUTO: 91.6 FL — SIGNIFICANT CHANGE UP (ref 80–100)
MCV RBC AUTO: 92.2 FL — SIGNIFICANT CHANGE UP (ref 80–100)
NITRITE UR-MCNC: NEGATIVE — SIGNIFICANT CHANGE UP
NRBC # BLD: 0 /100 WBCS — SIGNIFICANT CHANGE UP (ref 0–0)
NRBC # BLD: 0 /100 WBCS — SIGNIFICANT CHANGE UP (ref 0–0)
PH UR: 6 — SIGNIFICANT CHANGE UP (ref 5–8)
PHOSPHATE SERPL-MCNC: 2.9 MG/DL — SIGNIFICANT CHANGE UP (ref 2.5–4.5)
PLATELET # BLD AUTO: 26 K/UL — LOW (ref 150–400)
PLATELET # BLD AUTO: 27 K/UL — LOW (ref 150–400)
POTASSIUM SERPL-MCNC: 4.4 MMOL/L — SIGNIFICANT CHANGE UP (ref 3.5–5.3)
POTASSIUM SERPL-SCNC: 4.4 MMOL/L — SIGNIFICANT CHANGE UP (ref 3.5–5.3)
PROT ?TM UR-MCNC: <7 MG/DL — SIGNIFICANT CHANGE UP (ref 0–12)
PROT SERPL-MCNC: 6.2 G/DL — SIGNIFICANT CHANGE UP (ref 6–8.3)
PROT UR-MCNC: NEGATIVE MG/DL — SIGNIFICANT CHANGE UP
PROT/CREAT UR-RTO: SIGNIFICANT CHANGE UP (ref 0–0.2)
RBC # BLD: 2.02 M/UL — LOW (ref 4.2–5.8)
RBC # BLD: 2.43 M/UL — LOW (ref 4.2–5.8)
RBC # BLD: 2.44 M/UL — LOW (ref 4.2–5.8)
RBC # FLD: 20.5 % — HIGH (ref 10.3–14.5)
RBC # FLD: 20.7 % — HIGH (ref 10.3–14.5)
RETICS #: 68.1 K/UL — SIGNIFICANT CHANGE UP (ref 25–125)
RETICS/RBC NFR: 2.8 % — HIGH (ref 0.5–2.5)
SODIUM SERPL-SCNC: 138 MMOL/L — SIGNIFICANT CHANGE UP (ref 135–145)
SODIUM UR-SCNC: 57 MMOL/L — SIGNIFICANT CHANGE UP
SP GR SPEC: 1.01 — SIGNIFICANT CHANGE UP (ref 1–1.03)
TSH SERPL-MCNC: 4.99 UIU/ML — HIGH (ref 0.27–4.2)
UROBILINOGEN FLD QL: 0.2 MG/DL — SIGNIFICANT CHANGE UP (ref 0.2–1)
WBC # BLD: 0.56 K/UL — CRITICAL LOW (ref 3.8–10.5)
WBC # BLD: 0.65 K/UL — CRITICAL LOW (ref 3.8–10.5)
WBC # FLD AUTO: 0.56 K/UL — CRITICAL LOW (ref 3.8–10.5)
WBC # FLD AUTO: 0.65 K/UL — CRITICAL LOW (ref 3.8–10.5)

## 2024-03-31 PROCEDURE — 99223 1ST HOSP IP/OBS HIGH 75: CPT | Mod: GC

## 2024-03-31 PROCEDURE — 12345: CPT | Mod: NC,GC

## 2024-03-31 PROCEDURE — 99223 1ST HOSP IP/OBS HIGH 75: CPT

## 2024-03-31 RX ORDER — SODIUM CHLORIDE 9 MG/ML
1000 INJECTION, SOLUTION INTRAVENOUS
Refills: 0 | Status: DISCONTINUED | OUTPATIENT
Start: 2024-03-31 | End: 2024-04-01

## 2024-03-31 RX ORDER — GLUCAGON INJECTION, SOLUTION 0.5 MG/.1ML
1 INJECTION, SOLUTION SUBCUTANEOUS ONCE
Refills: 0 | Status: DISCONTINUED | OUTPATIENT
Start: 2024-03-31 | End: 2024-04-01

## 2024-03-31 RX ORDER — DEXTROSE 50 % IN WATER 50 %
25 SYRINGE (ML) INTRAVENOUS ONCE
Refills: 0 | Status: DISCONTINUED | OUTPATIENT
Start: 2024-03-31 | End: 2024-04-01

## 2024-03-31 RX ORDER — FOLIC ACID 0.8 MG
1 TABLET ORAL DAILY
Refills: 0 | Status: DISCONTINUED | OUTPATIENT
Start: 2024-03-31 | End: 2024-04-01

## 2024-03-31 RX ORDER — FERROUS SULFATE 325(65) MG
325 TABLET ORAL DAILY
Refills: 0 | Status: DISCONTINUED | OUTPATIENT
Start: 2024-03-31 | End: 2024-04-01

## 2024-03-31 RX ORDER — CHOLECALCIFEROL (VITAMIN D3) 125 MCG
1 CAPSULE ORAL
Qty: 0 | Refills: 0 | DISCHARGE

## 2024-03-31 RX ORDER — TAMSULOSIN HYDROCHLORIDE 0.4 MG/1
1 CAPSULE ORAL
Qty: 0 | Refills: 0 | DISCHARGE

## 2024-03-31 RX ORDER — FLUCONAZOLE 150 MG/1
200 TABLET ORAL DAILY
Refills: 0 | Status: DISCONTINUED | OUTPATIENT
Start: 2024-03-31 | End: 2024-04-01

## 2024-03-31 RX ORDER — AMLODIPINE BESYLATE 2.5 MG/1
1 TABLET ORAL
Qty: 0 | Refills: 0 | DISCHARGE

## 2024-03-31 RX ORDER — INSULIN LISPRO 100/ML
VIAL (ML) SUBCUTANEOUS
Refills: 0 | Status: DISCONTINUED | OUTPATIENT
Start: 2024-03-31 | End: 2024-04-01

## 2024-03-31 RX ORDER — ACYCLOVIR SODIUM 500 MG
400 VIAL (EA) INTRAVENOUS
Refills: 0 | Status: DISCONTINUED | OUTPATIENT
Start: 2024-03-31 | End: 2024-04-01

## 2024-03-31 RX ORDER — PANTOPRAZOLE SODIUM 20 MG/1
80 TABLET, DELAYED RELEASE ORAL ONCE
Refills: 0 | Status: COMPLETED | OUTPATIENT
Start: 2024-03-31 | End: 2024-03-31

## 2024-03-31 RX ORDER — LANOLIN ALCOHOL/MO/W.PET/CERES
3 CREAM (GRAM) TOPICAL AT BEDTIME
Refills: 0 | Status: DISCONTINUED | OUTPATIENT
Start: 2024-03-31 | End: 2024-04-01

## 2024-03-31 RX ORDER — DEXTROSE 50 % IN WATER 50 %
12.5 SYRINGE (ML) INTRAVENOUS ONCE
Refills: 0 | Status: DISCONTINUED | OUTPATIENT
Start: 2024-03-31 | End: 2024-04-01

## 2024-03-31 RX ORDER — LEVOTHYROXINE SODIUM 125 MCG
50 TABLET ORAL DAILY
Refills: 0 | Status: DISCONTINUED | OUTPATIENT
Start: 2024-03-31 | End: 2024-04-01

## 2024-03-31 RX ORDER — ESCITALOPRAM OXALATE 10 MG/1
10 TABLET, FILM COATED ORAL DAILY
Refills: 0 | Status: DISCONTINUED | OUTPATIENT
Start: 2024-03-31 | End: 2024-04-01

## 2024-03-31 RX ORDER — PANTOPRAZOLE SODIUM 20 MG/1
40 TABLET, DELAYED RELEASE ORAL
Refills: 0 | Status: DISCONTINUED | OUTPATIENT
Start: 2024-03-31 | End: 2024-03-31

## 2024-03-31 RX ORDER — PANTOPRAZOLE SODIUM 20 MG/1
40 TABLET, DELAYED RELEASE ORAL EVERY 12 HOURS
Refills: 0 | Status: DISCONTINUED | OUTPATIENT
Start: 2024-03-31 | End: 2024-04-01

## 2024-03-31 RX ORDER — CHOLECALCIFEROL (VITAMIN D3) 125 MCG
2000 CAPSULE ORAL DAILY
Refills: 0 | Status: DISCONTINUED | OUTPATIENT
Start: 2024-03-31 | End: 2024-03-31

## 2024-03-31 RX ORDER — FENOFIBRATE,MICRONIZED 130 MG
1 CAPSULE ORAL
Qty: 0 | Refills: 0 | DISCHARGE

## 2024-03-31 RX ORDER — DEXTROSE 50 % IN WATER 50 %
15 SYRINGE (ML) INTRAVENOUS ONCE
Refills: 0 | Status: DISCONTINUED | OUTPATIENT
Start: 2024-03-31 | End: 2024-04-01

## 2024-03-31 RX ORDER — INFLUENZA VIRUS VACCINE 15; 15; 15; 15 UG/.5ML; UG/.5ML; UG/.5ML; UG/.5ML
0.7 SUSPENSION INTRAMUSCULAR ONCE
Refills: 0 | Status: DISCONTINUED | OUTPATIENT
Start: 2024-03-31 | End: 2024-04-01

## 2024-03-31 RX ORDER — INSULIN LISPRO 100/ML
VIAL (ML) SUBCUTANEOUS AT BEDTIME
Refills: 0 | Status: DISCONTINUED | OUTPATIENT
Start: 2024-03-31 | End: 2024-04-01

## 2024-03-31 RX ADMIN — PANTOPRAZOLE SODIUM 80 MILLIGRAM(S): 20 TABLET, DELAYED RELEASE ORAL at 01:03

## 2024-03-31 RX ADMIN — Medication 50 MICROGRAM(S): at 06:42

## 2024-03-31 RX ADMIN — PANTOPRAZOLE SODIUM 40 MILLIGRAM(S): 20 TABLET, DELAYED RELEASE ORAL at 17:53

## 2024-03-31 RX ADMIN — Medication 1 MILLIGRAM(S): at 11:10

## 2024-03-31 RX ADMIN — Medication 3 MILLIGRAM(S): at 21:21

## 2024-03-31 RX ADMIN — ESCITALOPRAM OXALATE 10 MILLIGRAM(S): 10 TABLET, FILM COATED ORAL at 11:10

## 2024-03-31 RX ADMIN — FLUCONAZOLE 200 MILLIGRAM(S): 150 TABLET ORAL at 16:36

## 2024-03-31 RX ADMIN — PANTOPRAZOLE SODIUM 40 MILLIGRAM(S): 20 TABLET, DELAYED RELEASE ORAL at 06:42

## 2024-03-31 RX ADMIN — Medication 400 MILLIGRAM(S): at 17:53

## 2024-03-31 RX ADMIN — Medication 325 MILLIGRAM(S): at 11:10

## 2024-03-31 NOTE — PROGRESS NOTE ADULT - ATTENDING COMMENTS
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   Labs remarkable for pancytopenia WBC 0.57, Hgb 3.4; plt 30. Findings for symptomatic anemia and neutropenia requiring blood transfusion.    - s/p 5u PRBC transfusions, Hb 7.4. Plt 27- no fever or bleed  - appreciated Heme plans- on prophylactic antimicrobials as follows: levofloxacin 500mg PO qd, acyclovir 400mg PO BID, fluconazole 200mg PO qd   - close monitoring CBC and transfusions per rec

## 2024-03-31 NOTE — PROVIDER CONTACT NOTE (OTHER) - SITUATION
[facility] Daily Progress Note      Subjective and Objective  -pt is seen and examined this morning  Denies sob  On 6LNciNO 105  Incisional pain controlled  Poor appetite  Edematous UE> LE   Up in chair this am  Last BM on      On Milrinone 0.25mcg/kg/min  lasix drip 5mg/hr-held due to no IV access and CVP 8-10  IV lasix 40mg x1 dose today   UO 80-150ml/hr   Rt femoral SGC on      RHC/EMB on        10 point review of systems is negative except otherwise as stated above       Reviewed the labs personally       Arterial Line MAP (mmHg)  Av.2 mmHg  Min: 75 mmHg  Max: 104 mmHg  No data recorded        PAP (mmHg): (28)/(14) 28/14  CVP:  [8 mmHg] 8 mmHg    Intake/Output Summary (Last 24 hours) at 2022 0943  Last data filed at 2022 0659  Gross per 24 hour   Intake 1356.19 ml   Output 2205 ml   Net -848.81 ml        Vital Last Value 24 Hour Range   Temperature 98.6 °F (37 °C) (22 0400) Temp  Min: 97.3 °F (36.3 °C)  Max: 98.6 °F (37 °C)   Pulse 99 (22 0700) Pulse  Min: 93  Max: 153   Respiratory 16 (22 0700) Resp  Min: 0  Max: 40   Non-Invasive  Blood Pressure 109/55 (22 2310) No data recorded   Pulse Oximetry 97 % (22 0740) SpO2  Min: 94 %  Max: 100 %   Arterial   Blood Pressure 138/75 (22 0700) Arterial Line BP  Min: 107/66  Max: 154/84      Physical Exam:  General:  Alert and oriented.  No acute distress.    Head:  Normocephalic   Neck:  Trachea is midline. No thyromegaly, no lymphadenopathy.   Eyes:  no scleral icterus,  normal conjunctivae   ENT:   Mucous membranes are moist.    Cardiovascular: normal heart sounds,  Regular rate and rhythm, no murmur.  Respiratory:  Clear to auscultation.  Gastrointestinal:  Soft and nontender.  Normal bowel sounds.  No hepatosplenomegaly   Genitourinary:no suprapubic tenderness, no flank tenderness   Skin:  no rash  Musculoskeletal:  No tenderness to palpation.    Extremities: no edema le   Back:  No spine tenderness.  Neurologic:  am labs cancelled and need to be reordered glucose, tsh, hapto and retic count alert and oriented,  no  focal deficits   Psychiatric:   Cooperative.  Appropriate mood and affect.  Normal judgment.      Labs:  Recent Labs     08/21/22  0322 08/21/22  1705 08/22/22  0331 08/23/22  0335   WBC 15.3* 17.3* 17.2* 17.2*   RBC 2.95* 3.26* 3.32* 3.29*   HGB 8.7* 9.6* 9.5* 9.6*   HCT 25.8* 28.8* 29.9* 29.2*   PLT 59* 81* 87* 94*   MCV 87.5 88.3 90.1 88.8   MCH 29.5 29.4 28.6 29.2   MCHC 33.7 33.3 31.8* 32.9   NRBCRE 2* 4* 4* 3*   ASEG 14.8*  --  16.7* 16.7*   ALYMP 0.2*  --  0.2* 0.3*   AMONO 0.0*  --  0.3 0.2*   AEO 0.0  --  0.0 0.0   ABAS 0.0  --  0.0 0.0   PMOR  --   --   --  Normal         Recent Labs     08/21/22  0322 08/21/22  1105 08/22/22  0331 08/22/22  1904 08/23/22  0335   SODIUM 147*   < > 142 140 139   POTASSIUM 4.2   < > 4.3 4.3 4.5   MG 2.9*   < > 2.7* 2.6* 2.6*   PHOS 3.3  --  4.1  --  4.9*   CO2 23   < > 20* 19* 20*   ANIONGAP 13   < > 13 13 12   GLUCOSE 150*   < > 159* 185* 129*   BUN 63*   < > 68* 77* 83*   CREATININE 1.72*   < > 1.93* 2.10* 2.24*   BCRAT 37*   < > 35* 37* 37*   CALCIUM 7.9*   < > 7.7* 7.8* 7.9*   BILIRUBIN 1.5*  --  1.7* 1.5* 1.4*   AST 26  --  34 23 21   GPT 35  --  44 41 37   ALKPT 55  --  64 63 61   GLOB 2.3  --  2.5 2.5 2.5   AGR 1.2  --  1.1 1.1 1.1    < > = values in this interval not displayed.        Recent Labs   Lab 08/23/22 0335   NTPROB 11,752*        Recent Labs     08/21/22  0322 08/22/22  0331 08/23/22 0335   INR 1.0 1.0 1.0   PT 11.0 10.9 11.0   PTT 31* 27 25           Inpatient Medications:  Current Facility-Administered Medications   Medication Dose Route Frequency Provider Last Rate Last Admin   • AMIODarone (PACERONE) tablet 400 mg  400 mg Oral 2 times per day Roxi Enriquez MD   400 mg at 08/23/22 0931   • pantoprazole (PROTONIX) EC tablet 40 mg  40 mg Oral QASSM Rehab Yaritza Lobo CNP   40 mg at 08/23/22 0629   • TACROlimus (PROGRAF) capsule 3.5 mg  3.5 mg Oral Daily Tx OCTAVIA Moon   3.5 mg at 08/23/22 0627   • TACROlimus  (PROGRAF) capsule 4 mg  4 mg Oral Nightly Tx Analy I Thais, CNS   4 mg at 08/22/22 1850   • hydrALAZINE (APRESOLINE) tablet 50 mg  50 mg Oral 3 times per day Milind Bradley MD   50 mg at 08/23/22 0627   • valGANciclovir (VALCYTE) tablet 450 mg  450 mg Oral Daily with breakfast Milind Bradley MD   450 mg at 08/23/22 0931   • mycophenolate (CELLCEPT) tablet 1,000 mg  1,000 mg Oral BIDTX Analy I Baljulietalayos, CNS   1,000 mg at 08/23/22 0628   • dapsone tablet 100 mg  100 mg Oral Daily Milind Bradley MD   100 mg at 08/22/22 0948   • melatonin tablet 9 mg  9 mg Oral Nightly Yaritza Lobo CNP   9 mg at 08/22/22 2102   • docusate sodium-sennosides (SENOKOT S) 50-8.6 MG 2 tablet  2 tablet Oral BID Yaritza Lobo CNP   2 tablet at 08/23/22 0931   • insulin lispro (ADMELOG,HumaLOG) - Correction Dose   Subcutaneous 4x Daily  Clemente Medina CNP   2 Units at 08/22/22 1900   • cholecalciferol (VITAMIN D) tablet 50 mcg  50 mcg Oral Daily Yaritza Lobo CNP   50 mcg at 08/22/22 0949   • predniSONE (DELTASONE) tablet 30 mg  30 mg Oral BID  Kelvin JACKSON CNP   30 mg at 08/23/22 0931    Followed by   • [START ON 8/26/2022] predniSONE (DELTASONE) tablet 20 mg  20 mg Oral BID  Kelvin JACKSON CNP        Followed by   • [START ON 8/30/2022] predniSONE (DELTASONE) tablet 10 mg  10 mg Oral BID  Kelvin JACKSON CNP          Current Facility-Administered Medications   Medication Dose Route Frequency Provider Last Rate Last Admin   • niCARdipine (CARDENE) 125 mg/250 mL in sodium chloride 0.9 % infusion  0-15 mg/hr Intravenous Continuous Lai Mcadams MD   Paused at 08/20/22 1955   • furosemide (LASIX) 250 mg in NaCl 0.9% 125 mL infusion  5 mg/hr Intravenous Continuous Milind Bradley MD   Completed at 08/22/22 4650   • propofol (DIPRIVAN) infusion  0-50 mcg/kg/min (Dosing Weight) Intravenous Continuous Chadrick CHARLES Enriquez MD   Completed at 08/20/22 2551   • sodium chloride 0.9% infusion   Intravenous Continuous PRN  Milind Bradley MD       • NORepinephrine (LEVOPHED) 8 mg/250 mL in dextrose 5 % infusion  0-100 mcg/min Intravenous Continuous Lai Mcadams MD 3.75 mL/hr at 08/20/22 0359 2 mcg/min at 08/20/22 0359   • sodium chloride 0.9% infusion   Intravenous Continuous Paddy Dupree MD       • sodium chloride 0.9% infusion   Intravenous Continuous Virginia Angel PA-C 3 mL/hr at 08/23/22 0701 Rate Verify at 08/23/22 0701   • sodium chloride 0.9% infusion   Intravenous Continuous Virginia Angel PA-C       • sodium chloride 0.9% infusion   Intravenous Continuous Virginia Angel PA-C 20 mL/hr at 08/22/22 1716 Rate Change at 08/22/22 1716   • sodium chloride 0.9% infusion   Intravenous Continuous Virginia Angel PA-C 10 mL/hr at 08/23/22 0701 Rate Verify at 08/23/22 0701   • milrinone (PRIMACOR) 20 mg/100 mL in dextrose 5 % infusion premix  0.25 mcg/kg/min (Dosing Weight) Intravenous Continuous Lai Mcadams MD 8.1 mL/hr at 08/23/22 0701 0.25 mcg/kg/min at 08/23/22 0701      Current Facility-Administered Medications   Medication Dose Route Frequency Provider Last Rate Last Admin   • hydrALAZINE (APRESOLINE) injection 10 mg  10 mg Intravenous Q6H PRN Milind Bradley MD       • sodium chloride 0.9% infusion   Intravenous Continuous PRN Milind Bradley MD       • dextrose 5 % / sodium chloride 0.45% infusion   Intravenous PRN Milind Bradley MD   Completed at 08/22/22 1029   • bisacodyl (DULCOLAX) suppository 10 mg  10 mg Rectal Daily PRN Milind Bradley MD   10 mg at 08/20/22 0344   • hydrALAZINE (APRESOLINE) injection 10 mg  10 mg Intravenous Q6H PRN Yaritza Lobo CNP   10 mg at 08/17/22 1722   • fentaNYL (SUBLIMAZE) injection 25 mcg  25 mcg Intravenous Q15 Min PRN Paddy Dupree MD   25 mcg at 08/14/22 1210   • polyethylene glycol (MIRALAX) packet 17 g  17 g Oral QHS PRN Paddy Dupree MD   17 g at 08/18/22 0931   • acetaminophen (TYLENOL) tablet 650 mg  650 mg Oral Q6H PRN Virginia Angel PA-C   650 mg at  08/22/22 2125    Or   • acetaminophen (TYLENOL) suppository 650 mg  650 mg Rectal Q6H PRN Virginia Angel PA-C       • HYDROcodone-acetaminophen (NORCO) 5-325 MG per tablet 1 tablet  1 tablet Oral Q6H PRN Virginia Angel PA-C   1 tablet at 08/16/22 0514    Or   • HYDROcodone-acetaminophen (NORCO)  MG per tablet 1 tablet  1 tablet Oral Q6H PRN Virginia Angel PA-C   1 tablet at 08/16/22 2039   • chlorhexidine gluconate (PERIDEX) 0.12 % solution 15 mL  15 mL Swish & Spit PRN Virginia Angel PA-C       • ondansetron (ZOFRAN ODT) disintegrating tablet 4 mg  4 mg Oral Q6H PRN Virginia Angel PA-C        Or   • ondansetron (ZOFRAN) injection 4 mg  4 mg Intravenous Q6H PRN Virginia Angel PA-C       • dextrose 5 % flush IVPB 40 mL  40 mL Intravenous PRN Virginia Angel PA-C       • potassium CHLORIDE 60 mEq in sodium chloride 0.9 % 150 mL total volume IVPB  60 mEq Intravenous PRN Virginia Angel PA-C       • potassium CHLORIDE 40 mEq/100 mL IVPB premix  40 mEq Intravenous PRN Virginia Angel PA-C   Completed at 08/14/22 2356   • potassium CHLORIDE 20 MEQ/50ML IVPB premix 20 mEq  20 mEq Intravenous PRN Virginia Angel PA-C   Completed at 08/21/22 1321   • magnesium sulfate 2 g in 50 mL premix IVPB  2 g Intravenous PRN Virginia Angel PA-C       • magnesium sulfate 4 g in sterile water IVPB  4 g Intravenous PRN Virginia Angel PA-C       • morphine injection 4 mg  4 mg Intravenous Q1H PRN Virginia Angel PA-C   4 mg at 08/16/22 2143    Or   • fentaNYL (SUBLIMAZE) injection 25 mcg  25 mcg Intravenous Q1H PRN Virginia Angel PA-C   25 mcg at 08/21/22 0826   • dextrose 50 % injection 25 g  25 g Intravenous PRN Virginia Angel PA-C       • dextrose 50 % injection 12.5 g  12.5 g Intravenous PRN Virginia M Angel, PA-C       • glucagon (GLUCAGEN) injection 1 mg  1 mg Intramuscular PRN Virginia Angel PA-C       • dextrose (GLUTOSE) 40 % gel 15 g  15 g Oral PRN Virginia Angel PA-C       • dextrose (GLUTOSE) 40 % gel 30 g  30 g Oral PRN Virginia BALDERAS  SKYLER Angel            Assessment and Plan:    Mr Benites is a 66yo M w/ hx of chronic systolic heart failure, and NICM, s/p ICD, s/p LVAD in 4/2021, hx of LUE DVT, permanent atrial fibrillation on Xarelto, hypertension, CKDIII, chronic hep C status post treatment who was admitted for a dual heart/kidney transplant.      #Mild encephalopathy 8/16 post extubation - resolved  - likely 2/2 sedation from intubation, no neuro deficits, expect patient to improve as sedation wears off  - now AOx3  - monitor     #Hx of HFrEF/NICM, s/p ICD, s/p LVAD in 4/2021  #Hx of Afib  #HTN  #S/p OHT 8/12/22  #S/p chest closure on 8/14/22  #Retrosternal/mediastinal hematoma on imaging on 8/18    #s/p Mediastinal exploration on 8/19  #Acute HFpEF  #Acute resp failure  - echo per transplant team, on 8/19 - echo w/ normal LV EF, RV function described as low normal  - CT C/A/P w/out con on 8/18 - Postsurgical changes of heart transplant. Retrosternal/mediastinal hematoma along and surrounding the right lateral aspect of the ascending aorta measuring 8.4 cm. Likely mild mass effect on the right atrium. The mediastinal drain terminates within this hematoma.  -s/p Mediastinal exploration on 8/19  - diuretics: on lasix gtt  - inotropes: On mil  - GDMT: on hydralazine  - on nicardipine  - on amio gtt   - immunosupressants/antimicrobials per transplant team  - extubated on 8/16, on NC  - mgmt per transplant team/CV surgery        #LLE DVT  #R IJ DVT  - RUE doppler - Acute partial thrombus in the right internal jugular vein.  - LE doppler on 8/19 - Nonocclusive thrombus in the left common femoral and deep femoral veins.  - discussed DVT's with Dr Bradley on 8/20, he will discuss management with CV surgery; ability to AC is limited by recent mediastinal bleeding   - defer AC/DVT management per transplant/CV surgery teams     #S/p Kidney transplant on 8/13  #CKD III hx  #Hypernatremia  #MISAEL  - Cr b/l pretransplant - 2  - avoid nephrotoxic meds  - 8/20 -  Cr 1.7<-1.8<-2.3  - CHF/bleeding mgmt as above  - immunosupressants/antimicrobials per transplant team   - mgmt per transplant teams     #Hyperbilirubinemia  - suspect congestive 2/2 heart failure  - mgmt per transplant team  - monitor     #Acute blood loss anemia  #Thrombocytopenia  - likely acute blood loss and consumptive from procedures  - transfusions per transplant team/CV surgery teams  - consider heme eval if persistent  - monitor     #Chronic hep C status post treatment     #Hx of LUE DVT, possibly provoked  - was on AC  - AC management per transplant team     #Chronic low back pain  - on gabapentin previously     DVT ppx - per transplant team     On PPI     Primary Care Physician  Milind Porras, DO     Code Status    Code Status: Full Resuscitation             Time spent 35 minutes   Greater than 50% of the time spent reviewing the patient records, coordinating patient care plan and discussing the above care plan  with the patient.  Reviewed all vitals, medications, new orders, I/O, labs, micro, radiology, nurses notes, pertinent consultant notes which are reflected in assessment and plan     Patient Privacy Notice:  The 21st Century Cures Act makes medical notes like these available to patients in the interest of transparency. Please be advised that this is a medical document. Medical documents are intended to carry relevant information, facts as evident, and the clinical opinion of the practitioner. The medical note is intended as peer to peer communication, and may appear blunt or direct. It is written in medical language, and may contain abbreviations or verbiage that are unfamiliar.    Penny Tatum MD  8/23/2022 9:43 AM

## 2024-03-31 NOTE — H&P ADULT - PROBLEM SELECTOR PLAN 7
On home Losartan   -Hold BP medications for now On home Losartan 50mg   -Hold BP medications for now given soft BP's on admission  -Pending finalized medication reconciliation On home amlodipine 10mg and metoprolol 50mg x1   -Hold BP medications for now given soft BP's on admission

## 2024-03-31 NOTE — H&P ADULT - ASSESSMENT
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 Patient is accompanied by his son. Reports that patient was at his usual state of health yesterday. Today, he felt weak. Not able to walk. Appears confused today. Denies blood in stool, urine. Not on AC. PMD: [facility]. Payam Klein ([facility]). His last blood transfusion was 5 days ago. No fever, cough, congestion, infectious symptoms. Associated symptoms of chest pain, shortness of breath. 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 Labs remarkable for pancytopenua WBC 0.57, Hgb 3.4; plt 30. Findings for symptomatic anemia and neutropenia requiring blood transfusion. 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 Labs remarkable for pancytopenia WBC 0.57, Hgb 3.4; plt 30. Findings for symptomatic anemia and neutropenia requiring blood transfusion.

## 2024-03-31 NOTE — PROGRESS NOTE ADULT - PROBLEM SELECTOR PLAN 10
On home doxazosin 2mg qhs and tamsulosin 0.4mg every other night  -Hold for now given soft/normal BP in setting of symptomatic anemia  -Monitor in case of urinary retention

## 2024-03-31 NOTE — H&P ADULT - NSHPREVIEWOFSYSTEMS_GEN_ALL_CORE
REVIEW OF SYSTEMS: As indicated above; otherwise negative    CONSTITUTIONAL: No weakness, fevers or chills  EYES/ENT: No visual changes; no vertigo or throat pain   NECK: No pain or stiffness  RESPIRATORY: No cough, wheezing, hemoptysis; No shortness of breath  CARDIOVASCULAR: No chest pain or palpitations  GASTROINTESTINAL: No abdominal or epigastric pain. No nausea, vomiting, or hematemesis; no diarrhea or constipation; no melena or hematochezia.  GENITOURINARY: No dysuria, frequency or hematuria  NEUROLOGICAL: No numbness or weakness  SKIN: No itching, rashes REVIEW OF SYSTEMS: As indicated above; otherwise negative    CONSTITUTIONAL: +Weakness, but no fevers or chills  EYES/ENT: Did not report visual changes; no vertigo or throat pain   NECK: Did not report; no pain   RESPIRATORY: No shortness of breath  CARDIOVASCULAR: No chest pain  GASTROINTESTINAL: No abdominal or epigastric pain. No diarrhea or constipation; no melena or hematochezia.  GENITOURINARY: No dysuria, frequency or hematuria  NEUROLOGICAL: Confused  SKIN: No itching

## 2024-03-31 NOTE — PATIENT PROFILE ADULT - HAVE YOU BEEN EATING POORLY BECAUSE OF A DECREASED APPETITE?
no rashes,  no suspicious lesions,  no areas of discoloration,  no jaundice present,  good turgor,  no abnormalities, no masses,  no tenderness on palpation No (0)

## 2024-03-31 NOTE — CONSULT NOTE ADULT - SUBJECTIVE AND OBJECTIVE BOX
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 His son came in the morning of admission to see patient and noted his father looked pale and tired, for which prompted a hospital visit. Patient reports absence fo fevers, chills, chest pain, nausea (except day prior to admission), emesis, diarrhea, hematemesis, black-colored poops, dysuria, hematuria, or LE edema. He shares of his chronic problem with needing blood transfusions (last blood transfusion ~ 1 week ago) because of poor blood cell production. As per chart review, patient was accompanied by his son, who reports that patient was in his usual state of health yesterday, but on admission noted his father felt weak, not able to walk, and appeared confused. Not on AC. PMD: Dr. Payam Klein ([facility]). His last blood transfusion was 5 days ago. No fever, cough, congestion, infectious symptoms.    ED Vitals: Afebrile; HR 58-78 with ECG sinus rhythm without ST segment changes; BP /39-63; SpO2 100% on room air. Found to have pancytopenia with WBC 0.57; Hgb 3.4; plt 30. Negative troponin Received LR 500cc x1 and pantoprazole 80mg x1. PRBCx3. CXR negative. CT head with stable atrophy and old R lacunar infarct. Heme team will see patient as per chart ED note. (31 Mar 2024 02:16)    Heme hx:  83M hx MDS (was on weekly IV decitabine and venetoclax but unable to tolerate), CAD; TAVR, T2DM, GI bleed from AVM's presenting with 1-day acute onset of generalized weakness and feeling mildly short of breath with chest tightness. His hematology is Dr. Bay Velazco at [facility]. Dr. Velazco started a weekly decitabine IV and venetoclax regimen with the goal to reduce transfusion requirements. He was receiving decitabine IV once a week on Tuesday and venetoclax once a week on Mondays. Initially intended to go on for 4 weeks but patient only able to tolerate 2 weeks. Patient currently feels much better after receiving 4 units of pRBC. He has previously needed a similar amount of pRBC for severe anemia. Per patient's son (called by phone), patient is not on any prophylactic anti-microbials and the topic never came up. Patient has received g-CSF in the past for infections with adequate response but no long-term g-CSF.    REVIEW OF SYSTEMS:  CONSTITUTIONAL: No weakness, fevers or chills  EYES/ENT: No visual changes;  No vertigo or throat pain   NECK: No pain or stiffness  RESPIRATORY: No cough, wheezing, hemoptysis; No shortness of breath  CARDIOVASCULAR: No chest pain or palpitations  GASTROINTESTINAL: No abdominal or epigastric pain. No nausea, vomiting, or hematemesis; No diarrhea or constipation. No melena or hematochezia.  GENITOURINARY: No dysuria, frequency or hematuria  NEUROLOGICAL: No numbness or weakness  SKIN: No itching, burning, rashes, or lesions   All other review of systems is negative unless indicated above.    PAST MEDICAL & SURGICAL HISTORY:  Gastrointestinal bleeding      HTN (hypertension)      HLD (hyperlipidemia)      T2DM (type 2 diabetes mellitus)      Hypothyroidism      S/P TAVR (transcatheter aortic valve replacement)  2020          FAMILY HISTORY:  No known family history of cancer.    SOCIAL HISTORY:   Lives alone. His son lives about 10 blocks away and checks in on him frequently.    Allergies  No Known Allergies      MEDICATIONS  (STANDING):  dextrose 5%. 1000 milliLiter(s) (100 mL/Hr) IV Continuous <Continuous>  dextrose 5%. 1000 milliLiter(s) (50 mL/Hr) IV Continuous <Continuous>  dextrose 50% Injectable 12.5 Gram(s) IV Push once  dextrose 50% Injectable 25 Gram(s) IV Push once  dextrose 50% Injectable 25 Gram(s) IV Push once  escitalopram 10 milliGRAM(s) Oral daily  ferrous    sulfate 325 milliGRAM(s) Oral daily  folic acid 1 milliGRAM(s) Oral daily  glucagon  Injectable 1 milliGRAM(s) IntraMuscular once  influenza  Vaccine (HIGH DOSE) 0.7 milliLiter(s) IntraMuscular once  insulin lispro (ADMELOG) corrective regimen sliding scale   SubCutaneous three times a day before meals  insulin lispro (ADMELOG) corrective regimen sliding scale   SubCutaneous at bedtime  levothyroxine 50 MICROGram(s) Oral daily  melatonin 3 milliGRAM(s) Oral at bedtime  pantoprazole  Injectable 40 milliGRAM(s) IV Push every 12 hours    MEDICATIONS  (PRN):  dextrose Oral Gel 15 Gram(s) Oral once PRN Blood Glucose LESS THAN 70 milliGRAM(s)/deciliter      OBJECTIVE   T(F): 97.9 (03-31-24 @ 10:52), Max: 98.2 (03-31-24 @ 06:55)  HR: 69 (03-31-24 @ 10:52)  BP: 158/66 (03-31-24 @ 10:52)  RR: 16 (03-31-24 @ 10:52)  SpO2: 99% (03-31-24 @ 10:52)  Wt(kg): --    PHYSICAL EXAM   GENERAL: NAD, well-developed  HEAD:  Atraumatic, Normocephalic  EYES: EOMI, PERRLA, conjunctiva and sclera clear  CHEST/LUNG: Clear to auscultation bilaterally; No wheeze  HEART: Regular rate and rhythm; No murmurs, rubs, or gallops  ABDOMEN: Soft, Nontender, Nondistended; Bowel sounds present  EXTREMITIES:  2+ Peripheral Pulses, No clubbing, cyanosis, or edema  NEUROLOGY: non-focal  SKIN: No rashes or lesions                          7.4    0.56  )-----------( 26       ( 31 Mar 2024 12:33 )             22.4       03-30    139  |  101  |  56<H>  ----------------------------<  126<H>  4.7   |  22  |  1.59<H>    Ca    9.4      30 Mar 2024 20:07  Phos  4.4     03-30  Mg     2.0     03-30    TPro  6.5  /  Alb  3.5  /  TBili  0.1<L>  /  DBili  x   /  AST  14  /  ALT  8<L>  /  AlkPhos  44  03-30      Magnesium: 2.0 mg/dL (03-30 @ 20:07)  Phosphorus: 4.4 mg/dL (03-30 @ 20:07)

## 2024-03-31 NOTE — H&P ADULT - PROBLEM SELECTOR PLAN 10
DVT prophylaxis: Heparin SC  Disposition: Pending clinical improvement DVT prophylaxis: Withhold chemical prophylaxis; but SCD's appropriate for now  Disposition: Pending clinical improvement On home doxazosin 2mg qhs and tamsulosin 0.4mg every other night  -Hold for now given soft/normal BP in setting of symptomatic anemia  -Monitor in case of urinary retention

## 2024-03-31 NOTE — H&P ADULT - PROBLEM SELECTOR PLAN 1
Labs notable for WBC 0.57; Hgb 3.4; Plt 30 concerning for pancytopenia in setting of known MDS and recent chemotherapy ~6 weeks ago. Patient is at very high risk of infection given notable severe neutropenia. Low threshold to start antibiotics, e.g. cefepime.  -No signs of infection at this time  -If SIRS+ would obtain infectious work up and consider empiric antibiotics   -MRSA/MSSA PCR  -Heme consulted as per ED chart note;   -Patient may benefit from Filgrastim but pending further recs  -Hgb transfuse >8 given CAD history  -Transfuse platelet if  plt <10, or fever and plt <20, or active bleeding and plt <50  -Withhold chemical prophylaxis for now Labs notable for WBC 0.57; Hgb 3.4; Plt 30 concerning for pancytopenia in setting of known MDS and recent chemotherapy ~6 weeks ago. Patient is at very high risk of infection given notable severe neutropenia. Low threshold to start antibiotics, e.g. cefepime.  -No signs of infection at this time  -If SIRS+ would obtain infectious work up and consider empiric antibiotics   -MRSA/MSSA PCR  -Heme consulted as per ED chart note;   -Patient may benefit from Filgrastim but pending further recs  -Hgb transfuse >8 given CAD history  -Ordered 4th unit PRBC --> follow up post transfusion CBC  -Transfuse platelet if  plt <10, or fever and plt <20, or active bleeding and plt <50  -Withhold chemical prophylaxis for now Hgb 3.4 and MCV >100 (consistent with macrocytic anemia) with reported chest tightness and shortness of breath that resolved. Now status post PRBC x3. Suspect anemia in setting of recent chemotherapy. No active bleeding reported, e.g. GI bleed or hematuria. However, has history of GI bleeds from AVM's  -Maintain active type and screen  -Follow up post-transfusion CBC  -Maintain Hgb >8 given history of CAD  -Folic acid 1mg po qd  -Ferrous iron supplement qd  -Protonix IV 40mg bid in case of possible occult bleed but consider de-escalation as appropriate

## 2024-03-31 NOTE — H&P ADULT - PROBLEM SELECTOR PLAN 2
Hgb 3.4 and MCV >100 (consistent with macrocytic anemia) with reported chest tightness and shortness of breath that resolved. Now status post PRBC x3. Suspect anemia in setting of recent chemotherapy. No active bleeding reported, e.g. GI bleed or hematuria.   -Maintain active type and screen  -Follow up post-transfusion CBC  -Maintain Hgb >8 given history of CAD  -obtain iron studies, b12, and folate serum levels for anemia work up Labs notable for WBC 0.57; Hgb 3.4; Plt 30 concerning for pancytopenia in setting of known MDS and recent chemotherapy ~6 weeks ago. Patient is at very high risk of infection given notable severe neutropenia. Low threshold to start antibiotics, e.g. cefepime.  -No signs of infection at this time  -If SIRS+ would obtain infectious work up and consider empiric antibiotics   -MRSA/MSSA PCR  -Heme consulted as per ED chart note;   -Patient may benefit from Filgrastim but pending further recs  -Hgb transfuse >8 given CAD history  -Ordered 4th unit PRBC --> follow up post transfusion CBC  -Transfuse platelet if  plt <10, or fever and plt <20, or active bleeding and plt <50  -Withhold chemical prophylaxis for now

## 2024-03-31 NOTE — H&P ADULT - NSHPSOCIALHISTORY_GEN_ALL_CORE
Patient reports no cigarette use, but does smoke cigars and says "not more than 50" cigars in a year; reports occasional consumption of wine with food; lives alone; has 2 adult children; his wife recently passed ~7months; he was  for 55 years Patient reports no cigarette use, but does smoke cigars and says "not more than 50" cigars in a year; reports occasional consumption of wine with food; lives alone; has 2 adult children; his wife recently passed ~7 months; he was  for 55 years

## 2024-03-31 NOTE — H&P ADULT - NSHPLABSRESULTS_GEN_ALL_CORE
-- DO NOT REPLY / DO NOT REPLY ALL --  -- Message is from the [facility]--      Patient is requesting a medication refill - medication is on active list    Was Medication Pended? Yes.    Rx Name and Dose:    glipiZIDE (GLUCOTROL) 10 MG tablet    Duration: 90 days    Pharmacy  [id number] - [place] [address] [facility]    Patient confirmed the above pharmacy as correct?  Yes    Does this request need an existing or new prescription at a pharmacy to be sent to a new pharmacy location?   No    Caller Information       Type Contact Phone    01/10/2022 11:50 AM CST Phone (Incoming) GREGORY KEENAN (Emergency Contact) [phone]          Turnaround time given to caller:   \"This message will be sent to [state Provider's name]. The clinical team will fulfill your request as soon as they review your message.\"   LABS:                        3.4    0.57  )-----------( 30       ( 30 Mar 2024 20:07 )             11.1     03-30    139  |  101  |  56<H>  ----------------------------<  126<H>  4.7   |  22  |  1.59<H>    Ca    9.4      30 Mar 2024 20:07  Phos  4.4     03-30  Mg     2.0     03-30    TPro  6.5  /  Alb  3.5  /  TBili  0.1<L>  /  DBili  x   /  AST  14  /  ALT  8<L>  /  AlkPhos  44  03-30    PT/INR - ( 30 Mar 2024 20:07 )   PT: 13.6 sec;   INR: 1.31 ratio         PTT - ( 30 Mar 2024 20:07 )  PTT:27.4 sec      Urinalysis Basic - ( 30 Mar 2024 20:07 )    Color: x / Appearance: x / SG: x / pH: x  Gluc: 126 mg/dL / Ketone: x  / Bili: x / Urobili: x   Blood: x / Protein: x / Nitrite: x   Leuk Esterase: x / RBC: x / WBC x   Sq Epi: x / Non Sq Epi: x / Bacteria: x    ----------------------------  < from: Xray Chest 1 View- PORTABLE-Urgent (Xray Chest 1 View- PORTABLE-Urgent .) (03.30.24 @ 21:17) >    FINDINGS:    The heart sizeis not accurately assessed on this projection. TAVR.  No focal consolidations  There is no pneumothorax or pleural effusion.  Calcific opacities in the bilateral neck soft tissues may represent   possible carotid artery calcifications      IMPRESSION:  No focal consolidations< from: CT Head No Cont (03.30.24 @ 22:27) >      FINDINGS:    There is generalized volume loss. Periventricular and subcortical white   matter hypoattenuation is again noted likely related to chronic small   vessel ischemic changes. There is an old lacunar infarct in the right   corona radiata.. There is no sulcal effacement. There is no intracranial   hemorrhage, extra axial fluid collection, mass effect or midline shift.   There is no acute large vessel territorial infarct. The skull is intact.   Small fluid level seen within the left sphenoid sinus. Mild mucosal   thickening of the ethmoid air cells is appreciated. There is evidence of   previous bilateral maxillary sinus surgery.      IMPRESSION:  No acute intracranial pathology.    Stable atrophy and white matter changes. Old right lacunar infarct.    < end of copied text >

## 2024-03-31 NOTE — H&P ADULT - PROBLEM SELECTOR PLAN 6
History of CAD on aspirin, as well as history of TAVR.  -Continue to monitor History of CAD on aspirin every other day, as well as history of TAVR.  -Hold aspirin for now  -Continue to monitor

## 2024-03-31 NOTE — CONSULT NOTE ADULT - ASSESSMENT
83M hx MDS (was on weekly IV decitabine and venetoclax but unable to tolerate), CAD; TAVR, T2DM, GI bleed from AVM's presenting with 1-day acute onset of generalized weakness and feeling mildly short of breath with chest tightness.     # MDS  His hematology is Dr. Bay Velazco at [facility]. Dr. Velazco started a weekly decitabine IV and venetoclax regimen with the goal to reduce transfusion requirements. He was receiving decitabine IV once a week on Tuesday and venetoclax once a week on Mondays. Initially intended to go on for 4 weeks but patient only able to tolerate 2 weeks.  - Please obtain collateral from his primary hematologist Dr. Velazco  - Recommend prophylactic antimicrobials as follows: levofloxacin 500mg PO qd, acyclovir 400mg PO BID, fluconazole 200mg PO qd  - Monitor CBC at least daily and transfuse to maintain Hb >7, and platelet count >10 minimum, >20 if febrile, and >50 if bleeding, prior to procedures, or needs anticoagulation. 83M hx MDS (was on weekly IV decitabine and venetoclax but unable to tolerate), CAD; TAVR, T2DM, GI bleed from AVM's presenting with 1-day acute onset of generalized weakness and feeling mildly short of breath with chest tightness.     # MDS  Primary hematologist is Dr. Bay Velazco at [facility]. Dr. Velazco started a weekly decitabine IV and venetoclax regimen with the goal to reduce transfusion requirements. He was receiving decitabine IV once a week on Tuesday and venetoclax once a week on Mondays. Initially intended to go on for 4 weeks but patient only able to tolerate 2 weeks.  - Please obtain collateral from his primary hematologist Dr. Bay Velazco at [facility]  - Recommend prophylactic antimicrobials as follows: levofloxacin 500mg PO qd, acyclovir 400mg PO BID, fluconazole 200mg PO qd  - Monitor CBC at least daily and transfuse to maintain Hb >7, and platelet count >10 minimum, >20 if febrile, and >50 if bleeding, prior to procedures, or needs anticoagulation.  - Plan was discussed with patient's son Shashank Monae by phone on 3/31/24  - Per patient, he could only tolerate 2 of the planned 4 weeks of weekly decitabine/venetoclax. Currently no plans for chemotherapy while inpatient  - To follow up with Dr. Bay Velazco after discharge    Note is not finalized until signed by attending.     Jesse Haas MD  Hematology/Oncology Fellow PGY-5  Pager: [facility] [phone] / [phone]  After 5pm and on weekends please page on-call fellow  Atrophic kidney

## 2024-03-31 NOTE — PROGRESS NOTE ADULT - PROBLEM SELECTOR PLAN 2
Labs notable for WBC 0.57; Hgb 3.4; Plt 30 concerning for pancytopenia in setting of known MDS and recent chemotherapy ~6 weeks ago. Patient is at very high risk of infection given notable severe neutropenia. Low threshold to start antibiotics, e.g. cefepime.  -No signs of infection at this time  -If SIRS+ would obtain infectious work up and consider empiric antibiotics   -MRSA/MSSA PCR  -Heme consulted as per ED chart note;   -Patient may benefit from Filgrastim but pending further recs  -Hgb transfuse >8 given CAD history  -Ordered 4th unit PRBC --> follow up post transfusion CBC  -Transfuse platelet if  plt <10, or fever and plt <20, or active bleeding and plt <50  -Withhold chemical prophylaxis for now Labs notable for WBC 0.57; Hgb 3.4; Plt 30 concerning for pancytopenia in setting of known MDS and recent chemotherapy ~6 weeks ago. Patient is at very high risk of infection given notable severe neutropenia. Low threshold to start antibiotics, e.g. cefepime.  -No signs of infection at this time  -If SIRS+ would obtain infectious work up and consider empiric antibiotics   -MRSA/MSSA PCR  -Heme consulted as per ED chart note;   -Patient may benefit from Filgrastim but pending further recs  -Hgb transfuse >8 given CAD history  -Ordered 4th unit PRBC --> follow up post transfusion CBC  -Transfuse platelet if  plt <10, or fever and plt <20, or active bleeding and plt <50  -Withhold chemical prophylaxis for now  - per heme, antimicrobial ppx as follows: levofloxacin 500mg PO qd, acyclovir 400mg PO BID, fluconazole 200mg PO qd

## 2024-03-31 NOTE — PATIENT PROFILE ADULT - FALL HARM RISK - HARM RISK INTERVENTIONS

## 2024-03-31 NOTE — H&P ADULT - HISTORY OF PRESENT ILLNESS
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 Patient is accompanied by his son. Reports that patient was at his usual state of health yesterday. Today, he felt weak. Not able to walk. Appears confused today. Denies blood in stool, urine. Not on AC. PMD: Dr. Payam Klein ([facility] His last blood transfusion was 5 days ago. No fever, cough, congestion, infectious symptoms. Associated symptoms of chest pain, shortness of breath.    ED Vitals: Afebrile; HR 58-78; BP /39-63; SpO2 100% on room air. Found to have pancytopenia with WBC 0.57; Hgb 3.4; plt 30. negative troponins. Received LR 500cc x1 and pantoprazole 80mg x1. PRBCx3. CXR negative. CT head with stable atrophy and old R lacunar infarct. Heme team will see patient as per chart ED note. 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 Patient is accompanied by his son. Reports that patient was at his usual state of health yesterday. Today, he felt weak. Not able to walk. Appears confused today. Denies blood in stool, urine. Not on AC. PMD: Dr. Payam Klein ([facility]). His last blood transfusion was 5 days ago. No fever, cough, congestion, infectious symptoms. Associated symptoms of chest pain, shortness of breath.    ED Vitals: Afebrile; HR 58-78; BP /39-63; SpO2 100% on room air. Found to have pancytopenia with WBC 0.57; Hgb 3.4; plt 30. negative troponins. Received LR 500cc x1 and pantoprazole 80mg x1. PRBCx3. CXR negative. CT head with stable atrophy and old R lacunar infarct. Heme team will see patient as per chart ED note. 83 M history CAD on aspirin, TAVR, DMT2, and MDS on chemotherapy (last treatment ~2 weeks ago) presenting with 1-day acute onset of generalized weakness and feeling mildly short of breath with chest tightness. His son came in the morning of admission to see patient and noted his father looked pale and tired, for which prompted a hospital visit. Patient reports absence fo fevers, chills, chest pain, nausea (except day prior to admission), emesis, diarrhea, hematemesis, black-colored poops, dysuria, hematuria, or LE edema. He shares of his chronic problem with needing blood transfusions (last blood transfusion ~ 1 week ago) because of poor blood cell production. As per chart review, patient was accompanied by his son, who reports that patient was in his usual state of health yesterday, but on admission noted his father felt weak, not able to walk, and appeared confused. Not on AC. PMD: Dr. Payam Klein ([facility] His last blood transfusion was 5 days ago. No fever, cough, congestion, infectious symptoms.    ED Vitals: Afebrile; HR 58-78 with ECG sinus rhythm without ST segment changes; BP /39-63; SpO2 100% on room air. Found to have pancytopenia with WBC 0.57; Hgb 3.4; plt 30. Negative troponin Received LR 500cc x1 and pantoprazole 80mg x1. PRBCx3. CXR negative. CT head with stable atrophy and old R lacunar infarct. Heme team will see patient as per chart ED note. 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 His son came in the morning of admission to see patient and noted his father looked pale and tired, for which prompted a hospital visit. Patient reports absence fo fevers, chills, chest pain, nausea (except day prior to admission), emesis, diarrhea, hematemesis, black-colored poops, dysuria, hematuria, or LE edema. He shares of his chronic problem with needing blood transfusions (last blood transfusion ~ 1 week ago) because of poor blood cell production. As per chart review, patient was accompanied by his son, who reports that patient was in his usual state of health yesterday, but on admission noted his father felt weak, not able to walk, and appeared confused. Not on AC. PMD: Dr. Payam Klein ([facility]). His last blood transfusion was 5 days ago. No fever, cough, congestion, infectious symptoms.    ED Vitals: Afebrile; HR 58-78 with ECG sinus rhythm without ST segment changes; BP /39-63; SpO2 100% on room air. Found to have pancytopenia with WBC 0.57; Hgb 3.4; plt 30. Negative troponin Received LR 500cc x1 and pantoprazole 80mg x1. PRBCx3. CXR negative. CT head with stable atrophy and old R lacunar infarct. Heme team will see patient as per chart ED note.

## 2024-03-31 NOTE — H&P ADULT - PROBLEM SELECTOR PLAN 3
Baseline SCr appears normal ~1. However, Cr ~1.59 in setting of pancytopenia with Hgb 3.4. Suspect pre-renal BHUPENDRA  -Replete Hgb  -IVF as appropriate Baseline SCr appears normal ~1. However, Cr ~1.59 in setting of pancytopenia with Hgb 3.4. Suspect pre-renal BHUPENDRA  -Replete electrolytes  -Urine studies  -IVF as appropriate  -Avoid nephrotoxins and renally dose medications Baseline SCr appears normal ~1. However, Cr ~1.59 in setting of pancytopenia with Hgb 3.4. Suspect pre-renal BHUPENDRA  -Replete electrolytes  -IVF as appropriate  -Avoid nephrotoxins and renally dose medications

## 2024-03-31 NOTE — H&P ADULT - PROBLEM SELECTOR PLAN 4
History of MDS with recent chemotherapy about 6 weeks ago.  -Heme team consulted; pending recs History of MDS with recent chemotherapy ~2 weeks ago.  -Heme team consulted; pending recs History of MDS with recent chemotherapy ~6 weeks ago.  -Heme team consulted; pending recs

## 2024-03-31 NOTE — CONSULT NOTE ADULT - ATTENDING COMMENTS
The patient is seen on 8 monti; he feels improved after transfusion of four units of packed red cells in management of myelodysplastic syndrome. He is normally followed at the [facility] and we will observe for stability prior to making recommendation for outpatient follow up.   I agree with use of prophylactic antimicrobial medication with his neutropenia.

## 2024-03-31 NOTE — H&P ADULT - ATTENDING COMMENTS
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, mds  a/w severe thrombocytopenia + coagulopathy w elevated pt/inr  unclear baseline blood counts; has been receiving blood product transfusions [id number], last was ~1 week ago  BUN:Cr >20:1 w BUN ~56; lactic acidosis; borderline low bp; confused; otherwise, adequate uo and no gross bleeding in er  s/p protonix ivp load + 3 u prbc in er  follow up post transfusion cbc, lactic acid, orthostatic vital signs; anemia work up  Monitor hgb + plt counts w serial CBC q8h; Goal Hb >7 g/dl,  Plt >10k, 20k if septic, 50k if actively bleeding  empirical protonix 40 bid ivp for now   hold all antiplt/ac  maintain adequate PIV and active type and screen; transfuse blood product as needed to maintain goal  hematology consult in am  gi consult in am    Otherwise, concur w a+p as described by resident above.

## 2024-03-31 NOTE — PATIENT PROFILE ADULT - FUNCTIONAL ASSESSMENT - BASIC MOBILITY 6.
4-calculated by average/Not able to assess (calculate score using [facility] averaging method)

## 2024-03-31 NOTE — H&P ADULT - PROBLEM SELECTOR PLAN 11
DVT prophylaxis: Withhold chemical prophylaxis; but SCD's appropriate for now  Disposition: Pending clinical improvement DVT prophylaxis: Withhold chemical prophylaxis; but SCD's appropriate for now  Disposition: Pending clinical improvement  -Continue home lexapro but at 10mg based on EMR notes, but actual dose us unclear; primary to follow up  -Continue melatonin 3mg qhs

## 2024-03-31 NOTE — H&P ADULT - NSHPPHYSICALEXAM_GEN_ALL_CORE
Vital Signs Last 24 Hrs  T(C): 36.4 (31 Mar 2024 01:56), Max: 36.5 (31 Mar 2024 00:27)  T(F): 97.5 (31 Mar 2024 01:56), Max: 97.7 (31 Mar 2024 00:27)  HR: 59 (31 Mar 2024 01:56) (56 - 78)  BP: 125/53 (31 Mar 2024 01:56) (97/47 - 127/39)  BP(mean): 72 (31 Mar 2024 00:08) (62 - 78)  RR: 18 (31 Mar 2024 01:56) (15 - 18)  SpO2: 98% (31 Mar 2024 01:56) (95% - 100%)    Parameters below as of 31 Mar 2024 01:56  Patient On (Oxygen Delivery Method): room air    PHYSICAL EXAM:  GENERAL: NAD, lying in bed comfortably  HEAD: Atraumatic, normocephalic appearing  EYES: EOMI, PERRLA, conjunctiva and sclera clear  ENT: Moist mucous membranes  NECK: Supple, No JVD; no palpable pre-auricular, post-auricular, occipital, mandibular, submental, supra-clavicular, or infra-clavicular lymph nodes   CHEST/LUNG: Clear to auscultation bilaterally; No rales, rhonchi, wheezing, or rubs. Unlabored respirations  HEART: Regular rate and rhythm; No murmurs, rubs, or gallops  ABDOMEN: Bowel sounds present; Soft, nontender to light and deep palpation, nondistended  EXTREMITIES:  2+ Peripheral radial pulses; brisk capillary refill. No clubbing, cyanosis, or bilateral LE edema  NERVOUS SYSTEM:  Alert & Oriented to situation, speech clear. No gross motor sensory deficits   MSK: FROM all 4 extremities, full and equal strength  PSYCH: Appropriate affect  SKIN: No obvious rashes or lesions Vital Signs Last 24 Hrs  T(C): 36.4 (31 Mar 2024 01:56), Max: 36.5 (31 Mar 2024 00:27)  T(F): 97.5 (31 Mar 2024 01:56), Max: 97.7 (31 Mar 2024 00:27)  HR: 59 (31 Mar 2024 01:56) (56 - 78)  BP: 125/53 (31 Mar 2024 01:56) (97/47 - 127/39)  BP(mean): 72 (31 Mar 2024 00:08) (62 - 78)  RR: 18 (31 Mar 2024 01:56) (15 - 18)  SpO2: 98% (31 Mar 2024 01:56) (95% - 100%)    Parameters below as of 31 Mar 2024 01:56  Patient On (Oxygen Delivery Method): room air    PHYSICAL EXAM:  GENERAL: NAD  HEAD: Atraumatic, normocephalic appearing  EYES: Sclera clear  CHEST/LUNG: Clear to auscultation bilaterally; no rales, rhonchi, wheezing, or rubs. Unlabored respirations  HEART: Regular rate and rhythm; grade 1 systolic murmurs (likely from TAVR), rubs, or gallops  ABDOMEN: Soft, nontender to light and deep palpation, nondistended  EXTREMITIES:  2+ Peripheral radial pulses; no clubbing bilateral LE edema  NERVOUS SYSTEM:  Alert & Oriented to situation except for month (thought it was November); speech clear. No gross motor sensory deficits   MSK: FROM all 4 extremities  PSYCH: Appropriate affect  SKIN: No obvious rashes or lesions

## 2024-03-31 NOTE — PATIENT PROFILE ADULT - FUNCTIONAL ASSESSMENT - BASIC MOBILITY 4.
----- Message from Asiya Kirby, PT sent at 8/6/2020  8:03 AM CDT -----  Ramona: Charlene has shown little to no progress in PT despite manual therapy, ionto, US, trial use of an S-I belt, Voltaren gel, and exercises. I feel physiatry would be her next best option. I discussed that with Charlene and she concurred. Could you or Anca refer her to physiatry? Thank you!   4 = No assist / stand by assistance

## 2024-03-31 NOTE — H&P ADULT - ATTENDING SUPERVISION STATEMENT
Patient ID: David is a 54 year old male.    Chief Complaint   Patient presents with    Cough     C/o of headache.         HPI    55 y/o German speaking M here with his wife who is translating for him.  Hx of CAD s/p triple bypass. December was found to have arterial blockage s/p stenting. Sees dr. Miller, Cardiology. Since then he has been very depressed according to his wife.  Pt with 3 days of severe cough, no fever, sore throat. Pt with headache from severe cough, and complaints of back pain.  He is a former smoker.  Pt has been feeling depressed since his cardiac bypass. Feels tired in general.    Review Flowsheet  More data exists         2/2/2024   PHQ 2/9 Score   Adult PHQ 2 Score 5   Adult PHQ 2 Interpretation Further screening needed   Little interest or pleasure in activity? More than half the days   Feeling down, depressed or hopeless? Nearly every day   Adult PHQ 9 Score 19   Adult PHQ 9 Interpretation Moderately Severe Depression   Trouble falling or staying asleep or sleeping all the time? Nearly every day   Feeling tired or having little energy? Nearly every day   Poor appetite or overeating? Nearly every day   Feeling bad about yourself or that you are a failure or have let yourself or family down? More than half the days   Trouble concentrating on things such as reading the newspaper or watching TV? More than half the days   Moving or speaking slowly that other people have noticed or the opposite - being so fidgety or restless that you have been moving around a lot more than usual? Several days   Thoughts that you would be better off dead or of hurting yourself in some way? Not at all   If you reported any problems, how difficult have these problems made it to do your work, take care of things at home, or get along with other people? Somewhat difficult     Last four GAD7 Assessments           2/2/2024     2:30 PM   GAD7 Screening   GAD7 Score 13   Feeling nervous, anxious or on edge Nearly every  day   Not being able to stop or control worrying More than half the days   Worrying too much about different things Nearly every day   Trouble relaxing Nearly every day   Being so restless that it's hard to sit still Not at all   Becoming easily annoyed or irritable More than half the days   Feeling afraid as if something awful might happen Not at all   Ability to handle work, home and other people Somewhat difficult           Patient's medications, allergies, past medical, surgical, social and family histories were reviewed and updated as appropriate.    Review of Systems   All other systems reviewed and are negative.      Patient Active Problem List   Diagnosis    Primary hypertension    Statin intolerance    S/P CABG (coronary artery bypass graft)    Shortness of breath    NSTEMI (non-ST elevated myocardial infarction) (CMD)    Tobacco dependency    Coronary artery disease due to lipid rich plaque    Encounter for routine adult health examination without abnormal findings    Grade I hemorrhoids    Class 1 obesity due to excess calories with serious comorbidity and body mass index (BMI) of 31.0 to 31.9 in adult    COVID-19 vaccination refused    Refused influenza vaccine    Refused varicella vaccine    Refuses tetanus, diphtheria, and acellular pertussis (Tdap) vaccination    Elevated ALT measurement    Prediabetes    Vitamin D insufficiency    HERNANDEZ (nonalcoholic steatohepatitis)    Noncompliance    Side effects of treatment    CAD in native artery    Precordial pain    Major depressive disorder, recurrent episode, moderate (CMD)    Bronchitis with bronchospasm       Current Outpatient Medications   Medication Sig Dispense Refill    benzonatate (TESSALON PERLES) 100 MG capsule Take 1 capsule by mouth 3 times daily as needed for Cough. 30 capsule 0    albuterol (VENTOLIN) (2.5 MG/3ML) 0.083% nebulizer solution Take 3 mLs by nebulization every 6 hours as needed for Wheezing or Shortness of Breath. 75 mL 1     azithromycin (ZITHROMAX) 250 MG tablet Take 2 tablets by mouth daily for 1 day, THEN 1 tablet daily for 4 days. 6 tablet 0    Respiratory Therapy Supplies (Nebulizer/Tubing/Mouthpiece) Kit Nebulizer machine and adult mask with tubing 1 kit 0    clopidogrel (Plavix) 75 MG tablet Take 8 tablets (600 mg loading dose) on 24, then take 1 tablet (75 mg) daily thereafter. 38 tablet 11    furosemide (LASIX) 20 MG tablet Take 1 tablet by mouth daily. 30 tablet 0    ezetimibe (ZETIA) 10 MG tablet Take 1 tablet by mouth daily. 90 tablet 3    metoPROLOL succinate (TOPROL-XL) 50 MG 24 hr tablet Take 1 tablet by mouth 2 times daily. 180 tablet 1    omeprazole (PrilOSEC) 20 MG capsule Take 1 capsule by mouth daily. 90 capsule 1    aspirin (ECOTRIN) 81 MG EC tablet Take 1 tablet by mouth daily. 90 tablet 1     No current facility-administered medications for this visit.       Past Medical History:   Diagnosis Date    Coronary artery disease     Essential (primary) hypertension     Hyperlipoproteinemia        Social History     Tobacco Use   Smoking Status Former    Current packs/day: 0.00    Types: Cigarettes    Quit date: 2022    Years since quittin.0   Smokeless Tobacco Never       Family History   Problem Relation Age of Onset    Patient is unaware of any medical problems Mother     Patient is unaware of any medical problems Father        Past Surgical History:   Procedure Laterality Date    Appendectomy      Cabg, vein, three      Cardiac catherization      Coronary artery bypass graft  2022    CABG x 3    Repair ing hernia,5+y/o,reducibl      Varicose vein surgery Left        ALLERGIES:   Allergen Reactions    Statins DIARRHEA     Diarrhea, joint pain, rectal bleeding       Visit Vitals  /71 (BP Location: LUE - Left upper extremity, Patient Position: Sitting, Cuff Size: Regular)   Pulse 64   Temp 97.1 °F (36.2 °C)   Wt 113.5 kg (250 lb 1.8 oz)   BMI 32.11 kg/m²       Physical Exam  Vitals and  nursing note reviewed. Exam conducted with a chaperone present.   Constitutional:       General: He is not in acute distress.     Appearance: Normal appearance. He is not ill-appearing.   Cardiovascular:      Rate and Rhythm: Normal rate and regular rhythm.      Pulses: Normal pulses.      Heart sounds: Normal heart sounds. No murmur heard.  Pulmonary:      Breath sounds: Rhonchi present.      Comments: Paroxysmal, forceful cough presents. Dry.    Neurological:      General: No focal deficit present.      Mental Status: He is alert and oriented to person, place, and time.         BMP  Sodium (mmol/L)   Date Value   01/16/2024 138     Potassium (mmol/L)   Date Value   01/16/2024 4.4     Chloride (mmol/L)   Date Value   01/16/2024 111 (H)     Glucose (mg/dL)   Date Value   01/16/2024 109 (H)     Calcium (mg/dL)   Date Value   01/16/2024 9.0     Carbon Dioxide (mmol/L)   Date Value   01/16/2024 27     BUN (mg/dL)   Date Value   01/16/2024 15     Creatinine (mg/dL)   Date Value   01/16/2024 1.04     No results found for: \"GFRA\"  No results found for: \"GFRNA\"    LIPIDS  Cholesterol (mg/dL)   Date Value   01/16/2024 254 (H)     HDL (mg/dL)   Date Value   01/16/2024 46     Cholesterol/ HDL Ratio (no units)   Date Value   01/16/2024 5.5 (H)     Triglycerides (mg/dL)   Date Value   01/16/2024 133     LDL (mg/dL)   Date Value   01/16/2024 181 (H)       MICROALBUMIN  Microalbumin/ Creatinine Ratio (mg/g)   Date Value   01/24/2023 10.8       CBC  WBC (K/mcL)   Date Value   01/16/2024 6.8     RBC (mil/mcL)   Date Value   01/16/2024 4.73     HCT (%)   Date Value   01/16/2024 40.2     HGB (g/dL)   Date Value   01/16/2024 13.8     PLT (K/mcL)   Date Value   01/16/2024 219       A1C  Hemoglobin A1C (%)   Date Value   01/24/2023 6.1 (H)         There is no immunization history on file for this patient.     Plan:  David was seen today for cough.    Diagnoses and all orders for this visit:    Bronchitis with bronchospasm  -      COVID/Flu/RSV panel  -     albuterol (VENTOLIN) nebulizer 2.5 mg. Lung fields improved after the nebulizer treatment.  -     benzonatate (TESSALON PERLES) 100 MG capsule; Take 1 capsule by mouth 3 times daily as needed for Cough.  -     albuterol (VENTOLIN) (2.5 MG/3ML) 0.083% nebulizer solution; Take 3 mLs by nebulization every 6 hours as needed for Wheezing or Shortness of Breath.  -     azithromycin (ZITHROMAX) 250 MG tablet; Take 2 tablets by mouth daily for 1 day, THEN 1 tablet daily for 4 days.  -     Respiratory Therapy Supplies (Nebulizer/Tubing/Mouthpiece) Kit; Nebulizer machine and adult mask with tubing    Major depressive disorder, recurrent episode, moderate (CMD)  - we discussed starting medications today, such as pristique. Pt would like to wait to see his PCP. Primoies SI/HI        Advised pt to return sooner if symptoms do not improve, or go to the emergency room.    I spent a total of 30 minutes on this encounter. Greater than 50% of time spent was spent on counseling and coordination of care.This includes pre-charting, chart review, documentation, and communicating with other health care professionals.     Note to patient: The 21st Century Cures Act requires that medical notes like this be available to patients in the interest of transparency. However, be advised this is a medical document. It is intended as peer to peer communication. It is written in medical language and may contain abbreviations or verbiage that are unfamiliar. It may appear blunt or direct. Medical documents are intended to carry relevant information, facts as evident, and the clinical opinion of the practitioner.    Electronically signed by:  Mandy Burkett MD  2/2/2024    Resident

## 2024-03-31 NOTE — H&P ADULT - PROBLEM SELECTOR PLAN 5
Home metformin 1000mg bid  -Low insulin sliding scale  -DASH/Diabetic diet  -Follow up A1c in AM  -Follow up lipid profile AM Home metformin but need to confirm dosing  -Low insulin sliding scale  -DASH/Diabetic diet  -Follow up A1c in AM  -Follow up lipid profile AM

## 2024-04-01 ENCOUNTER — TRANSCRIPTION ENCOUNTER (OUTPATIENT)
Age: 84
End: 2024-04-01

## 2024-04-01 VITALS
TEMPERATURE: 98 F | OXYGEN SATURATION: 97 % | DIASTOLIC BLOOD PRESSURE: 70 MMHG | SYSTOLIC BLOOD PRESSURE: 156 MMHG | HEART RATE: 52 BPM | RESPIRATION RATE: 18 BRPM

## 2024-04-01 LAB
ALBUMIN SERPL ELPH-MCNC: 3.8 G/DL — SIGNIFICANT CHANGE UP (ref 3.3–5)
ALP SERPL-CCNC: 52 U/L — SIGNIFICANT CHANGE UP (ref 40–120)
ALT FLD-CCNC: <5 U/L — LOW (ref 10–45)
ANION GAP SERPL CALC-SCNC: 11 MMOL/L — SIGNIFICANT CHANGE UP (ref 5–17)
AST SERPL-CCNC: 16 U/L — SIGNIFICANT CHANGE UP (ref 10–40)
BASOPHILS # BLD AUTO: 0.02 K/UL — SIGNIFICANT CHANGE UP (ref 0–0.2)
BASOPHILS NFR BLD AUTO: 2.6 % — HIGH (ref 0–2)
BILIRUB SERPL-MCNC: 0.4 MG/DL — SIGNIFICANT CHANGE UP (ref 0.2–1.2)
BUN SERPL-MCNC: 20 MG/DL — SIGNIFICANT CHANGE UP (ref 7–23)
CALCIUM SERPL-MCNC: 9.4 MG/DL — SIGNIFICANT CHANGE UP (ref 8.4–10.5)
CHLORIDE SERPL-SCNC: 105 MMOL/L — SIGNIFICANT CHANGE UP (ref 96–108)
CO2 SERPL-SCNC: 24 MMOL/L — SIGNIFICANT CHANGE UP (ref 22–31)
CREAT SERPL-MCNC: 0.96 MG/DL — SIGNIFICANT CHANGE UP (ref 0.5–1.3)
EGFR: 78 ML/MIN/1.73M2 — SIGNIFICANT CHANGE UP
EOSINOPHIL # BLD AUTO: 0 K/UL — SIGNIFICANT CHANGE UP (ref 0–0.5)
EOSINOPHIL NFR BLD AUTO: 0 % — SIGNIFICANT CHANGE UP (ref 0–6)
GIANT PLATELETS BLD QL SMEAR: PRESENT — SIGNIFICANT CHANGE UP
GLUCOSE BLDC GLUCOMTR-MCNC: 100 MG/DL — HIGH (ref 70–99)
GLUCOSE BLDC GLUCOMTR-MCNC: 92 MG/DL — SIGNIFICANT CHANGE UP (ref 70–99)
GLUCOSE SERPL-MCNC: 94 MG/DL — SIGNIFICANT CHANGE UP (ref 70–99)
HCT VFR BLD CALC: 29.7 % — LOW (ref 39–50)
HGB BLD-MCNC: 9.6 G/DL — LOW (ref 13–17)
LYMPHOCYTES # BLD AUTO: 0.39 K/UL — LOW (ref 1–3.3)
LYMPHOCYTES # BLD AUTO: 61.1 % — HIGH (ref 13–44)
MAGNESIUM SERPL-MCNC: 1.8 MG/DL — SIGNIFICANT CHANGE UP (ref 1.6–2.6)
MANUAL SMEAR VERIFICATION: SIGNIFICANT CHANGE UP
MCHC RBC-ENTMCNC: 30.3 PG — SIGNIFICANT CHANGE UP (ref 27–34)
MCHC RBC-ENTMCNC: 32.3 GM/DL — SIGNIFICANT CHANGE UP (ref 32–36)
MCV RBC AUTO: 93.7 FL — SIGNIFICANT CHANGE UP (ref 80–100)
MONOCYTES # BLD AUTO: 0.07 K/UL — SIGNIFICANT CHANGE UP (ref 0–0.9)
MONOCYTES NFR BLD AUTO: 10.6 % — SIGNIFICANT CHANGE UP (ref 2–14)
MYELOCYTES NFR BLD: 0.9 % — HIGH (ref 0–0)
NEUTROPHILS # BLD AUTO: 0.16 K/UL — LOW (ref 1.8–7.4)
NEUTROPHILS NFR BLD AUTO: 24.8 % — LOW (ref 43–77)
PHOSPHATE SERPL-MCNC: 2.5 MG/DL — SIGNIFICANT CHANGE UP (ref 2.5–4.5)
PLAT MORPH BLD: ABNORMAL
PLATELET # BLD AUTO: 27 K/UL — LOW (ref 150–400)
POTASSIUM SERPL-MCNC: 4.1 MMOL/L — SIGNIFICANT CHANGE UP (ref 3.5–5.3)
POTASSIUM SERPL-SCNC: 4.1 MMOL/L — SIGNIFICANT CHANGE UP (ref 3.5–5.3)
PROT SERPL-MCNC: 6.7 G/DL — SIGNIFICANT CHANGE UP (ref 6–8.3)
RBC # BLD: 3.17 M/UL — LOW (ref 4.2–5.8)
RBC # FLD: 19.9 % — HIGH (ref 10.3–14.5)
RBC BLD AUTO: SIGNIFICANT CHANGE UP
SODIUM SERPL-SCNC: 140 MMOL/L — SIGNIFICANT CHANGE UP (ref 135–145)
WBC # BLD: 0.64 K/UL — CRITICAL LOW (ref 3.8–10.5)
WBC # FLD AUTO: 0.64 K/UL — CRITICAL LOW (ref 3.8–10.5)

## 2024-04-01 PROCEDURE — 81003 URINALYSIS AUTO W/O SCOPE: CPT

## 2024-04-01 PROCEDURE — 80053 COMPREHEN METABOLIC PANEL: CPT

## 2024-04-01 PROCEDURE — 84132 ASSAY OF SERUM POTASSIUM: CPT

## 2024-04-01 PROCEDURE — 84484 ASSAY OF TROPONIN QUANT: CPT

## 2024-04-01 PROCEDURE — 85610 PROTHROMBIN TIME: CPT

## 2024-04-01 PROCEDURE — 70450 CT HEAD/BRAIN W/O DYE: CPT | Mod: MC

## 2024-04-01 PROCEDURE — 93005 ELECTROCARDIOGRAM TRACING: CPT

## 2024-04-01 PROCEDURE — 85014 HEMATOCRIT: CPT

## 2024-04-01 PROCEDURE — 84100 ASSAY OF PHOSPHORUS: CPT

## 2024-04-01 PROCEDURE — 86923 COMPATIBILITY TEST ELECTRIC: CPT

## 2024-04-01 PROCEDURE — 82947 ASSAY GLUCOSE BLOOD QUANT: CPT

## 2024-04-01 PROCEDURE — 83605 ASSAY OF LACTIC ACID: CPT

## 2024-04-01 PROCEDURE — 82570 ASSAY OF URINE CREATININE: CPT

## 2024-04-01 PROCEDURE — 96360 HYDRATION IV INFUSION INIT: CPT

## 2024-04-01 PROCEDURE — 82330 ASSAY OF CALCIUM: CPT

## 2024-04-01 PROCEDURE — 85018 HEMOGLOBIN: CPT

## 2024-04-01 PROCEDURE — 82803 BLOOD GASES ANY COMBINATION: CPT

## 2024-04-01 PROCEDURE — 86850 RBC ANTIBODY SCREEN: CPT

## 2024-04-01 PROCEDURE — 71045 X-RAY EXAM CHEST 1 VIEW: CPT

## 2024-04-01 PROCEDURE — 86901 BLOOD TYPING SEROLOGIC RH(D): CPT

## 2024-04-01 PROCEDURE — 84295 ASSAY OF SERUM SODIUM: CPT

## 2024-04-01 PROCEDURE — 99231 SBSQ HOSP IP/OBS SF/LOW 25: CPT

## 2024-04-01 PROCEDURE — 36430 TRANSFUSION BLD/BLD COMPNT: CPT

## 2024-04-01 PROCEDURE — 84300 ASSAY OF URINE SODIUM: CPT

## 2024-04-01 PROCEDURE — 83735 ASSAY OF MAGNESIUM: CPT

## 2024-04-01 PROCEDURE — P9040: CPT

## 2024-04-01 PROCEDURE — 85025 COMPLETE CBC W/AUTO DIFF WBC: CPT

## 2024-04-01 PROCEDURE — P9016: CPT

## 2024-04-01 PROCEDURE — 86900 BLOOD TYPING SEROLOGIC ABO: CPT

## 2024-04-01 PROCEDURE — 84443 ASSAY THYROID STIM HORMONE: CPT

## 2024-04-01 PROCEDURE — 82962 GLUCOSE BLOOD TEST: CPT

## 2024-04-01 PROCEDURE — 85027 COMPLETE CBC AUTOMATED: CPT

## 2024-04-01 PROCEDURE — 99285 EMERGENCY DEPT VISIT HI MDM: CPT | Mod: 25

## 2024-04-01 PROCEDURE — 83010 ASSAY OF HAPTOGLOBIN QUANT: CPT

## 2024-04-01 PROCEDURE — 82435 ASSAY OF BLOOD CHLORIDE: CPT

## 2024-04-01 PROCEDURE — 83615 LACTATE (LD) (LDH) ENZYME: CPT

## 2024-04-01 PROCEDURE — 84156 ASSAY OF PROTEIN URINE: CPT

## 2024-04-01 PROCEDURE — 85730 THROMBOPLASTIN TIME PARTIAL: CPT

## 2024-04-01 PROCEDURE — 85045 AUTOMATED RETICULOCYTE COUNT: CPT

## 2024-04-01 PROCEDURE — 36415 COLL VENOUS BLD VENIPUNCTURE: CPT

## 2024-04-01 RX ORDER — FENOFIBRATE,MICRONIZED 130 MG
48 CAPSULE ORAL DAILY
Refills: 0 | Status: DISCONTINUED | OUTPATIENT
Start: 2024-04-01 | End: 2024-04-01

## 2024-04-01 RX ORDER — LEVOFLOXACIN 5 MG/ML
1 INJECTION, SOLUTION INTRAVENOUS
Qty: 30 | Refills: 0
Start: 2024-04-01 | End: 2024-04-30

## 2024-04-01 RX ORDER — AMLODIPINE BESYLATE 2.5 MG/1
10 TABLET ORAL DAILY
Refills: 0 | Status: DISCONTINUED | OUTPATIENT
Start: 2024-04-01 | End: 2024-04-01

## 2024-04-01 RX ORDER — ACYCLOVIR SODIUM 500 MG
1 VIAL (EA) INTRAVENOUS
Qty: 60 | Refills: 0
Start: 2024-04-01 | End: 2024-04-30

## 2024-04-01 RX ORDER — FLUCONAZOLE 150 MG/1
1 TABLET ORAL
Qty: 30 | Refills: 0
Start: 2024-04-01 | End: 2024-04-30

## 2024-04-01 RX ORDER — METOPROLOL TARTRATE 50 MG
50 TABLET ORAL DAILY
Refills: 0 | Status: DISCONTINUED | OUTPATIENT
Start: 2024-04-01 | End: 2024-04-01

## 2024-04-01 RX ORDER — TAMSULOSIN HYDROCHLORIDE 0.4 MG/1
0.4 CAPSULE ORAL AT BEDTIME
Refills: 0 | Status: DISCONTINUED | OUTPATIENT
Start: 2024-04-01 | End: 2024-04-01

## 2024-04-01 RX ADMIN — PANTOPRAZOLE SODIUM 40 MILLIGRAM(S): 20 TABLET, DELAYED RELEASE ORAL at 06:09

## 2024-04-01 RX ADMIN — FLUCONAZOLE 200 MILLIGRAM(S): 150 TABLET ORAL at 12:14

## 2024-04-01 RX ADMIN — ESCITALOPRAM OXALATE 10 MILLIGRAM(S): 10 TABLET, FILM COATED ORAL at 12:14

## 2024-04-01 RX ADMIN — Medication 1 MILLIGRAM(S): at 12:14

## 2024-04-01 RX ADMIN — Medication 48 MILLIGRAM(S): at 14:20

## 2024-04-01 RX ADMIN — Medication 325 MILLIGRAM(S): at 12:14

## 2024-04-01 RX ADMIN — AMLODIPINE BESYLATE 10 MILLIGRAM(S): 2.5 TABLET ORAL at 08:54

## 2024-04-01 RX ADMIN — Medication 50 MILLIGRAM(S): at 08:54

## 2024-04-01 RX ADMIN — Medication 400 MILLIGRAM(S): at 15:47

## 2024-04-01 RX ADMIN — Medication 400 MILLIGRAM(S): at 06:09

## 2024-04-01 RX ADMIN — Medication 50 MICROGRAM(S): at 05:02

## 2024-04-01 NOTE — PROGRESS NOTE ADULT - PROBLEM SELECTOR PLAN 6
History of CAD on aspirin every other day, as well as history of TAVR.  -Hold aspirin for now  -Continue to monitor
History of CAD on aspirin every other day, as well as history of TAVR.  -Hold aspirin for now  -Continue to monitor

## 2024-04-01 NOTE — PROGRESS NOTE ADULT - SUBJECTIVE AND OBJECTIVE BOX
INTERNAL MEDICINE PROGRESS NOTE  Deep Andrade MD  Please contact via TEAMS    Patient is a 83y old  Male who presents with a chief complaint of Anemia (31 Mar 2024 08:23)      SUBJECTIVE / OVERNIGHT EVENTS::  No acute overnight events. Pt seen and examined. Denies fevers, chills, CP, SOB, Abdominal pain, N/V, Constipation, Diarrhea      =================Meds=================  MEDICATIONS  (STANDING):  acyclovir   Oral Tab/Cap 400 milliGRAM(s) Oral two times a day  dextrose 5%. 1000 milliLiter(s) (100 mL/Hr) IV Continuous <Continuous>  dextrose 5%. 1000 milliLiter(s) (50 mL/Hr) IV Continuous <Continuous>  dextrose 50% Injectable 25 Gram(s) IV Push once  dextrose 50% Injectable 25 Gram(s) IV Push once  dextrose 50% Injectable 12.5 Gram(s) IV Push once  escitalopram 10 milliGRAM(s) Oral daily  ferrous    sulfate 325 milliGRAM(s) Oral daily  fluconAZOLE   Tablet 200 milliGRAM(s) Oral daily  folic acid 1 milliGRAM(s) Oral daily  glucagon  Injectable 1 milliGRAM(s) IntraMuscular once  influenza  Vaccine (HIGH DOSE) 0.7 milliLiter(s) IntraMuscular once  insulin lispro (ADMELOG) corrective regimen sliding scale   SubCutaneous three times a day before meals  insulin lispro (ADMELOG) corrective regimen sliding scale   SubCutaneous at bedtime  levoFLOXacin  Tablet 500 milliGRAM(s) Oral every 24 hours  levothyroxine 50 MICROGram(s) Oral daily  melatonin 3 milliGRAM(s) Oral at bedtime  pantoprazole  Injectable 40 milliGRAM(s) IV Push every 12 hours    MEDICATIONS  (PRN):  dextrose Oral Gel 15 Gram(s) Oral once PRN Blood Glucose LESS THAN 70 milliGRAM(s)/deciliter      I&O's Summary      =================Vitals=================  Vital Signs Last 24 Hrs  T(C): 36.4 (31 Mar 2024 23:20), Max: 36.7 (31 Mar 2024 13:42)  T(F): 97.5 (31 Mar 2024 23:20), Max: 98.1 (31 Mar 2024 13:42)  HR: 50 (31 Mar 2024 23:20) (50 - 69)  BP: 165/69 (31 Mar 2024 23:20) (136/67 - 165/69)  BP(mean): --  RR: 18 (31 Mar 2024 23:20) (16 - 18)  SpO2: 96% (31 Mar 2024 23:20) (96% - 99%)    Parameters below as of 31 Mar 2024 23:20  Patient On (Oxygen Delivery Method): room air        =================PHYSICAL EXAM=================  GENERAL: Laying comfortably, NAD  EYES: EOMI, PERRL, no scleral icterus  NECK: No JVD  LUNG: Clear to auscultation bilaterally; No wheeze, crackles or rhonci  HEART: Regular rate and rhythm; No murmurs, rubs, or gallops  ABDOMEN: Soft, Nontender, Nondistended  EXTREMITIES:  No LE edema, 2+ Peripheral Pulses, No clubbing, cyanosis, or edema  PSYCH: AAOx3  NEUROLOGY: non-focal, strength 5/5 in all extremities, sensation intact  SKIN: No rashes or lesions      ===================LABS=======================                        7.4    0.56  )-----------( 26       ( 31 Mar 2024 12:33 )             22.4     Auto Eosinophil # x     / Auto Eosinophil % x     / Auto Neutrophil # x     / Auto Neutrophil % x     / BANDS % x                            6.1    0.65  )-----------( 27       ( 31 Mar 2024 02:13 )             18.5     Auto Eosinophil # x     / Auto Eosinophil % x     / Auto Neutrophil # x     / Auto Neutrophil % x     / BANDS % x                            3.4    0.57  )-----------( 30       ( 30 Mar 2024 20:07 )             11.1     Auto Eosinophil # 0.00  / Auto Eosinophil % 0.0   / Auto Neutrophil # 0.27  / Auto Neutrophil % 44.0  / BANDS % 4.0      03-31    138  |  105  |  38<H>  ----------------------------<  95  4.4   |  21<L>  |  1.12      139  |  101  |  56<H>  ----------------------------<  126<H>  4.7   |  22  |  1.59<H>    Ca    8.8      31 Mar 2024 12:33  Mg     1.9       Phos  2.9       TPro  6.2  /  Alb  3.5  /  TBili  0.2  /  DBili  x   /  AST  12  /  ALT  6<L>  /  AlkPhos  47    TPro  6.5  /  Alb  3.5  /  TBili  0.1<L>  /  DBili  x   /  AST  14  /  ALT  8<L>  /  AlkPhos  44      PT/INR - ( 30 Mar 2024 20:07 )   PT: 13.6 sec;   INR: 1.31 ratio         PTT - ( 30 Mar 2024 20:07 )  PTT:27.4 sec      Urinalysis Basic - ( 31 Mar 2024 13:40 )    Color: Yellow / Appearance: Clear / S.011 / pH: x  Gluc: x / Ketone: Negative mg/dL  / Bili: Negative / Urobili: 0.2 mg/dL   Blood: x / Protein: Negative mg/dL / Nitrite: Negative   Leuk Esterase: Negative / RBC: x / WBC x   Sq Epi: x / Non Sq Epi: x / Bacteria: x      Lactate, Blood: 2.4 mmol/L ( @ 22:50)      ===============Radiology & Additional Tests==================  Reviewed.      
INTERNAL MEDICINE PROGRESS NOTE  Deep Andrade MD  Please contact via TEAMS    Patient is a 83y old  Male who presents with a chief complaint of Anemia (31 Mar 2024 02:16)      SUBJECTIVE / OVERNIGHT EVENTS::  No acute overnight events. Pt seen and examined. Denies fevers, chills, CP, SOB, Abdominal pain, N/V, Constipation, Diarrhea      =================Meds=================  MEDICATIONS  (STANDING):  dextrose 5%. 1000 milliLiter(s) (100 mL/Hr) IV Continuous <Continuous>  dextrose 5%. 1000 milliLiter(s) (50 mL/Hr) IV Continuous <Continuous>  dextrose 50% Injectable 25 Gram(s) IV Push once  dextrose 50% Injectable 25 Gram(s) IV Push once  dextrose 50% Injectable 12.5 Gram(s) IV Push once  escitalopram 10 milliGRAM(s) Oral daily  ferrous    sulfate 325 milliGRAM(s) Oral daily  folic acid 1 milliGRAM(s) Oral daily  glucagon  Injectable 1 milliGRAM(s) IntraMuscular once  influenza  Vaccine (HIGH DOSE) 0.7 milliLiter(s) IntraMuscular once  insulin lispro (ADMELOG) corrective regimen sliding scale   SubCutaneous three times a day before meals  insulin lispro (ADMELOG) corrective regimen sliding scale   SubCutaneous at bedtime  levothyroxine 50 MICROGram(s) Oral daily  melatonin 3 milliGRAM(s) Oral at bedtime  pantoprazole  Injectable 40 milliGRAM(s) IV Push every 12 hours    MEDICATIONS  (PRN):  dextrose Oral Gel 15 Gram(s) Oral once PRN Blood Glucose LESS THAN 70 milliGRAM(s)/deciliter      I&O's Summary    30 Mar 2024 07:01  -  31 Mar 2024 07:00  --------------------------------------------------------  IN: 0 mL / OUT: 1 mL / NET: -1 mL        =================Vitals=================  Vital Signs Last 24 Hrs  T(C): 36.8 (31 Mar 2024 06:55), Max: 36.8 (31 Mar 2024 06:55)  T(F): 98.2 (31 Mar 2024 06:55), Max: 98.2 (31 Mar 2024 06:55)  HR: 59 (31 Mar 2024 06:55) (56 - 78)  BP: 132/68 (31 Mar 2024 06:55) (97/47 - 132/68)  BP(mean): 72 (31 Mar 2024 00:08) (62 - 78)  RR: 18 (31 Mar 2024 06:55) (15 - 18)  SpO2: 98% (31 Mar 2024 06:55) (95% - 100%)    Parameters below as of 31 Mar 2024 06:55  Patient On (Oxygen Delivery Method): room air        =================PHYSICAL EXAM=================  GENERAL: Laying comfortably, NAD  EYES: EOMI, PERRL, no scleral icterus  NECK: No JVD  LUNG: Clear to auscultation bilaterally; No wheeze, crackles or rhonci  HEART: Regular rate and rhythm; No murmurs, rubs, or gallops  ABDOMEN: Soft, Nontender, Nondistended  EXTREMITIES:  No LE edema, 2+ Peripheral Pulses, No clubbing, cyanosis, or edema  PSYCH: AAOx3  NEUROLOGY: non-focal, strength 5/5 in all extremities, sensation intact  SKIN: No rashes or lesions      ===================LABS=======================                        6.1    0.65  )-----------( 27       ( 31 Mar 2024 02:13 )             18.5     Auto Eosinophil # x     / Auto Eosinophil % x     / Auto Neutrophil # x     / Auto Neutrophil % x     / BANDS % x                            3.4    0.57  )-----------( 30       ( 30 Mar 2024 20:07 )             11.1     Auto Eosinophil # 0.00  / Auto Eosinophil % 0.0   / Auto Neutrophil # 0.27  / Auto Neutrophil % 44.0  / BANDS % 4.0      03-30    139  |  101  |  56<H>  ----------------------------<  126<H>  4.7   |  22  |  1.59<H>    Ca    9.4      30 Mar 2024 20:07  Mg     2.0     03-30  Phos  4.4     03-30  TPro  6.5  /  Alb  3.5  /  TBili  0.1<L>  /  DBili  x   /  AST  14  /  ALT  8<L>  /  AlkPhos  44  03-30    PT/INR - ( 30 Mar 2024 20:07 )   PT: 13.6 sec;   INR: 1.31 ratio         PTT - ( 30 Mar 2024 20:07 )  PTT:27.4 sec      Urinalysis Basic - ( 30 Mar 2024 20:07 )    Color: x / Appearance: x / SG: x / pH: x  Gluc: 126 mg/dL / Ketone: x  / Bili: x / Urobili: x   Blood: x / Protein: x / Nitrite: x   Leuk Esterase: x / RBC: x / WBC x   Sq Epi: x / Non Sq Epi: x / Bacteria: x      Lactate, Blood: 2.4 mmol/L (03-30 @ 22:50)      ===============Radiology & Additional Tests==================  Reviewed.

## 2024-04-01 NOTE — PROGRESS NOTE ADULT - ATTENDING COMMENTS
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   Labs remarkable for pancytopenia WBC 0.57, Hgb 3.4; plt 30. Findings for symptomatic anemia and neutropenia requiring blood transfusion. Symptoms improved after transfusions and H/H stable      #Symptomatic anemia with pancytopenia  - s/p 5u PRBC transfusions, Hb 3.4->9.6. Plt 20s- no fever or bleed  -Symptoms resolved.   - appreciated Heme plans- on prophylactic antimicrobials as follows: levofloxacin 500mg PO qd, acyclovir 400mg PO BID, fluconazole 200mg PO qd   - close monitoring CBC and transfusions per rec.  -Per Heme/Onc, OK for discharge with close followup for repeat labwork    Dispo- Home today. Pt is stable and clear for discharge. I personally spent 32 minutes on discharge planning for this patient. 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   Labs remarkable for pancytopenia WBC 0.57, Hgb 3.4; plt 30. Findings for symptomatic anemia and neutropenia requiring blood transfusion. Symptoms improved after transfusions and H/H stable      #Symptomatic anemia with pancytopenia  - s/p 5u PRBC transfusions, Hb 3.4->9.6. Plt 20s- no fever or bleed  -Symptoms resolved.   - appreciated Heme plans- on prophylactic antimicrobials as follows: levofloxacin 500mg PO qd, acyclovir 400mg PO BID, fluconazole 200mg PO qd   - close monitoring CBC and transfusions per rec.  -Per Heme/Onc, OK for discharge with close followup for repeat labwork    Dispo- Home today. Pt is stable and clear for discharge. I personally spent 32 minutes on discharge planning for this patient.    Rest as per Dr. Andrade above.    The necessity of the time spent during the encounter on this date of service was due to:    - Ordering, reviewing, and/or interpreting labs, testing, and/or imaging  - Independently obtaining a review of systems and performing a physical exam  - Reviewing prior records and where necessary, outpatient records  - Counselling and educating patient and/or family regarding interpretation of aforementioned items and plan of care.

## 2024-04-01 NOTE — PROGRESS NOTE ADULT - PROBLEM SELECTOR PLAN 1
Hgb 3.4 and MCV >100 (consistent with macrocytic anemia) with reported chest tightness and shortness of breath that resolved. Now status post PRBC x3. Suspect anemia in setting of recent chemotherapy. No active bleeding reported, e.g. GI bleed or hematuria. However, has history of GI bleeds from AVM's  -Maintain active type and screen  -Follow up post-transfusion CBC  -Maintain Hgb >8 given history of CAD  -Folic acid 1mg po qd  -Ferrous iron supplement qd  -Protonix IV 40mg bid in case of possible occult bleed but consider de-escalation as appropriate
Hgb 3.4 and MCV >100 (consistent with macrocytic anemia) with reported chest tightness and shortness of breath that resolved. Now status post PRBC x5. Suspect anemia in setting of recent chemotherapy. No active bleeding reported, e.g. GI bleed or hematuria. However, has history of GI bleeds from AVM's  -Maintain active type and screen  -Follow up post-transfusion CBC  -Maintain Hgb >8 given history of CAD  -Folic acid 1mg po qd  -Ferrous iron supplement qd  -Protonix IV 40mg bid in case of possible occult bleed but consider de-escalation as appropriate

## 2024-04-01 NOTE — DISCHARGE NOTE PROVIDER - CARE PROVIDER_API CALL
Bay Velazco  Phone: [phone]  Fax: (   )    -  Established Patient  Follow Up Time: 1 week    NORRIS SERRATO Phys,    Phone: ([phone]  Fax: ()-  Established Patient  Follow Up Time: 1 week

## 2024-04-01 NOTE — PROGRESS NOTE ADULT - PROBLEM SELECTOR PLAN 4
History of MDS with recent chemotherapy ~6 weeks ago.  -Heme/onc team consulted; pending recs
History of MDS with recent chemotherapy ~6 weeks ago.  -Heme/onc team consulted; pending recs

## 2024-04-01 NOTE — DISCHARGE NOTE PROVIDER - HOSPITAL COURSE
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 His son came in the morning of admission to see patient and noted his father looked pale and tired, for which prompted a hospital visit. Patient reports absence fo fevers, chills, chest pain, nausea (except day prior to admission), emesis, diarrhea, hematemesis, black-colored poops, dysuria, hematuria, or LE edema. He shares of his chronic problem with needing blood transfusions (last blood transfusion ~ 1 week ago) because of poor blood cell production. As per chart review, patient was accompanied by his son, who reports that patient was in his usual state of health yesterday, but on admission noted his father felt weak, not able to walk, and appeared confused. Not on AC. PMD: Dr. Payam Klein ([facility]). His last blood transfusion was 5 days ago. No fever, cough, congestion, infectious symptoms.    ED course: Afebrile; HR 58-78 with ECG sinus rhythm without ST segment changes; BP /39-63; SpO2 100% on room air. Found to have pancytopenia with WBC 0.57; Hgb 3.4; plt 30. Negative troponin Received LR 500cc x1 and pantoprazole 80mg x1. PRBCx3. CXR negative. CT head with stable atrophy and old R lacunar infarct. Heme team will see patient as per chart ED note.    Hospital course: received 2 more PRBCs. Seen by heme onc who commented on his pancytopenia due to MDS which can be exacerbated i/s/o recent chemotherapy. Pt remained asymptomatic and stable throughout hospital admission.     Pt was ready for discharge on ___ with close outpatient heme/onc outpatient f/u.    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 His son came in the morning of admission to see patient and noted his father looked pale and tired, for which prompted a hospital visit. Patient reports absence fo fevers, chills, chest pain, nausea (except day prior to admission), emesis, diarrhea, hematemesis, black-colored poops, dysuria, hematuria, or LE edema. He shares of his chronic problem with needing blood transfusions (last blood transfusion ~ 1 week ago) because of poor blood cell production. As per chart review, patient was accompanied by his son, who reports that patient was in his usual state of health yesterday, but on admission noted his father felt weak, not able to walk, and appeared confused. Not on AC. PMD: Dr. Payam Klein ([facility]). His last blood transfusion was 5 days ago. No fever, cough, congestion, infectious symptoms.    ED course: Afebrile; HR 58-78 with ECG sinus rhythm without ST segment changes; BP /39-63; SpO2 100% on room air. Found to have pancytopenia with WBC 0.57; Hgb 3.4; plt 30. Negative troponin Received LR 500cc x1 and pantoprazole 80mg x1. PRBCx3. CXR negative. CT head with stable atrophy and old R lacunar infarct. Heme team will see patient as per chart ED note.    Hospital course: received 2 more PRBCs. Seen by heme onc who commented on his pancytopenia due to MDS which can be exacerbated i/s/o recent chemotherapy. Pt remained asymptomatic and stable throughout hospital admission.     Pt was ready for discharge on 4/1/24 with close outpatient heme/onc outpatient f/u.    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 His son came in the morning of admission to see patient and noted his father looked pale and tired, for which prompted a hospital visit. Patient reports absence fo fevers, chills, chest pain, nausea (except day prior to admission), emesis, diarrhea, hematemesis, black-colored poops, dysuria, hematuria, or LE edema. He shares of his chronic problem with needing blood transfusions (last blood transfusion ~ 1 week ago) because of poor blood cell production. As per chart review, patient was accompanied by his son, who reports that patient was in his usual state of health yesterday, but on admission noted his father felt weak, not able to walk, and appeared confused. Not on AC. PMD: Dr. Payam Klein ([facility]). His last blood transfusion was 5 days ago. No fever, cough, congestion, infectious symptoms.    ED course: Afebrile; HR 58-78 with ECG sinus rhythm without ST segment changes; BP /39-63; SpO2 100% on room air. Found to have pancytopenia with WBC 0.57; Hgb 3.4; plt 30. Negative troponin Received LR 500cc x1 and pantoprazole 80mg x1. PRBCx3. CXR negative. CT head with stable atrophy and old R lacunar infarct. Heme team will see patient as per chart ED note.    Hospital course: received 2 more units PRBCs. Seen by heme onc who commented on his pancytopenia due to MDS which can be exacerbated i/s/o recent chemotherapy. Pt remained asymptomatic and stable throughout hospital admission.     Pt was ready for discharge on 4/1/24 with close outpatient heme/onc outpatient f/u.    Medication changes:   + levoFLOXacin 500 mg oral tablet: 1 tab(s) orally every 24 hours  + fluconazole 200 mg oral tablet: 1 tab(s) orally once a day  + acyclovir 400 mg oral tablet: 1 tab(s) orally 2 times a day

## 2024-04-01 NOTE — PROGRESS NOTE ADULT - PROBLEM SELECTOR PLAN 7
On home amlodipine 10mg and metoprolol 50mg x1   -Hold BP medications for now given soft BP's on admission
On home amlodipine 10mg and metoprolol 50mg x1   -Hold BP medications for now given soft BP's on admission

## 2024-04-01 NOTE — PROGRESS NOTE ADULT - PROBLEM SELECTOR PLAN 3
Baseline SCr appears normal ~1. However, Cr ~1.59 in setting of pancytopenia with Hgb 3.4. Suspect pre-renal BHUPENDRA  -Replete electrolytes  -IVF as appropriate  -Avoid nephrotoxins and renally dose medications
Baseline SCr appears normal ~1. However, Cr ~1.59 in setting of pancytopenia with Hgb 3.4. Suspect pre-renal BHUPENDRA  -Replete electrolytes  -IVF as appropriate  -Avoid nephrotoxins and renally dose medications

## 2024-04-01 NOTE — DISCHARGE NOTE PROVIDER - NSDCMRMEDTOKEN_GEN_ALL_CORE_FT
amLODIPine 10 mg oral tablet: 1 tab(s) orally once a day  aspirin 81 mg oral tablet: 1 tab(s) orally every other day  doxazosin 2 mg oral tablet: 1 tab(s) orally once a day (at bedtime)  fenofibrate 54 mg oral tablet: 1 tab(s) orally once a day  ferrous sulfate 325 mg (65 mg elemental iron) oral tablet: 1 tab(s) orally once a day  folic acid 1 mg oral tablet: 1 tab(s) orally once a day  levothyroxine 50 mcg (0.05 mg) oral tablet: 1 tab(s) orally once a day  Lexapro 10 mg oral tablet: 1 tab(s) orally once a day  melatonin 3 mg oral tablet: 1 tab(s) orally once a day (at bedtime)  metFORMIN 500 mg oral tablet, extended release: 1 tab(s) orally 2 times a day  metoprolol succinate 50 mg oral capsule, extended release: 1 cap(s) orally once a day  pantoprazole 40 mg oral delayed release tablet: 1 tab(s) orally once a day  senna (sennosides) 17 mg oral tablet: 1 tab(s) orally 2 times a day  tamsulosin 0.4 mg oral capsule: 1 cap(s) orally every other day   acyclovir 400 mg oral tablet: 1 tab(s) orally 2 times a day  amLODIPine 10 mg oral tablet: 1 tab(s) orally once a day  aspirin 81 mg oral tablet: 1 tab(s) orally every other day  doxazosin 2 mg oral tablet: 1 tab(s) orally once a day (at bedtime)  fenofibrate 54 mg oral tablet: 1 tab(s) orally once a day  ferrous sulfate 325 mg (65 mg elemental iron) oral tablet: 1 tab(s) orally once a day  fluconazole 200 mg oral tablet: 1 tab(s) orally once a day  folic acid 1 mg oral tablet: 1 tab(s) orally once a day  levoFLOXacin 500 mg oral tablet: 1 tab(s) orally every 24 hours  levothyroxine 50 mcg (0.05 mg) oral tablet: 1 tab(s) orally once a day  Lexapro 10 mg oral tablet: 1 tab(s) orally once a day  melatonin 3 mg oral tablet: 1 tab(s) orally once a day (at bedtime)  metFORMIN 500 mg oral tablet, extended release: 1 tab(s) orally 2 times a day  metoprolol succinate 50 mg oral capsule, extended release: 1 cap(s) orally once a day  pantoprazole 40 mg oral delayed release tablet: 1 tab(s) orally once a day  senna (sennosides) 17 mg oral tablet: 1 tab(s) orally 2 times a day  tamsulosin 0.4 mg oral capsule: 1 cap(s) orally every other day

## 2024-04-01 NOTE — DISCHARGE NOTE NURSING/CASE MANAGEMENT/SOCIAL WORK - NSDCPEFALRISK_GEN_ALL_CORE
For information on Fall & Injury Prevention, visit: [email or web address] OR  [email or web address] OR  https://www.cdc.gov/steadi/patient.html

## 2024-04-01 NOTE — DISCHARGE NOTE PROVIDER - NSDCCPCAREPLAN_GEN_ALL_CORE_FT
PRINCIPAL DISCHARGE DIAGNOSIS  Diagnosis: Symptomatic anemia  Assessment and Plan of Treatment: You came in with severely low hemoglobin that presented with symptoms of weakness and shortness of breath. Our hematology/oncology doctors saw you in the hospital and concluded this anemia is due to your myelodysplastic syndrome (MDS) coupled with recent chemotherapy. You received 5 blood transfusion in an attempt to increase your hemoglobin to acceptable levels. While you were here, we monitored your labs to ensure your hemoglobin remains stable.  Please come back to the ED if you experience any new episodes of weakness, dizziness, loss of vision, or loss of consciousness.  Please follow up with Dr. Bay Velazco (hematology/oncology) after discharge.     PRINCIPAL DISCHARGE DIAGNOSIS  Diagnosis: Symptomatic anemia  Assessment and Plan of Treatment: You came in with severely low hemoglobin that presented with symptoms of weakness and shortness of breath. Our hematology/oncology doctors saw you in the hospital and concluded this anemia is due to your myelodysplastic syndrome (MDS) coupled with recent chemotherapy. You received 5 blood transfusion in an attempt to increase your hemoglobin to acceptable levels. While you were here, we monitored your labs to ensure your hemoglobin remains stable.  Please come back to the ED if you experience any new episodes of weakness, dizziness, loss of vision, or loss of consciousness.  Please follow up with Dr. Bay Velazco (hematology/oncology) after discharge.      SECONDARY DISCHARGE DIAGNOSES  Diagnosis: Pancytopenia  Assessment and Plan of Treatment: You came in with pancytopenia which means your blood cells, white cells, and platelets were low. Low red blood cells is discussed above under anemia. Low white cells means your immune system is weak and you are predisposed to infection. Our heme/onc team here put you on 3 daily antimicrobial medications to prevent common viral/bacterial infections. Low platelets predispose you to bleeding, but you didn't have any signs of bleeding while here.   Please follow up with your hematologist-oncologist Dr. Velazco.  Please come back to the ED if you experience any signs of bleeding or infection.

## 2024-04-01 NOTE — DISCHARGE NOTE PROVIDER - NSDCFUADDAPPT_GEN_ALL_CORE_FT
APPTS ARE READY TO BE MADE: [ ] YES    Best Family or Patient Contact (if needed):    Additional Information about above appointments (if needed):    1: Heme/onc  2: PCP  3:     Other comments or requests:

## 2024-04-01 NOTE — PROGRESS NOTE ADULT - PROBLEM SELECTOR PLAN 2
Labs notable for WBC 0.57; Hgb 3.4; Plt 30 concerning for pancytopenia in setting of known MDS and recent chemotherapy ~6 weeks ago. Patient is at very high risk of infection given notable severe neutropenia. Low threshold to start antibiotics, e.g. cefepime.  -No signs of infection at this time  -If SIRS+ would obtain infectious work up and consider empiric antibiotics   -MRSA/MSSA PCR  -Heme consulted as per ED chart note;   -Patient may benefit from Filgrastim but pending further recs  -Hgb transfuse >8 given CAD history  -Ordered 4th unit PRBC --> follow up post transfusion CBC  -Transfuse platelet if  plt <10, or fever and plt <20, or active bleeding and plt <50  -Withhold chemical prophylaxis for now  - per heme, antimicrobial ppx as follows: levofloxacin 500mg PO qd, acyclovir 400mg PO BID, fluconazole 200mg PO qd

## 2024-04-01 NOTE — DISCHARGE NOTE PROVIDER - PROVIDER TOKENS
FREE:[LAST:[Velazco],FIRST:[Bay],PHONE:[phone]],FAX:[(   )    -],FOLLOWUP:[1 week],ESTABLISHEDPATIENT:[T]],PROVIDER:[TOKEN:[[id number]:[facility]:[id number]],FOLLOWUP:[1 week],ESTABLISHEDPATIENT:[T]]

## 2024-04-01 NOTE — DISCHARGE NOTE NURSING/CASE MANAGEMENT/SOCIAL WORK - PATIENT PORTAL LINK FT
You can access the FollowMyHealth Patient Portal offered by [facility] by registering at the following website: [email or web address]. By joining [facility]’s FollowMyHealth portal, you will also be able to view your health information using other applications (apps) compatible with our system.

## 2024-04-01 NOTE — PROGRESS NOTE ADULT - PROBLEM SELECTOR PLAN 5
Home metformin but need to confirm dosing  -Low insulin sliding scale  -DASH/Diabetic diet  -Follow up A1c in AM  -Follow up lipid profile AM
Home metformin but need to confirm dosing  -Low insulin sliding scale  -DASH/Diabetic diet  -Follow up A1c in AM  -Follow up lipid profile AM

## 2024-04-01 NOTE — PROGRESS NOTE ADULT - ASSESSMENT
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 Labs remarkable for pancytopenia WBC 0.57, Hgb 3.4; plt 30. Findings for symptomatic anemia and neutropenia requiring blood transfusion.
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 Labs remarkable for pancytopenia WBC 0.57, Hgb 3.4; plt 30. Findings for symptomatic anemia and neutropenia requiring blood transfusion.

## 2024-04-01 NOTE — PROGRESS NOTE ADULT - PROBLEM SELECTOR PLAN 11
DVT prophylaxis: Withhold chemical prophylaxis; but SCD's appropriate for now  Disposition: Pending clinical improvement  -Continue home lexapro but at 10mg based on EMR notes, but actual dose us unclear; primary to follow up  -Continue melatonin 3mg qhs
DVT prophylaxis: Withhold chemical prophylaxis; but SCD's appropriate for now  Disposition: Pending clinical improvement  -Continue home lexapro but at 10mg based on EMR notes, but actual dose us unclear; primary to follow up  -Continue melatonin 3mg qhs

## 2024-04-09 ENCOUNTER — HOSPITAL ENCOUNTER (OUTPATIENT)
Dept: HOSPITAL 74 - JONCNONCHE | Age: 84
Discharge: HOME | End: 2024-04-09
Attending: INTERNAL MEDICINE
Payer: COMMERCIAL

## 2024-04-09 VITALS
RESPIRATION RATE: 18 BRPM | TEMPERATURE: 97.4 F | HEART RATE: 52 BPM | DIASTOLIC BLOOD PRESSURE: 54 MMHG | SYSTOLIC BLOOD PRESSURE: 142 MMHG

## 2024-04-09 DIAGNOSIS — Z51.11: Primary | ICD-10-CM

## 2024-04-09 DIAGNOSIS — D46.9: ICD-10-CM

## 2024-04-09 LAB
ALBUMIN SERPL-MCNC: 3.5 G/DL (ref 3.4–5)
ALP SERPL-CCNC: 74 U/L (ref 45–117)
ALT SERPL-CCNC: 12 U/L (ref 13–61)
ANION GAP SERPL CALC-SCNC: 4 MMOL/L (ref 4–13)
ANISOCYTOSIS BLD QL: 0
AST SERPL-CCNC: 18 U/L (ref 15–37)
BILIRUB CONJ SERPL-MCNC: 0.1 MG/DL (ref 0–0.2)
BILIRUB DIRECT SERPL-MCNC: 269 U/L (ref 87–246)
BILIRUB SERPL-MCNC: 0.3 MG/DL (ref 0.2–1)
BUN SERPL-MCNC: 27.5 MG/DL (ref 7–18)
CALCIUM SERPL-MCNC: 9.3 MG/DL (ref 8.5–10.1)
CHLORIDE SERPL-SCNC: 108 MMOL/L (ref 98–107)
CO2 SERPL-SCNC: 28 MMOL/L (ref 21–32)
CREAT SERPL-MCNC: 1 MG/DL (ref 0.55–1.3)
DEPRECATED RDW RBC AUTO: 21.3 % (ref 11.9–15.9)
GLUCOSE SERPL-MCNC: 129 MG/DL (ref 74–106)
HCT VFR BLD CALC: 27.9 % (ref 35.4–49)
HGB BLD-MCNC: 9.2 GM/DL (ref 11.7–16.9)
MACROCYTES BLD QL: 0
MCH RBC QN AUTO: 31 PG (ref 25.7–33.7)
MCHC RBC AUTO-ENTMCNC: 33.1 G/DL (ref 32–35.9)
MCV RBC: 93.7 FL (ref 80–96)
PLATELET # BLD AUTO: 27 10^3/UL (ref 134–434)
PMV BLD: 10 FL (ref 7.5–11.1)
POTASSIUM SERPLBLD-SCNC: 4.5 MMOL/L (ref 3.5–5.1)
PROT SERPL-MCNC: 7.1 G/DL (ref 6.4–8.2)
RBC # BLD AUTO: 2.98 M/MM3 (ref 4–5.6)
RETICS # AUTO: 0.84 % (ref 0.5–1.5)
SODIUM SERPL-SCNC: 141 MMOL/L (ref 136–145)
WBC # BLD AUTO: 0.4 K/MM3 (ref 4–10)

## 2024-04-09 RX ADMIN — DECITABINE ONE MG: 50 INJECTION, POWDER, LYOPHILIZED, FOR SOLUTION INTRAVENOUS at 13:17

## 2024-04-16 ENCOUNTER — HOSPITAL ENCOUNTER (OUTPATIENT)
Dept: HOSPITAL 74 - JONCNONCHE | Age: 84
Discharge: HOME | End: 2024-04-16
Attending: INTERNAL MEDICINE
Payer: COMMERCIAL

## 2024-04-16 VITALS
HEART RATE: 60 BPM | RESPIRATION RATE: 16 BRPM | TEMPERATURE: 97.8 F | SYSTOLIC BLOOD PRESSURE: 122 MMHG | DIASTOLIC BLOOD PRESSURE: 46 MMHG

## 2024-04-16 DIAGNOSIS — Z51.11: Primary | ICD-10-CM

## 2024-04-16 DIAGNOSIS — D46.9: ICD-10-CM

## 2024-04-16 LAB
ALBUMIN SERPL-MCNC: 3.7 G/DL (ref 3.4–5)
ALP SERPL-CCNC: 78 U/L (ref 45–117)
ALT SERPL-CCNC: 14 U/L (ref 13–61)
ANION GAP SERPL CALC-SCNC: 7 MMOL/L (ref 4–13)
ANISOCYTOSIS BLD QL: (no result)
AST SERPL-CCNC: 19 U/L (ref 15–37)
BILIRUB CONJ SERPL-MCNC: 0.1 MG/DL (ref 0–0.2)
BILIRUB DIRECT SERPL-MCNC: 248 U/L (ref 87–246)
BILIRUB SERPL-MCNC: 0.3 MG/DL (ref 0.2–1)
BUN SERPL-MCNC: 36.8 MG/DL (ref 7–18)
CALCIUM SERPL-MCNC: 9.9 MG/DL (ref 8.5–10.1)
CHLORIDE SERPL-SCNC: 108 MMOL/L (ref 98–107)
CO2 SERPL-SCNC: 27 MMOL/L (ref 21–32)
CREAT SERPL-MCNC: 1.1 MG/DL (ref 0.55–1.3)
DEPRECATED RDW RBC AUTO: 21.4 % (ref 11.9–15.9)
GLUCOSE SERPL-MCNC: 105 MG/DL (ref 74–106)
HCT VFR BLD CALC: 24.3 % (ref 35.4–49)
HGB BLD-MCNC: 8.1 GM/DL (ref 11.7–16.9)
MACROCYTES BLD QL: 0
MCH RBC QN AUTO: 31.3 PG (ref 25.7–33.7)
MCHC RBC AUTO-ENTMCNC: 33.4 G/DL (ref 32–35.9)
MCV RBC: 93.6 FL (ref 80–96)
PLATELET # BLD AUTO: 39 10^3/UL (ref 134–434)
PMV BLD: 10.4 FL (ref 7.5–11.1)
POTASSIUM SERPLBLD-SCNC: 4.7 MMOL/L (ref 3.5–5.1)
PROT SERPL-MCNC: 7.7 G/DL (ref 6.4–8.2)
RBC # BLD AUTO: 2.6 M/MM3 (ref 4–5.6)
RETICS # AUTO: 0.76 % (ref 0.5–1.5)
SODIUM SERPL-SCNC: 142 MMOL/L (ref 136–145)
WBC # BLD AUTO: 0.5 K/MM3 (ref 4–10)

## 2024-04-16 RX ADMIN — DECITABINE ONE MG: 50 INJECTION, POWDER, LYOPHILIZED, FOR SOLUTION INTRAVENOUS at 13:21

## 2024-04-23 ENCOUNTER — HOSPITAL ENCOUNTER (OUTPATIENT)
Dept: HOSPITAL 74 - JONCNONCHE | Age: 84
Discharge: HOME | End: 2024-04-23
Attending: INTERNAL MEDICINE
Payer: COMMERCIAL

## 2024-04-23 VITALS
HEART RATE: 64 BPM | TEMPERATURE: 97.7 F | SYSTOLIC BLOOD PRESSURE: 128 MMHG | DIASTOLIC BLOOD PRESSURE: 51 MMHG | RESPIRATION RATE: 20 BRPM

## 2024-04-23 DIAGNOSIS — Z51.11: Primary | ICD-10-CM

## 2024-04-23 DIAGNOSIS — D46.9: ICD-10-CM

## 2024-04-23 LAB
ALBUMIN SERPL-MCNC: 3.8 G/DL (ref 3.4–5)
ALP SERPL-CCNC: 75 U/L (ref 45–117)
ALT SERPL-CCNC: 13 U/L (ref 13–61)
ANION GAP SERPL CALC-SCNC: 5 MMOL/L (ref 4–13)
ANISOCYTOSIS BLD QL: (no result)
AST SERPL-CCNC: 21 U/L (ref 15–37)
BILIRUB CONJ SERPL-MCNC: 0.1 MG/DL (ref 0–0.2)
BILIRUB DIRECT SERPL-MCNC: 247 U/L (ref 87–246)
BILIRUB SERPL-MCNC: 0.3 MG/DL (ref 0.2–1)
BUN SERPL-MCNC: 32.2 MG/DL (ref 7–18)
CALCIUM SERPL-MCNC: 9.3 MG/DL (ref 8.5–10.1)
CHLORIDE SERPL-SCNC: 109 MMOL/L (ref 98–107)
CO2 SERPL-SCNC: 27 MMOL/L (ref 21–32)
CREAT SERPL-MCNC: 1 MG/DL (ref 0.55–1.3)
DEPRECATED RDW RBC AUTO: 21.8 % (ref 11.9–15.9)
GLUCOSE SERPL-MCNC: 100 MG/DL (ref 74–106)
HCT VFR BLD CALC: 20.3 % (ref 35.4–49)
HGB BLD-MCNC: 6.8 GM/DL (ref 11.7–16.9)
MACROCYTES BLD QL: 0
MCH RBC QN AUTO: 31.9 PG (ref 25.7–33.7)
MCHC RBC AUTO-ENTMCNC: 33.4 G/DL (ref 32–35.9)
MCV RBC: 95.4 FL (ref 80–96)
PLATELET # BLD AUTO: 50 10^3/UL (ref 134–434)
PMV BLD: 10 FL (ref 7.5–11.1)
POTASSIUM SERPLBLD-SCNC: 4.6 MMOL/L (ref 3.5–5.1)
PROT SERPL-MCNC: 7.5 G/DL (ref 6.4–8.2)
RBC # BLD AUTO: 2.13 M/MM3 (ref 4–5.6)
RETICS # AUTO: 1.22 % (ref 0.5–1.5)
SODIUM SERPL-SCNC: 141 MMOL/L (ref 136–145)
WBC # BLD AUTO: 0.5 K/MM3 (ref 4–10)

## 2024-04-23 PROCEDURE — P9038 RBC IRRADIATED: HCPCS

## 2024-04-23 PROCEDURE — 30233N1 TRANSFUSION OF NONAUTOLOGOUS RED BLOOD CELLS INTO PERIPHERAL VEIN, PERCUTANEOUS APPROACH: ICD-10-PCS | Performed by: INTERNAL MEDICINE

## 2024-04-23 PROCEDURE — 3E01305 INTRODUCTION OF OTHER ANTINEOPLASTIC INTO SUBCUTANEOUS TISSUE, PERCUTANEOUS APPROACH: ICD-10-PCS | Performed by: INTERNAL MEDICINE

## 2024-04-23 RX ADMIN — DECITABINE ONE MG: 50 INJECTION, POWDER, LYOPHILIZED, FOR SOLUTION INTRAVENOUS at 16:01

## 2024-04-30 ENCOUNTER — HOSPITAL ENCOUNTER (INPATIENT)
Dept: HOSPITAL 74 - JER | Age: 84
LOS: 1 days | Discharge: HOME | DRG: 809 | End: 2024-05-01
Attending: NURSE PRACTITIONER | Admitting: STUDENT IN AN ORGANIZED HEALTH CARE EDUCATION/TRAINING PROGRAM
Payer: COMMERCIAL

## 2024-04-30 VITALS — BODY MASS INDEX: 27.9 KG/M2

## 2024-04-30 DIAGNOSIS — E11.9: ICD-10-CM

## 2024-04-30 DIAGNOSIS — D46.9: ICD-10-CM

## 2024-04-30 DIAGNOSIS — I10: ICD-10-CM

## 2024-04-30 DIAGNOSIS — D61.818: Primary | ICD-10-CM

## 2024-04-30 DIAGNOSIS — I25.10: ICD-10-CM

## 2024-04-30 DIAGNOSIS — D64.9: ICD-10-CM

## 2024-04-30 DIAGNOSIS — N17.9: ICD-10-CM

## 2024-04-30 LAB
ALBUMIN SERPL-MCNC: 3.4 G/DL (ref 3.4–5)
ALP SERPL-CCNC: 59 U/L (ref 45–117)
ALT SERPL-CCNC: 15 U/L (ref 13–61)
ANION GAP SERPL CALC-SCNC: 8 MMOL/L (ref 4–13)
APPEARANCE UR: CLEAR
AST SERPL-CCNC: 20 U/L (ref 15–37)
BILIRUB SERPL-MCNC: 0.2 MG/DL (ref 0.2–1)
BILIRUB UR STRIP.AUTO-MCNC: NEGATIVE MG/DL
BUN SERPL-MCNC: 52.3 MG/DL (ref 7–18)
CALCIUM SERPL-MCNC: 9.6 MG/DL (ref 8.5–10.1)
CHLORIDE SERPL-SCNC: 108 MMOL/L (ref 98–107)
CO2 SERPL-SCNC: 23 MMOL/L (ref 21–32)
COLOR UR: YELLOW
CREAT SERPL-MCNC: 1.4 MG/DL (ref 0.55–1.3)
DEPRECATED RDW RBC AUTO: 23.4 % (ref 11.9–15.9)
GLUCOSE SERPL-MCNC: 199 MG/DL (ref 74–106)
HCT VFR BLD CALC: 12.9 % (ref 35.4–49)
HGB BLD-MCNC: 4.3 GM/DL (ref 11.7–16.9)
KETONES UR QL STRIP: NEGATIVE
LEUKOCYTE ESTERASE UR QL STRIP.AUTO: NEGATIVE
MAGNESIUM SERPL-MCNC: 2.1 MG/DL (ref 1.8–2.4)
MCH RBC QN AUTO: 32.4 PG (ref 25.7–33.7)
MCHC RBC AUTO-ENTMCNC: 33.5 G/DL (ref 32–35.9)
MCV RBC: 96.7 FL (ref 80–96)
NITRITE UR QL STRIP: NEGATIVE
PH UR: 6 [PH] (ref 5–8)
PLATELET # BLD AUTO: 59 10^3/UL (ref 134–434)
PMV BLD: 10.6 FL (ref 7.5–11.1)
POTASSIUM SERPLBLD-SCNC: 4.6 MMOL/L (ref 3.5–5.1)
PROT SERPL-MCNC: 6.7 G/DL (ref 6.4–8.2)
PROT UR QL STRIP: NEGATIVE
PROT UR QL STRIP: NEGATIVE
RBC # BLD AUTO: 1.33 M/MM3 (ref 4–5.6)
SODIUM SERPL-SCNC: 139 MMOL/L (ref 136–145)
SP GR UR: 1.01 (ref 1.01–1.03)
UROBILINOGEN UR STRIP-MCNC: 0.2 MG/DL (ref 0.2–1)
WBC # BLD AUTO: 0.7 K/MM3 (ref 4–10)

## 2024-04-30 PROCEDURE — P9058 RBC, L/R, CMV-NEG, IRRAD: HCPCS

## 2024-04-30 PROCEDURE — 30233N1 TRANSFUSION OF NONAUTOLOGOUS RED BLOOD CELLS INTO PERIPHERAL VEIN, PERCUTANEOUS APPROACH: ICD-10-PCS | Performed by: NURSE PRACTITIONER

## 2024-04-30 PROCEDURE — P9038 RBC IRRADIATED: HCPCS

## 2024-04-30 RX ADMIN — FERROUS SULFATE TAB EC 324 MG (65 MG FE EQUIVALENT) SCH MG: 324 (65 FE) TABLET DELAYED RESPONSE at 22:14

## 2024-04-30 RX ADMIN — ACYCLOVIR SCH MG: 400 TABLET ORAL at 22:14

## 2024-05-01 VITALS
RESPIRATION RATE: 18 BRPM | DIASTOLIC BLOOD PRESSURE: 68 MMHG | TEMPERATURE: 97.5 F | HEART RATE: 85 BPM | SYSTOLIC BLOOD PRESSURE: 156 MMHG

## 2024-05-01 LAB
ANION GAP SERPL CALC-SCNC: 4 MMOL/L (ref 4–13)
ANISOCYTOSIS BLD QL: (no result)
BUN SERPL-MCNC: 34.6 MG/DL (ref 7–18)
CALCIUM SERPL-MCNC: 9.1 MG/DL (ref 8.5–10.1)
CHLORIDE SERPL-SCNC: 111 MMOL/L (ref 98–107)
CO2 SERPL-SCNC: 27 MMOL/L (ref 21–32)
CREAT SERPL-MCNC: 0.9 MG/DL (ref 0.55–1.3)
DEPRECATED RDW RBC AUTO: 16.4 % (ref 11.9–15.9)
GLUCOSE SERPL-MCNC: 107 MG/DL (ref 74–106)
HCT VFR BLD CALC: 20.2 % (ref 35.4–49)
HGB BLD-MCNC: 6.8 GM/DL (ref 11.7–16.9)
MACROCYTES BLD QL: (no result)
MCH RBC QN AUTO: 31.3 PG (ref 25.7–33.7)
MCHC RBC AUTO-ENTMCNC: 34 G/DL (ref 32–35.9)
MCV RBC: 92.1 FL (ref 80–96)
PLATELET # BLD AUTO: 39 10^3/UL (ref 134–434)
PMV BLD: 9.9 FL (ref 7.5–11.1)
POTASSIUM SERPLBLD-SCNC: 4.3 MMOL/L (ref 3.5–5.1)
RBC # BLD AUTO: 2.19 M/MM3 (ref 4–5.6)
SODIUM SERPL-SCNC: 142 MMOL/L (ref 136–145)
WBC # BLD AUTO: 0.5 K/MM3 (ref 4–10)

## 2024-05-01 RX ADMIN — ALLOPURINOL SCH MG: 300 TABLET ORAL at 10:16

## 2024-05-01 RX ADMIN — LEVOTHYROXINE SODIUM SCH MCG: 112 TABLET ORAL at 07:00

## 2024-05-01 RX ADMIN — ESCITALOPRAM OXALATE SCH MG: 10 TABLET, FILM COATED ORAL at 10:16

## 2024-05-01 RX ADMIN — AMLODIPINE BESYLATE SCH MG: 10 TABLET ORAL at 10:16

## 2024-05-01 RX ADMIN — FOLIC ACID SCH MG: 1 TABLET ORAL at 10:16

## 2024-05-01 RX ADMIN — FLUCONAZOLE SCH MG: 100 TABLET ORAL at 10:15

## 2024-05-07 ENCOUNTER — HOSPITAL ENCOUNTER (INPATIENT)
Dept: HOSPITAL 74 - JER | Age: 84
LOS: 2 days | Discharge: HOME | DRG: 812 | End: 2024-05-09
Attending: INTERNAL MEDICINE | Admitting: STUDENT IN AN ORGANIZED HEALTH CARE EDUCATION/TRAINING PROGRAM
Payer: COMMERCIAL

## 2024-05-07 VITALS — BODY MASS INDEX: 27.1 KG/M2

## 2024-05-07 DIAGNOSIS — E03.9: ICD-10-CM

## 2024-05-07 DIAGNOSIS — D46.9: Primary | ICD-10-CM

## 2024-05-07 DIAGNOSIS — E11.9: ICD-10-CM

## 2024-05-07 DIAGNOSIS — D70.9: ICD-10-CM

## 2024-05-07 DIAGNOSIS — D69.6: ICD-10-CM

## 2024-05-07 DIAGNOSIS — D63.0: ICD-10-CM

## 2024-05-07 DIAGNOSIS — I10: ICD-10-CM

## 2024-05-07 DIAGNOSIS — N17.9: ICD-10-CM

## 2024-05-07 LAB
ALBUMIN SERPL-MCNC: 2.8 G/DL (ref 3.4–5)
ALP SERPL-CCNC: 54 U/L (ref 45–117)
ALT SERPL-CCNC: 17 U/L (ref 13–61)
ANION GAP SERPL CALC-SCNC: 6 MMOL/L (ref 4–13)
AST SERPL-CCNC: 24 U/L (ref 15–37)
BILIRUB SERPL-MCNC: 0.2 MG/DL (ref 0.2–1)
BUN SERPL-MCNC: 47.3 MG/DL (ref 7–18)
CALCIUM SERPL-MCNC: 8.6 MG/DL (ref 8.5–10.1)
CHLORIDE SERPL-SCNC: 109 MMOL/L (ref 98–107)
CO2 SERPL-SCNC: 25 MMOL/L (ref 21–32)
CREAT SERPL-MCNC: 1.2 MG/DL (ref 0.55–1.3)
DEPRECATED RDW RBC AUTO: 20.9 % (ref 11.9–15.9)
GLUCOSE SERPL-MCNC: 135 MG/DL (ref 74–106)
HCT VFR BLD CALC: 14.6 % (ref 35.4–49)
HGB BLD-MCNC: 4.9 GM/DL (ref 11.7–16.9)
INR BLD: 1.25 (ref 0.83–1.09)
MCH RBC QN AUTO: 32.8 PG (ref 25.7–33.7)
MCHC RBC AUTO-ENTMCNC: 33.7 G/DL (ref 32–35.9)
MCV RBC: 97.1 FL (ref 80–96)
PLATELET # BLD AUTO: 53 10^3/UL (ref 134–434)
PMV BLD: 10.9 FL (ref 7.5–11.1)
POTASSIUM SERPLBLD-SCNC: 4.8 MMOL/L (ref 3.5–5.1)
PROT SERPL-MCNC: 6 G/DL (ref 6.4–8.2)
PT PNL PPP: 14 SEC (ref 9.7–13)
RBC # BLD AUTO: 1.5 M/MM3 (ref 4–5.6)
SODIUM SERPL-SCNC: 139 MMOL/L (ref 136–145)
WBC # BLD AUTO: 0.5 K/MM3 (ref 4–10)

## 2024-05-07 PROCEDURE — P9058 RBC, L/R, CMV-NEG, IRRAD: HCPCS

## 2024-05-07 PROCEDURE — P9038 RBC IRRADIATED: HCPCS

## 2024-05-07 PROCEDURE — G0378 HOSPITAL OBSERVATION PER HR: HCPCS

## 2024-05-07 RX ADMIN — FERROUS SULFATE TAB EC 324 MG (65 MG FE EQUIVALENT) SCH MG: 324 (65 FE) TABLET DELAYED RESPONSE at 21:06

## 2024-05-08 VITALS — RESPIRATION RATE: 18 BRPM

## 2024-05-08 LAB
ANION GAP SERPL CALC-SCNC: 2 MMOL/L (ref 4–13)
ANISOCYTOSIS BLD QL: (no result)
BUN SERPL-MCNC: 31.7 MG/DL (ref 7–18)
CALCIUM SERPL-MCNC: 8.5 MG/DL (ref 8.5–10.1)
CHLORIDE SERPL-SCNC: 108 MMOL/L (ref 98–107)
CO2 SERPL-SCNC: 27 MMOL/L (ref 21–32)
CREAT SERPL-MCNC: 0.8 MG/DL (ref 0.55–1.3)
DEPRECATED RDW RBC AUTO: 14.7 % (ref 11.9–15.9)
DEPRECATED RDW RBC AUTO: 15 % (ref 11.9–15.9)
GLUCOSE SERPL-MCNC: 91 MG/DL (ref 74–106)
HCT VFR BLD CALC: 18.7 % (ref 35.4–49)
HCT VFR BLD CALC: 24 % (ref 35.4–49)
HGB BLD-MCNC: 6.6 GM/DL (ref 11.7–16.9)
HGB BLD-MCNC: 8.3 GM/DL (ref 11.7–16.9)
MACROCYTES BLD QL: 0
MCH RBC QN AUTO: 31.2 PG (ref 25.7–33.7)
MCH RBC QN AUTO: 31.9 PG (ref 25.7–33.7)
MCHC RBC AUTO-ENTMCNC: 34.5 G/DL (ref 32–35.9)
MCHC RBC AUTO-ENTMCNC: 35.3 G/DL (ref 32–35.9)
MCV RBC: 90.2 FL (ref 80–96)
MCV RBC: 90.4 FL (ref 80–96)
PLATELET # BLD AUTO: 35 10^3/UL (ref 134–434)
PLATELET # BLD AUTO: 39 10^3/UL (ref 134–434)
PMV BLD: 9.3 FL (ref 7.5–11.1)
PMV BLD: 9.6 FL (ref 7.5–11.1)
POTASSIUM SERPLBLD-SCNC: 4.1 MMOL/L (ref 3.5–5.1)
RBC # BLD AUTO: 2.08 M/MM3 (ref 4–5.6)
RBC # BLD AUTO: 2.65 M/MM3 (ref 4–5.6)
SODIUM SERPL-SCNC: 137 MMOL/L (ref 136–145)
WBC # BLD AUTO: 0.4 K/MM3 (ref 4–10)
WBC # BLD AUTO: 0.5 K/MM3 (ref 4–10)

## 2024-05-08 RX ADMIN — LEVOTHYROXINE SODIUM SCH MCG: 112 TABLET ORAL at 06:20

## 2024-05-08 RX ADMIN — ALLOPURINOL SCH MG: 300 TABLET ORAL at 10:17

## 2024-05-08 RX ADMIN — AMLODIPINE BESYLATE SCH MG: 10 TABLET ORAL at 10:17

## 2024-05-08 RX ADMIN — ESCITALOPRAM OXALATE SCH MG: 10 TABLET, FILM COATED ORAL at 10:17

## 2024-05-08 RX ADMIN — FOLIC ACID SCH MG: 1 TABLET ORAL at 10:17

## 2024-05-09 VITALS — DIASTOLIC BLOOD PRESSURE: 65 MMHG | SYSTOLIC BLOOD PRESSURE: 143 MMHG | TEMPERATURE: 97.7 F | HEART RATE: 61 BPM

## 2024-05-09 LAB
ANION GAP SERPL CALC-SCNC: 3 MMOL/L (ref 4–13)
BUN SERPL-MCNC: 30.9 MG/DL (ref 7–18)
CALCIUM SERPL-MCNC: 8.8 MG/DL (ref 8.5–10.1)
CHLORIDE SERPL-SCNC: 110 MMOL/L (ref 98–107)
CO2 SERPL-SCNC: 26 MMOL/L (ref 21–32)
CREAT SERPL-MCNC: 0.8 MG/DL (ref 0.55–1.3)
DEPRECATED RDW RBC AUTO: 14.9 % (ref 11.9–15.9)
GLUCOSE SERPL-MCNC: 99 MG/DL (ref 74–106)
HCT VFR BLD CALC: 22.1 % (ref 35.4–49)
HGB BLD-MCNC: 7.8 GM/DL (ref 11.7–16.9)
MCH RBC QN AUTO: 31.8 PG (ref 25.7–33.7)
MCHC RBC AUTO-ENTMCNC: 35.3 G/DL (ref 32–35.9)
MCV RBC: 89.9 FL (ref 80–96)
PLATELET # BLD AUTO: 40 10^3/UL (ref 134–434)
PMV BLD: 9 FL (ref 7.5–11.1)
POTASSIUM SERPLBLD-SCNC: 4.2 MMOL/L (ref 3.5–5.1)
RBC # BLD AUTO: 2.46 M/MM3 (ref 4–5.6)
SODIUM SERPL-SCNC: 139 MMOL/L (ref 136–145)
WBC # BLD AUTO: 0.4 K/MM3 (ref 4–10)

## 2024-05-13 ENCOUNTER — INPATIENT (INPATIENT)
Facility: HOSPITAL | Age: 84
LOS: 2 days | Discharge: HOME | DRG: 812 | End: 2024-05-16
Attending: STUDENT IN AN ORGANIZED HEALTH CARE EDUCATION/TRAINING PROGRAM | Admitting: HOSPITALIST
Payer: MEDICARE

## 2024-05-13 VITALS
HEART RATE: 81 BPM | RESPIRATION RATE: 20 BRPM | DIASTOLIC BLOOD PRESSURE: 63 MMHG | TEMPERATURE: 98 F | OXYGEN SATURATION: 100 % | SYSTOLIC BLOOD PRESSURE: 103 MMHG

## 2024-05-13 DIAGNOSIS — N40.0 BENIGN PROSTATIC HYPERPLASIA WITHOUT LOWER URINARY TRACT SYMPTOMS: ICD-10-CM

## 2024-05-13 DIAGNOSIS — E78.5 HYPERLIPIDEMIA, UNSPECIFIED: ICD-10-CM

## 2024-05-13 DIAGNOSIS — Z29.9 ENCOUNTER FOR PROPHYLACTIC MEASURES, UNSPECIFIED: ICD-10-CM

## 2024-05-13 DIAGNOSIS — D46.9 MYELODYSPLASTIC SYNDROME, UNSPECIFIED: ICD-10-CM

## 2024-05-13 DIAGNOSIS — D62 ACUTE POSTHEMORRHAGIC ANEMIA: ICD-10-CM

## 2024-05-13 DIAGNOSIS — D61.818 OTHER PANCYTOPENIA: ICD-10-CM

## 2024-05-13 DIAGNOSIS — I25.10 ATHEROSCLEROTIC HEART DISEASE OF NATIVE CORONARY ARTERY WITHOUT ANGINA PECTORIS: ICD-10-CM

## 2024-05-13 DIAGNOSIS — I10 ESSENTIAL (PRIMARY) HYPERTENSION: ICD-10-CM

## 2024-05-13 DIAGNOSIS — E11.9 TYPE 2 DIABETES MELLITUS WITHOUT COMPLICATIONS: ICD-10-CM

## 2024-05-13 DIAGNOSIS — E03.9 HYPOTHYROIDISM, UNSPECIFIED: ICD-10-CM

## 2024-05-13 DIAGNOSIS — Z95.2 PRESENCE OF PROSTHETIC HEART VALVE: Chronic | ICD-10-CM

## 2024-05-13 DIAGNOSIS — D64.9 ANEMIA, UNSPECIFIED: ICD-10-CM

## 2024-05-13 LAB
ADD ON TEST-SPECIMEN IN LAB: SIGNIFICANT CHANGE UP
ALBUMIN SERPL ELPH-MCNC: 3.1 G/DL — LOW (ref 3.3–5)
ALP SERPL-CCNC: 51 U/L — SIGNIFICANT CHANGE UP (ref 40–120)
ALT FLD-CCNC: 10 U/L — SIGNIFICANT CHANGE UP (ref 10–45)
ANION GAP SERPL CALC-SCNC: 13 MMOL/L — SIGNIFICANT CHANGE UP (ref 5–17)
ANISOCYTOSIS BLD QL: SLIGHT — SIGNIFICANT CHANGE UP
APPEARANCE UR: CLEAR — SIGNIFICANT CHANGE UP
APTT BLD: 24.8 SEC — SIGNIFICANT CHANGE UP (ref 24.5–35.6)
AST SERPL-CCNC: 17 U/L — SIGNIFICANT CHANGE UP (ref 10–40)
BACTERIA # UR AUTO: NEGATIVE /HPF — SIGNIFICANT CHANGE UP
BASE EXCESS BLDV CALC-SCNC: -0.7 MMOL/L — SIGNIFICANT CHANGE UP (ref -2–3)
BASE EXCESS BLDV CALC-SCNC: -3 MMOL/L — LOW (ref -2–3)
BASOPHILS # BLD AUTO: 0 K/UL — SIGNIFICANT CHANGE UP (ref 0–0.2)
BASOPHILS NFR BLD AUTO: 0 % — SIGNIFICANT CHANGE UP (ref 0–2)
BILIRUB SERPL-MCNC: 0.1 MG/DL — LOW (ref 0.2–1.2)
BILIRUB UR-MCNC: NEGATIVE — SIGNIFICANT CHANGE UP
BLD GP AB SCN SERPL QL: NEGATIVE — SIGNIFICANT CHANGE UP
BUN SERPL-MCNC: 63 MG/DL — HIGH (ref 7–23)
CA-I SERPL-SCNC: 1.3 MMOL/L — SIGNIFICANT CHANGE UP (ref 1.15–1.33)
CA-I SERPL-SCNC: 1.32 MMOL/L — SIGNIFICANT CHANGE UP (ref 1.15–1.33)
CALCIUM SERPL-MCNC: 9.2 MG/DL — SIGNIFICANT CHANGE UP (ref 8.4–10.5)
CAST: 0 /LPF — SIGNIFICANT CHANGE UP (ref 0–4)
CHLORIDE BLDV-SCNC: 106 MMOL/L — SIGNIFICANT CHANGE UP (ref 96–108)
CHLORIDE BLDV-SCNC: 108 MMOL/L — SIGNIFICANT CHANGE UP (ref 96–108)
CHLORIDE SERPL-SCNC: 107 MMOL/L — SIGNIFICANT CHANGE UP (ref 96–108)
CO2 BLDV-SCNC: 23 MMOL/L — SIGNIFICANT CHANGE UP (ref 22–26)
CO2 BLDV-SCNC: 25 MMOL/L — SIGNIFICANT CHANGE UP (ref 22–26)
CO2 SERPL-SCNC: 20 MMOL/L — LOW (ref 22–31)
COLOR SPEC: YELLOW — SIGNIFICANT CHANGE UP
CREAT SERPL-MCNC: 1.04 MG/DL — SIGNIFICANT CHANGE UP (ref 0.5–1.3)
DACRYOCYTES BLD QL SMEAR: SLIGHT — SIGNIFICANT CHANGE UP
DIFF PNL FLD: NEGATIVE — SIGNIFICANT CHANGE UP
EGFR: 71 ML/MIN/1.73M2 — SIGNIFICANT CHANGE UP
EOSINOPHIL # BLD AUTO: 0.01 K/UL — SIGNIFICANT CHANGE UP (ref 0–0.5)
EOSINOPHIL NFR BLD AUTO: 2 % — SIGNIFICANT CHANGE UP (ref 0–6)
GAS PNL BLDV: 137 MMOL/L — SIGNIFICANT CHANGE UP (ref 136–145)
GAS PNL BLDV: 139 MMOL/L — SIGNIFICANT CHANGE UP (ref 136–145)
GAS PNL BLDV: SIGNIFICANT CHANGE UP
GAS PNL BLDV: SIGNIFICANT CHANGE UP
GLUCOSE BLDC GLUCOMTR-MCNC: 97 MG/DL — SIGNIFICANT CHANGE UP (ref 70–99)
GLUCOSE BLDV-MCNC: 172 MG/DL — HIGH (ref 70–99)
GLUCOSE BLDV-MCNC: 78 MG/DL — SIGNIFICANT CHANGE UP (ref 70–99)
GLUCOSE SERPL-MCNC: 181 MG/DL — HIGH (ref 70–99)
GLUCOSE UR QL: NEGATIVE MG/DL — SIGNIFICANT CHANGE UP
HCO3 BLDV-SCNC: 22 MMOL/L — SIGNIFICANT CHANGE UP (ref 22–29)
HCO3 BLDV-SCNC: 24 MMOL/L — SIGNIFICANT CHANGE UP (ref 22–29)
HCT VFR BLD CALC: 12.5 % — CRITICAL LOW (ref 39–50)
HCT VFR BLD CALC: 22.4 % — LOW (ref 39–50)
HCT VFR BLDA CALC: 12 % — CRITICAL LOW (ref 39–51)
HCT VFR BLDA CALC: 24 % — LOW (ref 39–51)
HGB BLD CALC-MCNC: 4.1 G/DL — CRITICAL LOW (ref 12.6–17.4)
HGB BLD CALC-MCNC: 7.9 G/DL — LOW (ref 12.6–17.4)
HGB BLD-MCNC: 4 G/DL — CRITICAL LOW (ref 13–17)
HGB BLD-MCNC: 7.7 G/DL — LOW (ref 13–17)
INR BLD: 1.17 RATIO — SIGNIFICANT CHANGE UP (ref 0.85–1.18)
KETONES UR-MCNC: NEGATIVE MG/DL — SIGNIFICANT CHANGE UP
LACTATE BLDV-MCNC: 0.6 MMOL/L — SIGNIFICANT CHANGE UP (ref 0.5–2)
LACTATE BLDV-MCNC: 3.9 MMOL/L — HIGH (ref 0.5–2)
LACTATE SERPL-SCNC: 2.2 MMOL/L — HIGH (ref 0.5–2)
LEUKOCYTE ESTERASE UR-ACNC: NEGATIVE — SIGNIFICANT CHANGE UP
LG PLATELETS BLD QL AUTO: SLIGHT — SIGNIFICANT CHANGE UP
LYMPHOCYTES # BLD AUTO: 0.38 K/UL — LOW (ref 1–3.3)
LYMPHOCYTES # BLD AUTO: 66 % — HIGH (ref 13–44)
LYMPHOCYTES # SPEC AUTO: 2 % — HIGH (ref 0–0)
MACROCYTES BLD QL: SIGNIFICANT CHANGE UP
MAGNESIUM SERPL-MCNC: 1.8 MG/DL — SIGNIFICANT CHANGE UP (ref 1.6–2.6)
MANUAL SMEAR VERIFICATION: SIGNIFICANT CHANGE UP
MCHC RBC-ENTMCNC: 29.7 PG — SIGNIFICANT CHANGE UP (ref 27–34)
MCHC RBC-ENTMCNC: 32 GM/DL — SIGNIFICANT CHANGE UP (ref 32–36)
MCHC RBC-ENTMCNC: 32 PG — SIGNIFICANT CHANGE UP (ref 27–34)
MCHC RBC-ENTMCNC: 34.4 GM/DL — SIGNIFICANT CHANGE UP (ref 32–36)
MCV RBC AUTO: 100 FL — SIGNIFICANT CHANGE UP (ref 80–100)
MCV RBC AUTO: 86.5 FL — SIGNIFICANT CHANGE UP (ref 80–100)
MONOCYTES # BLD AUTO: 0.01 K/UL — SIGNIFICANT CHANGE UP (ref 0–0.9)
MONOCYTES NFR BLD AUTO: 2 % — SIGNIFICANT CHANGE UP (ref 2–14)
NEUTROPHILS # BLD AUTO: 0.16 K/UL — LOW (ref 1.8–7.4)
NEUTROPHILS NFR BLD AUTO: 26 % — LOW (ref 43–77)
NEUTS BAND # BLD: 2 % — SIGNIFICANT CHANGE UP (ref 0–8)
NITRITE UR-MCNC: NEGATIVE — SIGNIFICANT CHANGE UP
NRBC # BLD: 0 /100 WBCS — SIGNIFICANT CHANGE UP (ref 0–0)
NRBC # BLD: 0 /100 WBCS — SIGNIFICANT CHANGE UP (ref 0–0)
OVALOCYTES BLD QL SMEAR: SLIGHT — SIGNIFICANT CHANGE UP
PCO2 BLDV: 37 MMHG — LOW (ref 42–55)
PCO2 BLDV: 40 MMHG — LOW (ref 42–55)
PH BLDV: 7.38 — SIGNIFICANT CHANGE UP (ref 7.32–7.43)
PH BLDV: 7.39 — SIGNIFICANT CHANGE UP (ref 7.32–7.43)
PH UR: 6.5 — SIGNIFICANT CHANGE UP (ref 5–8)
PHOSPHATE SERPL-MCNC: 2.7 MG/DL — SIGNIFICANT CHANGE UP (ref 2.5–4.5)
PLAT MORPH BLD: NORMAL — SIGNIFICANT CHANGE UP
PLATELET # BLD AUTO: 33 K/UL — LOW (ref 150–400)
PLATELET # BLD AUTO: 46 K/UL — LOW (ref 150–400)
PO2 BLDV: 32 MMHG — SIGNIFICANT CHANGE UP (ref 25–45)
PO2 BLDV: 43 MMHG — SIGNIFICANT CHANGE UP (ref 25–45)
POIKILOCYTOSIS BLD QL AUTO: SLIGHT — SIGNIFICANT CHANGE UP
POTASSIUM BLDV-SCNC: 3.7 MMOL/L — SIGNIFICANT CHANGE UP (ref 3.5–5.1)
POTASSIUM BLDV-SCNC: 4.6 MMOL/L — SIGNIFICANT CHANGE UP (ref 3.5–5.1)
POTASSIUM SERPL-MCNC: 4.3 MMOL/L — SIGNIFICANT CHANGE UP (ref 3.5–5.3)
POTASSIUM SERPL-SCNC: 4.3 MMOL/L — SIGNIFICANT CHANGE UP (ref 3.5–5.3)
PROCALCITONIN SERPL-MCNC: 0.13 NG/ML — HIGH (ref 0.02–0.1)
PROT SERPL-MCNC: 5.5 G/DL — LOW (ref 6–8.3)
PROT UR-MCNC: NEGATIVE MG/DL — SIGNIFICANT CHANGE UP
PROTHROM AB SERPL-ACNC: 12.2 SEC — SIGNIFICANT CHANGE UP (ref 9.5–13)
RBC # BLD: 1.25 M/UL — LOW (ref 4.2–5.8)
RBC # BLD: 1.25 M/UL — LOW (ref 4.2–5.8)
RBC # BLD: 2.59 M/UL — LOW (ref 4.2–5.8)
RBC # FLD: 17.8 % — HIGH (ref 10.3–14.5)
RBC # FLD: 20 % — HIGH (ref 10.3–14.5)
RBC BLD AUTO: ABNORMAL
RBC CASTS # UR COMP ASSIST: 0 /HPF — SIGNIFICANT CHANGE UP (ref 0–4)
RETICS #: 41.1 K/UL — SIGNIFICANT CHANGE UP (ref 25–125)
RETICS/RBC NFR: 3.2 % — HIGH (ref 0.5–2.5)
RH IG SCN BLD-IMP: POSITIVE — SIGNIFICANT CHANGE UP
SAO2 % BLDV: 51 % — LOW (ref 67–88)
SAO2 % BLDV: 72.2 % — SIGNIFICANT CHANGE UP (ref 67–88)
SODIUM SERPL-SCNC: 140 MMOL/L — SIGNIFICANT CHANGE UP (ref 135–145)
SP GR SPEC: 1.01 — SIGNIFICANT CHANGE UP (ref 1–1.03)
SQUAMOUS # UR AUTO: 0 /HPF — SIGNIFICANT CHANGE UP (ref 0–5)
UROBILINOGEN FLD QL: 0.2 MG/DL — SIGNIFICANT CHANGE UP (ref 0.2–1)
WBC # BLD: 0.42 K/UL — CRITICAL LOW (ref 3.8–10.5)
WBC # BLD: 0.57 K/UL — CRITICAL LOW (ref 3.8–10.5)
WBC # FLD AUTO: 0.42 K/UL — CRITICAL LOW (ref 3.8–10.5)
WBC # FLD AUTO: 0.57 K/UL — CRITICAL LOW (ref 3.8–10.5)
WBC UR QL: 0 /HPF — SIGNIFICANT CHANGE UP (ref 0–5)

## 2024-05-13 PROCEDURE — 74177 CT ABD & PELVIS W/CONTRAST: CPT | Mod: 26

## 2024-05-13 PROCEDURE — 99223 1ST HOSP IP/OBS HIGH 75: CPT | Mod: GC,AI

## 2024-05-13 PROCEDURE — 99292 CRITICAL CARE ADDL 30 MIN: CPT

## 2024-05-13 PROCEDURE — 71045 X-RAY EXAM CHEST 1 VIEW: CPT | Mod: 26

## 2024-05-13 PROCEDURE — 99291 CRITICAL CARE FIRST HOUR: CPT

## 2024-05-13 PROCEDURE — 85060 BLOOD SMEAR INTERPRETATION: CPT

## 2024-05-13 RX ORDER — LIDOCAINE 4 G/100G
1 CREAM TOPICAL ONCE
Refills: 0 | Status: COMPLETED | OUTPATIENT
Start: 2024-05-13 | End: 2024-05-13

## 2024-05-13 RX ORDER — DEXTROSE 10 % IN WATER 10 %
125 INTRAVENOUS SOLUTION INTRAVENOUS ONCE
Refills: 0 | Status: DISCONTINUED | OUTPATIENT
Start: 2024-05-13 | End: 2024-05-16

## 2024-05-13 RX ORDER — DEXTROSE 50 % IN WATER 50 %
12.5 SYRINGE (ML) INTRAVENOUS ONCE
Refills: 0 | Status: DISCONTINUED | OUTPATIENT
Start: 2024-05-13 | End: 2024-05-16

## 2024-05-13 RX ORDER — PANTOPRAZOLE SODIUM 20 MG/1
40 TABLET, DELAYED RELEASE ORAL ONCE
Refills: 0 | Status: COMPLETED | OUTPATIENT
Start: 2024-05-13 | End: 2024-05-13

## 2024-05-13 RX ORDER — GLUCAGON INJECTION, SOLUTION 0.5 MG/.1ML
1 INJECTION, SOLUTION SUBCUTANEOUS ONCE
Refills: 0 | Status: DISCONTINUED | OUTPATIENT
Start: 2024-05-13 | End: 2024-05-16

## 2024-05-13 RX ORDER — PANTOPRAZOLE SODIUM 20 MG/1
40 TABLET, DELAYED RELEASE ORAL
Refills: 0 | Status: DISCONTINUED | OUTPATIENT
Start: 2024-05-13 | End: 2024-05-15

## 2024-05-13 RX ORDER — SODIUM CHLORIDE 9 MG/ML
1000 INJECTION, SOLUTION INTRAVENOUS ONCE
Refills: 0 | Status: COMPLETED | OUTPATIENT
Start: 2024-05-13 | End: 2024-05-13

## 2024-05-13 RX ORDER — DEXTROSE 50 % IN WATER 50 %
15 SYRINGE (ML) INTRAVENOUS ONCE
Refills: 0 | Status: DISCONTINUED | OUTPATIENT
Start: 2024-05-13 | End: 2024-05-16

## 2024-05-13 RX ORDER — DEXTROSE 50 % IN WATER 50 %
25 SYRINGE (ML) INTRAVENOUS ONCE
Refills: 0 | Status: DISCONTINUED | OUTPATIENT
Start: 2024-05-13 | End: 2024-05-16

## 2024-05-13 RX ORDER — SODIUM CHLORIDE 9 MG/ML
1000 INJECTION, SOLUTION INTRAVENOUS
Refills: 0 | Status: DISCONTINUED | OUTPATIENT
Start: 2024-05-13 | End: 2024-05-16

## 2024-05-13 RX ADMIN — LIDOCAINE 1 PATCH: 4 CREAM TOPICAL at 18:30

## 2024-05-13 RX ADMIN — PANTOPRAZOLE SODIUM 40 MILLIGRAM(S): 20 TABLET, DELAYED RELEASE ORAL at 11:02

## 2024-05-13 RX ADMIN — SODIUM CHLORIDE 2000 MILLILITER(S): 9 INJECTION, SOLUTION INTRAVENOUS at 04:47

## 2024-05-13 RX ADMIN — PANTOPRAZOLE SODIUM 40 MILLIGRAM(S): 20 TABLET, DELAYED RELEASE ORAL at 20:39

## 2024-05-13 RX ADMIN — LIDOCAINE 1 PATCH: 4 CREAM TOPICAL at 04:47

## 2024-05-13 RX ADMIN — PANTOPRAZOLE SODIUM 40 MILLIGRAM(S): 20 TABLET, DELAYED RELEASE ORAL at 04:47

## 2024-05-13 RX ADMIN — LIDOCAINE 1 PATCH: 4 CREAM TOPICAL at 09:21

## 2024-05-13 NOTE — PATIENT PROFILE ADULT - FALL HARM RISK - HARM RISK INTERVENTIONS
Communicate Risk of Fall with Harm to all staff/Monitor for mental status changes/Monitor gait and stability/Reinforce activity limits and safety measures with patient and family/Tailored Fall Risk Interventions/Visual Cue: Yellow wristband and red socks/Bed in lowest position, wheels locked, appropriate side rails in place/Call bell, personal items and telephone in reach/Instruct patient to call for assistance before getting out of bed or chair/Non-slip footwear when patient is out of bed/[place] to call system/Physically safe environment - no spills, clutter or unnecessary equipment/Purposeful Proactive Rounding/Room/bathroom lighting operational, light cord in reach

## 2024-05-13 NOTE — H&P ADULT - PROBLEM SELECTOR PLAN 1
Hgb 4   Suspect anemia in setting of GI bleed with anemia and recent chemotherapy. Patient with history of GI bleed from AVM's  CT A/P with no evidence of active GIB   S/p 3 units prbc and 1 unit platelet in ED     -Maintain active type and screen  -Follow up post-transfusion CBC  -Maintain Hgb >8 given history of CAD  -Protonix IV 40mg bid   -GI consult Hgb 4   Suspect anemia in setting of GI bleed with melena and recent chemotherapy. Patient with history of GI bleed from AVM's  CT A/P with no evidence of active GIB   S/p 3 units prbc and 1 unit platelet in ED     -Maintain active type and screen  -Follow up post-transfusion CBC  -Maintain Hgb >8 given history of CAD  -Protonix IV 40mg bid   -GI consult

## 2024-05-13 NOTE — H&P ADULT - HISTORY OF PRESENT ILLNESS
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 Patient chronically receives blood transfusions with his last occurring 3 days ago. Patient states he began to feel weak and fatigued last night. Patient state it acutely worsened this morning with associated dizziness which prompted him to come to the ED. Patient also reports having a black, tarry stool 2 days ago. Patient states he has not had a bowel movement since. He denies any bright red blood per rectum or hematuria. Patient also reports an episode of emesis this morning that was non-bloody. Patient denies any chest pain, abdominal pain, shortness of breath. He received chemotherapy last one week ago.

## 2024-05-13 NOTE — H&P ADULT - NSHPSOCIALHISTORY_GEN_ALL_CORE
Patient is  and lives at home by himself   He has a visiting nurse, home health aide and  that come   He uses a walker to ambulate  Patient was in import/export business   He is former smoker many years ago, rare alcohol use, no recreational drug use

## 2024-05-13 NOTE — H&P ADULT - PROBLEM SELECTOR PLAN 5
History of CAD on aspirin every other day, as well as history of TAVR.  -Hold aspirin iso of active GIB History of CAD on aspirin every other day, as well as history of TAVR    -Hold aspirin iso of active GIB

## 2024-05-13 NOTE — H&P ADULT - ASSESSMENT
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

## 2024-05-13 NOTE — ED ADULT NURSE REASSESSMENT NOTE - NS ED NURSE REASSESS COMMENT FT1
PRBC 1st unit finished at 0822, no blood reactions noted. Second unit PRBC with unit number [id number] started at 0840 after checked by 2 RN's. Blood transfusion protocol followed.

## 2024-05-13 NOTE — H&P ADULT - NSHPREVIEWOFSYSTEMS_GEN_ALL_CORE
REVIEW OF SYSTEMS:    CONSTITUTIONAL: Positive for weakness. Negative for fevers or chills  EYES/ENT: No visual changes;  No vertigo or throat pain   NECK: No pain or stiffness  RESPIRATORY: No cough, wheezing, hemoptysis; No shortness of breath  CARDIOVASCULAR: No chest pain or palpitations  GASTROINTESTINAL: No abdominal or epigastric pain. No hematemesis; No diarrhea or constipation. Positive for melena and vomiting,  GENITOURINARY: No dysuria, frequency or hematuria  NEUROLOGICAL: No numbness or weakness  SKIN: No itching, burning, rashes, or lesions   All other review of systems is negative unless indicated above.

## 2024-05-13 NOTE — ED PROVIDER NOTE - ATTENDING CONTRIBUTION TO CARE
------------ATTENDING NOTE------------  pt w/ son c/o 2 days of black tarry stools, painless, increased generalized malaise/fatigue, nausea at times w/ episode of nbnb vomiting, complicated as h/o MDS and baseline pancytopenia and regular pRBC transfusions, h/o GIB from AVMs, HD stable, overall soft benign abd, equal distal pulses, no recent falls/trauma, no fevers, awaiting labs/imaging and close reassessments -->  - Donato Chamorro MD   ------------------------------------------------- ------------ATTENDING NOTE------------  pt w/ son c/o 2 days of black tarry stools, painless, increased generalized malaise/fatigue, nausea at times w/ episode of nbnb vomiting, complicated as h/o MDS and baseline pancytopenia and regular pRBC transfusions, h/o GIB from AVMs, HD stable, overall soft benign abd, equal distal pulses, no recent falls/trauma, no fevers, awaiting labs/imaging and close reassessments --> remaining stable, no BMs during ED course, alarming as pancytopenia w/ anemia 4, 2 units pRBC, Heme / GI consults, admitting for continued cisneros, tx, optimize medical mgmt.  - Donato Chamorro MD   -------------------------------------------------

## 2024-05-13 NOTE — ED PROVIDER NOTE - OBJECTIVE STATEMENT
83 y M, hx of MDS, TAVR, CAD on aspirin, DM, p/w 2 days of black tarry stools, painless, increased generalized malaise/fatigue, 1 episode of vomiting. Pt has MDS and baseline pancytopenia and regular pRBC transfusions, h/o GIB from AVM. Currently denying nausea, abd pain, cp sob, urinary sx.

## 2024-05-13 NOTE — ED PROVIDER NOTE - CARE PLAN
Principal Discharge DX:	Anemia due to acute blood loss  Secondary Diagnosis:	GI bleed   1 Principal Discharge DX:	Anemia due to acute blood loss  Secondary Diagnosis:	GI bleed  Secondary Diagnosis:	Pancytopenia

## 2024-05-13 NOTE — CHART NOTE - NSCHARTNOTEFT_GEN_A_CORE
House GI asked to see pt for c/f GIB. Pt declining house GI (due to prior negative experience) in favor of private GI.  GI fellow contacted admitting nocturnist and NS gastro that pt would prefer private GI.  NS gastro will see pt tomorrow AM.

## 2024-05-13 NOTE — H&P ADULT - NSHPLABSRESULTS_GEN_ALL_CORE
LABS:                         4.0    0.42  )-----------( 33       ( 13 May 2024 04:13 )             12.5     05-13    140  |  107  |  63<H>  ----------------------------<  181<H>  4.3   |  20<L>  |  1.04    Ca    9.2      13 May 2024 04:13  Phos  2.7       Mg     1.8         TPro  5.5<L>  /  Alb  3.1<L>  /  TBili  0.1<L>  /  DBili  x   /  AST  17  /  ALT  10  /  AlkPhos  51  13    PT/INR - ( 13 May 2024 04:13 )   PT: 12.2 sec;   INR: 1.17 ratio         PTT - ( 13 May 2024 04:13 )  PTT:24.8 sec  Urinalysis Basic - ( 13 May 2024 07:26 )    Color: Yellow / Appearance: Clear / S.013 / pH: x  Gluc: x / Ketone: Negative mg/dL  / Bili: Negative / Urobili: 0.2 mg/dL   Blood: x / Protein: Negative mg/dL / Nitrite: Negative   Leuk Esterase: Negative / RBC: 0 /HPF / WBC 0 /HPF   Sq Epi: x / Non Sq Epi: 0 /HPF / Bacteria: Negative /HPF            Lactate, Blood: 2.2 mmol/L ( @ 07:26)      RADIOLOGY, EKG & ADDITIONAL TESTS:

## 2024-05-13 NOTE — H&P ADULT - NSHPPHYSICALEXAM_GEN_ALL_CORE
LOS:     VITALS:   T(C): 36.4 (05-13-24 @ 11:43), Max: 36.8 (05-13-24 @ 09:17)  HR: 61 (05-13-24 @ 11:43) (61 - 81)  BP: 116/58 (05-13-24 @ 11:43) (101/74 - 144/66)  RR: 18 (05-13-24 @ 11:43) (14 - 20)  SpO2: 99% (05-13-24 @ 11:43) (99% - 100%)    GENERAL: NAD, lying in bed comfortably  HEAD:  Atraumatic, Normocephalic  EYES: EOMI, PERRLA, conjunctiva and sclera clear  ENT: Moist mucous membranes  NECK: Supple, No JVD  CHEST/LUNG: Clear to auscultation bilaterally; No rales, rhonchi, wheezing, or rubs. Unlabored respirations  HEART: Regular rate and rhythm; No murmurs, rubs, or gallops  ABDOMEN: BSx4; Soft, nontender, nondistended  EXTREMITIES:  2+ Peripheral Pulses, brisk capillary refill. No edema  NERVOUS SYSTEM:  A&Ox3, no focal deficits   SKIN: No rashes or lesions LOS:     VITALS:   T(C): 36.4 (05-13-24 @ 11:43), Max: 36.8 (05-13-24 @ 09:17)  HR: 61 (05-13-24 @ 11:43) (61 - 81)  BP: 116/58 (05-13-24 @ 11:43) (101/74 - 144/66)  RR: 18 (05-13-24 @ 11:43) (14 - 20)  SpO2: 99% (05-13-24 @ 11:43) (99% - 100%)    GENERAL: NAD, lying in bed comfortably  HEAD:  Atraumatic, Normocephalic  EYES: PERRLA, conjunctiva and sclera clear  ENT: Moist mucous membranes  NECK: Supple, No JVD  CHEST/LUNG: Clear to auscultation bilaterally  HEART: Regular rate and rhythm; No murmurs  ABDOMEN: Soft, nontender, nondistended  EXTREMITIES:  2+ Peripheral Pulses, brisk capillary refill. No edema  NERVOUS SYSTEM:  A&Ox3, no focal deficits   SKIN: No rashes or lesions

## 2024-05-13 NOTE — H&P ADULT - ATTENDING COMMENTS
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 On admission Hgb 4.    S/p 3U pRBC, 1U platelets follow up repeat bloodwork. PPI IV BID, Given dark tarry stools, GI consulted pending eval/plan. Panctopenia i/s/o MDS and chemo last week, add on diff, Heme consult, antimicrobial ppx was levaqin, acyclocir, and fluconazole on last admission.     pending GI plan, Heme recs  d/w HS 1

## 2024-05-13 NOTE — H&P ADULT - PROBLEM SELECTOR PLAN 2
Labs notable for WBC 0.42; Hgb 4; Plt 33 concerning for pancytopenia in setting of known MDS and recent chemotherapy ~1 weeks ago.     -No signs of infection at this time  -If SIRS+ would obtain infectious work up and consider empiric antibiotics   -MRSA/MSSA PCR  -Hgb transfuse >8 given CAD history  -Transfuse platelet if  plt <10, or fever and plt <20, or active bleeding and plt <50

## 2024-05-13 NOTE — PATIENT PROFILE ADULT - HAVE YOU RECENTLY LOST WEIGHT WITHOUT TRYING?
Yes...

## 2024-05-13 NOTE — ED PROVIDER NOTE - PHYSICAL EXAMINATION
PHYSICAL EXAM:  GEN: nad but pale appearing   HEAD: Atraumatic  EYES:  anicteric, conjunctival pallor  CARDIAC: Normal rate, regular rhythm. +S1/S2. No murmurs, rubs or gallops.  RESPIRATORY: Breathing unlabored. Breath sounds clear and equal bilaterally.  ABDOMEN:  Soft, nontender, nondistended. No rebound tenderness or guarding.  SKIN: Skin warm and dry. No evidence of rashes or lesions.

## 2024-05-13 NOTE — ED ADULT NURSE NOTE - OBJECTIVE STATEMENT
83y Male presents to the ED c/o increased weakness. Pts son at bedside states pt has had multiple blood transfusions r/t low hgb/MDS. pt also endorses vomitting without blood and dark tarry stool. pt is aox4 inependent at baseline. pt also complaining of neck pain.  no signs of distress at this time.

## 2024-05-13 NOTE — ED ADULT NURSE REASSESSMENT NOTE - NS ED NURSE REASSESS COMMENT FT1
Received alert and orientedx4. Breathing easy and non labored. Blood transfusion in progress. Denies chest pain, sob or any discomfort. Will monitor.

## 2024-05-13 NOTE — H&P ADULT - TIME BILLING
time spent reviewing prior charts, meds, discussing plan with patient= 77 minutes. Time spent does not include time spent teaching time spent reviewing prior charts, meds, discussing plan with patient= 77 minutes. Time spent does not include time spent teaching.

## 2024-05-14 LAB
A1C WITH ESTIMATED AVERAGE GLUCOSE RESULT: 5.7 % — HIGH (ref 4–5.6)
ADD ON TEST-SPECIMEN IN LAB: SIGNIFICANT CHANGE UP
ADD ON TEST-SPECIMEN IN LAB: SIGNIFICANT CHANGE UP
ALBUMIN SERPL ELPH-MCNC: 3.4 G/DL — SIGNIFICANT CHANGE UP (ref 3.3–5)
ALP SERPL-CCNC: 42 U/L — SIGNIFICANT CHANGE UP (ref 40–120)
ALT FLD-CCNC: 15 U/L — SIGNIFICANT CHANGE UP (ref 10–45)
ANION GAP SERPL CALC-SCNC: 11 MMOL/L — SIGNIFICANT CHANGE UP (ref 5–17)
AST SERPL-CCNC: 28 U/L — SIGNIFICANT CHANGE UP (ref 10–40)
BILIRUB SERPL-MCNC: 0.4 MG/DL — SIGNIFICANT CHANGE UP (ref 0.2–1.2)
BUN SERPL-MCNC: 23 MG/DL — SIGNIFICANT CHANGE UP (ref 7–23)
CALCIUM SERPL-MCNC: 9.2 MG/DL — SIGNIFICANT CHANGE UP (ref 8.4–10.5)
CHLORIDE SERPL-SCNC: 107 MMOL/L — SIGNIFICANT CHANGE UP (ref 96–108)
CO2 SERPL-SCNC: 23 MMOL/L — SIGNIFICANT CHANGE UP (ref 22–31)
CREAT SERPL-MCNC: 0.87 MG/DL — SIGNIFICANT CHANGE UP (ref 0.5–1.3)
CULTURE RESULTS: NO GROWTH — SIGNIFICANT CHANGE UP
EGFR: 86 ML/MIN/1.73M2 — SIGNIFICANT CHANGE UP
ESTIMATED AVERAGE GLUCOSE: 117 MG/DL — HIGH (ref 68–114)
GLUCOSE BLDC GLUCOMTR-MCNC: 88 MG/DL — SIGNIFICANT CHANGE UP (ref 70–99)
GLUCOSE BLDC GLUCOMTR-MCNC: 96 MG/DL — SIGNIFICANT CHANGE UP (ref 70–99)
GLUCOSE BLDC GLUCOMTR-MCNC: 98 MG/DL — SIGNIFICANT CHANGE UP (ref 70–99)
GLUCOSE BLDC GLUCOMTR-MCNC: 99 MG/DL — SIGNIFICANT CHANGE UP (ref 70–99)
GLUCOSE SERPL-MCNC: 92 MG/DL — SIGNIFICANT CHANGE UP (ref 70–99)
HCT VFR BLD CALC: 23.4 % — LOW (ref 39–50)
HGB BLD-MCNC: 8.1 G/DL — LOW (ref 13–17)
MAGNESIUM SERPL-MCNC: 1.8 MG/DL — SIGNIFICANT CHANGE UP (ref 1.6–2.6)
MANUAL DIF COMMENT BLD-IMP: SIGNIFICANT CHANGE UP
MCHC RBC-ENTMCNC: 30 PG — SIGNIFICANT CHANGE UP (ref 27–34)
MCHC RBC-ENTMCNC: 34.6 GM/DL — SIGNIFICANT CHANGE UP (ref 32–36)
MCV RBC AUTO: 86.7 FL — SIGNIFICANT CHANGE UP (ref 80–100)
NRBC # BLD: 0 /100 WBCS — SIGNIFICANT CHANGE UP (ref 0–0)
PHOSPHATE SERPL-MCNC: 3.3 MG/DL — SIGNIFICANT CHANGE UP (ref 2.5–4.5)
PLATELET # BLD AUTO: 47 K/UL — LOW (ref 150–400)
POTASSIUM SERPL-MCNC: 3.7 MMOL/L — SIGNIFICANT CHANGE UP (ref 3.5–5.3)
POTASSIUM SERPL-SCNC: 3.7 MMOL/L — SIGNIFICANT CHANGE UP (ref 3.5–5.3)
PROT SERPL-MCNC: 5.8 G/DL — LOW (ref 6–8.3)
RBC # BLD: 2.7 M/UL — LOW (ref 4.2–5.8)
RBC # FLD: 18.3 % — HIGH (ref 10.3–14.5)
SODIUM SERPL-SCNC: 141 MMOL/L — SIGNIFICANT CHANGE UP (ref 135–145)
SPECIMEN SOURCE: SIGNIFICANT CHANGE UP
WBC # BLD: 0.46 K/UL — CRITICAL LOW (ref 3.8–10.5)
WBC # FLD AUTO: 0.46 K/UL — CRITICAL LOW (ref 3.8–10.5)

## 2024-05-14 PROCEDURE — 99233 SBSQ HOSP IP/OBS HIGH 50: CPT | Mod: GC

## 2024-05-14 PROCEDURE — 99223 1ST HOSP IP/OBS HIGH 75: CPT | Mod: GC

## 2024-05-14 PROCEDURE — 93010 ELECTROCARDIOGRAM REPORT: CPT

## 2024-05-14 RX ORDER — ESCITALOPRAM OXALATE 10 MG/1
10 TABLET, FILM COATED ORAL DAILY
Refills: 0 | Status: DISCONTINUED | OUTPATIENT
Start: 2024-05-14 | End: 2024-05-16

## 2024-05-14 RX ORDER — LEVOTHYROXINE SODIUM 125 MCG
50 TABLET ORAL DAILY
Refills: 0 | Status: DISCONTINUED | OUTPATIENT
Start: 2024-05-14 | End: 2024-05-16

## 2024-05-14 RX ORDER — FENOFIBRATE,MICRONIZED 130 MG
48 CAPSULE ORAL DAILY
Refills: 0 | Status: DISCONTINUED | OUTPATIENT
Start: 2024-05-14 | End: 2024-05-16

## 2024-05-14 RX ORDER — INSULIN LISPRO 100/ML
VIAL (ML) SUBCUTANEOUS
Refills: 0 | Status: DISCONTINUED | OUTPATIENT
Start: 2024-05-14 | End: 2024-05-16

## 2024-05-14 RX ORDER — ACYCLOVIR SODIUM 500 MG
400 VIAL (EA) INTRAVENOUS
Refills: 0 | Status: DISCONTINUED | OUTPATIENT
Start: 2024-05-14 | End: 2024-05-16

## 2024-05-14 RX ORDER — METOPROLOL TARTRATE 50 MG
50 TABLET ORAL DAILY
Refills: 0 | Status: DISCONTINUED | OUTPATIENT
Start: 2024-05-14 | End: 2024-05-16

## 2024-05-14 RX ORDER — FLUCONAZOLE 150 MG/1
200 TABLET ORAL DAILY
Refills: 0 | Status: DISCONTINUED | OUTPATIENT
Start: 2024-05-14 | End: 2024-05-16

## 2024-05-14 RX ORDER — FOLIC ACID 0.8 MG
1 TABLET ORAL DAILY
Refills: 0 | Status: DISCONTINUED | OUTPATIENT
Start: 2024-05-14 | End: 2024-05-16

## 2024-05-14 RX ORDER — AMLODIPINE BESYLATE 2.5 MG/1
10 TABLET ORAL DAILY
Refills: 0 | Status: DISCONTINUED | OUTPATIENT
Start: 2024-05-14 | End: 2024-05-16

## 2024-05-14 RX ORDER — SODIUM CHLORIDE 9 MG/ML
1000 INJECTION, SOLUTION INTRAVENOUS
Refills: 0 | Status: DISCONTINUED | OUTPATIENT
Start: 2024-05-14 | End: 2024-05-14

## 2024-05-14 RX ORDER — INSULIN LISPRO 100/ML
VIAL (ML) SUBCUTANEOUS AT BEDTIME
Refills: 0 | Status: DISCONTINUED | OUTPATIENT
Start: 2024-05-14 | End: 2024-05-16

## 2024-05-14 RX ORDER — TAMSULOSIN HYDROCHLORIDE 0.4 MG/1
0.4 CAPSULE ORAL AT BEDTIME
Refills: 0 | Status: DISCONTINUED | OUTPATIENT
Start: 2024-05-14 | End: 2024-05-16

## 2024-05-14 RX ORDER — PANTOPRAZOLE SODIUM 20 MG/1
40 TABLET, DELAYED RELEASE ORAL ONCE
Refills: 0 | Status: COMPLETED | OUTPATIENT
Start: 2024-05-14 | End: 2024-05-14

## 2024-05-14 RX ADMIN — PANTOPRAZOLE SODIUM 40 MILLIGRAM(S): 20 TABLET, DELAYED RELEASE ORAL at 05:18

## 2024-05-14 RX ADMIN — Medication 1 MILLIGRAM(S): at 14:27

## 2024-05-14 RX ADMIN — PANTOPRAZOLE SODIUM 40 MILLIGRAM(S): 20 TABLET, DELAYED RELEASE ORAL at 18:41

## 2024-05-14 RX ADMIN — SODIUM CHLORIDE 75 MILLILITER(S): 9 INJECTION, SOLUTION INTRAVENOUS at 09:34

## 2024-05-14 RX ADMIN — TAMSULOSIN HYDROCHLORIDE 0.4 MILLIGRAM(S): 0.4 CAPSULE ORAL at 21:25

## 2024-05-14 RX ADMIN — Medication 50 MICROGRAM(S): at 14:28

## 2024-05-14 NOTE — CONSULT NOTE ADULT - SUBJECTIVE AND OBJECTIVE BOX
**** INCOMPLETE NOTE ****    Patient is a 83y old  Male who presents with a chief complaint of GI Bleed (14 May 2024 08:59)      HPI:  83 year old male with PMH of CAD, TAVR, DMII, GI bleed from AVM's, and MDS on chemotherapy (last treatment ~1 week ago) presenting with 1-day of acute onset of generalized weakness and melena. Patient chronically receives blood transfusions with his last occurring 3 days ago. Patient states he began to feel weak and fatigued last night. Patient state it acutely worsened this morning with associated dizziness which prompted him to come to the ED. Patient also reports having a black, tarry stool 2 days ago. Patient states he has not had a bowel movement since. He denies any bright red blood per rectum or hematuria. Patient also reports an episode of emesis this morning that was non-bloody. Patient denies any chest pain, abdominal pain, shortness of breath. He received chemotherapy last one week ago.  (13 May 2024 13:24)      last BM :       - s/p VCE (8/10)-  red blood in the stomach; hematin/melena (altered blood) in the small bowel; non-diagnostic small bowel study as the capsule terminates in the small bowel at the end of the study  - s/p EGD (8/10)-  Normal esophagus; single bleeding angioectasia with adherent clot in the stomach- s/p treatment with APC likely the main source of bleeding and cause for blood clots seen on VCE; few recently bleeding angioectasias in the stomach- s/p treatment with APC; normal examined duodenum.  - s/p push enteroscopy (7/25)- normal esophagus; ultiple gastric polyps; normal duodenum; normal examined jejunum. -s/p Colonoscopy (7/26)- fair prep, pan-diverticulosis, normal TI, normal rectal retroflexion, no findings to explain melena    history of previous enteroscopies (single and double balloon) w/ Dr Ashton 2/2 AVM bleeds    PAST MEDICAL & SURGICAL HISTORY:  Gastrointestinal bleeding s/s AVMs    HTN (hypertension)    HLD (hyperlipidemia)    T2DM (type 2 diabetes mellitus)    Hypothyroidism    Severe AS S/P TAVR (transcatheter aortic valve replacement)  2020          Allergies  No Known Allergies      MEDICATIONS  (STANDING):  dextrose 10% Bolus 125 milliLiter(s) IV Bolus once  dextrose 5%. 1000 milliLiter(s) (50 mL/Hr) IV Continuous <Continuous>  dextrose 5%. 1000 milliLiter(s) (100 mL/Hr) IV Continuous <Continuous>  dextrose 50% Injectable 12.5 Gram(s) IV Push once  dextrose 50% Injectable 25 Gram(s) IV Push once  glucagon  Injectable 1 milliGRAM(s) IntraMuscular once  lactated ringers. 1000 milliLiter(s) (75 mL/Hr) IV Continuous <Continuous>  pantoprazole  Injectable 40 milliGRAM(s) IV Push two times a day    MEDICATIONS  (PRN):  dextrose Oral Gel 15 Gram(s) Oral once PRN Blood Glucose LESS THAN 70 milliGRAM(s)/deciliter      Social History:      no toxic habits    Family History   IBD (  ) Yes   (  ) No  GI Malignancy (  )  Yes    (  ) No    Health Management  Last Colonoscopy -      Advanced Directives: ( X    ) None    (      ) DNR    (     ) DNI    (     ) Health Care Proxy:     Review of Systems:  see HPI- remainder 10 point ROS negative      Vital Signs Last 24 Hrs  T(C): 36.7 (14 May 2024 09:20), Max: 36.9 (13 May 2024 12:33)  T(F): 98 (14 May 2024 09:20), Max: 98.4 (13 May 2024 12:33)  HR: 52 (14 May 2024 09:20) (52 - 62)  BP: 139/73 (14 May 2024 09:20) (116/58 - 156/56)  BP(mean): --  RR: 18 (14 May 2024 09:20) (18 - 18)  SpO2: 98% (14 May 2024 09:20) (98% - 100%)    Parameters below as of 14 May 2024 09:20  Patient On (Oxygen Delivery Method): room air        PHYSICAL EXAM:    Constitutional: NAD, WD WN elderly WM alert and cooperative     Neck: No LAD, supple no JVD  Respiratory: Clear bl no inc WOB  Cardiovascular: S1 and S2, RRR  Gastrointestinal: BS+, soft, NT/ND no caput, neg HSM,+bl inguinal hernias - NT, soft  Extremities: No peripheral edema, neg clubbing, cyanosis  Vascular: 2+ peripheral pulses  Neurological: A/O x 3, no focal deficits or asymmetry   speech clear and fluent  Psychiatric: Normal mood, normal affect  Skin: No rashes, anicteric        LABS:                        8.1    0.46  )-----------( 47       ( 14 May 2024 07:20 )             23.4     Platelet Count - Automated: 47 K/uL (05.14.24 @ 07:20)   Platelet Count - Automated: 46 K/uL (05.13.24 @ 20:56)   sp 1 unit Platelets  Platelet Count - Automated: 33 K/uL (05.13.24 @ 04:13)   Platelet Count - Automated: 27 K/uL (04.01.24 @ 07:07)   Platelet Count - Automated: 26 K/uL (03.31.24 @ 12:33)   Platelet Count - Automated: 27: Test Repeated Specimen integrity verified. K/uL (03.31.24 @ 02:13)     Hemoglobin: 8.1 g/dL (05.14.24 @ 07:20)   Hemoglobin: 7.7 g/dL (05.13.24 @ 20:56)   sp 3 units PRBCs  Hemoglobin: 4.0: Test Repeated Specimen integrity verified. g/dL (05.13.24 @ 04:13)   Hemoglobin: 9.6 g/dL (04.01.24 @ 07:07)       05-14    141  |  107  |  23  ----------------------------<  92  3.7   |  23  |  0.87    Ca    9.2      14 May 2024 07:20  Phos  3.3     05-14  Mg     1.8     05-14    TPro  5.8<L>  /  Alb  3.4  /  TBili  0.4  /  DBili  x   /  AST  28  /  ALT  15  /  AlkPhos  42  05-14    PT/INR - ( 13 May 2024 04:13 )   PT: 12.2 sec;   INR: 1.17 ratio    PTT - ( 13 May 2024 04:13 )  PTT:24.8 sec      RADIOLOGY & ADDITIONAL TESTS:    ACC: [id number] EXAM:  CT ABDOMEN AND PELVIS IC   ORDERED BY: ETHAN REED     PROCEDURE DATE:  05/13/2024          INTERPRETATION:  CLINICAL INFORMATION: Melena, history of AVM    COMPARISON: CT abdomen and pelvis 8/9/2023.    CONTRAST/COMPLICATIONS:  IV Contrast: Omnipaque 350  90 cc administered   10 cc discarded  Oral Contrast: NONE  Complications: None reported at time of study completion    PROCEDURE:  CT of the Abdomen and Pelvis was performed.  Precontrast, Arterial and Delayed phases were performed.  Sagittal and coronal reformats were performed.    FINDINGS:  LOWER CHEST: Hypoattenuation of the blood pool relative to the   myocardium, compatible with anemia. TAVR. Coronary artery calcifications.   Small calcified mediastinal lymph nodes. Minimal bibasilar subsegmental   atelectasis.    LIVER: Unchanged punctate calcification in the medial left lobe.  BILE DUCTS: Normal caliber.  GALLBLADDER: Within normal limits.  SPLEEN: Within normal limits.  PANCREAS: Within normal limits.  ADRENALS: Bilateral adrenal gland thickening, similar to prior.  KIDNEYS/URETERS: No hydronephrosis. Left lower pole renal cyst. Bilateral   subcentimeter hypodense foci that are too small to characterize.    BLADDER: Within normal limits.  REPRODUCTIVE ORGANS: Mildly enlarged prostate.    BOWEL: Distal duodenal diverticulum. Colonic diverticulosis. No evidence   for acute diverticulitis. No bowel obstruction. The appendix is located   within a right inguinal hernia, and is otherwise normal. No evidence for   active gastrointestinal bleed.  PERITONEUM: No ascites.  VESSELS: Atherosclerotic changes.  RETROPERITONEUM/LYMPH NODES: No lymphadenopathy.  ABDOMINAL WALL: Small right inguinal hernia containing the appendix.   Small left inguinal herniacontaining fat.  BONES: Degenerative changes. Chronic left rib fractures.    IMPRESSION:  No acute abdominopelvic findings. No evidence of active gastrointestinal   hemorrhage at this time.        --- End of Report ---               Patient is a 83y old  Male who presents with a chief complaint of GI Bleed (14 May 2024 08:59)      HPI:  83 year old male with PMH of CAD, TAVR, DMII, GI bleed from AVM's, and MDS on chemotherapy (last treatment ~1 week ago) presenting with 1-day of acute onset of generalized weakness and melena. Patient chronically receives blood transfusions with his last occurring 3 days ago. Patient states he began to feel weak and fatigued last night. Patient state it acutely worsened this morning with associated dizziness which prompted him to come to the ED. Patient also reports having a black, tarry stool 2 days ago. Patient states he has not had a bowel movement since. He denies any bright red blood per rectum or hematuria. Patient denies any chest pain, abdominal pain, shortness of breath. He received chemotherapy last one week ago.      Known history of extensive GI AVMs (SB +/- colon; s/p DBE and colonsocopies) - seen and scoped on multiple occasions by Dr Ashton  Most recently admitted here in 2023 with GI course as outlined below:    - s/p VCE (8/10)-  red blood in the stomach; hematin/melena (altered blood) in the small bowel; non-diagnostic small bowel study as the capsule terminates in the small bowel at the end of the study  - s/p EGD (8/10)-  Normal esophagus; single bleeding angioectasia with adherent clot in the stomach- s/p treatment with APC likely the main source of bleeding and cause for blood clots seen on VCE; few recently bleeding angioectasias in the stomach- s/p treatment with APC; normal examined duodenum.  - s/p push enteroscopy (7/25)- normal esophagus; multiple gastric polyps; normal duodenum; normal examined jejunum.   -s/p Colonoscopy (7/26)- fair prep, pan-diverticulosis, normal TI, normal rectal retroflexion, no findings to explain melena      no abdominal pain, no hematochezia or BRBPR   s/p chemo 1 week ago with pancytopenia followed by 1 day of tarry black stool 2 days ago  received 3 units of PRBCs and 1 unit of Platelets this admission  last BM : 2 days ago  no nausea, vomiting or hemetemesis  no abdominal pain, CP or SOB  feels better s/p transfusions      PAST MEDICAL & SURGICAL HISTORY:  Gastrointestinal bleeding s/s AVMs    HTN (hypertension)    HLD (hyperlipidemia)    T2DM (type 2 diabetes mellitus)    Hypothyroidism    Severe AS S/P TAVR (transcatheter aortic valve replacement)  2020          Allergies  No Known Allergies      MEDICATIONS  (STANDING):  dextrose 10% Bolus 125 milliLiter(s) IV Bolus once  dextrose 5%. 1000 milliLiter(s) (50 mL/Hr) IV Continuous <Continuous>  dextrose 5%. 1000 milliLiter(s) (100 mL/Hr) IV Continuous <Continuous>  dextrose 50% Injectable 12.5 Gram(s) IV Push once  dextrose 50% Injectable 25 Gram(s) IV Push once  glucagon  Injectable 1 milliGRAM(s) IntraMuscular once  lactated ringers. 1000 milliLiter(s) (75 mL/Hr) IV Continuous <Continuous>  pantoprazole  Injectable 40 milliGRAM(s) IV Push two times a day    MEDICATIONS  (PRN):  dextrose Oral Gel 15 Gram(s) Oral once PRN Blood Glucose LESS THAN 70 milliGRAM(s)/deciliter      Social History:      no toxic habits    Family History   IBD (  ) Yes   ( X ) No  GI Malignancy (  )  Yes    (X  ) No    Advanced Directives: ( X    ) None    (      ) DNR    (     ) DNI    (     ) Health Care Proxy:     Review of Systems:  see HPI- remainder 10 point ROS negative      Vital Signs Last 24 Hrs  T(C): 36.7 (14 May 2024 09:20), Max: 36.9 (13 May 2024 12:33)  T(F): 98 (14 May 2024 09:20), Max: 98.4 (13 May 2024 12:33)  HR: 52 (14 May 2024 09:20) (52 - 62)  BP: 139/73 (14 May 2024 09:20) (116/58 - 156/56)  BP(mean): --  RR: 18 (14 May 2024 09:20) (18 - 18)  SpO2: 98% (14 May 2024 09:20) (98% - 100%)    Parameters below as of 14 May 2024 09:20  Patient On (Oxygen Delivery Method): room air        PHYSICAL EXAM:    Constitutional: NAD, WD WN elderly WM alert and cooperative     Neck: No LAD, supple no JVD  Respiratory: Clear bl no inc WOB  Cardiovascular: S1 and S2, RRR  Gastrointestinal: BS+, soft, NT/ND no caput, neg HSM,+bl inguinal hernias - NT, soft  Extremities: No peripheral edema, neg clubbing, cyanosis  Vascular: 2+ peripheral pulses  Neurological: A/O x 3, no focal deficits or asymmetry   speech clear and fluent  Psychiatric: Normal mood, normal affect  Skin: No rashes, anicteric        LABS:                        8.1    0.46  )-----------( 47       ( 14 May 2024 07:20 )             23.4     Platelet Count - Automated: 47 K/uL (05.14.24 @ 07:20)   Platelet Count - Automated: 46 K/uL (05.13.24 @ 20:56)   sp 1 unit Platelets  Platelet Count - Automated: 33 K/uL (05.13.24 @ 04:13)   Platelet Count - Automated: 27 K/uL (04.01.24 @ 07:07)   Platelet Count - Automated: 26 K/uL (03.31.24 @ 12:33)   Platelet Count - Automated: 27: Test Repeated Specimen integrity verified. K/uL (03.31.24 @ 02:13)     Hemoglobin: 8.1 g/dL (05.14.24 @ 07:20)   Hemoglobin: 7.7 g/dL (05.13.24 @ 20:56)   sp 3 units PRBCs  Hemoglobin: 4.0: Test Repeated Specimen integrity verified. g/dL (05.13.24 @ 04:13)   Hemoglobin: 9.6 g/dL (04.01.24 @ 07:07)       05-14    141  |  107  |  23  ----------------------------<  92  3.7   |  23  |  0.87    Ca    9.2      14 May 2024 07:20  Phos  3.3     05-14  Mg     1.8     05-14    TPro  5.8<L>  /  Alb  3.4  /  TBili  0.4  /  DBili  x   /  AST  28  /  ALT  15  /  AlkPhos  42  05-14    PT/INR - ( 13 May 2024 04:13 )   PT: 12.2 sec;   INR: 1.17 ratio    PTT - ( 13 May 2024 04:13 )  PTT:24.8 sec      RADIOLOGY & ADDITIONAL TESTS:    ACC: [id number] EXAM:  CT ABDOMEN AND PELVIS IC   ORDERED BY: ETHAN REED     PROCEDURE DATE:  05/13/2024          INTERPRETATION:  CLINICAL INFORMATION: Melena, history of AVM    COMPARISON: CT abdomen and pelvis 8/9/2023.    CONTRAST/COMPLICATIONS:  IV Contrast: Omnipaque 350  90 cc administered   10 cc discarded  Oral Contrast: NONE  Complications: None reported at time of study completion    PROCEDURE:  CT of the Abdomen and Pelvis was performed.  Precontrast, Arterial and Delayed phases were performed.  Sagittal and coronal reformats were performed.    FINDINGS:  LOWER CHEST: Hypoattenuation of the blood pool relative to the   myocardium, compatible with anemia. TAVR. Coronary artery calcifications.   Small calcified mediastinal lymph nodes. Minimal bibasilar subsegmental   atelectasis.    LIVER: Unchanged punctate calcification in the medial left lobe.  BILE DUCTS: Normal caliber.  GALLBLADDER: Within normal limits.  SPLEEN: Within normal limits.  PANCREAS: Within normal limits.  ADRENALS: Bilateral adrenal gland thickening, similar to prior.  KIDNEYS/URETERS: No hydronephrosis. Left lower pole renal cyst. Bilateral   subcentimeter hypodense foci that are too small to characterize.    BLADDER: Within normal limits.  REPRODUCTIVE ORGANS: Mildly enlarged prostate.    BOWEL: Distal duodenal diverticulum. Colonic diverticulosis. No evidence   for acute diverticulitis. No bowel obstruction. The appendix is located   within a right inguinal hernia, and is otherwise normal. No evidence for   active gastrointestinal bleed.  PERITONEUM: No ascites.  VESSELS: Atherosclerotic changes.  RETROPERITONEUM/LYMPH NODES: No lymphadenopathy.  ABDOMINAL WALL: Small right inguinal hernia containing the appendix.   Small left inguinal herniacontaining fat.  BONES: Degenerative changes. Chronic left rib fractures.    IMPRESSION:  No acute abdominopelvic findings. No evidence of active gastrointestinal   hemorrhage at this time.        --- End of Report ---

## 2024-05-14 NOTE — CONSULT NOTE ADULT - SUBJECTIVE AND OBJECTIVE BOX
HEMATOLOGY ONCOLOGY CONSULT     Patient is a 83y old  Male who presents with a chief complaint of GI Bleed (14 May 2024 10:32)    HPI:  83yoM with PMhx of MDS (primary hematologist Dr. 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 3-days ago, the patient noticed weakness, dizziness, and melena. Of note, he had a recent admission 3/30 - 4/1 for symptomatic anemia (Hgb 3.4) requiring a total of 5u pRBC and discharged on prophylactic antibiotics (levofloxacin, fluconazole, acyclovir). Hematology consulted for management recommendations.     ROS negative except as indicated in the HPI.    PAST MEDICAL & SURGICAL HISTORY:  Gastrointestinal bleeding  HTN (hypertension)  HLD (hyperlipidemia)  T2DM (type 2 diabetes mellitus)  Hypothyroidism  S/P TAVR (transcatheter aortic valve replacement) - 2020    SOCIAL HISTORY:    FAMILY HISTORY:    MEDICATIONS  (STANDING):  dextrose 10% Bolus 125 milliLiter(s) IV Bolus once  dextrose 5%. 1000 milliLiter(s) (50 mL/Hr) IV Continuous <Continuous>  dextrose 5%. 1000 milliLiter(s) (100 mL/Hr) IV Continuous <Continuous>  dextrose 50% Injectable 12.5 Gram(s) IV Push once  dextrose 50% Injectable 25 Gram(s) IV Push once  glucagon  Injectable 1 milliGRAM(s) IntraMuscular once  pantoprazole  Injectable 40 milliGRAM(s) IV Push two times a day    MEDICATIONS  (PRN):  dextrose Oral Gel 15 Gram(s) Oral once PRN Blood Glucose LESS THAN 70 milliGRAM(s)/deciliter    Allergies  No Known Allergies  Intolerances    Vital Signs Last 24 Hrs  T(C): 36.7 (14 May 2024 09:20), Max: 36.9 (13 May 2024 12:33)  T(F): 98 (14 May 2024 09:20), Max: 98.4 (13 May 2024 12:33)  HR: 52 (14 May 2024 09:20) (52 - 62)  BP: 139/73 (14 May 2024 09:20) (132/59 - 156/56)  BP(mean): --  RR: 18 (14 May 2024 09:20) (18 - 18)  SpO2: 98% (14 May 2024 09:20) (98% - 100%)    Parameters below as of 14 May 2024 09:20  Patient On (Oxygen Delivery Method): room air    PHYSICAL EXAM  General: adult in NAD  HEENT: clear oropharynx, anicteric sclera, pink conjunctiva  Neck: supple  CV: normal S1/S2 with no murmur rubs or gallops  Lungs: positive air movement b/l ant lungs, clear to auscultation, no wheezes, no rales  Abdomen: soft non-tender non-distended, no hepatosplenomegaly  Ext: no clubbing cyanosis or edema  Skin: no rashes and no petechiae  Neuro: alert and oriented X 4, no focal deficits    05-13-24 @ 07:01  -  05-14-24 @ 07:00  --------------------------------------------------------  IN: 0 mL / OUT: 800 mL / NET: -800 mL    LABS:                    8.1    0.46  )-----------( 47       ( 14 May 2024 07:20 )             23.4     Mean Cell Volume : 86.7 fl  Mean Cell Hemoglobin : 30.0 pg  Mean Cell Hemoglobin Concentration : 34.6 gm/dL  Auto Neutrophil # : x  Auto Lymphocyte # : x  Auto Monocyte # : x  Auto Eosinophil # : x  Auto Basophil # : x  Auto Neutrophil % : x  Auto Lymphocyte % : x  Auto Monocyte % : x  Auto Eosinophil % : x  Auto Basophil % : x    05-14    141  |  107  |  23  ----------------------------<  92  3.7   |  23  |  0.87    Ca    9.2      14 May 2024 07:20  Phos  3.3     05-14  Mg     1.8     05-14    TPro  5.8<L>  /  Alb  3.4  /  TBili  0.4  /  DBili  x   /  AST  28  /  ALT  15  /  AlkPhos  42  05-14  PT/INR - ( 13 May 2024 04:13 )   PT: 12.2 sec;   INR: 1.17 ratio    PTT - ( 13 May 2024 04:13 )  PTT:24.8 sec  Reticulocyte Percent: 3.2 % (05-13 @ 04:13) HEMATOLOGY ONCOLOGY CONSULT     Patient is a 83y old  Male who presents with a chief complaint of GI Bleed (14 May 2024 10:32)    HPI:  83yoM with PMhx of MDS/AML (primary hematologist Dr. 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 3-days ago, the patient noticed weakness, dizziness, and melena. Of note, he had a recent admission 3/30 - 4/1 for symptomatic anemia (Hgb 3.4) requiring a total of 5u pRBC and discharged on prophylactic antibiotics (levofloxacin, fluconazole, acyclovir). Hematology consulted for management recommendations.     ROS negative except as indicated in the HPI.    PAST MEDICAL & SURGICAL HISTORY:  Gastrointestinal bleeding  HTN (hypertension)  HLD (hyperlipidemia)  T2DM (type 2 diabetes mellitus)  Hypothyroidism  S/P TAVR (transcatheter aortic valve replacement) - 2020    SOCIAL HISTORY:    FAMILY HISTORY:    MEDICATIONS  (STANDING):  dextrose 10% Bolus 125 milliLiter(s) IV Bolus once  dextrose 5%. 1000 milliLiter(s) (50 mL/Hr) IV Continuous <Continuous>  dextrose 5%. 1000 milliLiter(s) (100 mL/Hr) IV Continuous <Continuous>  dextrose 50% Injectable 12.5 Gram(s) IV Push once  dextrose 50% Injectable 25 Gram(s) IV Push once  glucagon  Injectable 1 milliGRAM(s) IntraMuscular once  pantoprazole  Injectable 40 milliGRAM(s) IV Push two times a day    MEDICATIONS  (PRN):  dextrose Oral Gel 15 Gram(s) Oral once PRN Blood Glucose LESS THAN 70 milliGRAM(s)/deciliter    Allergies  No Known Allergies  Intolerances    Vital Signs Last 24 Hrs  T(C): 36.7 (14 May 2024 09:20), Max: 36.9 (13 May 2024 12:33)  T(F): 98 (14 May 2024 09:20), Max: 98.4 (13 May 2024 12:33)  HR: 52 (14 May 2024 09:20) (52 - 62)  BP: 139/73 (14 May 2024 09:20) (132/59 - 156/56)  BP(mean): --  RR: 18 (14 May 2024 09:20) (18 - 18)  SpO2: 98% (14 May 2024 09:20) (98% - 100%)    Parameters below as of 14 May 2024 09:20  Patient On (Oxygen Delivery Method): room air    PHYSICAL EXAM  General: adult in NAD  HEENT: clear oropharynx, anicteric sclera, pink conjunctiva  Neck: supple  CV: normal S1/S2 with no murmur rubs or gallops  Lungs: positive air movement b/l ant lungs, clear to auscultation, no wheezes, no rales  Abdomen: soft non-tender non-distended, no hepatosplenomegaly  Ext: no clubbing cyanosis or edema  Skin: no rashes and no petechiae  Neuro: alert and oriented X 4, no focal deficits    05-13-24 @ 07:01  -  05-14-24 @ 07:00  --------------------------------------------------------  IN: 0 mL / OUT: 800 mL / NET: -800 mL    LABS:                    8.1    0.46  )-----------( 47       ( 14 May 2024 07:20 )             23.4     Mean Cell Volume : 86.7 fl  Mean Cell Hemoglobin : 30.0 pg  Mean Cell Hemoglobin Concentration : 34.6 gm/dL  Auto Neutrophil # : x  Auto Lymphocyte # : x  Auto Monocyte # : x  Auto Eosinophil # : x  Auto Basophil # : x  Auto Neutrophil % : x  Auto Lymphocyte % : x  Auto Monocyte % : x  Auto Eosinophil % : x  Auto Basophil % : x    05-14    141  |  107  |  23  ----------------------------<  92  3.7   |  23  |  0.87    Ca    9.2      14 May 2024 07:20  Phos  3.3     05-14  Mg     1.8     05-14    TPro  5.8<L>  /  Alb  3.4  /  TBili  0.4  /  DBili  x   /  AST  28  /  ALT  15  /  AlkPhos  42  05-14  PT/INR - ( 13 May 2024 04:13 )   PT: 12.2 sec;   INR: 1.17 ratio    PTT - ( 13 May 2024 04:13 )  PTT:24.8 sec  Reticulocyte Percent: 3.2 % (05-13 @ 04:13) HEMATOLOGY ONCOLOGY CONSULT     Patient is a 83y old  Male who presents with a chief complaint of GI Bleed (14 May 2024 10:32)    HPI:  83yoM with PMhx of MDS/AML (primary hematologist Dr. 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 3-days ago, the patient noticed weakness, dizziness, and melena. Of note, he had a recent admission 3/30 - 4/1 for symptomatic anemia (Hgb 3.4) requiring a total of 5u pRBC and discharged on prophylactic antibiotics (levofloxacin, fluconazole, acyclovir). Hematology consulted for management recommendations.     The patient was seen at bedside. Endorsed x1 episode of vomiting yesterday. Denies fevers, chills, nausea, SOB, CP, hematuria, hematemesis, complications with chemo. Last BM 2-days ago  Last chemo session 5/7/2024 - tolerating session well. Last blood transfusion session 4-days ago.     ROS negative except as indicated in the HPI.    PAST MEDICAL & SURGICAL HISTORY:  Gastrointestinal bleeding  HTN (hypertension)  HLD (hyperlipidemia)  T2DM (type 2 diabetes mellitus)  Hypothyroidism  S/P TAVR (transcatheter aortic valve replacement) - 2020    SOCIAL HISTORY:    FAMILY HISTORY:    MEDICATIONS  (STANDING):  dextrose 10% Bolus 125 milliLiter(s) IV Bolus once  dextrose 5%. 1000 milliLiter(s) (50 mL/Hr) IV Continuous <Continuous>  dextrose 5%. 1000 milliLiter(s) (100 mL/Hr) IV Continuous <Continuous>  dextrose 50% Injectable 12.5 Gram(s) IV Push once  dextrose 50% Injectable 25 Gram(s) IV Push once  glucagon  Injectable 1 milliGRAM(s) IntraMuscular once  pantoprazole  Injectable 40 milliGRAM(s) IV Push two times a day    MEDICATIONS  (PRN):  dextrose Oral Gel 15 Gram(s) Oral once PRN Blood Glucose LESS THAN 70 milliGRAM(s)/deciliter    Allergies  No Known Allergies  Intolerances    Vital Signs Last 24 Hrs  T(C): 36.7 (14 May 2024 09:20), Max: 36.9 (13 May 2024 12:33)  T(F): 98 (14 May 2024 09:20), Max: 98.4 (13 May 2024 12:33)  HR: 52 (14 May 2024 09:20) (52 - 62)  BP: 139/73 (14 May 2024 09:20) (132/59 - 156/56)  BP(mean): --  RR: 18 (14 May 2024 09:20) (18 - 18)  SpO2: 98% (14 May 2024 09:20) (98% - 100%)    Parameters below as of 14 May 2024 09:20  Patient On (Oxygen Delivery Method): room air    PHYSICAL EXAM  General: adult in NAD  HEENT: Clear oropharynx, anicteric sclera  Neck: Supple  CV: Normal S1/S2  Lungs: CTAB, no wheezes, rales  Abd: Soft, nontender/nondistended, +Bowel sounds  Extremities: No edema   Skin: dry skin upper chest  Neuro: alert and oriented X 4, no focal deficits    05-13-24 @ 07:01  -  05-14-24 @ 07:00  --------------------------------------------------------  IN: 0 mL / OUT: 800 mL / NET: -800 mL    LABS:                    8.1    0.46  )-----------( 47       ( 14 May 2024 07:20 )             23.4     Mean Cell Volume : 86.7 fl  Mean Cell Hemoglobin : 30.0 pg  Mean Cell Hemoglobin Concentration : 34.6 gm/dL  Auto Neutrophil # : x  Auto Lymphocyte # : x  Auto Monocyte # : x  Auto Eosinophil # : x  Auto Basophil # : x  Auto Neutrophil % : x  Auto Lymphocyte % : x  Auto Monocyte % : x  Auto Eosinophil % : x  Auto Basophil % : x    05-14    141  |  107  |  23  ----------------------------<  92  3.7   |  23  |  0.87    Ca    9.2      14 May 2024 07:20  Phos  3.3     05-14  Mg     1.8     05-14    TPro  5.8<L>  /  Alb  3.4  /  TBili  0.4  /  DBili  x   /  AST  28  /  ALT  15  /  AlkPhos  42  05-14  PT/INR - ( 13 May 2024 04:13 )   PT: 12.2 sec;   INR: 1.17 ratio    PTT - ( 13 May 2024 04:13 )  PTT:24.8 sec  Reticulocyte Percent: 3.2 % (05-13 @ 04:13) HEMATOLOGY ONCOLOGY CONSULT     Patient is a 83y old  Male who presents with a chief complaint of GI Bleed (14 May 2024 10:32)    HPI:  83yoM with PMhx of MDS (primary hematologist Dr. 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 3-days ago, the patient noticed weakness, dizziness, and melena. Of note, he had a recent admission 3/30 - 4/1 for symptomatic anemia (Hgb 3.4) requiring a total of 5u pRBC and discharged on prophylactic antibiotics (levofloxacin, fluconazole, acyclovir). Hematology consulted for management recommendations.     The patient was seen at bedside. Endorsed x1 episode of vomiting yesterday. Denies fevers, chills, nausea, SOB, CP, hematuria, hematemesis, complications with chemo. Last BM 2-days ago  Last chemo session 5/7/2024 - tolerating session well. Last blood transfusion session 4-days ago.     ROS negative except as indicated in the HPI.    PAST MEDICAL & SURGICAL HISTORY:  Gastrointestinal bleeding  HTN (hypertension)  HLD (hyperlipidemia)  T2DM (type 2 diabetes mellitus)  Hypothyroidism  S/P TAVR (transcatheter aortic valve replacement) - 2020    SOCIAL HISTORY:    FAMILY HISTORY:    MEDICATIONS  (STANDING):  dextrose 10% Bolus 125 milliLiter(s) IV Bolus once  dextrose 5%. 1000 milliLiter(s) (50 mL/Hr) IV Continuous <Continuous>  dextrose 5%. 1000 milliLiter(s) (100 mL/Hr) IV Continuous <Continuous>  dextrose 50% Injectable 12.5 Gram(s) IV Push once  dextrose 50% Injectable 25 Gram(s) IV Push once  glucagon  Injectable 1 milliGRAM(s) IntraMuscular once  pantoprazole  Injectable 40 milliGRAM(s) IV Push two times a day    MEDICATIONS  (PRN):  dextrose Oral Gel 15 Gram(s) Oral once PRN Blood Glucose LESS THAN 70 milliGRAM(s)/deciliter    Allergies  No Known Allergies  Intolerances    Vital Signs Last 24 Hrs  T(C): 36.7 (14 May 2024 09:20), Max: 36.9 (13 May 2024 12:33)  T(F): 98 (14 May 2024 09:20), Max: 98.4 (13 May 2024 12:33)  HR: 52 (14 May 2024 09:20) (52 - 62)  BP: 139/73 (14 May 2024 09:20) (132/59 - 156/56)  BP(mean): --  RR: 18 (14 May 2024 09:20) (18 - 18)  SpO2: 98% (14 May 2024 09:20) (98% - 100%)    Parameters below as of 14 May 2024 09:20  Patient On (Oxygen Delivery Method): room air    PHYSICAL EXAM  General: adult in NAD  HEENT: Clear oropharynx, anicteric sclera  Neck: Supple  CV: Normal S1/S2  Lungs: CTAB, no wheezes, rales  Abd: Soft, nontender/nondistended, +Bowel sounds  Extremities: No edema   Skin: dry skin upper chest  Neuro: alert and oriented X 4, no focal deficits    05-13-24 @ 07:01  -  05-14-24 @ 07:00  --------------------------------------------------------  IN: 0 mL / OUT: 800 mL / NET: -800 mL    LABS:                    8.1    0.46  )-----------( 47       ( 14 May 2024 07:20 )             23.4     Mean Cell Volume : 86.7 fl  Mean Cell Hemoglobin : 30.0 pg  Mean Cell Hemoglobin Concentration : 34.6 gm/dL  Auto Neutrophil # : x  Auto Lymphocyte # : x  Auto Monocyte # : x  Auto Eosinophil # : x  Auto Basophil # : x  Auto Neutrophil % : x  Auto Lymphocyte % : x  Auto Monocyte % : x  Auto Eosinophil % : x  Auto Basophil % : x    05-14    141  |  107  |  23  ----------------------------<  92  3.7   |  23  |  0.87    Ca    9.2      14 May 2024 07:20  Phos  3.3     05-14  Mg     1.8     05-14    TPro  5.8<L>  /  Alb  3.4  /  TBili  0.4  /  DBili  x   /  AST  28  /  ALT  15  /  AlkPhos  42  05-14  PT/INR - ( 13 May 2024 04:13 )   PT: 12.2 sec;   INR: 1.17 ratio    PTT - ( 13 May 2024 04:13 )  PTT:24.8 sec  Reticulocyte Percent: 3.2 % (05-13 @ 04:13) HEMATOLOGY ONCOLOGY CONSULT     Patient is a 83y old  Male who presents with a chief complaint of GI Bleed    HPI:  83yoM with PMhx of MDS (primary hematologist Dr. 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 3-days ago, the patient noticed weakness, dizziness, and melena. Of note, he had a recent admission 3/30 - 4/1 for symptomatic anemia (Hgb 3.4) requiring a total of 5u pRBC and discharged on prophylactic antibiotics (levofloxacin, fluconazole, acyclovir). Hematology consulted for management recommendations.     The patient was seen at bedside. Endorsed x1 episode of vomiting yesterday. Denies fevers, chills, nausea, SOB, CP, hematuria, hematemesis, complications with chemo. Last BM 2-days ago  Last chemo session 5/7/2024 - tolerating session well. Last blood transfusion session 4-days ago.     ROS negative except as indicated in the HPI.    PAST MEDICAL & SURGICAL HISTORY:  Gastrointestinal bleeding  HTN (hypertension)  HLD (hyperlipidemia)  T2DM (type 2 diabetes mellitus)  Hypothyroidism  S/P TAVR (transcatheter aortic valve replacement) - 2020    MEDICATIONS  (STANDING):  dextrose 10% Bolus 125 milliLiter(s) IV Bolus once  dextrose 5%. 1000 milliLiter(s) (50 mL/Hr) IV Continuous <Continuous>  dextrose 5%. 1000 milliLiter(s) (100 mL/Hr) IV Continuous <Continuous>  dextrose 50% Injectable 12.5 Gram(s) IV Push once  dextrose 50% Injectable 25 Gram(s) IV Push once  glucagon  Injectable 1 milliGRAM(s) IntraMuscular once  pantoprazole  Injectable 40 milliGRAM(s) IV Push two times a day    MEDICATIONS  (PRN):  dextrose Oral Gel 15 Gram(s) Oral once PRN Blood Glucose LESS THAN 70 milliGRAM(s)/deciliter    Allergies  No Known Allergies  Intolerances    Vital Signs Last 24 Hrs  T(C): 36.7 (14 May 2024 09:20), Max: 36.9 (13 May 2024 12:33)  T(F): 98 (14 May 2024 09:20), Max: 98.4 (13 May 2024 12:33)  HR: 52 (14 May 2024 09:20) (52 - 62)  BP: 139/73 (14 May 2024 09:20) (132/59 - 156/56)  BP(mean): --  RR: 18 (14 May 2024 09:20) (18 - 18)  SpO2: 98% (14 May 2024 09:20) (98% - 100%)    Parameters below as of 14 May 2024 09:20  Patient On (Oxygen Delivery Method): room air    PHYSICAL EXAM  General: adult in NAD  HEENT: Clear oropharynx, anicteric sclera  Neck: Supple  CV: Normal S1/S2  Lungs: CTAB, no wheezes, rales  Abd: Soft, nontender/nondistended, +Bowel sounds  Extremities: No edema   Skin: dry skin upper chest  Neuro: alert and oriented X 4, no focal deficits    05-13-24 @ 07:01  -  05-14-24 @ 07:00  --------------------------------------------------------  IN: 0 mL / OUT: 800 mL / NET: -800 mL    LABS:                    8.1    0.46  )-----------( 47       ( 14 May 2024 07:20 )             23.4     Mean Cell Volume : 86.7 fl  Mean Cell Hemoglobin : 30.0 pg  Mean Cell Hemoglobin Concentration : 34.6 gm/dL  Auto Neutrophil # : x  Auto Lymphocyte # : x  Auto Monocyte # : x  Auto Eosinophil # : x  Auto Basophil # : x  Auto Neutrophil % : x  Auto Lymphocyte % : x  Auto Monocyte % : x  Auto Eosinophil % : x  Auto Basophil % : x    05-14    141  |  107  |  23  ----------------------------<  92  3.7   |  23  |  0.87    Ca    9.2      14 May 2024 07:20  Phos  3.3     05-14  Mg     1.8     05-14    TPro  5.8<L>  /  Alb  3.4  /  TBili  0.4  /  DBili  x   /  AST  28  /  ALT  15  /  AlkPhos  42  05-14  PT/INR - ( 13 May 2024 04:13 )   PT: 12.2 sec;   INR: 1.17 ratio    PTT - ( 13 May 2024 04:13 )  PTT:24.8 sec  Reticulocyte Percent: 3.2 % (05-13 @ 04:13)

## 2024-05-14 NOTE — PROGRESS NOTE ADULT - PROBLEM SELECTOR PLAN 7
home med fenofibrate 54mg qd    -Hold iso of active GIB home med fenofibrate 54mg qd    -Continue home fenofibrate

## 2024-05-14 NOTE — CONSULT NOTE ADULT - ASSESSMENT
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 with Dr. Bay Velazco. Was started on weekly decitabine IV (Tuesdays) and venetoclax regimen (Mondays) w/ goal of reducing transfuion requirements.   - Recent admission 3/30-4/1 for symptomatic anemia for which he was given 5u pRBC and started on prophylactic antibiotics - levofloxacin 500mg PO qd, acyclovir 400mg PO BID, fluconazole 200mg PO qd  - Presents on this admission w/ 1-day generalized weakness, melena, SOB, dizziness. VSS. Hgb on admission 4, WBC 0.42, Plt 33. CT A/P in the ED showing no active GIB. Given 3u pRBC + 1u Plt. Repeat Hgb (5/14) 8.1. Reticulocyte 3.2% Patient declined house GI.    Plan  - Please obtain collateral from his primary hematologist, Dr. Bay Velazco at [facility]  - Continue with levofloxacin 500mg PO qd, acyclovir 400mg PO BID, fluconazole 200mg PO qd  - Trend CBC. Transfusion goal Hgb >7, platelet >10, >20 if febrile, >50 if bleeding  - Follow up with Dr. Bay Velazco at discharge    ***Incomplete Note. 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 and now at [facility] with Dr. Bay Velazco. Was started on weekly decitabine IV (Tuesdays) and venetoclax regimen (Mondays) w/ goal of reducing transfuion requirements.   - Recent admission 3/30-4/1 for symptomatic anemia for which he was given 5u pRBC and started on prophylactic antibiotics - levofloxacin 500mg PO qd, acyclovir 400mg PO BID, fluconazole 200mg PO qd  - Presents on this admission w/ 1-day generalized weakness, melena, SOB, dizziness. VSS. Hgb on admission 4, WBC 0.42, Plt 33. CT A/P in the ED showing no active GIB. Given 3u pRBC + 1u Plt. Repeat Hgb (5/14) 8.1. Reticulocyte 3.2% Patient declined house GI.    Plan  - Please obtain collateral from his primary hematologist, Dr. Bay Velazco at [facility]  - Continue with levofloxacin 500mg PO qd, acyclovir 400mg PO BID, fluconazole 200mg PO qd  - Trend CBC. Transfusion goal Hgb >7, platelet >10, >20 if febrile, >50 if bleeding  - Follow up with Dr. Bay Velazco at discharge    ***Incomplete Note. 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 Presented with melena on admission.   - Previously followed at [facility] and now at [facility] with Dr. Bay Velazco. Was started on weekly decitabine IV (Tuesdays) and venetoclax regimen (Mondays) w/ goal of reducing transfusion requirements. Last chemotherapy session 5/7/24 - tolerating well  - Recent admission 3/30-4/1 for symptomatic anemia for which he was given 5u pRBC and started on prophylactic antibiotics - levofloxacin 500mg PO qd, acyclovir 400mg PO BID, fluconazole 200mg PO qd  - Presents on this admission w/ 1-day generalized weakness, melena, SOB, dizziness. VSS. Hgb on admission 4, WBC 0.42, Plt 33. CT A/P in the ED showing no active GIB. Given 3u pRBC + 1u Plt. Repeat Hgb (5/14) 8.1. Reticulocyte 3.2% Patient declined house GI.    Plan  - Please obtain collateral from his primary hematologist, Dr. Bay Velazco at [facility]  - Continue with levofloxacin 500mg PO qd, acyclovir 400mg PO BID, fluconazole 200mg PO qd  - Trend CBC. Transfusion goal Hgb >7, platelet >10, >20 if febrile, >50 if bleeding  - Follow up with Dr. Bay Velazco at discharge  - Dr. Velazco to get back with hematology team about resuming chemotherapy inpatient     ***Incomplete Note. 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 Presented with melena on admission.   - Previously followed at [facility] and now at [facility] with Dr. Bay Velazco. Was started on weekly venetoclax 100mg (Mondays) and decitabine 0.2mg/kg (Tuesdays). Last session 5/7/2024.   - Spoke with Dr. Velazco with the hematology team. MDS diagnosis made ~July 2023. The patient was started on the Yogen regimen. Cytogenetic markers positive for IDH2, ASXL1, PHF5 and negative for BCR-ABL, JAK2, CALR. Dr. Mora matthews with holding this week's chemotherapy agent  - Recent admission 3/30-4/1 for symptomatic anemia for which he was given 5u pRBC and started on prophylactic antibiotics - levofloxacin 500mg PO qd, acyclovir 400mg PO BID, fluconazole 200mg PO qd  - Presents on this admission w/ 1-day generalized weakness, melena, SOB, dizziness. VSS. Hgb on admission 4, WBC 0.42, Plt 33. CT A/P in the ED showing no active GIB. Given 3u pRBC + 1u Plt. Repeat Hgb (5/14) 8.1. Reticulocyte 3.2% Patient declined house GI.    Plan  - Per outpatient oncologistcassie to venetoclax/decitabine inpatient.   - Hemolysis Labs: LDH, reticulocyte, haptoglobin, fibrinogen, D-dimer, bilirubin to r/o TLS  - Continue with levofloxacin 500mg PO qd, acyclovir 400mg PO BID, fluconazole 200mg PO qd  - Trend CBC. Transfusion goal Hgb >7, platelet >10, >20 if febrile, >50 if bleeding  - Follow up with Dr. Bay Velazco at discharge    ***Incomplete Note. 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 Hematology consulted for management of MDS    #Pancytopenia 2/2 MDS w/ collateral from Dr. Bay Velazco's office  - 7/12/23: BMB - No iron seen. Hyperceullar marrow (50%) w/ trilinearge hematopoiesis and no significant increase in blasts. A small lymphoid aggregate, favor reactive. No granulomas. Stainable iron is essentially absent. Mild patchy increase in reticulin fibers 1+ out of 3.  - 11/17/23: peripheral molecular myeloid panel showing: IDH2 mutation, ASKL1 mutation, PFHF6 mutation c/w MDS  - Initially offered to be on the Ximenaen trial (low dose dacogen - venetoxlax trial w/ Dr. Sissy Patel) but declined due to transportation issues  - Chemotherapy Regimen: Started low-dose decitabine (0.2mg/kg)  treatment w/ venetoxlax (100mg)  - Treatment Dates: venetoclax 2/5, decitabine 2/6, 4/9, 4/16, 4/23, 4/30, 5/7  - Recent admission 3/30-4/1 at [facility] for symptomatic anemia for which he was given 5u pRBC and started on prophylactic antibiotics - levofloxacin 500mg PO qd, acyclovir 400mg PO BID, fluconazole 200mg PO qd  - Presents on this admission w/ 1-day generalized weakness, melena, SOB, dizziness. VSS. Hgb on admission 4, WBC 0.42, Plt 33. CT A/P in the ED showing no active GIB. Given 3u pRBC + 1u Plt. Repeat Hgb (5/14) 8.1. Reticulocyte 3.2% Patient declined house GI.  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 Presented with melena on admission.   - Was briefly on thalidomide for GI AVMs  - 11/1/23: Abd US showing spleen 12.7cm, liver 15.8cm, possible steatosis     Plan  - Hemolysis Labs: LDH, reticulocyte, haptoglobin, fibrinogen, D-dimer, bilirubin to r/o TLS  - f/u Iron studies, B12, Folate  - Continue with levofloxacin 500mg PO qd, acyclovir 400mg PO BID, fluconazole 200mg PO qd given ANC <500  - Trend CBC. Transfusion goal Hgb >7, platelet >10, >20 if febrile, >50 if bleeding  - Follow up GI recs   - Hold inpatient chemotherapy per Dr. Velazco  - Follow up with Dr. 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 Hematology consulted for management of MDS    #Pancytopenia 2/2 MDS w/ collateral from Dr. Bay Velazco's office  - 7/12/23: BMB - No iron seen. Hyperceullar marrow (50%) w/ trilinearge hematopoiesis and no significant increase in blasts. A small lymphoid aggregate, favor reactive. No granulomas. Stainable iron is essentially absent. Mild patchy increase in reticulin fibers 1+ out of 3.  - 11/17/23: peripheral molecular myeloid panel showing: IDH2 mutation, ASKL1 mutation, PFHF6 mutation c/w MDS  - Initially offered to be on the Ximenaen trial (low dose dacogen - venetoxlax trial w/ Dr. Sissy Patel) but declined due to transportation issues  - Chemotherapy Regimen: Started low-dose decitabine (0.2mg/kg)  treatment w/ venetoxlax (100mg)  - Treatment Dates: venetoclax 2/5, decitabine 2/6, 4/9, 4/16, 4/23, 4/30, 5/7  - Recent admission 3/30-4/1 at [facility] for symptomatic anemia for which he was given 5u pRBC and started on prophylactic antibiotics - levofloxacin 500mg PO qd, acyclovir 400mg PO BID, fluconazole 200mg PO qd  - Presents on this admission w/ 1-day generalized weakness, melena, SOB, dizziness. VSS. Hgb on admission 4, WBC 0.42, Plt 33. CT A/P in the ED showing no active GIB. Given 3u pRBC + 1u Plt. Repeat Hgb (5/14) 8.1. Reticulocyte 3.2% Patient declined house GI.  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 Presented with melena on admission.   - Was briefly on thalidomide for GI AVMs  - 11/1/23: Abd US showing spleen 12.7cm, liver 15.8cm, possible steatosis     Plan  - Check one time LDH, reticulocyte, haptoglobin, fibrinogen, D-dimer, fractionated bilirubin, coag to r/o DIC/TLS  - f/u Iron studies, B12, Folate work anemia  - Continue with levofloxacin 500mg PO qd, acyclovir 400mg PO BID, fluconazole 200mg PO qd given ANC <500  - Trend CBC. Transfusion goal Hgb >7, platelet >10, >20 if febrile, >50 if bleeding  - Follow up GI recs   - Hold inpatient chemotherapy per Dr. Velazco  - Follow up with Dr. 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 Hematology consulted for management of MDS    #Pancytopenia 2/2 MDS w/ collateral from Dr. Bay Velazco's office  - 7/12/23: BMB - No iron seen. Hyperceullar marrow (50%) w/ trilinearge hematopoiesis and no significant increase in blasts. A small lymphoid aggregate, favor reactive. No granulomas. Stainable iron is essentially absent. Mild patchy increase in reticulin fibers 1+ out of 3.  - 11/17/23: peripheral molecular myeloid panel showing: IDH2 mutation, ASKL1 mutation, PFHF6 mutation c/w MDS  - Initially offered to be on the Ximenaen trial (low dose dacogen - venetoxlax trial w/ Dr. Sissy Patel) but declined due to transportation issues  - Chemotherapy Regimen: Started low-dose decitabine (0.2mg/kg)  treatment w/ venetoxlax (100mg)  - Treatment Dates: venetoclax 2/5, decitabine 2/6, 4/9, 4/16, 4/23, 4/30, 5/7  - Recent admission 3/30-4/1 at [facility] for symptomatic anemia for which he was given 5u pRBC and started on prophylactic antibiotics - levofloxacin 500mg PO qd, acyclovir 400mg PO BID, fluconazole 200mg PO qd  - Presents on this admission w/ 1-day generalized weakness, melena, SOB, dizziness. VSS. Hgb on admission 4, WBC 0.42, Plt 33. CT A/P in the ED showing no active GIB. Given 3u pRBC + 1u Plt. Repeat Hgb (5/14) 8.1. Reticulocyte 3.2% Patient declined house GI.  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 Presented with melena on admission.   - Was briefly on thalidomide for GI AVMs  - 11/1/23: Abd US showing spleen 12.7cm, liver 15.8cm, possible steatosis     Plan  - Check one time LDH, reticulocyte, haptoglobin, fibrinogen, D-dimer, fractionated bilirubin, coag to r/o DIC/TLS  - f/u Iron studies, B12, Folate work anemia  - Continue with levofloxacin 500mg PO qd, acyclovir 400mg PO BID, fluconazole 200mg PO qd given ANC <500  - Trend CBC. Transfusion goal Hgb >7, platelet >10, >20 if febrile, >50 if bleeding  - Follow up GI recs   - Hold inpatient chemotherapy per Dr. Velazco  - If Hgb stable, okay to discharge but requires close follow up with his primary hematologist for recurrent transfusions.   - Follow up with Dr. 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 Hematology consulted for management of MDS    #Pancytopenia 2/2 MDS w/ collateral from Dr. Bay Velazco's office  - 7/12/23: BMB - No iron seen. Hyperceullar marrow (50%) w/ trilinearge hematopoiesis and no significant increase in blasts. A small lymphoid aggregate, favor reactive. No granulomas. Stainable iron is essentially absent. Mild patchy increase in reticulin fibers 1+ out of 3.  - 11/17/23: peripheral molecular myeloid panel showing: IDH2 mutation, ASKL1 mutation, PFHF6 mutation c/w MDS  - Initially offered to be on the Ximenaen trial (low dose dacogen - venetoxlax trial w/ Dr. Sissy Patel) but declined due to transportation issues  - Chemotherapy Regimen: Started low-dose decitabine (0.2mg/kg)  treatment w/ venetoxlax (100mg)  - Treatment Dates: venetoclax 2/5, decitabine 2/6, 4/9, 4/16, 4/23, 4/30, 5/7  - Recent admission 3/30-4/1 at [facility] for symptomatic anemia for which he was given 5u pRBC and started on prophylactic antibiotics - levofloxacin 500mg PO qd, acyclovir 400mg PO BID, fluconazole 200mg PO qd  - Presents on this admission w/ 1-day generalized weakness, melena, SOB, dizziness. VSS. Hgb on admission 4, WBC 0.42, Plt 33. CT A/P in the ED showing no active GIB. Given 3u pRBC + 1u Plt. Repeat Hgb (5/14) 8.1. Reticulocyte 3.2% Patient declined house GI.  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 Presented with melena on admission.   - Was briefly on thalidomide for GI AVMs  - 11/1/23: Abd US showing spleen 12.7cm, liver 15.8cm, possible steatosis     Plan  - Check one time LDH, reticulocyte, haptoglobin, fibrinogen, D-dimer, fractionated bilirubin, coag to r/o DIC/TLS  - f/u Iron studies, B12, Folate work anemia  - Continue with levofloxacin 500mg PO qd, acyclovir 400mg PO BID, fluconazole 200mg PO qd given ANC <500  - Trend CBC. Transfusion goal Hgb >7, platelet >10, >20 if febrile, >50 if bleeding  - Follow up GI recs   - Hold inpatient chemotherapy per Dr. Velazco  - If Hgb stable, okay to discharge but requires close follow up with his primary hematologist for recurrent transfusions.   - Follow up with Dr. Bay Velazco at discharge ([phone]). 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 Hematology consulted for management of MDS    #Pancytopenia 2/2 MDS w/ collateral from Dr. Bay Velazco's office  - 7/12/23: BMB - No iron seen. Hyperceullar marrow (50%) w/ trilinearge hematopoiesis and no significant increase in blasts. A small lymphoid aggregate, favor reactive. No granulomas. Stainable iron is essentially absent. Mild patchy increase in reticulin fibers 1+ out of 3.  - 11/17/23: peripheral molecular myeloid panel showing: IDH2 mutation, ASKL1 mutation, PFHF6 mutation c/w MDS  - Initially offered to be on the Ximenaen trial (low dose dacogen - venetoxlax trial w/ Dr. Sissy Patel) but declined due to transportation issues  - Chemotherapy Regimen: Started low-dose decitabine (0.2mg/kg)  treatment w/ venetoxlax (100mg)  - Treatment Dates: venetoclax 2/5, decitabine 2/6, 4/9, 4/16, 4/23, 4/30, 5/7  - Recent admission 3/30-4/1 at [facility] for symptomatic anemia for which he was given 5u pRBC and started on prophylactic antibiotics - levofloxacin 500mg PO qd, acyclovir 400mg PO BID, fluconazole 200mg PO qd  - Presents on this admission w/ 1-day generalized weakness, melena, SOB, dizziness. VSS. Hgb on admission 4, WBC 0.42, Plt 33. CT A/P in the ED showing no active GIB. Given 3u pRBC + 1u Plt. Repeat Hgb (5/14) 8.1. Reticulocyte 3.2% Patient declined house GI.  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 Presented with melena on admission.   - Was briefly on thalidomide for GI AVMs  - 11/1/23: Abd US showing spleen 12.7cm, liver 15.8cm, possible steatosis     Plan  - Check one time LDH, reticulocyte, haptoglobin to r/o hemolysis; fibrinogen, D-dimer, fractionated bilirubin, coag to r/o DIC  - f/u Iron studies, B12, Folate work anemia  - Continue with levofloxacin 500mg PO qd, acyclovir 400mg PO BID, fluconazole 200mg PO qd given ANC <500  - Trend CBC. Transfusion goal Hgb >7, platelet >10, >20 if febrile, >50 if bleeding  - Follow up GI recs   - Hold inpatient chemotherapy per Dr. Velazco  - If Hgb stable, okay to discharge but requires close follow up with his primary hematologist for recurrent transfusions.   - Follow up with Dr. Bay Velazco at discharge ([phone]).

## 2024-05-14 NOTE — PROGRESS NOTE ADULT - PROBLEM SELECTOR PLAN 1
Hgb 4   Suspect anemia in setting of GI bleed with melena and recent chemotherapy. Patient with history of GI bleed from AVM's  CT A/P with no evidence of active GIB   S/p 3 units prbc and 1 unit platelet in ED with appropriate response to Hgb 8.1     -Maintain active type and screen  -Maintain Hgb >8 given history of CAD  -Protonix IV 40mg bid   -GI consult Hgb 4   Suspect anemia in setting of GI bleed with melena and recent chemotherapy. Patient with history of GI bleed from AVM's  CT A/P with no evidence of active GIB   S/p 3 units prbc and 1 unit platelet in ED with appropriate response to Hgb 8.1     -Maintain active type and screen  -Maintain Hgb >8 given history of CAD  -Protonix IV 40mg bid   -GI consult - given neutropenia, will hold off on intervention given stable hemoglobin

## 2024-05-14 NOTE — PROGRESS NOTE ADULT - SUBJECTIVE AND OBJECTIVE BOX
DATE OF SERVICE: 05-14-24 @ 09:00    Patient is a 83y old  Male who presents with a chief complaint of GI Bleed (13 May 2024 13:24)      SUBJECTIVE / OVERNIGHT EVENTS: No acute events overnight. Patient to be evaluated by GI for possible intervention. Patient s/p 3 units prbc with appropriate response to Hgb 8.1.     MEDICATIONS  (STANDING):  dextrose 10% Bolus 125 milliLiter(s) IV Bolus once  dextrose 5%. 1000 milliLiter(s) (50 mL/Hr) IV Continuous <Continuous>  dextrose 5%. 1000 milliLiter(s) (100 mL/Hr) IV Continuous <Continuous>  dextrose 50% Injectable 25 Gram(s) IV Push once  dextrose 50% Injectable 12.5 Gram(s) IV Push once  glucagon  Injectable 1 milliGRAM(s) IntraMuscular once  lactated ringers. 1000 milliLiter(s) (75 mL/Hr) IV Continuous <Continuous>  pantoprazole  Injectable 40 milliGRAM(s) IV Push two times a day    MEDICATIONS  (PRN):  dextrose Oral Gel 15 Gram(s) Oral once PRN Blood Glucose LESS THAN 70 milliGRAM(s)/deciliter      Vital Signs Last 24 Hrs  T(C): 36.5 (14 May 2024 06:05), Max: 36.9 (13 May 2024 12:33)  T(F): 97.7 (14 May 2024 06:05), Max: 98.4 (13 May 2024 12:33)  HR: 56 (14 May 2024 06:05) (56 - 62)  BP: 145/55 (14 May 2024 06:05) (116/58 - 156/56)  BP(mean): --  RR: 18 (14 May 2024 06:05) (18 - 18)  SpO2: 100% (14 May 2024 06:05) (98% - 100%)    Parameters below as of 14 May 2024 06:05  Patient On (Oxygen Delivery Method): room air      CAPILLARY BLOOD GLUCOSE      POCT Blood Glucose.: 96 mg/dL (14 May 2024 05:46)  POCT Blood Glucose.: 97 mg/dL (13 May 2024 15:45)    I&O's Summary    13 May 2024 07:01  -  14 May 2024 07:00  --------------------------------------------------------  IN: 0 mL / OUT: 800 mL / NET: -800 mL        PHYSICAL EXAM:  GENERAL: NAD, lying in bed comfortably  HEAD:  Atraumatic, Normocephalic  EYES: PERRLA, conjunctiva and sclera clear  ENT: Moist mucous membranes  NECK: Supple, No JVD  CHEST/LUNG: Clear to auscultation bilaterally  HEART: Regular rate and rhythm; No murmurs  ABDOMEN: Soft, nontender, nondistended  EXTREMITIES:  2+ Peripheral Pulses, brisk capillary refill. No edema  NERVOUS SYSTEM:  A&Ox3, no focal deficits   SKIN: No rashes or lesions    LABS:                        8.1    0.46  )-----------( 47       ( 14 May 2024 07:20 )             23.4     05-14    141  |  107  |  23  ----------------------------<  92  3.7   |  23  |  0.87    Ca    9.2      14 May 2024 07:20  Phos  3.3     05-14  Mg     1.8     05-14    TPro  5.8<L>  /  Alb  3.4  /  TBili  0.4  /  DBili  x   /  AST  28  /  ALT  15  /  AlkPhos  42  05-14    PT/INR - ( 13 May 2024 04:13 )   PT: 12.2 sec;   INR: 1.17 ratio         PTT - ( 13 May 2024 04:13 )  PTT:24.8 sec      Urinalysis Basic - ( 14 May 2024 07:20 )    Color: x / Appearance: x / SG: x / pH: x  Gluc: 92 mg/dL / Ketone: x  / Bili: x / Urobili: x   Blood: x / Protein: x / Nitrite: x   Leuk Esterase: x / RBC: x / WBC x   Sq Epi: x / Non Sq Epi: x / Bacteria: x        RADIOLOGY & ADDITIONAL TESTS:    Imaging Personally Reviewed:    Consultant(s) Notes Reviewed:      Care Discussed with Consultants/Other Providers:

## 2024-05-14 NOTE — CONSULT NOTE ADULT - ATTENDING COMMENTS
Patient seen post transfusion of 3 units of packed red cells and one unit of platelets; He has had gastrointestinal bleeding. Chemotherapy has adriano given at [facility] in [place]. No decitabine therapy is planned in current admission that should include endoscopy to rule out a diverticulitis.

## 2024-05-14 NOTE — CONSULT NOTE ADULT - ASSESSMENT
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 Patient chronically receives blood transfusions with his last occurring 3 days ago. Patient states he began to feel weak and fatigued last night. Patient state it acutely worsened this morning with associated dizziness which prompted him to come to the ED. Patient also reports having a black, tarry stool 2 days ago.    Known history of extensive GI AVMs (SB +/- colon; s/p DBE and colonsocopies) - seen and scoped on multiple occasions by Dr Ashton  Most recently admitted here in 2023 with GI course as outlined below:    - s/p VCE (8/10)-  red blood in the stomach; hematin/melena (altered blood) in the small bowel; non-diagnostic small bowel study as the capsule terminates in the small bowel at the end of the study  - s/p EGD (8/10)-  Normal esophagus; single bleeding angioectasia with adherent clot in the stomach- s/p treatment with APC likely the main source of bleeding and cause for blood clots seen on VCE; few recently bleeding angioectasias in the stomach- s/p treatment with APC; normal examined duodenum.  - s/p push enteroscopy (7/25)- normal esophagus; multiple gastric polyps; normal duodenum; normal examined jejunum.   -s/p Colonoscopy (7/26)- fair prep, pan-diverticulosis, normal TI, normal rectal retroflexion, no findings to explain melena    #pancytopenia 2/2 MDS on chemo  #known history of GI tract AVMs  #acute anemia - no e/o ongoing/brisk GI bleeding (last BM 2 days ago), appropriate Hgb response to PRBCs. Suspect pt may have had recurrent bleeding from AVMs i/s/o thrombocytopenia (s/p chemo)    RECS:  -Given neutropenia and stable/improving Hgb post-transfusion; risks of endoscopic evaluation outweigh potential benefits  -transfuse PRN for goal Hbg >7 and Plt >40 given history of recurrent AVM bleeding  -if pt develops recurrent bleeding +/- HD instability will re-assess for endoluminal evaluation +/- VCE  -continue BID PPI  -PO diet (full liquids, can advance as tolerated)  -monitor stools; counseled to expect initial stools to be dark in color 2/2 passage of "old"/retained blood from within GI tract  -Hematology input    Discussed with pt; all questions answered  Discussed with pt's son Shashank Kwong per pt request  Discussed with Medicine attending    Nikhil Amezcua PA-C  [facility] Teaching GI Service  Available on TEAMS Mon-Fri 8a-4p  After hours and weekend coverage [phone]

## 2024-05-15 LAB
ALBUMIN SERPL ELPH-MCNC: 3.2 G/DL — LOW (ref 3.3–5)
ALP SERPL-CCNC: 44 U/L — SIGNIFICANT CHANGE UP (ref 40–120)
ALT FLD-CCNC: 13 U/L — SIGNIFICANT CHANGE UP (ref 10–45)
ANION GAP SERPL CALC-SCNC: 12 MMOL/L — SIGNIFICANT CHANGE UP (ref 5–17)
AST SERPL-CCNC: 23 U/L — SIGNIFICANT CHANGE UP (ref 10–40)
BILIRUB DIRECT SERPL-MCNC: 0.1 MG/DL — SIGNIFICANT CHANGE UP (ref 0–0.3)
BILIRUB INDIRECT FLD-MCNC: 0.2 MG/DL — SIGNIFICANT CHANGE UP (ref 0.2–1)
BILIRUB SERPL-MCNC: 0.3 MG/DL — SIGNIFICANT CHANGE UP (ref 0.2–1.2)
BILIRUB SERPL-MCNC: 0.3 MG/DL — SIGNIFICANT CHANGE UP (ref 0.2–1.2)
BUN SERPL-MCNC: 14 MG/DL — SIGNIFICANT CHANGE UP (ref 7–23)
CALCIUM SERPL-MCNC: 9.2 MG/DL — SIGNIFICANT CHANGE UP (ref 8.4–10.5)
CHLORIDE SERPL-SCNC: 105 MMOL/L — SIGNIFICANT CHANGE UP (ref 96–108)
CO2 SERPL-SCNC: 22 MMOL/L — SIGNIFICANT CHANGE UP (ref 22–31)
CREAT SERPL-MCNC: 0.95 MG/DL — SIGNIFICANT CHANGE UP (ref 0.5–1.3)
EGFR: 79 ML/MIN/1.73M2 — SIGNIFICANT CHANGE UP
FERRITIN SERPL-MCNC: 324 NG/ML — SIGNIFICANT CHANGE UP (ref 30–400)
FIBRINOGEN PPP-MCNC: 347 MG/DL — SIGNIFICANT CHANGE UP (ref 200–445)
FOLATE SERPL-MCNC: >20 NG/ML — SIGNIFICANT CHANGE UP
GLUCOSE BLDC GLUCOMTR-MCNC: 108 MG/DL — HIGH (ref 70–99)
GLUCOSE BLDC GLUCOMTR-MCNC: 109 MG/DL — HIGH (ref 70–99)
GLUCOSE BLDC GLUCOMTR-MCNC: 118 MG/DL — HIGH (ref 70–99)
GLUCOSE SERPL-MCNC: 94 MG/DL — SIGNIFICANT CHANGE UP (ref 70–99)
HAPTOGLOB SERPL-MCNC: 25 MG/DL — LOW (ref 34–200)
HCT VFR BLD CALC: 23.8 % — LOW (ref 39–50)
HGB BLD-MCNC: 8 G/DL — LOW (ref 13–17)
IRON SATN MFR SERPL: 25 % — SIGNIFICANT CHANGE UP (ref 16–55)
IRON SATN MFR SERPL: 88 UG/DL — SIGNIFICANT CHANGE UP (ref 45–165)
LDH SERPL L TO P-CCNC: 239 U/L — SIGNIFICANT CHANGE UP (ref 50–242)
MAGNESIUM SERPL-MCNC: 1.9 MG/DL — SIGNIFICANT CHANGE UP (ref 1.6–2.6)
MCHC RBC-ENTMCNC: 30.1 PG — SIGNIFICANT CHANGE UP (ref 27–34)
MCHC RBC-ENTMCNC: 33.6 GM/DL — SIGNIFICANT CHANGE UP (ref 32–36)
MCV RBC AUTO: 89.5 FL — SIGNIFICANT CHANGE UP (ref 80–100)
NRBC # BLD: 0 /100 WBCS — SIGNIFICANT CHANGE UP (ref 0–0)
PHOSPHATE SERPL-MCNC: 3.8 MG/DL — SIGNIFICANT CHANGE UP (ref 2.5–4.5)
PLATELET # BLD AUTO: 51 K/UL — LOW (ref 150–400)
POTASSIUM SERPL-MCNC: 3.8 MMOL/L — SIGNIFICANT CHANGE UP (ref 3.5–5.3)
POTASSIUM SERPL-SCNC: 3.8 MMOL/L — SIGNIFICANT CHANGE UP (ref 3.5–5.3)
PROT SERPL-MCNC: 5.8 G/DL — LOW (ref 6–8.3)
RBC # BLD: 2.66 M/UL — LOW (ref 4.2–5.8)
RBC # BLD: 2.66 M/UL — LOW (ref 4.2–5.8)
RBC # FLD: 18.2 % — HIGH (ref 10.3–14.5)
RETICS #: 51 K/UL — SIGNIFICANT CHANGE UP (ref 25–125)
RETICS/RBC NFR: 1.9 % — SIGNIFICANT CHANGE UP (ref 0.5–2.5)
SODIUM SERPL-SCNC: 139 MMOL/L — SIGNIFICANT CHANGE UP (ref 135–145)
TIBC SERPL-MCNC: 351 UG/DL — SIGNIFICANT CHANGE UP (ref 220–430)
UIBC SERPL-MCNC: 264 UG/DL — SIGNIFICANT CHANGE UP (ref 110–370)
VIT B12 SERPL-MCNC: 1715 PG/ML — HIGH (ref 232–1245)
WBC # BLD: 0.4 K/UL — CRITICAL LOW (ref 3.8–10.5)
WBC # FLD AUTO: 0.4 K/UL — CRITICAL LOW (ref 3.8–10.5)

## 2024-05-15 PROCEDURE — 99232 SBSQ HOSP IP/OBS MODERATE 35: CPT | Mod: GC

## 2024-05-15 PROCEDURE — 99232 SBSQ HOSP IP/OBS MODERATE 35: CPT | Mod: FS

## 2024-05-15 RX ORDER — CHLORHEXIDINE GLUCONATE 213 G/1000ML
1 SOLUTION TOPICAL
Refills: 0 | Status: DISCONTINUED | OUTPATIENT
Start: 2024-05-15 | End: 2024-05-16

## 2024-05-15 RX ORDER — PANTOPRAZOLE SODIUM 20 MG/1
40 TABLET, DELAYED RELEASE ORAL
Refills: 0 | Status: DISCONTINUED | OUTPATIENT
Start: 2024-05-15 | End: 2024-05-16

## 2024-05-15 RX ORDER — PANTOPRAZOLE SODIUM 20 MG/1
40 TABLET, DELAYED RELEASE ORAL ONCE
Refills: 0 | Status: COMPLETED | OUTPATIENT
Start: 2024-05-15 | End: 2024-05-15

## 2024-05-15 RX ORDER — ASPIRIN/CALCIUM CARB/MAGNESIUM 324 MG
81 TABLET ORAL DAILY
Refills: 0 | Status: DISCONTINUED | OUTPATIENT
Start: 2024-05-15 | End: 2024-05-16

## 2024-05-15 RX ADMIN — PANTOPRAZOLE SODIUM 40 MILLIGRAM(S): 20 TABLET, DELAYED RELEASE ORAL at 17:38

## 2024-05-15 RX ADMIN — Medication 50 MICROGRAM(S): at 06:15

## 2024-05-15 RX ADMIN — Medication 81 MILLIGRAM(S): at 10:57

## 2024-05-15 RX ADMIN — Medication 1 MILLIGRAM(S): at 12:37

## 2024-05-15 RX ADMIN — PANTOPRAZOLE SODIUM 40 MILLIGRAM(S): 20 TABLET, DELAYED RELEASE ORAL at 06:20

## 2024-05-15 RX ADMIN — Medication 48 MILLIGRAM(S): at 12:36

## 2024-05-15 RX ADMIN — FLUCONAZOLE 200 MILLIGRAM(S): 150 TABLET ORAL at 12:37

## 2024-05-15 RX ADMIN — Medication 400 MILLIGRAM(S): at 06:15

## 2024-05-15 RX ADMIN — Medication 50 MILLIGRAM(S): at 06:15

## 2024-05-15 RX ADMIN — Medication 400 MILLIGRAM(S): at 17:38

## 2024-05-15 RX ADMIN — AMLODIPINE BESYLATE 10 MILLIGRAM(S): 2.5 TABLET ORAL at 06:15

## 2024-05-15 RX ADMIN — ESCITALOPRAM OXALATE 10 MILLIGRAM(S): 10 TABLET, FILM COATED ORAL at 12:37

## 2024-05-15 NOTE — DIETITIAN INITIAL EVALUATION ADULT - PERTINENT MEDS FT
MEDICATIONS  (STANDING):  acyclovir   Oral Tab/Cap 400 milliGRAM(s) Oral two times a day  amLODIPine   Tablet 10 milliGRAM(s) Oral daily  aspirin  chewable 81 milliGRAM(s) Oral daily  dextrose 10% Bolus 125 milliLiter(s) IV Bolus once  dextrose 5%. 1000 milliLiter(s) (50 mL/Hr) IV Continuous <Continuous>  dextrose 5%. 1000 milliLiter(s) (100 mL/Hr) IV Continuous <Continuous>  dextrose 50% Injectable 25 Gram(s) IV Push once  dextrose 50% Injectable 12.5 Gram(s) IV Push once  escitalopram 10 milliGRAM(s) Oral daily  fenofibrate Tablet 48 milliGRAM(s) Oral daily  fluconAZOLE   Tablet 200 milliGRAM(s) Oral daily  folic acid 1 milliGRAM(s) Oral daily  glucagon  Injectable 1 milliGRAM(s) IntraMuscular once  insulin lispro (ADMELOG) corrective regimen sliding scale   SubCutaneous at bedtime  insulin lispro (ADMELOG) corrective regimen sliding scale   SubCutaneous three times a day before meals  levoFLOXacin  Tablet 500 milliGRAM(s) Oral every 24 hours  levothyroxine 50 MICROGram(s) Oral daily  metoprolol succinate ER 50 milliGRAM(s) Oral daily  pantoprazole    Tablet 40 milliGRAM(s) Oral two times a day  tamsulosin 0.4 milliGRAM(s) Oral at bedtime    MEDICATIONS  (PRN):  dextrose Oral Gel 15 Gram(s) Oral once PRN Blood Glucose LESS THAN 70 milliGRAM(s)/deciliter

## 2024-05-15 NOTE — DIETITIAN INITIAL EVALUATION ADULT - SIGNS/SYMPTOMS
as evidenced by pt with myelodysplastic syndrome on chemotherapy (per chart). as evidenced by pt with mild/moderate muscle/fat loss, mild edema, meeting <75% EER >/= 3 months.

## 2024-05-15 NOTE — DIETITIAN INITIAL EVALUATION ADULT - NSFNSNUTRHOMESUPPLEMENTFT_GEN_A_CORE
Pt stated he takes Vitamin D3, Centrum Silver and Vitamin B12 prior to admission.  Pt stated he used to drink Ensure 1x/day prior to admission, however endorsed liking Glucerna supplements in-house. Requesting to continue Glucerna in-house.

## 2024-05-15 NOTE — DIETITIAN INITIAL EVALUATION ADULT - PROBLEM SELECTOR PLAN 5
History of CAD on aspirin every other day, as well as history of TAVR    -Hold aspirin iso of active GIB

## 2024-05-15 NOTE — DIETITIAN INITIAL EVALUATION ADULT - ADD RECOMMEND
1) Continue Regular diet as tolerated. Defer diet texture/consistency to Medical Team. Continue to monitor glucose levels.   2) Defer micronutrient supplementation to medical team, as pt noted to be previously on chemotherapy prior to admission.   3) Recommend continue Glucerna 2x/day to optimize protein-energy intake in-house.   4) Monitor and encourage adequate PO intake, obtain food preferences and honor as able.   5) Monitor electrolytes, replete as needed.   6) Monitor nutritional intake, tolerance to diet prescription, bowel movements, weights, labs, and skin integrity.   7) Malnutrition sticker placed in chart.  1) Continue Regular diet as tolerated. Defer diet texture/consistency to Medical Team. Continue to monitor glucose levels.   2) Defer micronutrient supplementation to medical team, as pt noted to be previously on chemotherapy prior to admission.   3) Add Glucerna 2x/day to optimize protein-energy intake in-house.   4) Monitor and encourage adequate PO intake, obtain food preferences and honor as able.   5) Monitor electrolytes, replete as needed.   6) Monitor nutritional intake, tolerance to diet prescription, bowel movements, weights, labs, and skin integrity.   7) Malnutrition sticker placed in chart.

## 2024-05-15 NOTE — DIETITIAN INITIAL EVALUATION ADULT - NSFNSADHERENCEPTAFT_GEN_A_CORE
Pt stated he followed a regular diet prior to admission, endorsed "balanced" eating. Confirmed history of T2DM, stated he takes Metformin at home for glycemic control and checks his fingersticks 2-3x/week, endorsed fingersticks <100 mg/dL fasting.

## 2024-05-15 NOTE — DIETITIAN INITIAL EVALUATION ADULT - ORAL INTAKE PTA/DIET HISTORY
Nutrition assessment completed at bedside. Pt reported reduced appetite and PO intake x 1 year prior to admission after losing his wife. Reported UBW of ~209 pounds (1 year ago), endorsed current body weight of ~160 pounds. Confirmed no known food allergies.

## 2024-05-15 NOTE — DIETITIAN INITIAL EVALUATION ADULT - OTHER INFO
Weights:  - UBW (per patient): 209 pounds (1 year ago), 160 pounds (currently)  - Dosing Weight (per chart): No dosing weight noted at this time.   - Daily Weights (per flow sheets): No daily weights noted at this time.   - Bed Scale Weight (taken by RD): 168.9 pounds (5/15)  - Per [facility] [place] HIE: 163.2 pounds (10/9/23), 169.2 pounds (8/9/23), 175.1 pounds (7/24/23), 183 pounds (5/6/23), 187.6 pounds (2/10/23)  Using HIE weight from 5/6/23 and current bed scale weight, pt with possible 14 pound weight loss x 1 year (8%, not clinically significant). Pt reported weight loss secondary to decreased intake over the past year due to his wife's passing. RD to continue to monitor weights and trends as able.     Nutritional Concerns:  - Pt with symptomatic anemia (per chart). Per chart, "CT A/P with no evidence of active GIB." Pt is s/p 3 units pRBC in-house (per chart).  - Pt with myelodysplastic syndrome (per chart). Recent chemotherapy ~1 week ago (per chart).  - Pt with history of DM, A1C 5.7% (5/14) indicating good glycemic control for age. Glucose 94 mg/dL today (5/15). Ordered for Insulin Lispro (ADMELOG) Corrective Regimen Sliding Scale in-house (per orders).   - Pt with hypothyroid, ordered for synthroid in-house (per orders).

## 2024-05-15 NOTE — DIETITIAN INITIAL EVALUATION ADULT - PERSON TAUGHT/METHOD
Educated on the importance of meeting adequate protein-energy needs. Encouraged consumption of HBV foods and prioritizing protein foods first at meal times for preservation of lean muscle mass. Encouraged consumption of oral nutrition supplement to optimize protein-energy intake. Emphasized importance of consuming nutrient dense foods and snacks to optimize energy and protein intake. Reviewed foods high in iron and foods moderate in iron for anemia. Encouraged consuming HBV protein sources such as meat, fish, and poultry.  Encouraged consuming citrus fruits, potatoes and enriched or fortified grain products to aid in iron absorption. Pt aware RD remains available./verbal instruction/patient instructed

## 2024-05-15 NOTE — DIETITIAN INITIAL EVALUATION ADULT - OTHER CALCULATIONS
Estimated protein-energy needs calculated using current bed scale weight of 76.8 kg (5/15) with consideration for myelodysplastic syndrome on chemotherapy (per chart).  Defer fluid calculations to the medical team.

## 2024-05-15 NOTE — PROGRESS NOTE ADULT - PROBLEM SELECTOR PLAN 1
Hgb 4   Suspect anemia in setting of GI bleed with melena and recent chemotherapy. Patient with history of GI bleed from AVM's  CT A/P with no evidence of active GIB   S/p 3 units prbc and 1 unit platelet in ED with appropriate response to Hgb 8.1     -Maintain active type and screen  -Maintain Hgb >8 given history of CAD  -Protonix IV 40mg bid   -GI consult - given neutropenia, will hold off on intervention given stable hemoglobin  -Re-introduce home aspirin Hgb 4   Suspect anemia in setting of GI bleed with melena and recent chemotherapy. Patient with history of GI bleed from AVM's  CT A/P with no evidence of active GIB   S/p 3 units prbc and 1 unit platelet in ED with appropriate response to Hgb 8.1     -Maintain active type and screen  -Maintain Hgb >8 given history of CAD  -Protonix IV 40mg bid - transitioned to PO BID - d/c home on 2 weeks BID therapy and then qdaily   -GI consult - given neutropenia, will hold off on intervention given stable hemoglobin  -Re-introduce home aspirin

## 2024-05-15 NOTE — DIETITIAN INITIAL EVALUATION ADULT - PROBLEM SELECTOR PLAN 1
Hgb 4   Suspect anemia in setting of GI bleed with melena and recent chemotherapy. Patient with history of GI bleed from AVM's  CT A/P with no evidence of active GIB   S/p 3 units prbc and 1 unit platelet in ED     -Maintain active type and screen  -Follow up post-transfusion CBC  -Maintain Hgb >8 given history of CAD  -Protonix IV 40mg bid   -GI consult

## 2024-05-15 NOTE — DIETITIAN INITIAL EVALUATION ADULT - ETIOLOGY
related to inability to meet sufficient protein-energy needs in setting of pt's wife passing, lack of company at meal times  related to increased metabolic demand for nutrients

## 2024-05-15 NOTE — DIETITIAN INITIAL EVALUATION ADULT - PERTINENT LABORATORY DATA
05-15    139  |  105  |  14  ----------------------------<  94  3.8   |  22  |  0.95    Ca    9.2      15 May 2024 06:53  Phos  3.8     05-15  Mg     1.9     05-15    TPro  5.8<L>  /  Alb  3.2<L>  /  TBili  0.3  /  DBili  0.1  /  AST  23  /  ALT  13  /  AlkPhos  44  05-15  POCT Blood Glucose.: 108 mg/dL (05-15-24 @ 08:38)  A1C with Estimated Average Glucose Result: 5.7 % (05-14-24 @ 07:20)  A1C with Estimated Average Glucose Result: 5.6 % (08-10-23 @ 07:07)

## 2024-05-15 NOTE — DIETITIAN INITIAL EVALUATION ADULT - ENERGY INTAKE
Currently, pt ordered for full liquids in-house. Reported good appetite and PO intake, endorsed eating % of foods in-house. Per flow sheets, eating % of meals. Stated he is drinking 100% of 1x Glucerna provided daily in-house, amenable to continuing to receive supplement.  Adequate (%)

## 2024-05-15 NOTE — PROGRESS NOTE ADULT - SUBJECTIVE AND OBJECTIVE BOX
INTERVAL HPI/OVERNIGHT EVENTS:  formed dark BMs x2 in past 24 hours  no transfusion requirement  no abdominal pain  no rectal bleeding or hematochezia    "I feel good"  tolerating PO full liquids    no fever or chills  no CP, focal weakness, LOC or syncope    MEDICATIONS  (STANDING):  acyclovir   Oral Tab/Cap 400 milliGRAM(s) Oral two times a day  amLODIPine   Tablet 10 milliGRAM(s) Oral daily  aspirin  chewable 81 milliGRAM(s) Oral daily  dextrose 10% Bolus 125 milliLiter(s) IV Bolus once  dextrose 5%. 1000 milliLiter(s) (50 mL/Hr) IV Continuous <Continuous>  dextrose 5%. 1000 milliLiter(s) (100 mL/Hr) IV Continuous <Continuous>  dextrose 50% Injectable 12.5 Gram(s) IV Push once  dextrose 50% Injectable 25 Gram(s) IV Push once  escitalopram 10 milliGRAM(s) Oral daily  fenofibrate Tablet 48 milliGRAM(s) Oral daily  fluconAZOLE   Tablet 200 milliGRAM(s) Oral daily  folic acid 1 milliGRAM(s) Oral daily  glucagon  Injectable 1 milliGRAM(s) IntraMuscular once  insulin lispro (ADMELOG) corrective regimen sliding scale   SubCutaneous three times a day before meals  insulin lispro (ADMELOG) corrective regimen sliding scale   SubCutaneous at bedtime  levoFLOXacin  Tablet 500 milliGRAM(s) Oral every 24 hours  levothyroxine 50 MICROGram(s) Oral daily  metoprolol succinate ER 50 milliGRAM(s) Oral daily  pantoprazole  Injectable 40 milliGRAM(s) IV Push two times a day  tamsulosin 0.4 milliGRAM(s) Oral at bedtime    MEDICATIONS  (PRN):  dextrose Oral Gel 15 Gram(s) Oral once PRN Blood Glucose LESS THAN 70 milliGRAM(s)/deciliter      Allergies  No Known Allergies      Review of Systems:  see HPI- remainder 10 point ROS negative      Vital Signs Last 24 Hrs  T(C): 36.6 (15 May 2024 09:04), Max: 36.8 (14 May 2024 12:00)  T(F): 97.9 (15 May 2024 09:04), Max: 98.3 (14 May 2024 12:00)  HR: 61 (15 May 2024 09:04) (52 - 69)  BP: 133/52 (15 May 2024 09:04) (125/58 - 142/58)  BP(mean): --  RR: 18 (15 May 2024 09:04) (18 - 18)  SpO2: 98% (15 May 2024 09:04) (97% - 100%)    Parameters below as of 15 May 2024 09:04  Patient On (Oxygen Delivery Method): room air    PHYSICAL EXAM:  Constitutional: NAD, WD WN elderly WM alert and cooperative     Neck: No LAD, supple no JVD  Respiratory: Clear bl no inc WOB  Cardiovascular: S1 and S2, RRR  Gastrointestinal: BS+, soft, NT/ND no caput, neg HSM,+bl inguinal hernias - NT, soft  Extremities: No peripheral edema, neg clubbing, cyanosis  Vascular: 2+ peripheral pulses  Neurological: A/O x 3, no focal deficits or asymmetry   speech clear and fluent  Psychiatric: Normal mood, normal affect  Skin: No rashes, anicteric        LABS:                        8.0    0.40  )-----------( 51       ( 15 May 2024 06:52 )             23.8     05-15    139  |  105  |  14  ----------------------------<  94  3.8   |  22  |  0.95    Ca    9.2      15 May 2024 06:53  Phos  3.8     05-15  Mg     1.9     05-15    TPro  5.8<L>  /  Alb  3.2<L>  /  TBili  0.3  /  DBili  0.1  /  AST  23  /  ALT  13  /  AlkPhos  44  05-15    Vitamin B12, Serum: 1715 pg/mL (05.15.24 @ 06:53)     Folate, Serum: >20.0 ng/mL (05.15.24 @ 06:53)     Lactate Dehydrogenase, Serum: 239 U/L (05.15.24 @ 06:53)   Haptoglobin, Serum: 25 mg/dL (05.15.24 @ 06:53)     Ferritin: 324 ng/mL (05.15.24 @ 06:53)   Iron - Total Binding Capacity.: 351 ug/dL  % Saturation, Iron: 25 %  Iron Total: 88 ug/dL  Unsaturated Iron Binding Capacity: 264 ug/dL    RADIOLOGY & ADDITIONAL TESTS:

## 2024-05-15 NOTE — DIETITIAN INITIAL EVALUATION ADULT - NSFNSGIIOFT_GEN_A_CORE
Pt reported no nausea or vomiting at this time, ordered for Protonix in-house (per orders).  Pt endorsed no diarrhea or constipation, reported last BM 5/14 (per patient and flow sheets).  - No bowel regimen ordered at this time.   Continue to monitor bowel movements and bowel movement regularity.

## 2024-05-15 NOTE — PROGRESS NOTE ADULT - SUBJECTIVE AND OBJECTIVE BOX
DATE OF SERVICE: 05-15-24 @ 08:27    Patient is a 83y old  Male who presents with a chief complaint of GI Bleed (14 May 2024 11:06)      SUBJECTIVE / OVERNIGHT EVENTS: No acute events overnight. Patient hemoglobin stable at 8. Patient with black bowel movement yesterday, per GI this can be expected as old retained blood products are expelled. Patient denies any complaints and hemodynamically stable. Plan to re-start home aspirin today and if hemoglobin stable x24 hours then can plan for discharge home.     MEDICATIONS  (STANDING):  acyclovir   Oral Tab/Cap 400 milliGRAM(s) Oral two times a day  amLODIPine   Tablet 10 milliGRAM(s) Oral daily  dextrose 10% Bolus 125 milliLiter(s) IV Bolus once  dextrose 5%. 1000 milliLiter(s) (50 mL/Hr) IV Continuous <Continuous>  dextrose 5%. 1000 milliLiter(s) (100 mL/Hr) IV Continuous <Continuous>  dextrose 50% Injectable 25 Gram(s) IV Push once  dextrose 50% Injectable 12.5 Gram(s) IV Push once  escitalopram 10 milliGRAM(s) Oral daily  fenofibrate Tablet 48 milliGRAM(s) Oral daily  fluconAZOLE   Tablet 200 milliGRAM(s) Oral daily  folic acid 1 milliGRAM(s) Oral daily  glucagon  Injectable 1 milliGRAM(s) IntraMuscular once  insulin lispro (ADMELOG) corrective regimen sliding scale   SubCutaneous at bedtime  insulin lispro (ADMELOG) corrective regimen sliding scale   SubCutaneous three times a day before meals  levoFLOXacin  Tablet 500 milliGRAM(s) Oral every 24 hours  levothyroxine 50 MICROGram(s) Oral daily  metoprolol succinate ER 50 milliGRAM(s) Oral daily  pantoprazole  Injectable 40 milliGRAM(s) IV Push two times a day  tamsulosin 0.4 milliGRAM(s) Oral at bedtime    MEDICATIONS  (PRN):  dextrose Oral Gel 15 Gram(s) Oral once PRN Blood Glucose LESS THAN 70 milliGRAM(s)/deciliter      Vital Signs Last 24 Hrs  T(C): 36.7 (15 May 2024 05:53), Max: 36.8 (14 May 2024 12:00)  T(F): 98.1 (15 May 2024 05:53), Max: 98.3 (14 May 2024 12:00)  HR: 69 (15 May 2024 05:53) (52 - 69)  BP: 125/58 (15 May 2024 05:53) (125/58 - 142/58)  BP(mean): --  RR: 18 (15 May 2024 05:53) (18 - 18)  SpO2: 100% (15 May 2024 05:53) (97% - 100%)    Parameters below as of 15 May 2024 05:53  Patient On (Oxygen Delivery Method): room air      CAPILLARY BLOOD GLUCOSE      POCT Blood Glucose.: 99 mg/dL (14 May 2024 21:16)  POCT Blood Glucose.: 88 mg/dL (14 May 2024 17:15)  POCT Blood Glucose.: 98 mg/dL (14 May 2024 12:02)    I&O's Summary      PHYSICAL EXAM:  GENERAL: NAD, lying in bed comfortably  HEAD:  Atraumatic, Normocephalic  EYES: PERRLA, conjunctiva and sclera clear  ENT: Moist mucous membranes  NECK: Supple, No JVD  CHEST/LUNG: Clear to auscultation bilaterally  HEART: Regular rate and rhythm; No murmurs  ABDOMEN: Soft, nontender, nondistended  EXTREMITIES:  2+ Peripheral Pulses, brisk capillary refill. No edema  NERVOUS SYSTEM:  A&Ox3, no focal deficits   SKIN: No rashes or lesions    LABS:                        8.0    0.40  )-----------( 51       ( 15 May 2024 06:52 )             23.8     05-15    139  |  105  |  14  ----------------------------<  94  3.8   |  22  |  0.95    Ca    9.2      15 May 2024 06:53  Phos  3.8     05-15  Mg     1.9     05-15    TPro  5.8<L>  /  Alb  3.2<L>  /  TBili  0.3  /  DBili  0.1  /  AST  23  /  ALT  13  /  AlkPhos  44  05-15          Urinalysis Basic - ( 15 May 2024 06:53 )    Color: x / Appearance: x / SG: x / pH: x  Gluc: 94 mg/dL / Ketone: x  / Bili: x / Urobili: x   Blood: x / Protein: x / Nitrite: x   Leuk Esterase: x / RBC: x / WBC x   Sq Epi: x / Non Sq Epi: x / Bacteria: x        RADIOLOGY & ADDITIONAL TESTS:    Imaging Personally Reviewed:    Consultant(s) Notes Reviewed:      Care Discussed with Consultants/Other Providers:

## 2024-05-15 NOTE — PROGRESS NOTE ADULT - TIME BILLING
time spent reviewing prior charts, meds, discussing plan with patient= 46 minutes. Time spent does not include time spent teaching
time spent reviewing prior charts, meds, discussing plan with patient, consultants= 51 minutes. Time spent excludes time spent teaching

## 2024-05-16 ENCOUNTER — TRANSCRIPTION ENCOUNTER (OUTPATIENT)
Age: 84
End: 2024-05-16

## 2024-05-16 VITALS
TEMPERATURE: 99 F | HEART RATE: 61 BPM | SYSTOLIC BLOOD PRESSURE: 131 MMHG | OXYGEN SATURATION: 100 % | RESPIRATION RATE: 19 BRPM | DIASTOLIC BLOOD PRESSURE: 61 MMHG

## 2024-05-16 LAB
ADD ON TEST-SPECIMEN IN LAB: SIGNIFICANT CHANGE UP
ALBUMIN SERPL ELPH-MCNC: 3.2 G/DL — LOW (ref 3.3–5)
ALP SERPL-CCNC: 52 U/L — SIGNIFICANT CHANGE UP (ref 40–120)
ALT FLD-CCNC: 11 U/L — SIGNIFICANT CHANGE UP (ref 10–45)
ANION GAP SERPL CALC-SCNC: 13 MMOL/L — SIGNIFICANT CHANGE UP (ref 5–17)
AST SERPL-CCNC: 14 U/L — SIGNIFICANT CHANGE UP (ref 10–40)
BILIRUB SERPL-MCNC: 0.4 MG/DL — SIGNIFICANT CHANGE UP (ref 0.2–1.2)
BLD GP AB SCN SERPL QL: NEGATIVE — SIGNIFICANT CHANGE UP
BUN SERPL-MCNC: 15 MG/DL — SIGNIFICANT CHANGE UP (ref 7–23)
CALCIUM SERPL-MCNC: 9.4 MG/DL — SIGNIFICANT CHANGE UP (ref 8.4–10.5)
CHLORIDE SERPL-SCNC: 104 MMOL/L — SIGNIFICANT CHANGE UP (ref 96–108)
CO2 SERPL-SCNC: 21 MMOL/L — LOW (ref 22–31)
CREAT SERPL-MCNC: 1.01 MG/DL — SIGNIFICANT CHANGE UP (ref 0.5–1.3)
EGFR: 74 ML/MIN/1.73M2 — SIGNIFICANT CHANGE UP
GLUCOSE BLDC GLUCOMTR-MCNC: 88 MG/DL — SIGNIFICANT CHANGE UP (ref 70–99)
GLUCOSE BLDC GLUCOMTR-MCNC: 97 MG/DL — SIGNIFICANT CHANGE UP (ref 70–99)
GLUCOSE SERPL-MCNC: 97 MG/DL — SIGNIFICANT CHANGE UP (ref 70–99)
HCT VFR BLD CALC: 23.7 % — LOW (ref 39–50)
HGB BLD-MCNC: 8 G/DL — LOW (ref 13–17)
MAGNESIUM SERPL-MCNC: 2 MG/DL — SIGNIFICANT CHANGE UP (ref 1.6–2.6)
MCHC RBC-ENTMCNC: 30.3 PG — SIGNIFICANT CHANGE UP (ref 27–34)
MCHC RBC-ENTMCNC: 33.8 GM/DL — SIGNIFICANT CHANGE UP (ref 32–36)
MCV RBC AUTO: 89.8 FL — SIGNIFICANT CHANGE UP (ref 80–100)
MRSA PCR RESULT.: SIGNIFICANT CHANGE UP
NRBC # BLD: 0 /100 WBCS — SIGNIFICANT CHANGE UP (ref 0–0)
PHOSPHATE SERPL-MCNC: 3.1 MG/DL — SIGNIFICANT CHANGE UP (ref 2.5–4.5)
PLATELET # BLD AUTO: 44 K/UL — LOW (ref 150–400)
POTASSIUM SERPL-MCNC: 3.8 MMOL/L — SIGNIFICANT CHANGE UP (ref 3.5–5.3)
POTASSIUM SERPL-SCNC: 3.8 MMOL/L — SIGNIFICANT CHANGE UP (ref 3.5–5.3)
PROT SERPL-MCNC: 6 G/DL — SIGNIFICANT CHANGE UP (ref 6–8.3)
RBC # BLD: 2.64 M/UL — LOW (ref 4.2–5.8)
RBC # FLD: 17.8 % — HIGH (ref 10.3–14.5)
RH IG SCN BLD-IMP: POSITIVE — SIGNIFICANT CHANGE UP
S AUREUS DNA NOSE QL NAA+PROBE: SIGNIFICANT CHANGE UP
SODIUM SERPL-SCNC: 138 MMOL/L — SIGNIFICANT CHANGE UP (ref 135–145)
WBC # BLD: 0.39 K/UL — CRITICAL LOW (ref 3.8–10.5)
WBC # FLD AUTO: 0.39 K/UL — CRITICAL LOW (ref 3.8–10.5)

## 2024-05-16 PROCEDURE — 83550 IRON BINDING TEST: CPT

## 2024-05-16 PROCEDURE — 82435 ASSAY OF BLOOD CHLORIDE: CPT

## 2024-05-16 PROCEDURE — 87641 MR-STAPH DNA AMP PROBE: CPT

## 2024-05-16 PROCEDURE — 85610 PROTHROMBIN TIME: CPT

## 2024-05-16 PROCEDURE — 84145 PROCALCITONIN (PCT): CPT

## 2024-05-16 PROCEDURE — 36430 TRANSFUSION BLD/BLD COMPNT: CPT

## 2024-05-16 PROCEDURE — 71045 X-RAY EXAM CHEST 1 VIEW: CPT

## 2024-05-16 PROCEDURE — 82962 GLUCOSE BLOOD TEST: CPT

## 2024-05-16 PROCEDURE — 83540 ASSAY OF IRON: CPT

## 2024-05-16 PROCEDURE — 83605 ASSAY OF LACTIC ACID: CPT

## 2024-05-16 PROCEDURE — 83036 HEMOGLOBIN GLYCOSYLATED A1C: CPT

## 2024-05-16 PROCEDURE — 85384 FIBRINOGEN ACTIVITY: CPT

## 2024-05-16 PROCEDURE — 82607 VITAMIN B-12: CPT

## 2024-05-16 PROCEDURE — 85045 AUTOMATED RETICULOCYTE COUNT: CPT

## 2024-05-16 PROCEDURE — 96374 THER/PROPH/DIAG INJ IV PUSH: CPT

## 2024-05-16 PROCEDURE — P9073: CPT

## 2024-05-16 PROCEDURE — 87640 STAPH A DNA AMP PROBE: CPT

## 2024-05-16 PROCEDURE — 83615 LACTATE (LD) (LDH) ENZYME: CPT

## 2024-05-16 PROCEDURE — 83735 ASSAY OF MAGNESIUM: CPT

## 2024-05-16 PROCEDURE — 81001 URINALYSIS AUTO W/SCOPE: CPT

## 2024-05-16 PROCEDURE — 99291 CRITICAL CARE FIRST HOUR: CPT | Mod: 25

## 2024-05-16 PROCEDURE — 84100 ASSAY OF PHOSPHORUS: CPT

## 2024-05-16 PROCEDURE — 82803 BLOOD GASES ANY COMBINATION: CPT

## 2024-05-16 PROCEDURE — 87086 URINE CULTURE/COLONY COUNT: CPT

## 2024-05-16 PROCEDURE — 99233 SBSQ HOSP IP/OBS HIGH 50: CPT

## 2024-05-16 PROCEDURE — 82248 BILIRUBIN DIRECT: CPT

## 2024-05-16 PROCEDURE — 84132 ASSAY OF SERUM POTASSIUM: CPT

## 2024-05-16 PROCEDURE — 86900 BLOOD TYPING SEROLOGIC ABO: CPT

## 2024-05-16 PROCEDURE — 85730 THROMBOPLASTIN TIME PARTIAL: CPT

## 2024-05-16 PROCEDURE — 82947 ASSAY GLUCOSE BLOOD QUANT: CPT

## 2024-05-16 PROCEDURE — 85014 HEMATOCRIT: CPT

## 2024-05-16 PROCEDURE — 84295 ASSAY OF SERUM SODIUM: CPT

## 2024-05-16 PROCEDURE — 93005 ELECTROCARDIOGRAM TRACING: CPT

## 2024-05-16 PROCEDURE — 99239 HOSP IP/OBS DSCHRG MGMT >30: CPT

## 2024-05-16 PROCEDURE — 82728 ASSAY OF FERRITIN: CPT

## 2024-05-16 PROCEDURE — 74177 CT ABD & PELVIS W/CONTRAST: CPT | Mod: MC

## 2024-05-16 PROCEDURE — 36415 COLL VENOUS BLD VENIPUNCTURE: CPT

## 2024-05-16 PROCEDURE — 82746 ASSAY OF FOLIC ACID SERUM: CPT

## 2024-05-16 PROCEDURE — 82247 BILIRUBIN TOTAL: CPT

## 2024-05-16 PROCEDURE — 80053 COMPREHEN METABOLIC PANEL: CPT

## 2024-05-16 PROCEDURE — 85025 COMPLETE CBC W/AUTO DIFF WBC: CPT

## 2024-05-16 PROCEDURE — 86923 COMPATIBILITY TEST ELECTRIC: CPT

## 2024-05-16 PROCEDURE — 86901 BLOOD TYPING SEROLOGIC RH(D): CPT

## 2024-05-16 PROCEDURE — 83010 ASSAY OF HAPTOGLOBIN QUANT: CPT

## 2024-05-16 PROCEDURE — 82330 ASSAY OF CALCIUM: CPT

## 2024-05-16 PROCEDURE — P9016: CPT

## 2024-05-16 PROCEDURE — 85018 HEMOGLOBIN: CPT

## 2024-05-16 PROCEDURE — 86850 RBC ANTIBODY SCREEN: CPT

## 2024-05-16 RX ORDER — ACYCLOVIR SODIUM 500 MG
1 VIAL (EA) INTRAVENOUS
Qty: 60 | Refills: 0
Start: 2024-05-16 | End: 2024-06-14

## 2024-05-16 RX ORDER — FLUCONAZOLE 150 MG/1
1 TABLET ORAL
Qty: 30 | Refills: 0
Start: 2024-05-16 | End: 2024-06-14

## 2024-05-16 RX ORDER — FLUCONAZOLE 150 MG/1
1 TABLET ORAL
Qty: 0 | Refills: 0 | DISCHARGE
Start: 2024-05-16

## 2024-05-16 RX ORDER — LEVOFLOXACIN 5 MG/ML
1 INJECTION, SOLUTION INTRAVENOUS
Qty: 30 | Refills: 0
Start: 2024-05-16 | End: 2024-06-14

## 2024-05-16 RX ADMIN — Medication 48 MILLIGRAM(S): at 14:44

## 2024-05-16 RX ADMIN — AMLODIPINE BESYLATE 10 MILLIGRAM(S): 2.5 TABLET ORAL at 06:28

## 2024-05-16 RX ADMIN — Medication 50 MICROGRAM(S): at 06:28

## 2024-05-16 RX ADMIN — Medication 400 MILLIGRAM(S): at 06:28

## 2024-05-16 RX ADMIN — Medication 81 MILLIGRAM(S): at 14:43

## 2024-05-16 RX ADMIN — FLUCONAZOLE 200 MILLIGRAM(S): 150 TABLET ORAL at 14:43

## 2024-05-16 RX ADMIN — CHLORHEXIDINE GLUCONATE 1 APPLICATION(S): 213 SOLUTION TOPICAL at 07:00

## 2024-05-16 RX ADMIN — Medication 50 MILLIGRAM(S): at 06:28

## 2024-05-16 RX ADMIN — ESCITALOPRAM OXALATE 10 MILLIGRAM(S): 10 TABLET, FILM COATED ORAL at 14:43

## 2024-05-16 RX ADMIN — PANTOPRAZOLE SODIUM 40 MILLIGRAM(S): 20 TABLET, DELAYED RELEASE ORAL at 06:28

## 2024-05-16 RX ADMIN — Medication 1 MILLIGRAM(S): at 14:43

## 2024-05-16 NOTE — DISCHARGE NOTE NURSING/CASE MANAGEMENT/SOCIAL WORK - NURSING SECTION COMPLETE
This is a chronic health problem that is uncontrolled with current medications and lifestyle measures.  
This is a chronic health problem that this patient has had for proximally 20 years. They've worked through multiple combinations of medications to finally settle on amlodipine 5 mg daily and losartan 100 mg daily. His blood pressure is excellent today at 118/80. We will get some baseline blood work and check his lipids and his electrolytes. Will follow up with those as is appropriate.  
This is a chronic problem that is bothering this patient for 1-2 months. He had an illness in January seem to get better then he was seen in the urgent care with another illness thought to be a sinusitis and a cough. He was placed on Zithromax and given Tessalon Perles that really have not helped with his cough. 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 Then when he went to [place], he cough lasts any noted on the way back home that he had been coughing much less. Now back in [place] his home station he is back having a cough that is sometimes productive of a more liquid sputum not a thick green sputum such as she would see with infection. I suspect this patient either has residual viral cough or is starting to experience some allergies. We will treat and try to get him feeling better.  
Patient/Caregiver provided printed discharge information.

## 2024-05-16 NOTE — PROGRESS NOTE ADULT - ASSESSMENT
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 Patient chronically receives blood transfusions with his last occurring 3 days ago. Patient states he began to feel weak and fatigued last night. Patient state it acutely worsened this morning with associated dizziness which prompted him to come to the ED. Patient also reports having a black, tarry stool 2 days ago.    Known history of extensive GI AVMs (SB +/- colon; s/p DBE and colonsocopies) - seen and scoped on multiple occasions by Dr Ashton  Most recently admitted here in 2023 with GI course as outlined below:    - s/p VCE (8/10)-  red blood in the stomach; hematin/melena (altered blood) in the small bowel; non-diagnostic small bowel study as the capsule terminates in the small bowel at the end of the study  - s/p EGD (8/10)-  Normal esophagus; single bleeding angioectasia with adherent clot in the stomach- s/p treatment with APC likely the main source of bleeding and cause for blood clots seen on VCE; few recently bleeding angioectasias in the stomach- s/p treatment with APC; normal examined duodenum.  - s/p push enteroscopy (7/25)- normal esophagus; multiple gastric polyps; normal duodenum; normal examined jejunum.   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 Suspect pt may have had recurrent bleeding from AVMs i/s/o thrombocytopenia (s/p chemo)    RECS:  -Given neutropenia and stable/improving Hgb post-transfusion; risks of endoscopic evaluation outweigh potential benefits  -transfuse PRN for goal Hbg >7 and Plt >40 given history of recurrent AVM bleeding  -if pt develops recurrent bleeding +/- HD instability will re-assess for endoluminal evaluation +/- VCE  -continue BID PPI  -advance to regular diet and monitor  -monitor stools; expect stools to get progressively lighter over the next 3-5 days as "old"/retained blood from within GI tract is cleared  -Hematology input noted and appreciated    Discussed with pt; all questions answered  Discussed with pt's son Shashank Kwong per pt request  Will update house staff    Nikhil Amezcua PA-C  [facility] Teaching GI Service  Available on TEAMS Mon-Fri 8a-4p  After hours and weekend coverage [phone]  
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 Patient chronically receives blood transfusions with his last occurring 3 days ago. Patient states he began to feel weak and fatigued last night. Patient state it acutely worsened this morning with associated dizziness which prompted him to come to the ED. Patient also reports having a black, tarry stool 2 days ago.    Known history of extensive GI AVMs (SB +/- colon; s/p DBE and colonsocopies) - seen and scoped on multiple occasions by Dr Ashton  Most recently admitted here in 2023 with GI course as outlined below:    - s/p VCE (8/10)-  red blood in the stomach; hematin/melena (altered blood) in the small bowel; non-diagnostic small bowel study as the capsule terminates in the small bowel at the end of the study  - s/p EGD (8/10)-  Normal esophagus; single bleeding angioectasia with adherent clot in the stomach- s/p treatment with APC likely the main source of bleeding and cause for blood clots seen on VCE; few recently bleeding angioectasias in the stomach- s/p treatment with APC; normal examined duodenum.  - s/p push enteroscopy (7/25)- normal esophagus; multiple gastric polyps; normal duodenum; normal examined jejunum.   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 Suspect pt may have had recurrent bleeding from AVMs i/s/o thrombocytopenia (s/p chemo). Given neutropenia and stable/improving Hgb post-transfusion; risks of endoscopic evaluation outweigh potential benefits. Pt and family in agreement with plan    RECS:    -goal Plt >40 given history of recurrent AVM bleeding and Hgb >7  -continue BID PPI (PO)  -regular diet  -counseled to monitor stools; expect stools to get progressively lighter over the next 3-5 days as "old"/retained blood from within GI tract is cleared    Discussed with pt; all questions answered  Discussed with Medicine attending  NO GI objection to planned DC today    Nikhil Amezcua PA-C  [facility] Teaching GI Service  Available on TEAMS Mon-Fri 8a-4p  After hours and weekend coverage [phone]    
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 

## 2024-05-16 NOTE — DISCHARGE NOTE NURSING/CASE MANAGEMENT/SOCIAL WORK - PATIENT PORTAL LINK FT
You can access the FollowMyHealth Patient Portal offered by [facility] by registering at the following website: [email or web address]. By joining [employer]’s FollowMyHealth portal, you will also be able to view your health information using other applications (apps) compatible with our system.

## 2024-05-16 NOTE — DISCHARGE NOTE PROVIDER - NSDCCPTREATMENT_GEN_ALL_CORE_FT
PRINCIPAL PROCEDURE  Procedure: CT abdomen pelvis  Findings and Treatment: FINDINGS:  LOWER CHEST: Hypoattenuation of the blood pool relative to the   myocardium, compatible with anemia. TAVR. Coronary artery calcifications.   Small calcified mediastinal lymph nodes. Minimal bibasilar subsegmental   atelectasis.  LIVER: Unchanged punctate calcification in the medial left lobe.  BILE DUCTS: Normal caliber.  GALLBLADDER: Within normal limits.  SPLEEN: Within normal limits.  PANCREAS: Within normal limits.  ADRENALS: Bilateral adrenal gland thickening, similar to prior.  KIDNEYS/URETERS: No hydronephrosis. Left lower pole renal cyst. Bilateral   subcentimeter hypodense foci that are too small to characterize.  BLADDER: Within normal limits.  REPRODUCTIVE ORGANS: Mildly enlarged prostate.  BOWEL: Distal duodenal diverticulum. Colonic diverticulosis. No evidence   for acute diverticulitis. No bowel obstruction. The appendix is located   within a right inguinal hernia, and is otherwise normal. No evidence for   active gastrointestinal bleed.  PERITONEUM: No ascites.  VESSELS: Atherosclerotic changes.  RETROPERITONEUM/LYMPH NODES: No lymphadenopathy.  ABDOMINAL WALL: Small right inguinal hernia containing the appendix.   Small left inguinal hernia containing fat.  BONES: Degenerative changes. Chronic left rib fractures.  IMPRESSION:  No acute abdominopelvic findings. No evidence of active gastrointestinal   hemorrhage at this time.       Altered mentation

## 2024-05-16 NOTE — PROGRESS NOTE ADULT - NS ATTEND AMEND GEN_ALL_CORE FT
Medical history as above, 84 yo male s/p gi bleeding , black stool in setting of pancytopenia, patient s/p egd/colonoscopy  in the past and treated  for AVMs by .  Would defer endoscopy at this time due to pancytopenia and increase risk for bleeding with therapeutic endoscopy.    plan ppi bid  transfuse for platelets less than 40 and for hgb less thank 7

## 2024-05-16 NOTE — PROGRESS NOTE ADULT - SUBJECTIVE AND OBJECTIVE BOX
DATE OF SERVICE: 05-16-24 @ 08:49    Patient is a 83y old  Male who presents with a chief complaint of Anemia due to acute blood loss     (15 May 2024 12:00)      SUBJECTIVE / OVERNIGHT EVENTS: No acute events overnight. Patient hemoglobin stable at 8 this morning. Plan to discharge patient home around noon when son arrives. Patient denies any complaints and agreeable with plan.     MEDICATIONS  (STANDING):  acyclovir   Oral Tab/Cap 400 milliGRAM(s) Oral two times a day  amLODIPine   Tablet 10 milliGRAM(s) Oral daily  aspirin  chewable 81 milliGRAM(s) Oral daily  chlorhexidine 2% Cloths 1 Application(s) Topical <User Schedule>  dextrose 10% Bolus 125 milliLiter(s) IV Bolus once  dextrose 5%. 1000 milliLiter(s) (50 mL/Hr) IV Continuous <Continuous>  dextrose 5%. 1000 milliLiter(s) (100 mL/Hr) IV Continuous <Continuous>  dextrose 50% Injectable 25 Gram(s) IV Push once  dextrose 50% Injectable 12.5 Gram(s) IV Push once  escitalopram 10 milliGRAM(s) Oral daily  fenofibrate Tablet 48 milliGRAM(s) Oral daily  fluconAZOLE   Tablet 200 milliGRAM(s) Oral daily  folic acid 1 milliGRAM(s) Oral daily  glucagon  Injectable 1 milliGRAM(s) IntraMuscular once  insulin lispro (ADMELOG) corrective regimen sliding scale   SubCutaneous three times a day before meals  insulin lispro (ADMELOG) corrective regimen sliding scale   SubCutaneous at bedtime  levoFLOXacin  Tablet 500 milliGRAM(s) Oral every 24 hours  levothyroxine 50 MICROGram(s) Oral daily  metoprolol succinate ER 50 milliGRAM(s) Oral daily  pantoprazole    Tablet 40 milliGRAM(s) Oral two times a day  tamsulosin 0.4 milliGRAM(s) Oral at bedtime    MEDICATIONS  (PRN):  dextrose Oral Gel 15 Gram(s) Oral once PRN Blood Glucose LESS THAN 70 milliGRAM(s)/deciliter      Vital Signs Last 24 Hrs  T(C): 36.7 (16 May 2024 06:04), Max: 37 (15 May 2024 13:00)  T(F): 98.1 (16 May 2024 06:04), Max: 98.6 (15 May 2024 13:00)  HR: 65 (16 May 2024 06:04) (61 - 68)  BP: 130/61 (16 May 2024 06:04) (128/82 - 138/66)  BP(mean): 128 (15 May 2024 19:24) (128 - 128)  RR: 18 (16 May 2024 06:04) (18 - 18)  SpO2: 98% (16 May 2024 06:04) (97% - 98%)    Parameters below as of 16 May 2024 06:04  Patient On (Oxygen Delivery Method): room air      CAPILLARY BLOOD GLUCOSE      POCT Blood Glucose.: 88 mg/dL (16 May 2024 08:28)  POCT Blood Glucose.: 118 mg/dL (15 May 2024 22:33)  POCT Blood Glucose.: 109 mg/dL (15 May 2024 13:00)    I&O's Summary    15 May 2024 07:01  -  16 May 2024 07:00  --------------------------------------------------------  IN: 840 mL / OUT: 140 mL / NET: 700 mL    16 May 2024 07:01  -  16 May 2024 08:49  --------------------------------------------------------  IN: 0 mL / OUT: 175 mL / NET: -175 mL        PHYSICAL EXAM:  GENERAL: NAD, lying in bed comfortably  HEAD:  Atraumatic, Normocephalic  EYES: PERRLA, conjunctiva and sclera clear  ENT: Moist mucous membranes  NECK: Supple, No JVD  CHEST/LUNG: Clear to auscultation bilaterally  HEART: Regular rate and rhythm; No murmurs  ABDOMEN: Soft, nontender, nondistended  EXTREMITIES:  2+ Peripheral Pulses, brisk capillary refill. No edema  NERVOUS SYSTEM:  A&Ox3, no focal deficits   SKIN: No rashes or lesions    LABS:                        8.0    0.39  )-----------( 44       ( 16 May 2024 07:01 )             23.7     05-16    138  |  104  |  15  ----------------------------<  97  3.8   |  21<L>  |  1.01    Ca    9.4      16 May 2024 07:01  Phos  3.1     05-16  Mg     2.0     05-16    TPro  6.0  /  Alb  3.2<L>  /  TBili  0.4  /  DBili  x   /  AST  14  /  ALT  11  /  AlkPhos  52  05-16          Urinalysis Basic - ( 16 May 2024 07:01 )    Color: x / Appearance: x / SG: x / pH: x  Gluc: 97 mg/dL / Ketone: x  / Bili: x / Urobili: x   Blood: x / Protein: x / Nitrite: x   Leuk Esterase: x / RBC: x / WBC x   Sq Epi: x / Non Sq Epi: x / Bacteria: x        RADIOLOGY & ADDITIONAL TESTS:    Imaging Personally Reviewed:    Consultant(s) Notes Reviewed:      Care Discussed with Consultants/Other Providers:

## 2024-05-16 NOTE — DISCHARGE NOTE PROVIDER - HOSPITAL COURSE
Discharge Summary     Admit Date: 05-13-24  Discharge Date:    Admission diagnoses: Anemia due to acute blood loss    Discharge diagnoses:  Anemia     Hospital Course:   For full details, please see H&P, progress notes, consult notes and ancillary notes. 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 The patient's hospital course will be summarized in a problem based format.    # Symptomatic anemia  Patient presented with hemoglobin of 4 s/p 3 units pRBC to Hgb of 8.1  Hemoglobin remained stable x72 hours inpatient   GI evaluated patient and given neutropenia and stable hemoglobin, luminal evaluation deferred   CT A/P with no evidence of active GI bleed   The protonix IV 40mg bid was transitioned to PO BID - d/c home on 2 weeks BID therapy and then qdaily   Patient to follow up with Dr. Velazco next week     On day of discharge, patient is clinically stable with no new exam findings or acute symptoms compared to prior. The patient was seen by the attending physician on the date of discharge and deemed stable and acceptable for discharge. The patient's chronic medical conditions were treated accordingly per the patient's home medication regimen. The patient's medication reconciliation (with changes made to chronic medications), follow up appointments, discharge orders, instructions, and significant lab and diagnostic studies are as noted.     Discharge follow up action items:     1. Follow up with PCP in 1-2 weeks. Follow up with Dr. Velazco in 1 week.   2. Medication changes PPI PO BID x2 weeks then q daily   3. On hold medications None     Patient's ordered code status: Full Code    Patient disposition: Home     Discharge Summary     Admit Date: 05-13-24  Discharge Date:    Admission diagnoses: Anemia due to acute blood loss    Discharge diagnoses:  Anemia     Hospital Course:   For full details, please see H&P, progress notes, consult notes and ancillary notes. 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 The patient's hospital course will be summarized in a problem based format.    # Symptomatic anemia  Patient presented with hemoglobin of 4 s/p 3 units pRBC to Hgb of 8.1  Hemoglobin remained stable x72 hours inpatient   GI evaluated patient and given neutropenia and stable hemoglobin, luminal evaluation deferred   CT A/P with no evidence of active GI bleed   The protonix IV 40mg bid was transitioned to PO BID - d/c home on 2 weeks BID therapy and then qdaily   Patient to follow up with Dr. Velazco next week   Continued with levofloxacin 500mg PO qd, acyclovir 400mg PO BID, fluconazole 200mg PO qd given ANC <500    On day of discharge, patient is clinically stable with no new exam findings or acute symptoms compared to prior. The patient was seen by the attending physician on the date of discharge and deemed stable and acceptable for discharge. The patient's chronic medical conditions were treated accordingly per the patient's home medication regimen. The patient's medication reconciliation (with changes made to chronic medications), follow up appointments, discharge orders, instructions, and significant lab and diagnostic studies are as noted.     Discharge follow up action items:     1. Follow up with PCP in 1-2 weeks. Follow up with Dr. Velazco in 1 week.   2. Medication changes PPI PO BID x2 weeks then q daily. levofloxacin 500mg PO qd, acyclovir 400mg PO BID, fluconazole 200mg PO qd  3. On hold medications None     Patient's ordered code status: Full Code    Patient disposition: Home

## 2024-05-16 NOTE — DISCHARGE NOTE PROVIDER - NSDCMRMEDTOKEN_GEN_ALL_CORE_FT
acyclovir 400 mg oral tablet: 1 tab(s) orally 2 times a day  amLODIPine 10 mg oral tablet: 1 tab(s) orally once a day  aspirin 81 mg oral tablet: 1 tab(s) orally every other day  doxazosin 2 mg oral tablet: 1 tab(s) orally once a day (at bedtime)  fenofibrate 54 mg oral tablet: 1 tab(s) orally once a day  ferrous sulfate 325 mg (65 mg elemental iron) oral tablet: 1 tab(s) orally once a day  fluconazole 200 mg oral tablet: 1 tab(s) orally once a day  folic acid 1 mg oral tablet: 1 tab(s) orally once a day  Levaquin 500 mg oral tablet: 1 tab(s) orally once a day  levothyroxine 50 mcg (0.05 mg) oral tablet: 1 tab(s) orally once a day  Lexapro 10 mg oral tablet: 1 tab(s) orally once a day  melatonin 3 mg oral tablet: 1 tab(s) orally once a day (at bedtime)  metFORMIN 500 mg oral tablet, extended release: 1 tab(s) orally 2 times a day  metoprolol succinate 50 mg oral capsule, extended release: 1 cap(s) orally once a day  pantoprazole 40 mg oral delayed release tablet: 1 tab(s) orally once a day Please take pantoprazole 40mg twice daily for 2 weeks, then take once a day  senna (sennosides) 17 mg oral tablet: 1 tab(s) orally 2 times a day  tamsulosin 0.4 mg oral capsule: 1 cap(s) orally every other day   acyclovir 400 mg oral tablet: 1 tab(s) orally 2 times a day  amLODIPine 10 mg oral tablet: 1 tab(s) orally once a day  aspirin 81 mg oral tablet: 1 tab(s) orally every other day  doxazosin 2 mg oral tablet: 1 tab(s) orally once a day (at bedtime)  fenofibrate 54 mg oral tablet: 1 tab(s) orally once a day  ferrous sulfate 325 mg (65 mg elemental iron) oral tablet: 1 tab(s) orally once a day  fluconazole 200 mg oral tablet: 1 tab(s) orally once a day  fluconazole 200 mg oral tablet: 1 tab(s) orally once a day  folic acid 1 mg oral tablet: 1 tab(s) orally once a day  Levaquin 500 mg oral tablet: 1 tab(s) orally once a day  levoFLOXacin 500 mg oral tablet: 1 tab(s) orally once a day  levothyroxine 50 mcg (0.05 mg) oral tablet: 1 tab(s) orally once a day  Lexapro 10 mg oral tablet: 1 tab(s) orally once a day  melatonin 3 mg oral tablet: 1 tab(s) orally once a day (at bedtime)  metFORMIN 500 mg oral tablet, extended release: 1 tab(s) orally 2 times a day  metoprolol succinate 50 mg oral capsule, extended release: 1 cap(s) orally once a day  pantoprazole 40 mg oral delayed release tablet: 1 tab(s) orally once a day Please take pantoprazole 40mg twice daily for 2 weeks, then take once a day  senna (sennosides) 17 mg oral tablet: 1 tab(s) orally 2 times a day  tamsulosin 0.4 mg oral capsule: 1 cap(s) orally every other day

## 2024-05-16 NOTE — PROGRESS NOTE ADULT - PROBLEM SELECTOR PLAN 8
-Continue home Levothyroxine 50mcg qd

## 2024-05-16 NOTE — DISCHARGE NOTE PROVIDER - DETAILS OF MALNUTRITION DIAGNOSIS/DIAGNOSES
This patient has been assessed with a concern for Malnutrition and was treated during this hospitalization for the following Nutrition diagnosis/diagnoses:     -  05/15/2024: Moderate protein-calorie malnutrition

## 2024-05-16 NOTE — PROGRESS NOTE ADULT - ATTENDING COMMENTS
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 On admission Hgb 4.    S/p 3U pRBC, 1U platelets with appropriate response, Hgb stable since ASA resumed, discussed with GI, has appt with Heme on Tuesday. Total time discharge planning 33 minutes.   d/w HS 1 and GI .
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 On admission Hgb 4.    S/p 3U pRBC, 1U platelets with appropriate response, had another dark BM today possibly old stool. Spoke to GI, holding off on scope at this time d/t stable Hgb and neutropenia. Can start diet, trend CBC. Pending Heme recs regarding anti-microbials/mgmt of MDS. Pancytopenia i/s/o MDS and chemo last week, antimicrobial ppx was levaqin, acyclocir, and fluconazole on last admission.     pending Heme recs, stability of CBC  d/w HS 1.
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 On admission Hgb 4.    S/p 3U pRBC, 1U platelets with appropriate response, pending BM for today. Diet advanced, aspirin resumed, appreciate heme input. If Hgb stable tomorrow then discharge home.   d/w HS 1 and GI

## 2024-05-16 NOTE — PROGRESS NOTE ADULT - PROBLEM SELECTOR PLAN 6
home med amlodipine 10mg    -Hold iso of active GIB

## 2024-05-16 NOTE — DISCHARGE NOTE PROVIDER - NSDCCPCAREPLAN_GEN_ALL_CORE_FT
PRINCIPAL DISCHARGE DIAGNOSIS  Diagnosis: Anemia due to acute blood loss  Assessment and Plan of Treatment: You presented to the hospital with weakness and dizziness and were found to have a hemoglobin of 4. You received blood transfusions in the hospital which improved your hemoglobin to 8. Your hemoglobin remained stable in the hospital for more than 48 hours. Please take Pantoprazole 40mg twice daily for the next two weeks. After 2 weeks, you can take this medicaiton once daily. Please follow up with Dr. Velazco and your primary care doctor. Please return to the Emergency Department if you notice any bleeding or have any symptoms concerning for a bleed such as lightheadedness, fatigue, dizziness, weakness.

## 2024-05-16 NOTE — DISCHARGE NOTE PROVIDER - PROVIDER TOKENS
PROVIDER:[TOKEN:[[id number]],FOLLOWUP:[1 week],ESTABLISHEDPATIENT:[T]],PROVIDER:[TOKEN:[[id number]],FOLLOWUP:[1 week],ESTABLISHEDPATIENT:[T]]

## 2024-05-16 NOTE — PROGRESS NOTE ADULT - PROBLEM SELECTOR PLAN 1
Hgb 4   Suspect anemia in setting of GI bleed with melena and recent chemotherapy. Patient with history of GI bleed from AVM's  CT A/P with no evidence of active GIB   S/p 3 units prbc and 1 unit platelet in ED with appropriate response to Hgb 8.1     -Maintain active type and screen  -Maintain Hgb >8 given history of CAD  -Protonix IV 40mg bid - transitioned to PO BID - d/c home on 2 weeks BID therapy and then qdaily   -GI consult - given neutropenia, will hold off on intervention given stable hemoglobin  -Re-introduce home aspirin

## 2024-05-16 NOTE — DISCHARGE NOTE PROVIDER - CARE PROVIDER_API CALL
TIRSO FERRARO  Established Patient  Follow Up Time: 1 week    NORRIS SERRATO, Phys,    Phone: [phone]  Fax: [phone]  Established Patient  Follow Up Time: 1 week

## 2024-05-16 NOTE — PROGRESS NOTE ADULT - SUBJECTIVE AND OBJECTIVE BOX
**** INCOMPLETE NOTE ****      INTERVAL HPI/OVERNIGHT EVENTS:    MEDICATIONS  (STANDING):  acyclovir   Oral Tab/Cap 400 milliGRAM(s) Oral two times a day  amLODIPine   Tablet 10 milliGRAM(s) Oral daily  aspirin  chewable 81 milliGRAM(s) Oral daily  chlorhexidine 2% Cloths 1 Application(s) Topical <User Schedule>  dextrose 10% Bolus 125 milliLiter(s) IV Bolus once  dextrose 5%. 1000 milliLiter(s) (50 mL/Hr) IV Continuous <Continuous>  dextrose 5%. 1000 milliLiter(s) (100 mL/Hr) IV Continuous <Continuous>  dextrose 50% Injectable 25 Gram(s) IV Push once  dextrose 50% Injectable 12.5 Gram(s) IV Push once  escitalopram 10 milliGRAM(s) Oral daily  fenofibrate Tablet 48 milliGRAM(s) Oral daily  fluconAZOLE   Tablet 200 milliGRAM(s) Oral daily  folic acid 1 milliGRAM(s) Oral daily  glucagon  Injectable 1 milliGRAM(s) IntraMuscular once  insulin lispro (ADMELOG) corrective regimen sliding scale   SubCutaneous three times a day before meals  insulin lispro (ADMELOG) corrective regimen sliding scale   SubCutaneous at bedtime  levoFLOXacin  Tablet 500 milliGRAM(s) Oral every 24 hours  levothyroxine 50 MICROGram(s) Oral daily  metoprolol succinate ER 50 milliGRAM(s) Oral daily  pantoprazole    Tablet 40 milliGRAM(s) Oral two times a day  tamsulosin 0.4 milliGRAM(s) Oral at bedtime    MEDICATIONS  (PRN):  dextrose Oral Gel 15 Gram(s) Oral once PRN Blood Glucose LESS THAN 70 milliGRAM(s)/deciliter      Allergies    No Known Allergies    Intolerances        Review of Systems:      Vital Signs Last 24 Hrs  T(C): 36.7 (16 May 2024 06:04), Max: 37 (15 May 2024 13:00)  T(F): 98.1 (16 May 2024 06:04), Max: 98.6 (15 May 2024 13:00)  HR: 65 (16 May 2024 06:04) (61 - 68)  BP: 130/61 (16 May 2024 06:04) (128/82 - 138/66)  BP(mean): 128 (15 May 2024 19:24) (128 - 128)  RR: 18 (16 May 2024 06:04) (18 - 18)  SpO2: 98% (16 May 2024 06:04) (97% - 98%)    Parameters below as of 16 May 2024 06:04  Patient On (Oxygen Delivery Method): room air        PHYSICAL EXAM:      LABS:                        8.0    0.39  )-----------( 44       ( 16 May 2024 07:01 )             23.7     05-16    138  |  104  |  15  ----------------------------<  97  3.8   |  21<L>  |  1.01    Ca    9.4      16 May 2024 07:01  Phos  3.1     05-16  Mg     2.0     05-16    TPro  6.0  /  Alb  3.2<L>  /  TBili  0.4  /  DBili  x   /  AST  14  /  ALT  11  /  AlkPhos  52  05-16      Urinalysis Basic - ( 16 May 2024 07:01 )    Color: x / Appearance: x / SG: x / pH: x  Gluc: 97 mg/dL / Ketone: x  / Bili: x / Urobili: x   Blood: x / Protein: x / Nitrite: x   Leuk Esterase: x / RBC: x / WBC x   Sq Epi: x / Non Sq Epi: x / Bacteria: x      LIVER FUNCTIONS - ( 16 May 2024 07:01 )  Alb: 3.2 g/dL / Pro: 6.0 g/dL / ALK PHOS: 52 U/L / ALT: 11 U/L / AST: 14 U/L / GGT: x             RADIOLOGY & ADDITIONAL TESTS:     INTERVAL HPI/OVERNIGHT EVENTS:  feels well  tolerated PO solids  no transfusion requirement  no CP SOB, dizziness, LOC or syncope  formed stool yesterday, dark but no hematochezia or loose tarry stools    eager to go home; scheduled outpt follow up Tuesday with primary Hem-Onc    MEDICATIONS  (STANDING):  acyclovir   Oral Tab/Cap 400 milliGRAM(s) Oral two times a day  amLODIPine   Tablet 10 milliGRAM(s) Oral daily  aspirin  chewable 81 milliGRAM(s) Oral daily  chlorhexidine 2% Cloths 1 Application(s) Topical <User Schedule>  dextrose 10% Bolus 125 milliLiter(s) IV Bolus once  dextrose 5%. 1000 milliLiter(s) (50 mL/Hr) IV Continuous <Continuous>  dextrose 5%. 1000 milliLiter(s) (100 mL/Hr) IV Continuous <Continuous>  dextrose 50% Injectable 25 Gram(s) IV Push once  dextrose 50% Injectable 12.5 Gram(s) IV Push once  escitalopram 10 milliGRAM(s) Oral daily  fenofibrate Tablet 48 milliGRAM(s) Oral daily  fluconAZOLE   Tablet 200 milliGRAM(s) Oral daily  folic acid 1 milliGRAM(s) Oral daily  glucagon  Injectable 1 milliGRAM(s) IntraMuscular once  insulin lispro (ADMELOG) corrective regimen sliding scale   SubCutaneous three times a day before meals  insulin lispro (ADMELOG) corrective regimen sliding scale   SubCutaneous at bedtime  levoFLOXacin  Tablet 500 milliGRAM(s) Oral every 24 hours  levothyroxine 50 MICROGram(s) Oral daily  metoprolol succinate ER 50 milliGRAM(s) Oral daily  pantoprazole    Tablet 40 milliGRAM(s) Oral two times a day  tamsulosin 0.4 milliGRAM(s) Oral at bedtime    MEDICATIONS  (PRN):  dextrose Oral Gel 15 Gram(s) Oral once PRN Blood Glucose LESS THAN 70 milliGRAM(s)/deciliter      Allergies  No Known Allergies      Review of Systems:  see HPI- remainder 10 point ROS negative    Vital Signs Last 24 Hrs  T(C): 36.7 (16 May 2024 06:04), Max: 37 (15 May 2024 13:00)  T(F): 98.1 (16 May 2024 06:04), Max: 98.6 (15 May 2024 13:00)  HR: 65 (16 May 2024 06:04) (61 - 68)  BP: 130/61 (16 May 2024 06:04) (128/82 - 138/66)  BP(mean): 128 (15 May 2024 19:24) (128 - 128)  RR: 18 (16 May 2024 06:04) (18 - 18)  SpO2: 98% (16 May 2024 06:04) (97% - 98%)    Parameters below as of 16 May 2024 06:04  Patient On (Oxygen Delivery Method): room air    PHYSICAL EXAM:  Constitutional: NAD, WD WN elderly WM alert and cooperative     Neck: No LAD, supple no JVD  Respiratory: Clear bl no inc WOB  Cardiovascular: S1 and S2, RRR  Gastrointestinal: BS+, soft, NT/ND no caput, neg HSM,+bl inguinal hernias - NT, soft  Extremities: No peripheral edema, neg clubbing, cyanosis  Vascular: 2+ peripheral pulses  Neurological: A/O x 3, no focal deficits or asymmetry   speech clear and fluent  Psychiatric: Normal mood, normal affect  Skin: No rashes, anicteric    LABS:                        8.0    0.39  )-----------( 44       ( 16 May 2024 07:01 )             23.7     05-16    138  |  104  |  15  ----------------------------<  97  3.8   |  21<L>  |  1.01    Ca    9.4      16 May 2024 07:01  Phos  3.1     05-16  Mg     2.0     05-16    TPro  6.0  /  Alb  3.2<L>  /  TBili  0.4  /  DBili  x   /  AST  14  /  ALT  11  /  AlkPhos  52  05-16      Urinalysis Basic - ( 16 May 2024 07:01 )    Color: x / Appearance: x / SG: x / pH: x  Gluc: 97 mg/dL / Ketone: x  / Bili: x / Urobili: x   Blood: x / Protein: x / Nitrite: x   Leuk Esterase: x / RBC: x / WBC x   Sq Epi: x / Non Sq Epi: x / Bacteria: x      LIVER FUNCTIONS - ( 16 May 2024 07:01 )  Alb: 3.2 g/dL / Pro: 6.0 g/dL / ALK PHOS: 52 U/L / ALT: 11 U/L / AST: 14 U/L / GGT: x             RADIOLOGY & ADDITIONAL TESTS:

## 2024-05-16 NOTE — PROGRESS NOTE ADULT - PROBLEM SELECTOR PROBLEM 4
Diabetes mellitus
Partially impaired: cannot see medication labels or newsprint, but can see obstacles in path, and the surrounding layout; can count fingers at arm's length/eye glasses

## 2024-05-16 NOTE — PROGRESS NOTE ADULT - PROBLEM SELECTOR PLAN 3
History of MDS with recent chemotherapy ~1 weeks ago  -Heme/onc consult
History of MDS with recent chemotherapy ~1 weeks ago  -Heme/onc consult - no plan for inpatient chemo, close outpatient follow up with Dr. Velazco.
History of MDS with recent chemotherapy ~1 weeks ago  -Heme/onc consult - no plan for inpatient chemo, close outpatient follow up with Dr. Velazco.

## 2024-05-16 NOTE — PROGRESS NOTE ADULT - PROBLEM SELECTOR PLAN 5
History of CAD on aspirin every other day, as well as history of TAVR    -Re-introduce home aspirin
History of CAD on aspirin every other day, as well as history of TAVR    -Hold aspirin iso of active GIB
History of CAD on aspirin every other day, as well as history of TAVR    -Re-introduce home aspirin

## 2024-05-30 ENCOUNTER — HOSPITAL ENCOUNTER (OUTPATIENT)
Dept: HOSPITAL 74 - JER | Age: 84
Setting detail: OBSERVATION
LOS: 1 days | Discharge: HOME | End: 2024-05-31
Attending: INTERNAL MEDICINE | Admitting: GENERAL ACUTE CARE HOSPITAL
Payer: COMMERCIAL

## 2024-05-30 VITALS — RESPIRATION RATE: 18 BRPM

## 2024-05-30 VITALS — BODY MASS INDEX: 24.9 KG/M2

## 2024-05-30 DIAGNOSIS — I10: ICD-10-CM

## 2024-05-30 DIAGNOSIS — N17.9: ICD-10-CM

## 2024-05-30 DIAGNOSIS — D46.9: Primary | ICD-10-CM

## 2024-05-30 LAB
ALBUMIN SERPL-MCNC: 3 G/DL (ref 3.4–5)
ALP SERPL-CCNC: 57 U/L (ref 45–117)
ALT SERPL-CCNC: 12 U/L (ref 13–61)
ANION GAP SERPL CALC-SCNC: 7 MMOL/L (ref 4–13)
ANISOCYTOSIS BLD QL: (no result)
AST SERPL-CCNC: 17 U/L (ref 15–37)
BILIRUB SERPL-MCNC: 0.2 MG/DL (ref 0.2–1)
BUN SERPL-MCNC: 41.1 MG/DL (ref 7–18)
CALCIUM SERPL-MCNC: 9 MG/DL (ref 8.5–10.1)
CHLORIDE SERPL-SCNC: 105 MMOL/L (ref 98–107)
CO2 SERPL-SCNC: 24 MMOL/L (ref 21–32)
CREAT SERPL-MCNC: 1.6 MG/DL (ref 0.55–1.3)
DEPRECATED RDW RBC AUTO: 21.7 % (ref 11.9–15.9)
GLUCOSE SERPL-MCNC: 159 MG/DL (ref 74–106)
HCT VFR BLD CALC: 15.7 % (ref 35.4–49)
HGB BLD-MCNC: 5.3 GM/DL (ref 11.7–16.9)
MACROCYTES BLD QL: (no result)
MCH RBC QN AUTO: 31.1 PG (ref 25.7–33.7)
MCHC RBC AUTO-ENTMCNC: 34.1 G/DL (ref 32–35.9)
MCV RBC: 91.2 FL (ref 80–96)
PLATELET # BLD AUTO: 45 10^3/UL (ref 134–434)
PMV BLD: 10.3 FL (ref 7.5–11.1)
POTASSIUM SERPLBLD-SCNC: 4.4 MMOL/L (ref 3.5–5.1)
PROT SERPL-MCNC: 6.8 G/DL (ref 6.4–8.2)
RBC # BLD AUTO: 1.72 M/MM3 (ref 4–5.6)
SODIUM SERPL-SCNC: 136 MMOL/L (ref 136–145)
WBC # BLD AUTO: 0.6 K/MM3 (ref 4–10)

## 2024-05-30 PROCEDURE — 3E033GC INTRODUCTION OF OTHER THERAPEUTIC SUBSTANCE INTO PERIPHERAL VEIN, PERCUTANEOUS APPROACH: ICD-10-PCS | Performed by: INTERNAL MEDICINE

## 2024-05-30 PROCEDURE — P9058 RBC, L/R, CMV-NEG, IRRAD: HCPCS

## 2024-05-30 PROCEDURE — 30233N1 TRANSFUSION OF NONAUTOLOGOUS RED BLOOD CELLS INTO PERIPHERAL VEIN, PERCUTANEOUS APPROACH: ICD-10-PCS | Performed by: INTERNAL MEDICINE

## 2024-05-30 PROCEDURE — G0378 HOSPITAL OBSERVATION PER HR: HCPCS

## 2024-05-30 RX ADMIN — ACYCLOVIR SCH MG: 400 TABLET ORAL at 22:05

## 2024-05-31 VITALS — HEART RATE: 72 BPM | SYSTOLIC BLOOD PRESSURE: 142 MMHG | DIASTOLIC BLOOD PRESSURE: 59 MMHG | TEMPERATURE: 98.2 F

## 2024-05-31 LAB
ALBUMIN SERPL-MCNC: 2.6 G/DL (ref 3.4–5)
ALP SERPL-CCNC: 50 U/L (ref 45–117)
ALT SERPL-CCNC: < 6 U/L (ref 13–61)
ANION GAP SERPL CALC-SCNC: 6 MMOL/L (ref 4–13)
AST SERPL-CCNC: 18 U/L (ref 15–37)
BILIRUB SERPL-MCNC: 0.4 MG/DL (ref 0.2–1)
BUN SERPL-MCNC: 29.8 MG/DL (ref 7–18)
CALCIUM SERPL-MCNC: 8.7 MG/DL (ref 8.5–10.1)
CHLORIDE SERPL-SCNC: 106 MMOL/L (ref 98–107)
CO2 SERPL-SCNC: 26 MMOL/L (ref 21–32)
CREAT SERPL-MCNC: 1 MG/DL (ref 0.55–1.3)
DEPRECATED RDW RBC AUTO: 14.9 % (ref 11.9–15.9)
DEPRECATED RDW RBC AUTO: 15 % (ref 11.9–15.9)
GLUCOSE SERPL-MCNC: 93 MG/DL (ref 74–106)
HCT VFR BLD CALC: 19.2 % (ref 35.4–49)
HCT VFR BLD CALC: 24.3 % (ref 35.4–49)
HGB BLD-MCNC: 6.7 GM/DL (ref 11.7–16.9)
HGB BLD-MCNC: 8.5 GM/DL (ref 11.7–16.9)
MCH RBC QN AUTO: 30 PG (ref 25.7–33.7)
MCH RBC QN AUTO: 30.4 PG (ref 25.7–33.7)
MCHC RBC AUTO-ENTMCNC: 34.9 G/DL (ref 32–35.9)
MCHC RBC AUTO-ENTMCNC: 34.9 G/DL (ref 32–35.9)
MCV RBC: 86 FL (ref 80–96)
MCV RBC: 87.1 FL (ref 80–96)
PLATELET # BLD AUTO: 37 10^3/UL (ref 134–434)
PLATELET # BLD AUTO: 39 10^3/UL (ref 134–434)
PMV BLD: 10 FL (ref 7.5–11.1)
PMV BLD: 9.4 FL (ref 7.5–11.1)
POTASSIUM SERPLBLD-SCNC: 3.9 MMOL/L (ref 3.5–5.1)
PROT SERPL-MCNC: 6 G/DL (ref 6.4–8.2)
RBC # BLD AUTO: 2.2 M/MM3 (ref 4–5.6)
RBC # BLD AUTO: 2.83 M/MM3 (ref 4–5.6)
SODIUM SERPL-SCNC: 137 MMOL/L (ref 136–145)
WBC # BLD AUTO: 0.6 K/MM3 (ref 4–10)
WBC # BLD AUTO: 0.7 K/MM3 (ref 4–10)

## 2024-05-31 RX ADMIN — TAMSULOSIN HYDROCHLORIDE SCH MG: 0.4 CAPSULE ORAL at 09:06

## 2024-05-31 RX ADMIN — FLUCONAZOLE SCH MG: 100 TABLET ORAL at 12:09

## 2024-05-31 RX ADMIN — LOSARTAN POTASSIUM SCH MG: 50 TABLET, FILM COATED ORAL at 12:09

## 2024-05-31 RX ADMIN — FENOFIBRIC ACID SCH MG: 135 CAPSULE, DELAYED RELEASE PELLETS ORAL at 12:10

## 2024-05-31 RX ADMIN — ESCITALOPRAM OXALATE SCH MG: 10 TABLET, FILM COATED ORAL at 12:09

## 2024-05-31 RX ADMIN — PANTOPRAZOLE SODIUM SCH MG: 40 TABLET, DELAYED RELEASE ORAL at 12:09

## 2024-05-31 RX ADMIN — FUROSEMIDE SCH MG: 10 INJECTION, SOLUTION INTRAVENOUS at 09:06

## 2024-05-31 RX ADMIN — ALLOPURINOL SCH MG: 100 TABLET ORAL at 12:11

## 2024-05-31 RX ADMIN — LEVOTHYROXINE SODIUM SCH MCG: 112 TABLET ORAL at 08:55

## 2024-05-31 RX ADMIN — AMLODIPINE BESYLATE SCH MG: 10 TABLET ORAL at 12:24

## 2024-06-06 ENCOUNTER — INPATIENT (INPATIENT)
Facility: HOSPITAL | Age: 84
LOS: 3 days | Discharge: HOME HEALTH | DRG: 379 | End: 2024-06-10
Attending: STUDENT IN AN ORGANIZED HEALTH CARE EDUCATION/TRAINING PROGRAM | Admitting: STUDENT IN AN ORGANIZED HEALTH CARE EDUCATION/TRAINING PROGRAM
Payer: MEDICARE

## 2024-06-06 VITALS
RESPIRATION RATE: 20 BRPM | HEART RATE: 80 BPM | HEIGHT: 65 IN | SYSTOLIC BLOOD PRESSURE: 92 MMHG | TEMPERATURE: 98 F | WEIGHT: 158.95 LBS | DIASTOLIC BLOOD PRESSURE: 52 MMHG

## 2024-06-06 DIAGNOSIS — E03.9 HYPOTHYROIDISM, UNSPECIFIED: ICD-10-CM

## 2024-06-06 DIAGNOSIS — D64.9 ANEMIA, UNSPECIFIED: ICD-10-CM

## 2024-06-06 DIAGNOSIS — I25.10 ATHEROSCLEROTIC HEART DISEASE OF NATIVE CORONARY ARTERY WITHOUT ANGINA PECTORIS: ICD-10-CM

## 2024-06-06 DIAGNOSIS — K92.2 GASTROINTESTINAL HEMORRHAGE, UNSPECIFIED: ICD-10-CM

## 2024-06-06 DIAGNOSIS — Z29.9 ENCOUNTER FOR PROPHYLACTIC MEASURES, UNSPECIFIED: ICD-10-CM

## 2024-06-06 DIAGNOSIS — D61.818 OTHER PANCYTOPENIA: ICD-10-CM

## 2024-06-06 DIAGNOSIS — N40.0 BENIGN PROSTATIC HYPERPLASIA WITHOUT LOWER URINARY TRACT SYMPTOMS: ICD-10-CM

## 2024-06-06 DIAGNOSIS — Z95.2 PRESENCE OF PROSTHETIC HEART VALVE: Chronic | ICD-10-CM

## 2024-06-06 DIAGNOSIS — E78.5 HYPERLIPIDEMIA, UNSPECIFIED: ICD-10-CM

## 2024-06-06 DIAGNOSIS — E11.9 TYPE 2 DIABETES MELLITUS WITHOUT COMPLICATIONS: ICD-10-CM

## 2024-06-06 DIAGNOSIS — I10 ESSENTIAL (PRIMARY) HYPERTENSION: ICD-10-CM

## 2024-06-06 LAB
ALBUMIN SERPL ELPH-MCNC: 2.7 G/DL — LOW (ref 3.3–5)
ALP SERPL-CCNC: 49 U/L — SIGNIFICANT CHANGE UP (ref 40–120)
ALT FLD-CCNC: 5 U/L — LOW (ref 10–45)
ANION GAP SERPL CALC-SCNC: 26 MMOL/L — HIGH (ref 5–17)
ANISOCYTOSIS BLD QL: SIGNIFICANT CHANGE UP
APTT BLD: 25.3 SEC — SIGNIFICANT CHANGE UP (ref 24.5–35.6)
AST SERPL-CCNC: 15 U/L — SIGNIFICANT CHANGE UP (ref 10–40)
BASOPHILS # BLD AUTO: 0 K/UL — SIGNIFICANT CHANGE UP (ref 0–0.2)
BASOPHILS NFR BLD AUTO: 0 % — SIGNIFICANT CHANGE UP (ref 0–2)
BILIRUB SERPL-MCNC: 0.2 MG/DL — SIGNIFICANT CHANGE UP (ref 0.2–1.2)
BLASTS # FLD: 4.6 % — HIGH (ref 0–0)
BUN SERPL-MCNC: 74 MG/DL — HIGH (ref 7–23)
BURR CELLS BLD QL SMEAR: SLIGHT — SIGNIFICANT CHANGE UP
CALCIUM SERPL-MCNC: 9.1 MG/DL — SIGNIFICANT CHANGE UP (ref 8.4–10.5)
CHLORIDE SERPL-SCNC: 102 MMOL/L — SIGNIFICANT CHANGE UP (ref 96–108)
CO2 SERPL-SCNC: 11 MMOL/L — LOW (ref 22–31)
CREAT SERPL-MCNC: 1.77 MG/DL — HIGH (ref 0.5–1.3)
EGFR: 38 ML/MIN/1.73M2 — LOW
EOSINOPHIL # BLD AUTO: 0 K/UL — SIGNIFICANT CHANGE UP (ref 0–0.5)
EOSINOPHIL NFR BLD AUTO: 0 % — SIGNIFICANT CHANGE UP (ref 0–6)
GIANT PLATELETS BLD QL SMEAR: PRESENT — SIGNIFICANT CHANGE UP
GLUCOSE BLDC GLUCOMTR-MCNC: 123 MG/DL — HIGH (ref 70–99)
GLUCOSE BLDC GLUCOMTR-MCNC: 136 MG/DL — HIGH (ref 70–99)
GLUCOSE SERPL-MCNC: 245 MG/DL — HIGH (ref 70–99)
HCT VFR BLD CALC: 13.9 % — CRITICAL LOW (ref 39–50)
HCT VFR BLD CALC: 19.4 % — CRITICAL LOW (ref 39–50)
HGB BLD-MCNC: 4.4 G/DL — CRITICAL LOW (ref 13–17)
HGB BLD-MCNC: 6.4 G/DL — CRITICAL LOW (ref 13–17)
HYPOCHROMIA BLD QL: SLIGHT — SIGNIFICANT CHANGE UP
INR BLD: 1.45 RATIO — HIGH (ref 0.85–1.18)
LYMPHOCYTES # BLD AUTO: 1.1 K/UL — SIGNIFICANT CHANGE UP (ref 1–3.3)
LYMPHOCYTES # BLD AUTO: 72.7 % — HIGH (ref 13–44)
MACROCYTES BLD QL: SLIGHT — SIGNIFICANT CHANGE UP
MANUAL SMEAR VERIFICATION: SIGNIFICANT CHANGE UP
MCHC RBC-ENTMCNC: 30.6 PG — SIGNIFICANT CHANGE UP (ref 27–34)
MCHC RBC-ENTMCNC: 30.6 PG — SIGNIFICANT CHANGE UP (ref 27–34)
MCHC RBC-ENTMCNC: 31.7 GM/DL — LOW (ref 32–36)
MCHC RBC-ENTMCNC: 33 GM/DL — SIGNIFICANT CHANGE UP (ref 32–36)
MCV RBC AUTO: 92.8 FL — SIGNIFICANT CHANGE UP (ref 80–100)
MCV RBC AUTO: 96.5 FL — SIGNIFICANT CHANGE UP (ref 80–100)
MONOCYTES # BLD AUTO: 0.18 K/UL — SIGNIFICANT CHANGE UP (ref 0–0.9)
MONOCYTES NFR BLD AUTO: 11.8 % — SIGNIFICANT CHANGE UP (ref 2–14)
NEUTROPHILS # BLD AUTO: 0.16 K/UL — LOW (ref 1.8–7.4)
NEUTROPHILS NFR BLD AUTO: 10.9 % — LOW (ref 43–77)
NRBC # BLD: 0 /100 WBCS — SIGNIFICANT CHANGE UP (ref 0–0)
NRBC # BLD: 1 /100 WBCS — HIGH (ref 0–0)
PLAT MORPH BLD: NORMAL — SIGNIFICANT CHANGE UP
PLATELET # BLD AUTO: 23 K/UL — LOW (ref 150–400)
PLATELET # BLD AUTO: 44 K/UL — LOW (ref 150–400)
POIKILOCYTOSIS BLD QL AUTO: SLIGHT — SIGNIFICANT CHANGE UP
POTASSIUM SERPL-MCNC: 4.6 MMOL/L — SIGNIFICANT CHANGE UP (ref 3.5–5.3)
POTASSIUM SERPL-SCNC: 4.6 MMOL/L — SIGNIFICANT CHANGE UP (ref 3.5–5.3)
PROT SERPL-MCNC: 5.9 G/DL — LOW (ref 6–8.3)
PROTHROM AB SERPL-ACNC: 15.8 SEC — HIGH (ref 9.5–13)
RBC # BLD: 1.44 M/UL — LOW (ref 4.2–5.8)
RBC # BLD: 2.09 M/UL — LOW (ref 4.2–5.8)
RBC # FLD: 15.9 % — HIGH (ref 10.3–14.5)
RBC # FLD: 18.5 % — HIGH (ref 10.3–14.5)
RBC BLD AUTO: ABNORMAL
SODIUM SERPL-SCNC: 139 MMOL/L — SIGNIFICANT CHANGE UP (ref 135–145)
WBC # BLD: 0.39 K/UL — CRITICAL LOW (ref 3.8–10.5)
WBC # BLD: 1.51 K/UL — LOW (ref 3.8–10.5)
WBC # FLD AUTO: 0.39 K/UL — CRITICAL LOW (ref 3.8–10.5)
WBC # FLD AUTO: 1.51 K/UL — LOW (ref 3.8–10.5)

## 2024-06-06 PROCEDURE — 99291 CRITICAL CARE FIRST HOUR: CPT

## 2024-06-06 PROCEDURE — 99223 1ST HOSP IP/OBS HIGH 75: CPT | Mod: GC,AI

## 2024-06-06 PROCEDURE — 99233 SBSQ HOSP IP/OBS HIGH 50: CPT

## 2024-06-06 RX ORDER — ONDANSETRON 8 MG/1
4 TABLET, FILM COATED ORAL ONCE
Refills: 0 | Status: COMPLETED | OUTPATIENT
Start: 2024-06-06 | End: 2024-06-06

## 2024-06-06 RX ORDER — FOLIC ACID 0.8 MG
1 TABLET ORAL
Qty: 0 | Refills: 0 | DISCHARGE

## 2024-06-06 RX ORDER — PANTOPRAZOLE SODIUM 20 MG/1
40 TABLET, DELAYED RELEASE ORAL
Refills: 0 | Status: DISCONTINUED | OUTPATIENT
Start: 2024-06-06 | End: 2024-06-06

## 2024-06-06 RX ORDER — PANTOPRAZOLE SODIUM 20 MG/1
40 TABLET, DELAYED RELEASE ORAL
Refills: 0 | Status: DISCONTINUED | OUTPATIENT
Start: 2024-06-06 | End: 2024-06-07

## 2024-06-06 RX ORDER — DEXTROSE 50 % IN WATER 50 %
25 SYRINGE (ML) INTRAVENOUS ONCE
Refills: 0 | Status: DISCONTINUED | OUTPATIENT
Start: 2024-06-06 | End: 2024-06-10

## 2024-06-06 RX ORDER — SENNA PLUS 8.6 MG/1
1 TABLET ORAL
Refills: 0 | DISCHARGE

## 2024-06-06 RX ORDER — ESCITALOPRAM OXALATE 10 MG/1
1 TABLET, FILM COATED ORAL
Qty: 0 | Refills: 0 | DISCHARGE

## 2024-06-06 RX ORDER — DIPHENHYDRAMINE HCL 50 MG
25 CAPSULE ORAL ONCE
Refills: 0 | Status: COMPLETED | OUTPATIENT
Start: 2024-06-06 | End: 2024-06-06

## 2024-06-06 RX ORDER — INSULIN LISPRO 100/ML
VIAL (ML) SUBCUTANEOUS
Refills: 0 | Status: DISCONTINUED | OUTPATIENT
Start: 2024-06-06 | End: 2024-06-10

## 2024-06-06 RX ORDER — PANTOPRAZOLE SODIUM 20 MG/1
1 TABLET, DELAYED RELEASE ORAL
Qty: 0 | Refills: 0 | DISCHARGE

## 2024-06-06 RX ORDER — FENOFIBRATE,MICRONIZED 130 MG
1 CAPSULE ORAL
Refills: 0 | DISCHARGE

## 2024-06-06 RX ORDER — ACYCLOVIR SODIUM 500 MG
400 VIAL (EA) INTRAVENOUS
Refills: 0 | Status: DISCONTINUED | OUTPATIENT
Start: 2024-06-06 | End: 2024-06-10

## 2024-06-06 RX ORDER — LEVOTHYROXINE SODIUM 125 MCG
50 TABLET ORAL DAILY
Refills: 0 | Status: DISCONTINUED | OUTPATIENT
Start: 2024-06-06 | End: 2024-06-10

## 2024-06-06 RX ORDER — LEVOFLOXACIN 5 MG/ML
500 INJECTION, SOLUTION INTRAVENOUS EVERY 24 HOURS
Refills: 0 | Status: DISCONTINUED | OUTPATIENT
Start: 2024-06-06 | End: 2024-06-06

## 2024-06-06 RX ORDER — LEVOFLOXACIN 5 MG/ML
1 INJECTION, SOLUTION INTRAVENOUS
Refills: 0 | DISCHARGE

## 2024-06-06 RX ORDER — INSULIN LISPRO 100/ML
VIAL (ML) SUBCUTANEOUS AT BEDTIME
Refills: 0 | Status: DISCONTINUED | OUTPATIENT
Start: 2024-06-06 | End: 2024-06-10

## 2024-06-06 RX ORDER — GLUCAGON INJECTION, SOLUTION 0.5 MG/.1ML
1 INJECTION, SOLUTION SUBCUTANEOUS ONCE
Refills: 0 | Status: DISCONTINUED | OUTPATIENT
Start: 2024-06-06 | End: 2024-06-10

## 2024-06-06 RX ORDER — LORATADINE 10 MG/1
10 TABLET ORAL ONCE
Refills: 0 | Status: DISCONTINUED | OUTPATIENT
Start: 2024-06-06 | End: 2024-06-06

## 2024-06-06 RX ORDER — METFORMIN HYDROCHLORIDE 850 MG/1
1 TABLET ORAL
Qty: 0 | Refills: 0 | DISCHARGE

## 2024-06-06 RX ORDER — FLUCONAZOLE 150 MG/1
200 TABLET ORAL DAILY
Refills: 0 | Status: DISCONTINUED | OUTPATIENT
Start: 2024-06-06 | End: 2024-06-10

## 2024-06-06 RX ORDER — DEXTROSE 50 % IN WATER 50 %
12.5 SYRINGE (ML) INTRAVENOUS ONCE
Refills: 0 | Status: DISCONTINUED | OUTPATIENT
Start: 2024-06-06 | End: 2024-06-10

## 2024-06-06 RX ORDER — DEXTROSE 50 % IN WATER 50 %
15 SYRINGE (ML) INTRAVENOUS ONCE
Refills: 0 | Status: DISCONTINUED | OUTPATIENT
Start: 2024-06-06 | End: 2024-06-10

## 2024-06-06 RX ORDER — PANTOPRAZOLE SODIUM 20 MG/1
80 TABLET, DELAYED RELEASE ORAL ONCE
Refills: 0 | Status: COMPLETED | OUTPATIENT
Start: 2024-06-06 | End: 2024-06-06

## 2024-06-06 RX ORDER — TAMSULOSIN HYDROCHLORIDE 0.4 MG/1
1 CAPSULE ORAL
Refills: 0 | DISCHARGE

## 2024-06-06 RX ADMIN — ONDANSETRON 4 MILLIGRAM(S): 8 TABLET, FILM COATED ORAL at 06:22

## 2024-06-06 RX ADMIN — Medication 25 MILLIGRAM(S): at 18:47

## 2024-06-06 RX ADMIN — FLUCONAZOLE 200 MILLIGRAM(S): 150 TABLET ORAL at 18:36

## 2024-06-06 RX ADMIN — Medication 25 MILLIGRAM(S): at 10:44

## 2024-06-06 RX ADMIN — Medication 400 MILLIGRAM(S): at 18:09

## 2024-06-06 RX ADMIN — PANTOPRAZOLE SODIUM 40 MILLIGRAM(S): 20 TABLET, DELAYED RELEASE ORAL at 21:51

## 2024-06-06 RX ADMIN — PANTOPRAZOLE SODIUM 80 MILLIGRAM(S): 20 TABLET, DELAYED RELEASE ORAL at 06:22

## 2024-06-06 NOTE — ED PROVIDER NOTE - PROGRESS NOTE DETAILS
Bertha Jorge, ED Attending:  Patient states that he feels much better after the first 2 units of blood, better blood pressure on repeat.  Will order for 2 more units as well as unit of platelets.  Reassess to dispo. Chen Mishra- pt stable. GI emailed. pt's outpatient GI is Dr. Velazco. Will admit.

## 2024-06-06 NOTE — H&P ADULT - PROBLEM SELECTOR PLAN 5
home med amlodipine 10mg    -Hold iso of active GIB. home med amlodipine 10mg, losartan 100mg     -Hold iso of active GIB.

## 2024-06-06 NOTE — ED PROVIDER NOTE - CLINICAL SUMMARY MEDICAL DECISION MAKING FREE TEXT BOX
84 yo M Hypothyroidism, T2DM, HDL, HTN, and GI Bleed on ASA pw hematemesis since 4 AM - VSS, hypotensive, tachycardic - pt will get emergent unit + prbcs, gi cs.

## 2024-06-06 NOTE — ED ADULT NURSE NOTE - OBJECTIVE STATEMENT
84 yo M AOx4 from home with Myelodysplastic Syndrome, Hypothyroidism, T2DM, HDL, HTN, and GI Bleed c/o nausea, weakness, dizziness, weakness, vomiting, and back pain. Pt reports onset of symptoms started at 0400 prompting arrival to [facility] ED. Pt son at bedside and pt endorses vomiting blood. Pt reports frequent blood transfusions weekly. Pt reports subjective fevers yesterday- now resolved. Pt placed on CM- NSR 86 bpm. Pt initially presents to [facility] ED in no acute distress with unlabored breathing. Call bell in reach. Stretcher in lowest position locked with blanket given. Comfort care and safety measures provided. Pt denies c/p, sob, abd pain, N/V/D, chills, headache, dysuria, blood in urine and stool.

## 2024-06-06 NOTE — ED ADULT NURSE REASSESSMENT NOTE - NS ED NURSE REASSESS COMMENT FT1
Emergent release blood #1 RPBC. 1RBC administered. 2 RN at bedside for verification. Consent checked and placed in chart. Pt verbalized and understands risks & benefits. Safety and comfort measures maintained. Call bell in reach.

## 2024-06-06 NOTE — H&P ADULT - NSHPLABSRESULTS_GEN_ALL_CORE
LABS:                         6.4    0.39  )-----------( 23       ( 06 Jun 2024 07:08 )             19.4     06-06    139  |  102  |  74<H>  ----------------------------<  245<H>  4.6   |  11<L>  |  1.77<H>    Ca    9.1      06 Jun 2024 06:03    TPro  5.9<L>  /  Alb  2.7<L>  /  TBili  0.2  /  DBili  x   /  AST  15  /  ALT  5<L>  /  AlkPhos  49  06-06    PT/INR - ( 06 Jun 2024 06:03 )   PT: 15.8 sec;   INR: 1.45 ratio         PTT - ( 06 Jun 2024 06:03 )  PTT:25.3 sec  Urinalysis Basic - ( 06 Jun 2024 06:03 )    Color: x / Appearance: x / SG: x / pH: x  Gluc: 245 mg/dL / Ketone: x  / Bili: x / Urobili: x   Blood: x / Protein: x / Nitrite: x   Leuk Esterase: x / RBC: x / WBC x   Sq Epi: x / Non Sq Epi: x / Bacteria: x                RADIOLOGY, EKG & ADDITIONAL TESTS:

## 2024-06-06 NOTE — H&P ADULT - PROBLEM SELECTOR PLAN 8
On home doxazosin 2mg qhs and tamsulosin 0.4mg every other night  -Hold for now given soft/normal BP in setting of symptomatic anemia  -Monitor in case of urinary retention.

## 2024-06-06 NOTE — H&P ADULT - PROBLEM SELECTOR PLAN 2
Patient with history of MDS  No longer receiving chemotherapy   Hgb 4.4  WBC .39   Platelet 23     -No signs of infection at this time  -If SIRS+ would obtain infectious work up and consider empiric antibiotics   -MRSA/MSSA PCR  -Hgb transfuse >8 given CAD history  -Maintain Platelets >40k Patient with history of MDS  No longer receiving chemotherapy   Hgb 4.4  WBC .39   Platelet 23     -No signs of infection at this time  -Hgb transfuse >8 given CAD history  -Maintain Platelets >40k  -C/w home Levaquin, Acyclovir and Fluconazole

## 2024-06-06 NOTE — H&P ADULT - NSHPSOCIALHISTORY_GEN_ALL_CORE
Patient is  and lives at home by himself, was  for 55 years  He has a visiting nurse, home health aide and  that come   He uses a walker to ambulate  Patient was in import/export business   He is former smoker many years ago, rare alcohol use, no recreational drug use

## 2024-06-06 NOTE — CONSULT NOTE ADULT - SUBJECTIVE AND OBJECTIVE BOX
Patient is a 83y old  Male who presents with a chief complaint of     HPI:  84 yo male presented to ED with c/o several episodes of small volume black emesis/spit-up (confirmed by pt and son), no lilliam hemetemesis. Pt reports feeling weak and fatigues yesterday - denies melena or rectal bleeding and no abdominal pain or nausea.  At ~4am pt felt very weak and called his son - noted by son to be very pale and episodes of "spitting up" dark contents (small volumes, in tissue)  No CP, LOC or syncope. Pt with known PMH of CAD, TAVR, DMII, GI bleed from AVM's, and MDS (No longer on chemo; plans for supportive care only with Q3 week injections of ?neulasta  in attempt to keep pt's counts up). Known transfusion dependent MDS - last outpt transfusion of PRBCs 1 week ago.     Known history of extensive GI AVMs (SB +/- colon; s/p DBE and colonsocopies) - seen and scoped on multiple occasions by Dr Ashton  Most recently admitted here 3 weeks ago and underwent extensive invasive GI work-up in 2023 with course as outlined below:    - s/p VCE (8/10/23)-  red blood in the stomach; hematin/melena (altered blood) in the small bowel; non-diagnostic small bowel study as the capsule terminates in the small bowel at the end of the study  - s/p EGD (8/10/23)-  Normal esophagus; single bleeding angioectasia with adherent clot in the stomach- s/p treatment with APC likely the main source of bleeding and cause for blood clots seen on VCE; few recently bleeding angioectasias in the stomach- s/p treatment with APC; normal examined duodenum.  - s/p push enteroscopy (7/25/23)- normal esophagus; multiple gastric polyps; normal duodenum; normal examined jejunum.   -s/p Colonoscopy (7/26/23)- fair prep, pan-diverticulosis, normal TI, normal rectal retroflexion, no findings to explain melena      In ED: hypotensive to systolic 90s.  Pale appearing.  Black vomitus noted on patient's chart.  No reproducible abdominal tenderness to palpation.  Patient neuro intact and mentating    s/p PRBCs - feeling "much better"  no further episodes of dark emesis/spit-up, no melena or hematochezia    PAST MEDICAL & SURGICAL HISTORY:  Gastrointestinal bleeding    HTN (hypertension)    HLD (hyperlipidemia)    T2DM (type 2 diabetes mellitus)    Hypothyroidism    S/P TAVR (transcatheter aortic valve replacement)  2020        Allergies  No Known Allergies      MEDICATIONS  (STANDING):  pantoprazole  Injectable 40 milliGRAM(s) IV Push two times a day    MEDICATIONS  (PRN):      Social History:    no toxic habits  lives alone; has home aides. Son lives close by     Family History   IBD (  ) Yes   ( X ) No  GI Malignancy (  )  Yes    ( X ) No      Advanced Directives: (  X   ) None    (      ) DNR    (     ) DNI    (     ) Health Care Proxy:     Review of Systems:  see HPI- remainder 10 point ROS negative    Vital Signs Last 24 Hrs  T(C): 36.9 (06 Jun 2024 11:30), Max: 36.9 (06 Jun 2024 11:30)  T(F): 98.4 (06 Jun 2024 11:30), Max: 98.4 (06 Jun 2024 11:30)  HR: 74 (06 Jun 2024 11:30) (74 - 98)  BP: 126/73 (06 Jun 2024 11:30) (90/54 - 141/73)  BP(mean): 90 (06 Jun 2024 11:30) (56 - 95)  RR: 18 (06 Jun 2024 11:30) (18 - 20)  SpO2: 100% (06 Jun 2024 11:30) (95% - 100%)    Parameters below as of 06 Jun 2024 11:30  Patient On (Oxygen Delivery Method): room air    PHYSICAL EXAM:    Constitutional: NAD, WD WN elderly WM alert and cooperative  PRBCs infusing. son Shashank Kwong on speaker phone per pt request   Neck: No LAD, supple no JVD  Respiratory: Clear bl no inc WOB  Cardiovascular: S1 and S2, RRR  Gastrointestinal: BS+, soft, NT/ND no caput, neg HSM,+bl inguinal hernias - NT, soft  Extremities: No peripheral edema, neg clubbing, cyanosis  Vascular: 2+ peripheral pulses  Neurological: A/O x 3, no focal deficits or asymmetry   speech clear and fluent  Psychiatric: Normal mood, normal affect  Skin: No rashes, anicteric +few facial telengectasias. No pallor      LABS:                        6.4    0.39  )-----------( 23       ( 06 Jun 2024 07:08 )             19.4     Hemoglobin: 4.4: Specimen integrity verified. g/dL (06.06.24 @ 06:03)   Hemoglobin: 8.0 g/dL (05.16.24 @ 07:01)     Blasts %: 4.6: called to dr ramos % (06.06.24 @ 06:03)     06-06    139  |  102  |  74<H>  ----------------------------<  245<H>  4.6   |  11<L>  |  1.77<H>    Ca    9.1      06 Jun 2024 06:03    TPro  5.9<L>  /  Alb  2.7<L>  /  TBili  0.2  /  DBili  x   /  AST  15  /  ALT  5<L>  /  AlkPhos  49  06-06    PT/INR - ( 06 Jun 2024 06:03 )   PT: 15.8 sec;   INR: 1.45 ratio    PTT - ( 06 Jun 2024 06:03 )  PTT:25.3 sec           RADIOLOGY & ADDITIONAL TESTS:

## 2024-06-06 NOTE — CONSULT NOTE ADULT - NS ATTEND AMEND GEN_ALL_CORE FT
s/p coffee ground emesis, h/o MDS, no gross gi bleeding, Pancytopenia, h/o avms    plan conservative managment  ppi bid  transfuse as needed  poor prognosis

## 2024-06-06 NOTE — ED ADULT NURSE NOTE - CHIEF COMPLAINT QUOTE
vomiting blood tonight; pmh frequent blood transfusions; feeling weak and dizzy; on baby asa; vomit blood x1 in triage

## 2024-06-06 NOTE — ED PROVIDER NOTE - OBJECTIVE STATEMENT
84 yo hx of MDS, chronic anemia, HLD, HTN, DM2, hypothyroidism requiring transfusions pw hematemesis since 4AM this morning. Feels weak. Several episodes. Denies melena. On ASA.

## 2024-06-06 NOTE — ED ADULT TRIAGE NOTE - CHIEF COMPLAINT QUOTE
vomiting blood tonight; pmh frequent blood transfusions; feeling weak and dizzy; on baby asa; vomiting blood tonight; pmh frequent blood transfusions; feeling weak and dizzy; on baby asa; vomit blood x1 in triage

## 2024-06-06 NOTE — H&P ADULT - HISTORY OF PRESENT ILLNESS
83 M history CAD; TAVR, DMT2, GI bleed from AVM's, and MDS presenting with symptomatic anemia. Patient known to me from last admission one month ago for same. Patient was receiving chemotherapy for MDS but after last admission he is no longer going to receive. Patient got 3 units pRBC last week outpatient. He reports feeling immediately dizzy with the room spinning around 4 am this morning. Patient called his son and was brought to the ED and found to be anemic to 4.4. He reports two small episodes of coffee ground emesis. Patient reports a soft brown bowel movement this morning. He currently denies any chest pain, shortness of breath, abdominal pain, headache, lightheadedness or dizziness.

## 2024-06-06 NOTE — ED PROVIDER NOTE - PHYSICAL EXAMINATION
Const: Well-nourished, Well-developed, appearing stated age.  Eyes: +pale conjunctiva, and symmetrical lids.  HEENT: Head NCAT, no lesions. Atraumatic external nose and ears.   Neck: Symmetric, trachea midline.   CVS: +S1/S2, Radial and DP pulses 2+ b/l.   RESP: Unlabored respiratory effort. Clear to auscultation bilaterally.  GI: Nontender/Nondistended, No CVA tenderness b/l.   MSK: Normocephalic/Atraumatic, Lower Extremities w/o edema b/l.   Skin: Warm, dry and intact.   Neuro: Fluent Speech. Moving all extremities.   Psych: Awake, Alert, & Oriented (AAO) x3. Appropriate mood and affect.

## 2024-06-06 NOTE — ED ADULT NURSE REASSESSMENT NOTE - NS ED NURSE REASSESS COMMENT FT1
2nd unit of emergent release blood. 1RBC administered. 2 RN at bedside for verification. Consent checked and placed in chart. Pt verbalized and understands risks & benefits. Safety and comfort measures maintained. Call bell in reach.

## 2024-06-06 NOTE — H&P ADULT - NSHPPHYSICALEXAM_GEN_ALL_CORE
LOS:     VITALS:   T(C): 36.9 (06-06-24 @ 11:30), Max: 36.9 (06-06-24 @ 11:30)  HR: 74 (06-06-24 @ 11:30) (74 - 98)  BP: 126/73 (06-06-24 @ 11:30) (90/54 - 141/73)  RR: 18 (06-06-24 @ 11:30) (18 - 20)  SpO2: 100% (06-06-24 @ 11:30) (95% - 100%)    GENERAL: NAD, lying in bed comfortably  HEAD:  Atraumatic, Normocephalic  EYES: EOMI, PERRLA, conjunctiva and sclera clear  ENT: Moist mucous membranes  NECK: Supple, No JVD  CHEST/LUNG: Clear to auscultation bilaterally  HEART: Regular rate and rhythm; No murmurs  ABDOMEN: BSx4; Soft, nontender, nondistended  EXTREMITIES:  2+ Peripheral Pulses, slow capillary refill. No edema  NERVOUS SYSTEM:  A&Ox3, no focal deficits   SKIN: No rashes or lesions

## 2024-06-06 NOTE — ED ADULT NURSE NOTE - NSFALLRISKINTERV_ED_ALL_ED
Assistance OOB with selected safe patient handling equipment if applicable/Communicate fall risk and risk factors to all staff, patient, and family/Provide patient with walking aids/Provide visual cue: yellow wristband, yellow gown, etc/Reinforce activity limits and safety measures with patient and family/Call bell, personal items and telephone in reach/Instruct patient to call for assistance before getting out of bed/chair/stretcher/Non-slip footwear applied when patient is off stretcher/[place] to call system/Physically safe environment - no spills, clutter or unnecessary equipment/Purposeful Proactive Rounding/Room/bathroom lighting operational, light cord in reach

## 2024-06-06 NOTE — H&P ADULT - PROBLEM SELECTOR PLAN 4
History of CAD on aspirin every other day, as well as history of TAVR    -Hold aspirin iso of active GIB.

## 2024-06-06 NOTE — H&P ADULT - PROBLEM SELECTOR PLAN 1
Presented with Hgb 4.4   Patient with history of GI bleed from AVM's  S/p 2 units prbc and 1 unit platelet in ED   Outpatient GI is Dr. Velazco    -Maintain active type and screen  -Follow up post-transfusion CBC  -Maintain Hgb >8 given history of CAD  -Maintain Platelets >40k   -Protonix IV 40mg bid   -GI following, no plan for endoscopic evaluation

## 2024-06-06 NOTE — ED PROVIDER NOTE - ATTENDING CONTRIBUTION TO CARE
I have personally provided the amount of critical care time documented below, excluding time spent on separate procedures: Anemia requiring emergent blood     83-year-old male presenting with chief complaint of bloody emesis.  Son at bedside describes it as dark bloody vomitus.  Patient has a history of AVMs on formations as well as MDS.  Recently admitted for similar symptoms.  On exam, hypotensive to systolic 90s.  Pale appearing.  Black vomitus noted on patient's chart.  No reproducible abdominal tenderness to palpation.  Patient neuro intact and mentating.  2 units of emergent blood called for.  Will assess patient's response and plan for further transfusion as necessary.  Patient will need admission for completion of workup.  Reassess to dispo. Bertha Jorge, ED Attending

## 2024-06-06 NOTE — H&P ADULT - TIME BILLING
time spent reviewing prior charts, meds, discussing plan with patient= 77 minutes. Time spent does not include time spent teaching

## 2024-06-06 NOTE — H&P ADULT - ATTENDING COMMENTS
83M w/ PMHx CAD, TAVR, T2DM, MDS (no longer on chemo), multiple recent admissions for GIB admitted for hematemesis and anemia to 4.4. Now s/p 2U pRBC and 1U platelets, pending post-transfusion CBC. Patient states that he is scheduled to start marrow stimulation injection next week. Given hematemesis, started on PPI IV BID, GI consulted, pt has known hx of gastric AVMs. No plan for intervention at this time, pt would like to stabilize and then get scoped as outpatient if needed. Full liquid diet    d/w HS 1

## 2024-06-06 NOTE — CONSULT NOTE ADULT - ASSESSMENT
84 yo male presented to ED with c/o several episodes of small volume black emesis/spit-up (confirmed by pt and son), no lilliam hemetemesis. Pt reports feeling weak and fatigues yesterday - denies melena or rectal bleeding and no abdominal pain or nausea.  At ~4am pt felt very weak and called his son - noted by son to be very pale and episodes of "spitting up" dark contents (small volumes, in tissue)  No CP, LOC or syncope. Pt with known PMH of CAD, TAVR, DMII, GI bleed from AVM's, and MDS (No longer on chemo; plans for supportive care only with Q3 week injections of ?neulasta  in attempt to keep pt's counts up). Known transfusion dependent MDS - last outpt transfusion of PRBCs 1 week ago.     Known history of extensive GI AVMs (SB +/- colon; s/p DBE and colonoscopies) - seen and scoped on multiple occasions by Dr Ashton  Most recently admitted here 3 weeks ago and underwent extensive invasive GI work-up in 2023 with course as outlined below:    - s/p VCE (8/10/23)-  red blood in the stomach; hematin/melena (altered blood) in the small bowel; non-diagnostic small bowel study as the capsule terminates in the small bowel at the end of the study  - s/p EGD (8/10/23)-  Normal esophagus; single bleeding angioectasia with adherent clot in the stomach- s/p treatment with APC likely the main source of bleeding and cause for blood clots seen on VCE; few recently bleeding angioectasias in the stomach- s/p treatment with APC; normal examined duodenum.  - s/p push enteroscopy (7/25/23)- normal esophagus; multiple gastric polyps; normal duodenum; normal examined jejunum.   -s/p Colonoscopy (7/26/23)- fair prep, pan-diverticulosis, normal TI, normal rectal retroflexion, no findings to explain melena    #pancytopenia 2/2 MDS  #MDS, no longer on chemo. Supportive care only per pt/family  #GI blood loss anemia; likely 2/2 known GI tract AVMs  suspect pt may have recurrent bleeding/oozing from known GI tract AVMs i/s/o thrombocytopenia  NO invasive evaluations per pt/family.  Wish to have supportive care only    RECS:  -transfuse PRN for goal Hgb ~7 and Plt >=40  -no invasive GI evaluations as per pt and family wishes (also i/s/o pancytopenia). Re-Counseled pt and son on the recurrent nature of AVMs and both express understanding stating given's pt's counts, advanced MDS with no plans for further chemo they wish to pursue supportive treatment.  -monitor stools; counseled to expect initial stools to be dark in color 2/2 passage of "old"/retained blood from within GI tract  -PPI BID (IV for now)  -OK for PO full liquid diet; if tolerates then advance as tolerated to regular (ADA/DASH)    Discussed with pt and son at length. All questions answered  House staff updated    Nikhil Amezcua PA-C  [facility] Non Teaching GI Service  Available on TEAMS Mon-Fri 8a-4p (Nikhil Domingo)  After hours and weekend coverage [phone]

## 2024-06-07 ENCOUNTER — TRANSCRIPTION ENCOUNTER (OUTPATIENT)
Age: 84
End: 2024-06-07

## 2024-06-07 LAB
ALBUMIN SERPL ELPH-MCNC: 2.9 G/DL — LOW (ref 3.3–5)
ALP SERPL-CCNC: 47 U/L — SIGNIFICANT CHANGE UP (ref 40–120)
ALT FLD-CCNC: 7 U/L — LOW (ref 10–45)
ANION GAP SERPL CALC-SCNC: 9 MMOL/L — SIGNIFICANT CHANGE UP (ref 5–17)
AST SERPL-CCNC: 18 U/L — SIGNIFICANT CHANGE UP (ref 10–40)
BASOPHILS # BLD AUTO: 0 K/UL — SIGNIFICANT CHANGE UP (ref 0–0.2)
BASOPHILS NFR BLD AUTO: 0 % — SIGNIFICANT CHANGE UP (ref 0–2)
BILIRUB SERPL-MCNC: 0.3 MG/DL — SIGNIFICANT CHANGE UP (ref 0.2–1.2)
BLASTS # FLD: 16 % — HIGH (ref 0–0)
BUN SERPL-MCNC: 56 MG/DL — HIGH (ref 7–23)
CALCIUM SERPL-MCNC: 9.4 MG/DL — SIGNIFICANT CHANGE UP (ref 8.4–10.5)
CHLORIDE SERPL-SCNC: 105 MMOL/L — SIGNIFICANT CHANGE UP (ref 96–108)
CO2 SERPL-SCNC: 24 MMOL/L — SIGNIFICANT CHANGE UP (ref 22–31)
CREAT SERPL-MCNC: 1.01 MG/DL — SIGNIFICANT CHANGE UP (ref 0.5–1.3)
EGFR: 74 ML/MIN/1.73M2 — SIGNIFICANT CHANGE UP
EOSINOPHIL # BLD AUTO: 0 K/UL — SIGNIFICANT CHANGE UP (ref 0–0.5)
EOSINOPHIL NFR BLD AUTO: 0 % — SIGNIFICANT CHANGE UP (ref 0–6)
GLUCOSE BLDC GLUCOMTR-MCNC: 101 MG/DL — HIGH (ref 70–99)
GLUCOSE BLDC GLUCOMTR-MCNC: 105 MG/DL — HIGH (ref 70–99)
GLUCOSE BLDC GLUCOMTR-MCNC: 97 MG/DL — SIGNIFICANT CHANGE UP (ref 70–99)
GLUCOSE BLDC GLUCOMTR-MCNC: 98 MG/DL — SIGNIFICANT CHANGE UP (ref 70–99)
GLUCOSE SERPL-MCNC: 96 MG/DL — SIGNIFICANT CHANGE UP (ref 70–99)
HCT VFR BLD CALC: 18.3 % — CRITICAL LOW (ref 39–50)
HCT VFR BLD CALC: 23.4 % — LOW (ref 39–50)
HGB BLD-MCNC: 6.3 G/DL — CRITICAL LOW (ref 13–17)
HGB BLD-MCNC: 7.9 G/DL — LOW (ref 13–17)
LYMPHOCYTES # BLD AUTO: 0.31 K/UL — LOW (ref 1–3.3)
LYMPHOCYTES # BLD AUTO: 48 % — HIGH (ref 13–44)
MAGNESIUM SERPL-MCNC: 1.8 MG/DL — SIGNIFICANT CHANGE UP (ref 1.6–2.6)
MANUAL SMEAR VERIFICATION: SIGNIFICANT CHANGE UP
MCHC RBC-ENTMCNC: 29.5 PG — SIGNIFICANT CHANGE UP (ref 27–34)
MCHC RBC-ENTMCNC: 29.7 PG — SIGNIFICANT CHANGE UP (ref 27–34)
MCHC RBC-ENTMCNC: 33.8 GM/DL — SIGNIFICANT CHANGE UP (ref 32–36)
MCHC RBC-ENTMCNC: 34.4 GM/DL — SIGNIFICANT CHANGE UP (ref 32–36)
MCV RBC AUTO: 86.3 FL — SIGNIFICANT CHANGE UP (ref 80–100)
MCV RBC AUTO: 87.3 FL — SIGNIFICANT CHANGE UP (ref 80–100)
MONOCYTES # BLD AUTO: 0.21 K/UL — SIGNIFICANT CHANGE UP (ref 0–0.9)
MONOCYTES NFR BLD AUTO: 32 % — HIGH (ref 2–14)
NEUTROPHILS # BLD AUTO: 0.03 K/UL — LOW (ref 1.8–7.4)
NEUTROPHILS NFR BLD AUTO: 4 % — LOW (ref 43–77)
NRBC # BLD: 0 /100 WBCS — SIGNIFICANT CHANGE UP (ref 0–0)
NRBC # BLD: 0 /100 WBCS — SIGNIFICANT CHANGE UP (ref 0–0)
PHOSPHATE SERPL-MCNC: 2.7 MG/DL — SIGNIFICANT CHANGE UP (ref 2.5–4.5)
PLAT MORPH BLD: NORMAL — SIGNIFICANT CHANGE UP
PLATELET # BLD AUTO: 41 K/UL — LOW (ref 150–400)
PLATELET # BLD AUTO: 73 K/UL — LOW (ref 150–400)
POTASSIUM SERPL-MCNC: 5 MMOL/L — SIGNIFICANT CHANGE UP (ref 3.5–5.3)
POTASSIUM SERPL-SCNC: 5 MMOL/L — SIGNIFICANT CHANGE UP (ref 3.5–5.3)
PROT SERPL-MCNC: 6 G/DL — SIGNIFICANT CHANGE UP (ref 6–8.3)
RBC # BLD: 2.12 M/UL — LOW (ref 4.2–5.8)
RBC # BLD: 2.68 M/UL — LOW (ref 4.2–5.8)
RBC # FLD: 15.9 % — HIGH (ref 10.3–14.5)
RBC # FLD: 16 % — HIGH (ref 10.3–14.5)
RBC BLD AUTO: SIGNIFICANT CHANGE UP
SODIUM SERPL-SCNC: 138 MMOL/L — SIGNIFICANT CHANGE UP (ref 135–145)
WBC # BLD: 0.63 K/UL — CRITICAL LOW (ref 3.8–10.5)
WBC # BLD: 0.65 K/UL — CRITICAL LOW (ref 3.8–10.5)
WBC # FLD AUTO: 0.63 K/UL — CRITICAL LOW (ref 3.8–10.5)
WBC # FLD AUTO: 0.65 K/UL — CRITICAL LOW (ref 3.8–10.5)

## 2024-06-07 PROCEDURE — 99232 SBSQ HOSP IP/OBS MODERATE 35: CPT | Mod: FS

## 2024-06-07 PROCEDURE — 99232 SBSQ HOSP IP/OBS MODERATE 35: CPT | Mod: GC

## 2024-06-07 RX ORDER — PANTOPRAZOLE SODIUM 20 MG/1
40 TABLET, DELAYED RELEASE ORAL
Refills: 0 | Status: DISCONTINUED | OUTPATIENT
Start: 2024-06-08 | End: 2024-06-10

## 2024-06-07 RX ADMIN — PANTOPRAZOLE SODIUM 40 MILLIGRAM(S): 20 TABLET, DELAYED RELEASE ORAL at 07:46

## 2024-06-07 RX ADMIN — Medication 400 MILLIGRAM(S): at 07:46

## 2024-06-07 RX ADMIN — FLUCONAZOLE 200 MILLIGRAM(S): 150 TABLET ORAL at 12:08

## 2024-06-07 RX ADMIN — Medication 50 MICROGRAM(S): at 07:10

## 2024-06-07 RX ADMIN — PANTOPRAZOLE SODIUM 40 MILLIGRAM(S): 20 TABLET, DELAYED RELEASE ORAL at 18:38

## 2024-06-07 RX ADMIN — Medication 400 MILLIGRAM(S): at 18:39

## 2024-06-07 NOTE — DISCHARGE NOTE PROVIDER - NSDCMRMEDTOKEN_GEN_ALL_CORE_FT
acyclovir 400 mg oral tablet: 1 tab(s) orally 2 times a day  amLODIPine 10 mg oral tablet: 1 tab(s) orally once a day  aspirin 81 mg oral tablet: 1 tab(s) orally every other day  doxazosin 2 mg oral tablet: 1 tab(s) orally once a day (at bedtime)  fenofibrate 54 mg oral tablet: 1 tab(s) orally once a day  ferrous sulfate 325 mg (65 mg elemental iron) oral tablet: 1 tab(s) orally once a day  fluconazole 200 mg oral tablet: 1 tab(s) orally once a day  folic acid 1 mg oral tablet: 1 tab(s) orally once a day  levoFLOXacin 500 mg oral tablet: 1 tab(s) orally once a day  levothyroxine 50 mcg (0.05 mg) oral tablet: 1 tab(s) orally once a day  Lexapro 10 mg oral tablet: 1 tab(s) orally once a day  losartan 100 mg oral tablet: 1 tab(s) orally once a day (at bedtime)  melatonin 3 mg oral tablet: 1 tab(s) orally once a day (at bedtime)  metFORMIN 500 mg oral tablet, extended release: 1 tab(s) orally 2 times a day  metoprolol succinate 50 mg oral capsule, extended release: 1 cap(s) orally once a day  pantoprazole 40 mg oral delayed release tablet: 1 tab(s) orally once a day Please take pantoprazole 40mg twice daily for 2 weeks, then take once a day  senna (sennosides) 17 mg oral tablet: 1 tab(s) orally 2 times a day  tamsulosin 0.4 mg oral capsule: 1 cap(s) orally every other day   acyclovir 400 mg oral tablet: 1 tab(s) orally 2 times a day  amLODIPine 10 mg oral tablet: 1 tab(s) orally once a day  aspirin 81 mg oral tablet: 1 tab(s) orally every other day  doxazosin 2 mg oral tablet: 1 tab(s) orally once a day (at bedtime)  fenofibrate 54 mg oral tablet: 1 tab(s) orally once a day  ferrous sulfate 325 mg (65 mg elemental iron) oral tablet: 1 tab(s) orally once a day  fluconazole 200 mg oral tablet: 1 tab(s) orally once a day  folic acid 1 mg oral tablet: 1 tab(s) orally once a day  levoFLOXacin 500 mg oral tablet: 1 tab(s) orally once a day  levothyroxine 50 mcg (0.05 mg) oral tablet: 1 tab(s) orally once a day  Lexapro 10 mg oral tablet: 1 tab(s) orally once a day  melatonin 3 mg oral tablet: 1 tab(s) orally once a day (at bedtime)  metFORMIN 500 mg oral tablet, extended release: 1 tab(s) orally 2 times a day  metoprolol succinate 50 mg oral capsule, extended release: 1 cap(s) orally once a day  pantoprazole 40 mg oral delayed release tablet: 1 tab(s) orally once a day Please take pantoprazole 40mg twice daily for 2 weeks, then take once a day  senna (sennosides) 17 mg oral tablet: 1 tab(s) orally 2 times a day  tamsulosin 0.4 mg oral capsule: 1 cap(s) orally every other day

## 2024-06-07 NOTE — PROGRESS NOTE ADULT - SUBJECTIVE AND OBJECTIVE BOX
DATE OF SERVICE: 06-07-24 @ 08:57    Patient is a 83y old  Male who presents with a chief complaint of Symptomatic Anemia (06 Jun 2024 12:56)      SUBJECTIVE / OVERNIGHT EVENTS: Post transfusion CBC after 2 units pRBC and 1 platelet at Hgb 6.3 and platelet 41, given additional unit of each overnight. Patient states he feels "great" this morning. He denies any chest pain, abdominal pain or shortness of breath.     MEDICATIONS  (STANDING):  acyclovir   Oral Tab/Cap 400 milliGRAM(s) Oral two times a day  dextrose 50% Injectable 25 Gram(s) IV Push once  dextrose 50% Injectable 25 Gram(s) IV Push once  dextrose 50% Injectable 12.5 Gram(s) IV Push once  dextrose Oral Gel 15 Gram(s) Oral once  fluconAZOLE   Tablet 200 milliGRAM(s) Oral daily  glucagon  Injectable 1 milliGRAM(s) IntraMuscular once  insulin lispro (ADMELOG) corrective regimen sliding scale   SubCutaneous three times a day before meals  insulin lispro (ADMELOG) corrective regimen sliding scale   SubCutaneous at bedtime  levoFLOXacin  Tablet 250 milliGRAM(s) Oral every 24 hours  levothyroxine 50 MICROGram(s) Oral daily  pantoprazole  Injectable 40 milliGRAM(s) IV Push two times a day    MEDICATIONS  (PRN):      Vital Signs Last 24 Hrs  T(C): 36.7 (07 Jun 2024 07:04), Max: 36.9 (06 Jun 2024 11:30)  T(F): 98.1 (07 Jun 2024 07:04), Max: 98.5 (06 Jun 2024 15:44)  HR: 66 (07 Jun 2024 07:04) (66 - 80)  BP: 146/77 (07 Jun 2024 07:04) (113/56 - 159/74)  BP(mean): 90 (06 Jun 2024 11:30) (72 - 90)  RR: 18 (07 Jun 2024 07:04) (18 - 19)  SpO2: 98% (07 Jun 2024 07:04) (97% - 100%)    Parameters below as of 07 Jun 2024 07:04  Patient On (Oxygen Delivery Method): room air      CAPILLARY BLOOD GLUCOSE      POCT Blood Glucose.: 97 mg/dL (07 Jun 2024 08:37)  POCT Blood Glucose.: 136 mg/dL (06 Jun 2024 21:57)  POCT Blood Glucose.: 123 mg/dL (06 Jun 2024 16:46)    I&O's Summary    06 Jun 2024 07:01  -  07 Jun 2024 07:00  --------------------------------------------------------  IN: 250 mL / OUT: 400 mL / NET: -150 mL        PHYSICAL EXAM:  GENERAL: NAD, lying in bed comfortably  HEAD:  Atraumatic, Normocephalic  EYES: EOMI, PERRLA, conjunctiva and sclera clear  ENT: Moist mucous membranes  NECK: Supple, No JVD  CHEST/LUNG: Clear to auscultation bilaterally  HEART: Regular rate and rhythm; No murmurs  ABDOMEN: BSx4; Soft, nontender, nondistended  EXTREMITIES:  2+ Peripheral Pulses, slow capillary refill. No edema  NERVOUS SYSTEM:  A&Ox3, no focal deficits   SKIN: No rashes or lesions    LABS:                        7.9    x     )-----------( x        ( 07 Jun 2024 08:30 )             23.4     06-06    139  |  102  |  74<H>  ----------------------------<  245<H>  4.6   |  11<L>  |  1.77<H>    Ca    9.1      06 Jun 2024 06:03    TPro  5.9<L>  /  Alb  2.7<L>  /  TBili  0.2  /  DBili  x   /  AST  15  /  ALT  5<L>  /  AlkPhos  49  06-06    PT/INR - ( 06 Jun 2024 06:03 )   PT: 15.8 sec;   INR: 1.45 ratio         PTT - ( 06 Jun 2024 06:03 )  PTT:25.3 sec      Urinalysis Basic - ( 06 Jun 2024 06:03 )    Color: x / Appearance: x / SG: x / pH: x  Gluc: 245 mg/dL / Ketone: x  / Bili: x / Urobili: x   Blood: x / Protein: x / Nitrite: x   Leuk Esterase: x / RBC: x / WBC x   Sq Epi: x / Non Sq Epi: x / Bacteria: x        RADIOLOGY & ADDITIONAL TESTS:    Imaging Personally Reviewed:    Consultant(s) Notes Reviewed:      Care Discussed with Consultants/Other Providers:

## 2024-06-07 NOTE — PATIENT PROFILE ADULT - FALL HARM RISK - HARM RISK INTERVENTIONS

## 2024-06-07 NOTE — PHYSICAL THERAPY INITIAL EVALUATION ADULT - HEALTH SCREEN CRITERIA
Physical Therapy Daily Progress Note Entered On:  6/18/2020 10:02     Performed On:  6/18/2020 9:59  by OsbaldopalomoMelissa               Musculoskeletal Assessment   Wheelchair Mobility   Rolling to Left :   Supervision   Supine to Sitting :   Supervision   Sitting to Supine :   Supervision   Sitting to Standing :   Supervision   Standing to Sitting :   Supervision   Melissa Suarez - 6/18/2020 9:59    Gait Grid   Assistive Device :    No assistive device   No assistive device           Distance :    60 ft   100 ft           Level of Assistance :    SBA   SBA           Weight Bearing Status :    Full weight bearing   Full weight bearing           Location :    Other: [place]   Other: [facility]             Melissa Suarez - 6/18/2020 9:59   Melissa Suarez - 6/18/2020 9:59         Treatments and Interventions   Treatments and Interventions Checklist :   Therapeutic exercise, Therapeutic transfer training, Bed mobility, Static sitting balance activities, Dynamic sitting balance activities, Static standing balance activities, Dynamic standing balance activities, Gait training   Patient/Family Education :   Yes   Melissa Suarez 6/18/2020 9:59    Education   General Patient Education [employer]   Topics :    Safety              Individuals Taught :    Patient              Barriers to Learning :    None evident              Patient Family Preference :    Explanation              Teaching Evaluation :    Returns demonstrations correctly              Comment  (Comment: Pt. educated in log roll technique to transfer in/out of bed [Melissa Suarez - 6/18/2020 10:03 ] )         Melissa Suarez - 6/18/2020 9:59          Preferred Communication Mode :   Verbal   Language for Written Material :   English   Melissa Suarez - 6/18/2020 9:59    Assessment and Goals   Assessment :   annia is a76 years old female who is admitted for ileostomy take down. PMH includes: CA sp partial colectomy with ileostomy, osteoporosis, COPD and cataract surgery  annia is  a76 years old female who is admitted for ileostomy take down. PMH includes: CA sp partial colectomy with ileostomy, osteoporosis, COPD and cataract surgery  atient is a76 years old female who is admitted for ileostomy take down. PMH includes: CA sp partial colectomy with ileostomy, osteoporosis, COPD and cataract surgery  atient is a76 years old female who is admitted for ileostomy take down. PMH includes: CA sp partial colectomy with ileostomy, osteoporosis, COPD and cataract surgery  atient is a76 years old female who is admitted for ileostomy take down. PMH includes: CA sp partial colectomy with ileostomy, osteoporosis, COPD and cataract surgery  atient is a76 years old female who is admitted for ileostomy take down. PMH includes: atient is a76 years old female who is admitted for ileostomy take down. PMH includes: CA sp partial colectomy  with ileostomy, osteoporosis, COPD and cataract surgery. Pt. presented sitting EOB, finishing breakfast, agreeable to therapy.  Pt. reports she hopes to be discharged home today.  Pt. transferred to standing with SBA and amb. in sanchez 60' and turned to return to room.  Pt. sat EOB and rested, and performed arom ex BLE's in sitting x 10 reps.  Pt. with slight SOB and rest between sets.  Pt. agreed to walk. again and amb to end of sanchez and sat to rest before returning.  Pt. stood at sanchez rail and performed balance activities including side-stepping, backward/forward walk and marching with CGA and occ. UE support.  Pt. returned to room and practiced log roll in/out of bed.  Pt. left in bs chair with all needs in place.  Pt. up ad hoc in room.  Time with pt. 25 min.  Ex 15 min. Gt. 10 min.      AMPAC 6 clicks 23/24     Rec. nondaily skilled therapy and nonskilled intermittent assist for ADL's and mobility        Additional PT Recommendations :   Rec. nondaily skilled therapy and nonskilled intermittent assist for ADL's and mobility   Frequency :   Daily x 7 days per week   Erick  Melissa - 6/18/2020 10:03    Follow-Up PT Recommendations :   To be determined   Erick Melissa Paulino 6/18/2020 9:59    PT Goals Grid   Problem :    mobility   transfers   gait   stairs     Patient will... :    be I & safe with bed mobility to safely get OOB at home.   be Mod I & safe with sit to stand & transfers with RW to prevent falls.   be Mod I & safe with ' to safely walk at home & in the community.   be sup & safe with 1 step with no step to be able to get in & out of her house.     Timeframe :    3-4 days   3-4 days   3-4 days   3-4 days       Erick Melissa - 6/18/2020 9:59   Melissa Suarez - 6/18/2020 9:59   Melissa Suarez - 6/18/2020 9:59   Melissa Suarez - 6/18/2020 9:59               yes

## 2024-06-07 NOTE — DISCHARGE NOTE PROVIDER - NSDCCPCAREPLAN_GEN_ALL_CORE_FT
PRINCIPAL DISCHARGE DIAGNOSIS  Diagnosis: GIB (gastrointestinal bleeding)  Assessment and Plan of Treatment: You presented to the hospital with weakness and dizziness and were found to have a hemoglobin of 4. You received blood transfusions in the hospital which improved your hemoglobin. Your hemoglobin remained stable in the hospital for more than 48 hours. Please take Pantoprazole 40mg twice daily for the next two weeks. After 2 weeks, you can take this medicaiton once daily. Please follow up with Dr. Velazco and your primary care doctor. Please return to the Emergency Department if you notice any bleeding or have any symptoms concerning for a bleed such as lightheadedness, fatigue, dizziness, weakness.     PRINCIPAL DISCHARGE DIAGNOSIS  Diagnosis: GIB (gastrointestinal bleeding)  Assessment and Plan of Treatment: You presented to the hospital with weakness and dizziness and were found to have a hemoglobin of 4. You received blood transfusions in the hospital which improved your hemoglobin. Your hemoglobin remained stable in the hospital for more than 48 hours. Please take Pantoprazole 40mg twice daily for the next two weeks. After 2 weeks, you can take this medicaiton once daily. Please follow up with Dr. Velazco, your hematologist, and your primary care doctor. Please return to the Emergency Department if you notice any bleeding or have any symptoms concerning for a bleed such as lightheadedness, fatigue, dizziness, weakness.

## 2024-06-07 NOTE — PROGRESS NOTE ADULT - PROBLEM SELECTOR PLAN 1
Presented with Hgb 4.4   Patient with history of GI bleed from AVM's  S/p 3 units prbc and 2 unit platelet  Outpatient GI is Dr. Velazco    -Maintain active type and screen  -Follow up post-transfusion CBC  -Maintain Hgb >8 given history of CAD  -Maintain Platelets >40k   -Protonix IV 40mg bid   -GI following, no plan for endoscopic evaluation

## 2024-06-07 NOTE — DISCHARGE NOTE PROVIDER - CARE PROVIDER_API CALL
Cipriano Velazco  Gastroenterology  [address] [place] [address]  Phone: [phone]  Fax: [phone]  Follow Up Time:

## 2024-06-07 NOTE — PHYSICAL THERAPY INITIAL EVALUATION ADULT - PERTINENT HX OF CURRENT PROBLEM, REHAB EVAL
83 M history CAD; TAVR, DMT2, GI bleed from AVM's, and MDS presenting with symptomatic anemia. Patient known to me from last admission one month ago for same. Patient was receiving chemotherapy for MDS but after last admission he is no longer going to receive. Patient got 3 units pRBC last week outpatient. He reports feeling immediately dizzy with the room spinning around 4 am this morning. Patient called his son and was brought to the ED and found to be anemic to 4.4. He reports two small episodes of coffee ground emesis. Patient reports a soft brown bowel movement this morning. He currently denies any chest pain, shortness of breath, abdominal pain, headache, lightheadedness or dizziness. Hospital course: (6/6)

## 2024-06-07 NOTE — PATIENT PROFILE ADULT - NSPROPOAURINARYCATHETER_GEN_A_NUR
Scheduled procedure with patient over the phone  Scheduled Via: Case Creation for [facility]   Procedure date: 4/21   Procedure time: 8 am     Arrival Time 7 am  Location :[facility]  Insurance confirmed as Payor: [employer] / Plan: OPEN ACCESS PLUS / Product Type: POS MISC , will be the same at time of procedure: Yes   The following have been confirmed:   Latex Allergy No   Diabetic No   Insulin No  CGM/Pump? No - Will need to be removed for procedure  Sleep Apnea No   Defibrillator No If yes, cannot be done at [facility]  Pacemaker No   Bloodthinners / ASA No     Appointment reminder letter including date, time and location of procedure was     Mailed to patient on n/a     Sent Via [facility] on 3/7    Follow Up appointment scheduled for 5/24 at 2 pm at [facility] with Jennifer Sy           no

## 2024-06-07 NOTE — PROGRESS NOTE ADULT - PROBLEM SELECTOR PLAN 2
Patient with history of MDS  No longer receiving chemotherapy   Hgb 4.4  WBC .39   Platelet 23     -No signs of infection at this time  -Hgb transfuse >8 given CAD history  -Maintain Platelets >40k  -C/w home Levaquin, Acyclovir and Fluconazole

## 2024-06-07 NOTE — PHYSICAL THERAPY INITIAL EVALUATION ADULT - ADDITIONAL COMMENTS
Patient lives in a private house with 3STE w/B/L and 12 steps inside w/B/L to reach bedroom; PTA, pt. was independent in ADLs & mobility w/RW; denies any hx of fall. Patient reports his son is in the process of hiring private A for assistance.

## 2024-06-07 NOTE — DISCHARGE NOTE PROVIDER - NSDCFUADDAPPT_GEN_ALL_CORE_FT
Please make sure to follow up with your primary care doctor after being discharged from the hospital.     Please make sure to follow up with your Hematologist, you have an appt this tuesday.  Please make sure to follow up with your primary care doctor after being discharged from the hospital.     Please make sure to follow up with your Hematologist, you have an appt this Tuesday.

## 2024-06-07 NOTE — PROGRESS NOTE ADULT - SUBJECTIVE AND OBJECTIVE BOX
INTERVAL HPI/OVERNIGHT EVENTS:  "I feel really good"    no BM since admission (last BM 6/6/24 AM prior to admission - brown soft stool)  no further nausea, vomiting or dark "spit up"  no fever or chills  no CP or SOB  no abdominal pain, dizziness, generalized weakness  tolerating PO (full liquids)    Has scheduled outpt FU with Heme 6/11       MEDICATIONS  (STANDING):  acyclovir   Oral Tab/Cap 400 milliGRAM(s) Oral two times a day  dextrose 50% Injectable 25 Gram(s) IV Push once  dextrose 50% Injectable 25 Gram(s) IV Push once  dextrose 50% Injectable 12.5 Gram(s) IV Push once  dextrose Oral Gel 15 Gram(s) Oral once  fluconAZOLE   Tablet 200 milliGRAM(s) Oral daily  glucagon  Injectable 1 milliGRAM(s) IntraMuscular once  insulin lispro (ADMELOG) corrective regimen sliding scale   SubCutaneous three times a day before meals  insulin lispro (ADMELOG) corrective regimen sliding scale   SubCutaneous at bedtime  levoFLOXacin  Tablet 250 milliGRAM(s) Oral every 24 hours  levothyroxine 50 MICROGram(s) Oral daily  pantoprazole  Injectable 40 milliGRAM(s) IV Push two times a day    MEDICATIONS  (PRN):      Allergies  No Known Allergies    Review of Systems:  see HPI- remainder 10 point ROS negative      Vital Signs Last 24 Hrs  T(C): 36.7 (07 Jun 2024 11:23), Max: 36.9 (06 Jun 2024 15:44)  T(F): 98 (07 Jun 2024 11:23), Max: 98.5 (06 Jun 2024 15:44)  HR: 73 (07 Jun 2024 11:23) (66 - 80)  BP: 126/66 (07 Jun 2024 11:23) (114/65 - 159/74)  BP(mean): --  RR: 18 (07 Jun 2024 11:23) (18 - 18)  SpO2: 100% (07 Jun 2024 11:23) (97% - 100%)    Parameters below as of 07 Jun 2024 11:23  Patient On (Oxygen Delivery Method): room air    PHYSICAL EXAM:  Constitutional: NAD, WD WN elderly WM alert and cooperative  PRBCs infusing. rosmery Kwong on speaker phone per pt request   Neck: No LAD, supple no JVD  Respiratory: Clear bl no inc WOB  Cardiovascular: S1 and S2, RRR  Gastrointestinal: BS+, soft, NT/ND no caput, neg HSM,+bl inguinal hernias - NT, soft  Extremities: No peripheral edema, neg clubbing, cyanosis  Vascular: 2+ peripheral pulses  Neurological: A/O x 3, no focal deficits or asymmetry   speech clear and fluent  Psychiatric: Normal mood, normal affect  Skin: No rashes, anicteric +few facial telengectasias. No pallor      LABS:                        7.9    0.65  )-----------( 73       ( 07 Jun 2024 08:30 )             23.4     06-07    138  |  105  |  56<H>  ----------------------------<  96  5.0   |  24  |  1.01    Ca    9.4      07 Jun 2024 08:30  Phos  2.7     06-07  Mg     1.8     06-07    TPro  6.0  /  Alb  2.9<L>  /  TBili  0.3  /  DBili  x   /  AST  18  /  ALT  7<L>  /  AlkPhos  47  06-07    PT/INR - ( 06 Jun 2024 06:03 )   PT: 15.8 sec;   INR: 1.45 ratio    PTT - ( 06 Jun 2024 06:03 )  PTT:25.3 sec          RADIOLOGY & ADDITIONAL TESTS:

## 2024-06-07 NOTE — DISCHARGE NOTE PROVIDER - HOSPITAL COURSE
Discharge Summary     Admit Date: 06-06-24  Discharge Date: 06-09-24    Admission diagnoses: Gastrointestinal hemorrhage    Discharge diagnoses:  GI bleed     Hospital Course:   For full details, please see H&P, progress notes, consult notes and ancillary notes. Briefly, SUSANNA BRAND is a 83y Male with a history of CAD; TAVR, DMT2, GI bleed from AVM's, and MDS presenting with symptomatic anemia. The patient's hospital course will be summarized in a problem based format.    #Symptomatic anemia.   Presented with Hgb 4.4 s/p 3 units prbc and 2 unit platelet with appropriate response. He was evaluated by GI with no plan for inpatient endoscopic evaluation. Once patient is more stable, he will pursue endoscopic evaluation outpatient. He was started on IV protonix and transitioned to PO.     # Pancytopenia.   Patient with history of MDS and is no longer receiving chemotherapy. He was continued on home Levaquin, Acyclovir and Fluconazole. Patient is set to receive injections outpatient with his oncologist to improve cell counts.       On day of discharge, patient is clinically stable with no new exam findings or acute symptoms compared to prior. The patient was seen by the attending physician on the date of discharge and deemed stable and acceptable for discharge. The patient's chronic medical conditions were treated accordingly per the patient's home medication regimen. The patient's medication reconciliation (with changes made to chronic medications), follow up appointments, discharge orders, instructions, and significant lab and diagnostic studies are as noted.     Discharge follow up action items:     1. Follow up with PCP in 1-2 weeks. Follow up with GI in 1-2 weeks.   2. Medication changes PO pantoprazole 40mg BID x2 weeks, then qdaily     Patient's ordered code status: Full Code    Patient disposition: Home      Discharge Summary     Admit Date: 06-06-24  Discharge Date: 06-09-24    Admission diagnoses: Gastrointestinal hemorrhage    Discharge diagnoses:  GI bleed     Hospital Course:   For full details, please see H&P, progress notes, consult notes and ancillary notes. Briefly, SUSANNA BRAND is a 83y Male with a history of CAD; TAVR, DMT2, GI bleed from AVM's, and MDS presenting with symptomatic anemia. The patient's hospital course will be summarized in a problem based format.    #Symptomatic anemia.   Presented with Hgb 4.4 s/p 3 units prbc and 2 unit platelet with appropriate response. He was evaluated by GI with no plan for inpatient endoscopic evaluation. Once patient is more stable, he will pursue endoscopic evaluation outpatient. He was started on IV protonix and transitioned to PO. Hgb stabalized and patient denied any further hematemesis.     # Pancytopenia.   Patient with history of MDS and is no longer receiving chemotherapy. He was continued on home Levaquin, Acyclovir and Fluconazole. Patient is set to receive injections outpatient with his oncologist to improve cell counts.       On day of discharge, patient is clinically stable with no new exam findings or acute symptoms compared to prior. The patient was seen by the attending physician on the date of discharge and deemed stable and acceptable for discharge. The patient's chronic medical conditions were treated accordingly per the patient's home medication regimen. The patient's medication reconciliation (with changes made to chronic medications), follow up appointments, discharge orders, instructions, and significant lab and diagnostic studies are as noted.     Discharge follow up action items:     1. Follow up with PCP in 1-2 weeks. Follow up with GI in 1-2 weeks.   2. Medication changes PO pantoprazole 40mg BID x2 weeks, then qdaily     Patient's ordered code status: Full Code    Patient disposition: Home      Discharge Summary     Admit Date: 06-06-24  Discharge Date: 06-09-24    Admission diagnoses: Gastrointestinal hemorrhage    Discharge diagnoses:  GI bleed     Hospital Course:   For full details, please see H&P, progress notes, consult notes and ancillary notes. Briefly, SUSANNA BRAND is a 83y Male with a history of CAD; TAVR, DMT2, GI bleed from AVM's, and MDS presenting with symptomatic anemia. The patient's hospital course will be summarized in a problem based format.    #Symptomatic anemia.   Presented with Hgb 4.4 s/p 3 units prbc and 2 unit platelet with appropriate response. He was evaluated by GI with no plan for inpatient endoscopic evaluation. Once patient is more stable, he will pursue endoscopic evaluation outpatient. He was started on IV protonix and transitioned to PO. Received additional 1u PRBC 6/9 with good response to Hgb 8.1. Hgb stabalized and patient denied any further hematemesis.     # Pancytopenia.   Patient with history of MDS and is no longer receiving chemotherapy. He was continued on home Levaquin, Acyclovir and Fluconazole. Patient is set to receive injections outpatient with his oncologist to improve cell counts.       On day of discharge, patient is clinically stable with no new exam findings or acute symptoms compared to prior. The patient was seen by the attending physician on the date of discharge and deemed stable and acceptable for discharge. The patient's chronic medical conditions were treated accordingly per the patient's home medication regimen. The patient's medication reconciliation (with changes made to chronic medications), follow up appointments, discharge orders, instructions, and significant lab and diagnostic studies are as noted.     Discharge follow up action items:     1. Follow up with PCP in 1-2 weeks. Follow up with GI in 1-2 weeks.   2. Medication changes PO pantoprazole 40mg BID x2 weeks, then qdaily     Patient's ordered code status: Full Code    Patient disposition: Home      Discharge Summary     Admit Date: 06-06-24  Discharge Date: 06-09-24    Admission diagnoses: Gastrointestinal hemorrhage    Discharge diagnoses:  GI bleed     Hospital Course:   For full details, please see H&P, progress notes, consult notes and ancillary notes. Briefly, SUSANNA BRAND is a 83y Male with a history of CAD; TAVR, DMT2, GI bleed from AVM's, and MDS presenting with symptomatic anemia. The patient's hospital course will be summarized in a problem based format.    #Symptomatic anemia.   Presented with Hgb 4.4 s/p 3 units prbc and 2 unit platelet with appropriate response. He was evaluated by GI with no plan for inpatient endoscopic evaluation. Once patient is more stable, he will pursue endoscopic evaluation outpatient. He was started on IV protonix and transitioned to PO. Received additional 1u PRBC 6/9 with good response to Hgb 8.1. Hgb stabalized and patient denied any further hematemesis.     # Pancytopenia.   Patient with history of MDS and is no longer receiving chemotherapy. He was continued on home Levaquin, Acyclovir and Fluconazole. Patient is set to receive injections outpatient with his oncologist to improve cell counts.       On day of discharge, patient is clinically stable with no new exam findings or acute symptoms compared to prior. The patient was seen by the attending physician on the date of discharge and deemed stable and acceptable for discharge. The patient's chronic medical conditions were treated accordingly per the patient's home medication regimen. The patient's medication reconciliation (with changes made to chronic medications), follow up appointments, discharge orders, instructions, and significant lab and diagnostic studies are as noted.     Discharge follow up action items:     1. Follow up with PCP in 1-2 weeks. Follow up with GI in 1-2 weeks. Follow up with Heme on 6/11  2. Medication changes PO pantoprazole 40mg BID x2 weeks, then qdaily     Patient's ordered code status: Full Code    Patient disposition: Home      Discharge Summary     Admit Date: 06-06-24  Discharge Date: 06-10-24    Admission diagnoses: Gastrointestinal hemorrhage    Discharge diagnoses:  GI bleed     Hospital Course:   For full details, please see H&P, progress notes, consult notes and ancillary notes. Briefly, SUSANNA BRAND is a 83y Male with a history of CAD; TAVR, DMT2, GI bleed from AVM's, and MDS presenting with symptomatic anemia. The patient's hospital course will be summarized in a problem based format.    #Symptomatic anemia.   Presented with Hgb 4.4 s/p 3 units prbc and 2 unit platelet with appropriate response. He was evaluated by GI with no plan for inpatient endoscopic evaluation. Once patient is more stable, he will pursue endoscopic evaluation outpatient. He was started on IV protonix and transitioned to PO. Received additional 1u PRBC 6/9 with good response to Hgb 8.1. Hgb stabalized and patient denied any further hematemesis.     # Pancytopenia.   Patient with history of MDS and is no longer receiving chemotherapy. He was continued on home Levaquin, Acyclovir and Fluconazole. Patient is set to receive injections outpatient with his oncologist to improve cell counts.       On day of discharge, patient is clinically stable with no new exam findings or acute symptoms compared to prior. The patient was seen by the attending physician on the date of discharge and deemed stable and acceptable for discharge. The patient's chronic medical conditions were treated accordingly per the patient's home medication regimen. The patient's medication reconciliation (with changes made to chronic medications), follow up appointments, discharge orders, instructions, and significant lab and diagnostic studies are as noted.     Discharge follow up action items:     1. Follow up with PCP in 1-2 weeks. Follow up with GI in 1-2 weeks. Follow up with Heme on 6/11  2. Medication changes PO pantoprazole 40mg BID x2 weeks, then qdaily     Patient's ordered code status: Full Code    Patient disposition: Home

## 2024-06-07 NOTE — PROGRESS NOTE ADULT - TIME BILLING
time spent reviewing prior charts, meds, discussing plan with patient= 44 minutes. Time spent does not include time spent teaching

## 2024-06-07 NOTE — DISCHARGE NOTE PROVIDER - DETAILS OF MALNUTRITION DIAGNOSIS/DIAGNOSES
This patient has been assessed with a concern for Malnutrition and was treated during this hospitalization for the following Nutrition diagnosis/diagnoses:     -  06/09/2024: Moderate protein-calorie malnutrition

## 2024-06-07 NOTE — PROGRESS NOTE ADULT - NS ATTEND AMEND GEN_ALL_CORE FT
83 yM with GI AVMs ,coffee gr emesis and anemia, had GI work up incl SBCE,PE and cauterizations  agree with conservative approach

## 2024-06-08 LAB
ALBUMIN SERPL ELPH-MCNC: 2.9 G/DL — LOW (ref 3.3–5)
ALP SERPL-CCNC: 54 U/L — SIGNIFICANT CHANGE UP (ref 40–120)
ALT FLD-CCNC: 8 U/L — LOW (ref 10–45)
ANION GAP SERPL CALC-SCNC: 14 MMOL/L — SIGNIFICANT CHANGE UP (ref 5–17)
AST SERPL-CCNC: 17 U/L — SIGNIFICANT CHANGE UP (ref 10–40)
BILIRUB SERPL-MCNC: 0.3 MG/DL — SIGNIFICANT CHANGE UP (ref 0.2–1.2)
BUN SERPL-MCNC: 30 MG/DL — HIGH (ref 7–23)
CALCIUM SERPL-MCNC: 9.6 MG/DL — SIGNIFICANT CHANGE UP (ref 8.4–10.5)
CHLORIDE SERPL-SCNC: 103 MMOL/L — SIGNIFICANT CHANGE UP (ref 96–108)
CO2 SERPL-SCNC: 22 MMOL/L — SIGNIFICANT CHANGE UP (ref 22–31)
CREAT SERPL-MCNC: 0.95 MG/DL — SIGNIFICANT CHANGE UP (ref 0.5–1.3)
EGFR: 79 ML/MIN/1.73M2 — SIGNIFICANT CHANGE UP
GLUCOSE BLDC GLUCOMTR-MCNC: 104 MG/DL — HIGH (ref 70–99)
GLUCOSE BLDC GLUCOMTR-MCNC: 112 MG/DL — HIGH (ref 70–99)
GLUCOSE BLDC GLUCOMTR-MCNC: 113 MG/DL — HIGH (ref 70–99)
GLUCOSE BLDC GLUCOMTR-MCNC: 121 MG/DL — HIGH (ref 70–99)
GLUCOSE SERPL-MCNC: 97 MG/DL — SIGNIFICANT CHANGE UP (ref 70–99)
HCT VFR BLD CALC: 25.3 % — LOW (ref 39–50)
HGB BLD-MCNC: 8.3 G/DL — LOW (ref 13–17)
MAGNESIUM SERPL-MCNC: 1.7 MG/DL — SIGNIFICANT CHANGE UP (ref 1.6–2.6)
MCHC RBC-ENTMCNC: 29.9 PG — SIGNIFICANT CHANGE UP (ref 27–34)
MCHC RBC-ENTMCNC: 32.8 GM/DL — SIGNIFICANT CHANGE UP (ref 32–36)
MCV RBC AUTO: 91 FL — SIGNIFICANT CHANGE UP (ref 80–100)
NRBC # BLD: 0 /100 WBCS — SIGNIFICANT CHANGE UP (ref 0–0)
PHOSPHATE SERPL-MCNC: 3.6 MG/DL — SIGNIFICANT CHANGE UP (ref 2.5–4.5)
PLATELET # BLD AUTO: 74 K/UL — LOW (ref 150–400)
POTASSIUM SERPL-MCNC: 4.2 MMOL/L — SIGNIFICANT CHANGE UP (ref 3.5–5.3)
POTASSIUM SERPL-SCNC: 4.2 MMOL/L — SIGNIFICANT CHANGE UP (ref 3.5–5.3)
PROT SERPL-MCNC: 6.5 G/DL — SIGNIFICANT CHANGE UP (ref 6–8.3)
RBC # BLD: 2.78 M/UL — LOW (ref 4.2–5.8)
RBC # FLD: 16.3 % — HIGH (ref 10.3–14.5)
SODIUM SERPL-SCNC: 139 MMOL/L — SIGNIFICANT CHANGE UP (ref 135–145)
WBC # BLD: 0.74 K/UL — CRITICAL LOW (ref 3.8–10.5)
WBC # FLD AUTO: 0.74 K/UL — CRITICAL LOW (ref 3.8–10.5)

## 2024-06-08 PROCEDURE — 99232 SBSQ HOSP IP/OBS MODERATE 35: CPT | Mod: GC

## 2024-06-08 RX ORDER — ASPIRIN/CALCIUM CARB/MAGNESIUM 324 MG
81 TABLET ORAL
Refills: 0 | Status: DISCONTINUED | OUTPATIENT
Start: 2024-06-08 | End: 2024-06-10

## 2024-06-08 RX ADMIN — PANTOPRAZOLE SODIUM 40 MILLIGRAM(S): 20 TABLET, DELAYED RELEASE ORAL at 05:32

## 2024-06-08 RX ADMIN — Medication 50 MICROGRAM(S): at 05:31

## 2024-06-08 RX ADMIN — Medication 400 MILLIGRAM(S): at 18:08

## 2024-06-08 RX ADMIN — Medication 400 MILLIGRAM(S): at 05:32

## 2024-06-08 RX ADMIN — FLUCONAZOLE 200 MILLIGRAM(S): 150 TABLET ORAL at 12:43

## 2024-06-08 RX ADMIN — PANTOPRAZOLE SODIUM 40 MILLIGRAM(S): 20 TABLET, DELAYED RELEASE ORAL at 18:09

## 2024-06-08 RX ADMIN — Medication 81 MILLIGRAM(S): at 15:03

## 2024-06-08 NOTE — PROGRESS NOTE ADULT - PROBLEM SELECTOR PLAN 3
Patient with history of MDS  No longer receiving chemotherapy   Hgb 4.4  WBC .39   Platelet 23     -No signs of infection at this time  -Hgb transfuse >8 given CAD history  -Maintain Platelets >40k  -C/w home Levaquin, Acyclovir and Fluconazole Patient with history of MDS  No longer receiving chemotherapy   Hgb 4.4  WBC .39   Platelet 23     Plan:  -No signs of infection at this time  -Hgb transfuse >8 given CAD history  -Maintain Platelets >40k  -C/w home Levaquin, Acyclovir and Fluconazole

## 2024-06-08 NOTE — PROGRESS NOTE ADULT - SUBJECTIVE AND OBJECTIVE BOX
DATE OF SERVICE: 06-08-24 @ 07:32    Patient is a 83y old  Male who presents with a chief complaint of Symptomatic Anemia (07 Jun 2024 12:21)      SUBJECTIVE / OVERNIGHT EVENTS:    MEDICATIONS  (STANDING):  acyclovir   Oral Tab/Cap 400 milliGRAM(s) Oral two times a day  dextrose 50% Injectable 25 Gram(s) IV Push once  dextrose 50% Injectable 12.5 Gram(s) IV Push once  dextrose 50% Injectable 25 Gram(s) IV Push once  dextrose Oral Gel 15 Gram(s) Oral once  fluconAZOLE   Tablet 200 milliGRAM(s) Oral daily  glucagon  Injectable 1 milliGRAM(s) IntraMuscular once  insulin lispro (ADMELOG) corrective regimen sliding scale   SubCutaneous three times a day before meals  insulin lispro (ADMELOG) corrective regimen sliding scale   SubCutaneous at bedtime  levoFLOXacin  Tablet 250 milliGRAM(s) Oral every 24 hours  levothyroxine 50 MICROGram(s) Oral daily  pantoprazole    Tablet 40 milliGRAM(s) Oral two times a day    MEDICATIONS  (PRN):      Vital Signs Last 24 Hrs  T(C): 36.9 (08 Jun 2024 04:39), Max: 36.9 (08 Jun 2024 01:33)  T(F): 98.4 (08 Jun 2024 04:39), Max: 98.4 (08 Jun 2024 01:33)  HR: 77 (08 Jun 2024 04:39) (70 - 77)  BP: 124/71 (08 Jun 2024 04:39) (124/71 - 154/85)  BP(mean): --  RR: 18 (08 Jun 2024 04:39) (18 - 18)  SpO2: 97% (08 Jun 2024 04:39) (97% - 100%)    Parameters below as of 08 Jun 2024 04:39  Patient On (Oxygen Delivery Method): room air      CAPILLARY BLOOD GLUCOSE      POCT Blood Glucose.: 98 mg/dL (07 Jun 2024 21:51)  POCT Blood Glucose.: 101 mg/dL (07 Jun 2024 16:55)  POCT Blood Glucose.: 105 mg/dL (07 Jun 2024 12:22)  POCT Blood Glucose.: 97 mg/dL (07 Jun 2024 08:37)    I&O's Summary    07 Jun 2024 07:01  -  08 Jun 2024 07:00  --------------------------------------------------------  IN: 0 mL / OUT: 300 mL / NET: -300 mL        PHYSICAL EXAM:  GENERAL: NAD, well-developed  HEAD:  Atraumatic, Normocephalic  EYES: EOMI, PERRLA, conjunctiva and sclera clear  NECK: Supple, No JVD  CHEST/LUNG: Clear to auscultation bilaterally; No wheeze  HEART: Regular rate and rhythm; No murmurs, rubs, or gallops  ABDOMEN: Soft, Nontender, Nondistended; Bowel sounds present  EXTREMITIES:  2+ Peripheral Pulses, No clubbing, cyanosis, or edema  PSYCH: AAOx3  NEUROLOGY: non-focal  SKIN: No rashes or lesions    LABS:                        7.9    0.65  )-----------( 73       ( 07 Jun 2024 08:30 )             23.4     06-07    138  |  105  |  56<H>  ----------------------------<  96  5.0   |  24  |  1.01    Ca    9.4      07 Jun 2024 08:30  Phos  2.7     06-07  Mg     1.8     06-07    TPro  6.0  /  Alb  2.9<L>  /  TBili  0.3  /  DBili  x   /  AST  18  /  ALT  7<L>  /  AlkPhos  47  06-07          Urinalysis Basic - ( 07 Jun 2024 08:30 )    Color: x / Appearance: x / SG: x / pH: x  Gluc: 96 mg/dL / Ketone: x  / Bili: x / Urobili: x   Blood: x / Protein: x / Nitrite: x   Leuk Esterase: x / RBC: x / WBC x   Sq Epi: x / Non Sq Epi: x / Bacteria: x        RADIOLOGY & ADDITIONAL TESTS:    Imaging Personally Reviewed:    Consultant(s) Notes Reviewed:      Care Discussed with Consultants/Other Providers:   DATE OF SERVICE: 06-08-24 @ 07:32    Patient is a 83y old  Male who presents with a chief complaint of Symptomatic Anemia (07 Jun 2024 12:21)      SUBJECTIVE / OVERNIGHT EVENTS: NAEO. Patient had no bowel movement overnight but one before was light brown. Ambulating and eating without difficulty.    MEDICATIONS  (STANDING):  acyclovir   Oral Tab/Cap 400 milliGRAM(s) Oral two times a day  dextrose 50% Injectable 25 Gram(s) IV Push once  dextrose 50% Injectable 12.5 Gram(s) IV Push once  dextrose 50% Injectable 25 Gram(s) IV Push once  dextrose Oral Gel 15 Gram(s) Oral once  fluconAZOLE   Tablet 200 milliGRAM(s) Oral daily  glucagon  Injectable 1 milliGRAM(s) IntraMuscular once  insulin lispro (ADMELOG) corrective regimen sliding scale   SubCutaneous three times a day before meals  insulin lispro (ADMELOG) corrective regimen sliding scale   SubCutaneous at bedtime  levoFLOXacin  Tablet 250 milliGRAM(s) Oral every 24 hours  levothyroxine 50 MICROGram(s) Oral daily  pantoprazole    Tablet 40 milliGRAM(s) Oral two times a day    MEDICATIONS  (PRN):      Vital Signs Last 24 Hrs  T(C): 36.9 (08 Jun 2024 04:39), Max: 36.9 (08 Jun 2024 01:33)  T(F): 98.4 (08 Jun 2024 04:39), Max: 98.4 (08 Jun 2024 01:33)  HR: 77 (08 Jun 2024 04:39) (70 - 77)  BP: 124/71 (08 Jun 2024 04:39) (124/71 - 154/85)  BP(mean): --  RR: 18 (08 Jun 2024 04:39) (18 - 18)  SpO2: 97% (08 Jun 2024 04:39) (97% - 100%)    Parameters below as of 08 Jun 2024 04:39  Patient On (Oxygen Delivery Method): room air      CAPILLARY BLOOD GLUCOSE      POCT Blood Glucose.: 98 mg/dL (07 Jun 2024 21:51)  POCT Blood Glucose.: 101 mg/dL (07 Jun 2024 16:55)  POCT Blood Glucose.: 105 mg/dL (07 Jun 2024 12:22)  POCT Blood Glucose.: 97 mg/dL (07 Jun 2024 08:37)    I&O's Summary    07 Jun 2024 07:01  -  08 Jun 2024 07:00  --------------------------------------------------------  IN: 0 mL / OUT: 300 mL / NET: -300 mL        PHYSICAL EXAM:  GENERAL: NAD, well-developed  HEAD:  Atraumatic, Normocephalic  EYES: EOMI, conjunctiva and sclera clear  CHEST/LUNG: Clear to auscultation bilaterally; No wheeze  HEART: Regular rate and rhythm; No murmurs, rubs, or gallops  ABDOMEN: Soft, Nontender, Nondistended; Bowel sounds present  EXTREMITIES:  2+ Peripheral Pulses, No cyanosis or edema  PSYCH: AAOx3  NEUROLOGY: non-focal  SKIN: No rashes or lesions    LABS:                        7.9    0.65  )-----------( 73       ( 07 Jun 2024 08:30 )             23.4     06-07    138  |  105  |  56<H>  ----------------------------<  96  5.0   |  24  |  1.01    Ca    9.4      07 Jun 2024 08:30  Phos  2.7     06-07  Mg     1.8     06-07    TPro  6.0  /  Alb  2.9<L>  /  TBili  0.3  /  DBili  x   /  AST  18  /  ALT  7<L>  /  AlkPhos  47  06-07          Urinalysis Basic - ( 07 Jun 2024 08:30 )    Color: x / Appearance: x / SG: x / pH: x  Gluc: 96 mg/dL / Ketone: x  / Bili: x / Urobili: x   Blood: x / Protein: x / Nitrite: x   Leuk Esterase: x / RBC: x / WBC x   Sq Epi: x / Non Sq Epi: x / Bacteria: x        RADIOLOGY & ADDITIONAL TESTS:    Imaging Personally Reviewed:    Consultant(s) Notes Reviewed:      Care Discussed with Consultants/Other Providers:

## 2024-06-08 NOTE — PROGRESS NOTE ADULT - PROBLEM SELECTOR PLAN 2
History of CAD on aspirin every other day, as well as history of TAVR    Plan:  -resume aspirin if follow up CBC stable History of CAD on aspirin every other day, as well as history of TAVR    Plan:  -resume aspirin 81 mg every other day

## 2024-06-08 NOTE — PROGRESS NOTE ADULT - PROBLEM SELECTOR PLAN 1
Presented with Hgb 4.4   Patient with history of GI bleed from AVM's  S/p 3 units prbc and 2 unit platelet  Outpatient GI is Dr. Velazco    -Maintain active type and screen  -Follow up post-transfusion CBC  -Maintain Hgb >8 given history of CAD  -Maintain Platelets >40k   -Protonix IV 40mg bid   -GI following, no plan for endoscopic evaluation Presented with Hgb 4.4   Patient with history of GI bleed from AVM's  S/p 3 units prbc and 2 unit platelet  Outpatient GI is Dr. Velazco    -Maintain active type and screen  -Follow up post-transfusion CBC  -Maintain Hgb >8 given history of CAD  -Maintain Platelets >40k   -Protonix PO 40mg bid for two weeks   -GI following, no plan for endoscopic evaluation Presented with Hgb 4.4   Patient with history of GI bleed from AVM's  S/p 3 units prbc and 2 unit platelet  Outpatient GI is Dr. Velazco    Plan:  -resume aspirin 81 mg every other day  -Protonix PO 40mg bid for two weeks   -GI following, no plan for endoscopic evaluation  -Maintain active type and screen  -Follow up post-transfusion CBC  -Maintain Hgb >8 given history of CAD  -Maintain Platelets >40k

## 2024-06-09 LAB
ALBUMIN SERPL ELPH-MCNC: 3.3 G/DL — SIGNIFICANT CHANGE UP (ref 3.3–5)
ALP SERPL-CCNC: 60 U/L — SIGNIFICANT CHANGE UP (ref 40–120)
ALT FLD-CCNC: 9 U/L — LOW (ref 10–45)
ANION GAP SERPL CALC-SCNC: 12 MMOL/L — SIGNIFICANT CHANGE UP (ref 5–17)
AST SERPL-CCNC: 15 U/L — SIGNIFICANT CHANGE UP (ref 10–40)
BASOPHILS # BLD AUTO: 0 K/UL — SIGNIFICANT CHANGE UP (ref 0–0.2)
BASOPHILS NFR BLD AUTO: 0 % — SIGNIFICANT CHANGE UP (ref 0–2)
BILIRUB SERPL-MCNC: 0.3 MG/DL — SIGNIFICANT CHANGE UP (ref 0.2–1.2)
BLASTS # FLD: 18.5 % — HIGH (ref 0–0)
BUN SERPL-MCNC: 22 MG/DL — SIGNIFICANT CHANGE UP (ref 7–23)
CALCIUM SERPL-MCNC: 9.3 MG/DL — SIGNIFICANT CHANGE UP (ref 8.4–10.5)
CHLORIDE SERPL-SCNC: 101 MMOL/L — SIGNIFICANT CHANGE UP (ref 96–108)
CO2 SERPL-SCNC: 24 MMOL/L — SIGNIFICANT CHANGE UP (ref 22–31)
CREAT SERPL-MCNC: 1.03 MG/DL — SIGNIFICANT CHANGE UP (ref 0.5–1.3)
EGFR: 72 ML/MIN/1.73M2 — SIGNIFICANT CHANGE UP
EOSINOPHIL # BLD AUTO: 0 K/UL — SIGNIFICANT CHANGE UP (ref 0–0.5)
EOSINOPHIL NFR BLD AUTO: 0 % — SIGNIFICANT CHANGE UP (ref 0–6)
GLUCOSE BLDC GLUCOMTR-MCNC: 122 MG/DL — HIGH (ref 70–99)
GLUCOSE BLDC GLUCOMTR-MCNC: 136 MG/DL — HIGH (ref 70–99)
GLUCOSE BLDC GLUCOMTR-MCNC: 148 MG/DL — HIGH (ref 70–99)
GLUCOSE BLDC GLUCOMTR-MCNC: 93 MG/DL — SIGNIFICANT CHANGE UP (ref 70–99)
GLUCOSE SERPL-MCNC: 114 MG/DL — HIGH (ref 70–99)
HCT VFR BLD CALC: 22.8 % — LOW (ref 39–50)
HGB BLD-MCNC: 7.5 G/DL — LOW (ref 13–17)
LYMPHOCYTES # BLD AUTO: 0.32 K/UL — LOW (ref 1–3.3)
LYMPHOCYTES # BLD AUTO: 42.6 % — SIGNIFICANT CHANGE UP (ref 13–44)
MAGNESIUM SERPL-MCNC: 1.8 MG/DL — SIGNIFICANT CHANGE UP (ref 1.6–2.6)
MANUAL SMEAR VERIFICATION: SIGNIFICANT CHANGE UP
MCHC RBC-ENTMCNC: 29.6 PG — SIGNIFICANT CHANGE UP (ref 27–34)
MCHC RBC-ENTMCNC: 32.9 GM/DL — SIGNIFICANT CHANGE UP (ref 32–36)
MCV RBC AUTO: 90.1 FL — SIGNIFICANT CHANGE UP (ref 80–100)
MONOCYTES # BLD AUTO: 0.26 K/UL — SIGNIFICANT CHANGE UP (ref 0–0.9)
MONOCYTES NFR BLD AUTO: 35.2 % — HIGH (ref 2–14)
NEUTROPHILS # BLD AUTO: 0.03 K/UL — LOW (ref 1.8–7.4)
NEUTROPHILS NFR BLD AUTO: 3.7 % — LOW (ref 43–77)
NRBC # BLD: 2 /100 WBCS — HIGH (ref 0–0)
PHOSPHATE SERPL-MCNC: 2.6 MG/DL — SIGNIFICANT CHANGE UP (ref 2.5–4.5)
PLAT MORPH BLD: NORMAL — SIGNIFICANT CHANGE UP
PLATELET # BLD AUTO: 50 K/UL — LOW (ref 150–400)
POTASSIUM SERPL-MCNC: 3.9 MMOL/L — SIGNIFICANT CHANGE UP (ref 3.5–5.3)
POTASSIUM SERPL-SCNC: 3.9 MMOL/L — SIGNIFICANT CHANGE UP (ref 3.5–5.3)
PROT SERPL-MCNC: 6.7 G/DL — SIGNIFICANT CHANGE UP (ref 6–8.3)
RBC # BLD: 2.53 M/UL — LOW (ref 4.2–5.8)
RBC # FLD: 15.9 % — HIGH (ref 10.3–14.5)
RBC BLD AUTO: SIGNIFICANT CHANGE UP
SODIUM SERPL-SCNC: 137 MMOL/L — SIGNIFICANT CHANGE UP (ref 135–145)
WBC # BLD: 0.74 K/UL — CRITICAL LOW (ref 3.8–10.5)
WBC # FLD AUTO: 0.74 K/UL — CRITICAL LOW (ref 3.8–10.5)

## 2024-06-09 PROCEDURE — 99232 SBSQ HOSP IP/OBS MODERATE 35: CPT | Mod: GC

## 2024-06-09 RX ORDER — FERROUS SULFATE 325(65) MG
1 TABLET ORAL
Qty: 0 | Refills: 0 | DISCHARGE

## 2024-06-09 RX ORDER — POLYETHYLENE GLYCOL 3350 17 G/17G
17 POWDER, FOR SOLUTION ORAL DAILY
Refills: 0 | Status: DISCONTINUED | OUTPATIENT
Start: 2024-06-09 | End: 2024-06-10

## 2024-06-09 RX ORDER — LOSARTAN POTASSIUM 100 MG/1
1 TABLET, FILM COATED ORAL
Refills: 0 | DISCHARGE

## 2024-06-09 RX ORDER — SENNA PLUS 8.6 MG/1
2 TABLET ORAL AT BEDTIME
Refills: 0 | Status: DISCONTINUED | OUTPATIENT
Start: 2024-06-09 | End: 2024-06-10

## 2024-06-09 RX ADMIN — PANTOPRAZOLE SODIUM 40 MILLIGRAM(S): 20 TABLET, DELAYED RELEASE ORAL at 05:00

## 2024-06-09 RX ADMIN — Medication 50 MICROGRAM(S): at 05:00

## 2024-06-09 RX ADMIN — Medication 400 MILLIGRAM(S): at 05:00

## 2024-06-09 RX ADMIN — FLUCONAZOLE 200 MILLIGRAM(S): 150 TABLET ORAL at 12:20

## 2024-06-09 RX ADMIN — PANTOPRAZOLE SODIUM 40 MILLIGRAM(S): 20 TABLET, DELAYED RELEASE ORAL at 17:20

## 2024-06-09 RX ADMIN — Medication 400 MILLIGRAM(S): at 17:20

## 2024-06-09 RX ADMIN — SENNA PLUS 2 TABLET(S): 8.6 TABLET ORAL at 21:17

## 2024-06-09 RX ADMIN — POLYETHYLENE GLYCOL 3350 17 GRAM(S): 17 POWDER, FOR SOLUTION ORAL at 17:45

## 2024-06-09 NOTE — DIETITIAN INITIAL EVALUATION ADULT - PERTINENT LABORATORY DATA
06-09    137  |  101  |  22  ----------------------------<  114<H>  3.9   |  24  |  1.03    Ca    9.3      09 Jun 2024 06:50  Phos  2.6     06-09  Mg     1.8     06-09    TPro  6.7  /  Alb  3.3  /  TBili  0.3  /  DBili  x   /  AST  15  /  ALT  9<L>  /  AlkPhos  60  06-09  POCT Blood Glucose.: 93 mg/dL (06-09-24 @ 08:21)  A1C with Estimated Average Glucose Result: 5.7 % (05-14-24 @ 07:20)  A1C with Estimated Average Glucose Result: 5.6 % (08-10-23 @ 07:07)

## 2024-06-09 NOTE — PROGRESS NOTE ADULT - SUBJECTIVE AND OBJECTIVE BOX
***********************************************  Cody Abreu MD  Internal Medicine   PGY3  ***********************************************      PROGRESS NOTE:     Patient is a 83y old  Male who presents with a chief complaint of Symptomatic Anemia (08 Jun 2024 07:31)      SUBJECTIVE / OVERNIGHT EVENTS:    OVERNIGHT:       Patient examined at bedside        MEDICATIONS  (STANDING):  acyclovir   Oral Tab/Cap 400 milliGRAM(s) Oral two times a day  aspirin enteric coated 81 milliGRAM(s) Oral <User Schedule>  dextrose 50% Injectable 25 Gram(s) IV Push once  dextrose 50% Injectable 12.5 Gram(s) IV Push once  dextrose 50% Injectable 25 Gram(s) IV Push once  dextrose Oral Gel 15 Gram(s) Oral once  fluconAZOLE   Tablet 200 milliGRAM(s) Oral daily  glucagon  Injectable 1 milliGRAM(s) IntraMuscular once  insulin lispro (ADMELOG) corrective regimen sliding scale   SubCutaneous three times a day before meals  insulin lispro (ADMELOG) corrective regimen sliding scale   SubCutaneous at bedtime  levoFLOXacin  Tablet 250 milliGRAM(s) Oral every 24 hours  levothyroxine 50 MICROGram(s) Oral daily  pantoprazole    Tablet 40 milliGRAM(s) Oral two times a day    MEDICATIONS  (PRN):      CAPILLARY BLOOD GLUCOSE      POCT Blood Glucose.: 112 mg/dL (08 Jun 2024 21:13)  POCT Blood Glucose.: 104 mg/dL (08 Jun 2024 16:48)  POCT Blood Glucose.: 113 mg/dL (08 Jun 2024 11:58)  POCT Blood Glucose.: 121 mg/dL (08 Jun 2024 08:09)    I&O's Summary    08 Jun 2024 07:01  -  09 Jun 2024 07:00  --------------------------------------------------------  IN: 480 mL / OUT: 250 mL / NET: 230 mL        PHYSICAL EXAM:  Vital Signs Last 24 Hrs  T(C): 36.7 (09 Jun 2024 04:09), Max: 37.2 (08 Jun 2024 16:25)  T(F): 98 (09 Jun 2024 04:09), Max: 98.9 (08 Jun 2024 16:25)  HR: 83 (09 Jun 2024 04:09) (70 - 83)  BP: 131/73 (09 Jun 2024 04:09) (122/71 - 147/75)  BP(mean): --  RR: 18 (09 Jun 2024 04:09) (18 - 18)  SpO2: 99% (09 Jun 2024 04:09) (97% - 99%)    Parameters below as of 09 Jun 2024 04:09  Patient On (Oxygen Delivery Method): room air        CONSTITUTIONAL: NAD; well-developed  HEENT: PERRL, clear conjunctiva  RESPIRATORY: Normal respiratory effort; lungs are clear to auscultation bilaterally; No Crackles/Rhonchi/Wheezing  CARDIOVASCULAR: Regular rate and rhythm, normal S1 and S2, no murmur/rub/gallop; No lower extremity edema; Peripheral pulses are 2+ bilaterally  ABDOMEN: Nontender to palpation, normoactive bowel sounds, no rebound/guarding; No hepatosplenomegaly  MUSCULOSKELETAL: no clubbing or cyanosis of digits; no joint swelling or tenderness to palpation  EXTREMITY: Lower extremities Non-tender to palpation; non-erythematous B/L  NEURO: A&Ox3; no focal deficits   PSYCH: normal mood; Affect appropirate    LABS:                        8.3    0.74  )-----------( 74       ( 08 Jun 2024 07:20 )             25.3     06-08    139  |  103  |  30<H>  ----------------------------<  97  4.2   |  22  |  0.95    Ca    9.6      08 Jun 2024 07:26  Phos  3.6     06-08  Mg     1.7     06-08    TPro  6.5  /  Alb  2.9<L>  /  TBili  0.3  /  DBili  x   /  AST  17  /  ALT  8<L>  /  AlkPhos  54  06-08          Urinalysis Basic - ( 08 Jun 2024 07:26 )    Color: x / Appearance: x / SG: x / pH: x  Gluc: 97 mg/dL / Ketone: x  / Bili: x / Urobili: x   Blood: x / Protein: x / Nitrite: x   Leuk Esterase: x / RBC: x / WBC x   Sq Epi: x / Non Sq Epi: x / Bacteria: x          RADIOLOGY & ADDITIONAL TESTS:  Results Reviewed:   Imaging Personally Reviewed:  Electrocardiogram Personally Reviewed:    COORDINATION OF CARE:  Care Discussed with Consultants/Other Providers [Y/N]:  Prior or Outpatient Records Reviewed [Y/N]:   ***********************************************  Cody Abreu MD  Internal Medicine   PGY3  ***********************************************      PROGRESS NOTE:     Patient is a 83y old  Male who presents with a chief complaint of Symptomatic Anemia (08 Jun 2024 07:31)      SUBJECTIVE / OVERNIGHT EVENTS:  No overnight events   Patient denies any new episodes of hematemesis, has not had a BM was requesting bowel regimen.   Denies feeling dizzy, short of breath, or having chest pain.       Patient examined at bedside        MEDICATIONS  (STANDING):  acyclovir   Oral Tab/Cap 400 milliGRAM(s) Oral two times a day  aspirin enteric coated 81 milliGRAM(s) Oral <User Schedule>  dextrose 50% Injectable 25 Gram(s) IV Push once  dextrose 50% Injectable 12.5 Gram(s) IV Push once  dextrose 50% Injectable 25 Gram(s) IV Push once  dextrose Oral Gel 15 Gram(s) Oral once  fluconAZOLE   Tablet 200 milliGRAM(s) Oral daily  glucagon  Injectable 1 milliGRAM(s) IntraMuscular once  insulin lispro (ADMELOG) corrective regimen sliding scale   SubCutaneous three times a day before meals  insulin lispro (ADMELOG) corrective regimen sliding scale   SubCutaneous at bedtime  levoFLOXacin  Tablet 250 milliGRAM(s) Oral every 24 hours  levothyroxine 50 MICROGram(s) Oral daily  pantoprazole    Tablet 40 milliGRAM(s) Oral two times a day    MEDICATIONS  (PRN):      CAPILLARY BLOOD GLUCOSE      POCT Blood Glucose.: 112 mg/dL (08 Jun 2024 21:13)  POCT Blood Glucose.: 104 mg/dL (08 Jun 2024 16:48)  POCT Blood Glucose.: 113 mg/dL (08 Jun 2024 11:58)  POCT Blood Glucose.: 121 mg/dL (08 Jun 2024 08:09)    I&O's Summary    08 Jun 2024 07:01  -  09 Jun 2024 07:00  --------------------------------------------------------  IN: 480 mL / OUT: 250 mL / NET: 230 mL        PHYSICAL EXAM:  Vital Signs Last 24 Hrs  T(C): 36.7 (09 Jun 2024 04:09), Max: 37.2 (08 Jun 2024 16:25)  T(F): 98 (09 Jun 2024 04:09), Max: 98.9 (08 Jun 2024 16:25)  HR: 83 (09 Jun 2024 04:09) (70 - 83)  BP: 131/73 (09 Jun 2024 04:09) (122/71 - 147/75)  BP(mean): --  RR: 18 (09 Jun 2024 04:09) (18 - 18)  SpO2: 99% (09 Jun 2024 04:09) (97% - 99%)    Parameters below as of 09 Jun 2024 04:09  Patient On (Oxygen Delivery Method): room air        GENERAL: NAD, well-developed  HEAD:  Atraumatic, Normocephalic  EYES: EOMI, conjunctiva and sclera clear  CHEST/LUNG: Clear to auscultation bilaterally; No wheeze  HEART: Regular rate and rhythm; No murmurs, rubs, or gallops  ABDOMEN: Soft, Nontender, Nondistended; Bowel sounds present  EXTREMITIES:  2+ Peripheral Pulses, No cyanosis or edema  PSYCH: AAOx3  NEUROLOGY: non-focal  SKIN: No rashes or lesions      LABS:                        8.3    0.74  )-----------( 74       ( 08 Jun 2024 07:20 )             25.3     06-08    139  |  103  |  30<H>  ----------------------------<  97  4.2   |  22  |  0.95    Ca    9.6      08 Jun 2024 07:26  Phos  3.6     06-08  Mg     1.7     06-08    TPro  6.5  /  Alb  2.9<L>  /  TBili  0.3  /  DBili  x   /  AST  17  /  ALT  8<L>  /  AlkPhos  54  06-08          Urinalysis Basic - ( 08 Jun 2024 07:26 )    Color: x / Appearance: x / SG: x / pH: x  Gluc: 97 mg/dL / Ketone: x  / Bili: x / Urobili: x   Blood: x / Protein: x / Nitrite: x   Leuk Esterase: x / RBC: x / WBC x   Sq Epi: x / Non Sq Epi: x / Bacteria: x          RADIOLOGY & ADDITIONAL TESTS:  Results Reviewed:   Imaging Personally Reviewed:  Electrocardiogram Personally Reviewed:    COORDINATION OF CARE:  Care Discussed with Consultants/Other Providers [Y/N]:  Prior or Outpatient Records Reviewed [Y/N]:

## 2024-06-09 NOTE — DIETITIAN INITIAL EVALUATION ADULT - PERTINENT MEDS FT
MEDICATIONS  (STANDING):  acyclovir   Oral Tab/Cap 400 milliGRAM(s) Oral two times a day  aspirin enteric coated 81 milliGRAM(s) Oral <User Schedule>  dextrose 50% Injectable 25 Gram(s) IV Push once  dextrose 50% Injectable 12.5 Gram(s) IV Push once  dextrose 50% Injectable 25 Gram(s) IV Push once  dextrose Oral Gel 15 Gram(s) Oral once  fluconAZOLE   Tablet 200 milliGRAM(s) Oral daily  glucagon  Injectable 1 milliGRAM(s) IntraMuscular once  insulin lispro (ADMELOG) corrective regimen sliding scale   SubCutaneous three times a day before meals  insulin lispro (ADMELOG) corrective regimen sliding scale   SubCutaneous at bedtime  levoFLOXacin  Tablet 250 milliGRAM(s) Oral every 24 hours  levothyroxine 50 MICROGram(s) Oral daily  pantoprazole    Tablet 40 milliGRAM(s) Oral two times a day

## 2024-06-09 NOTE — DIETITIAN INITIAL EVALUATION ADULT - REASON FOR ADMISSION
per chart: "83 M history CAD; TAVR, DMT2, GI bleed from AVM's, and MDS presenting with symptomatic anemia."

## 2024-06-09 NOTE — DIETITIAN INITIAL EVALUATION ADULT - ADD RECOMMEND
1) Will continue to monitor PO intake, weight, labs, skin, GI status and diet 2) nutrition risk placed in chart 3) RD to add No Sugar Added Mighty Shake supplement daily to aid in meeting nutrient needs

## 2024-06-09 NOTE — DIETITIAN INITIAL EVALUATION ADULT - NS FNS DIET ORDER
Diet, DASH/TLC:   Sodium & Cholesterol Restricted  Consistent Carbohydrate {No Snacks} (CSTCHO) (06-07-24 @ 11:05) [Active]

## 2024-06-09 NOTE — DIETITIAN INITIAL EVALUATION ADULT - PHYSCIAL ASSESSMENT
Reported UBW of ~209 pounds (1 year ago)  Per [facility] HIE: 163.2 pounds (10/9/23), 169.2 pounds (8/9/23)  dosing weight 159 pounds  ~23% loss x ~ 1 year per pt report  RD will continue to monitor trends.

## 2024-06-09 NOTE — PROGRESS NOTE ADULT - PROBLEM SELECTOR PLAN 1
Presented with Hgb 4.4   Patient with history of GI bleed from AVM's  S/p 3 units prbc and 2 unit platelet  Outpatient GI is Dr. Velazco    Plan:  -resume aspirin 81 mg every other day  -Protonix PO 40mg bid for two weeks   -GI following, no plan for endoscopic evaluation  -Maintain active type and screen  -Maintain Hgb >8 given history of CAD  -Maintain Platelets >40k

## 2024-06-09 NOTE — PROVIDER CONTACT NOTE (CRITICAL VALUE NOTIFICATION) - ACTION/TREATMENT ORDERED:
Notified Cody Abreu MD. Safety maintained. Continue to monitor.
Resident made aware, 1 unit PRBC and 1 unit platelets ordered, PRBC has been started, will continue to monitor

## 2024-06-09 NOTE — PROGRESS NOTE ADULT - PROBLEM SELECTOR PLAN 3
Patient with history of MDS  No longer receiving chemotherapy   Hgb 4.4  WBC .39   Platelet 23     Plan:  -No signs of infection at this time  -Hgb transfuse >8 given CAD history  -Maintain Platelets >40k  -C/w home Levaquin, Acyclovir and Fluconazole

## 2024-06-09 NOTE — PROGRESS NOTE ADULT - PROBLEM SELECTOR PLAN 2
History of CAD on aspirin every other day, as well as history of TAVR    Plan:  -resume aspirin 81 mg every other day

## 2024-06-09 NOTE — DIETITIAN INITIAL EVALUATION ADULT - ORAL INTAKE PTA/DIET HISTORY
visited pt at bedside this morning. States to not have diabetes. Most recent Hgb A1c 5.7 % (pre-DM level). However, per dietitian initial evaluation 5/15/24: " Confirmed history of T2DM, stated he takes Metformin at home for glycemic control and checks his fingersticks 2-3x/week, endorsed fingersticks <100 mg/dL fasting." ? accuracy of pt report.     metformin noted per outpatient medications. reports decreased PO intake x 1 year since wife . Daughter sends meal service to patient daily.

## 2024-06-10 ENCOUNTER — TRANSCRIPTION ENCOUNTER (OUTPATIENT)
Age: 84
End: 2024-06-10

## 2024-06-10 VITALS
SYSTOLIC BLOOD PRESSURE: 145 MMHG | HEART RATE: 71 BPM | RESPIRATION RATE: 18 BRPM | OXYGEN SATURATION: 99 % | DIASTOLIC BLOOD PRESSURE: 68 MMHG | TEMPERATURE: 98 F

## 2024-06-10 LAB
ALBUMIN SERPL ELPH-MCNC: 2.8 G/DL — LOW (ref 3.3–5)
ALP SERPL-CCNC: 75 U/L — SIGNIFICANT CHANGE UP (ref 40–120)
ALT FLD-CCNC: 12 U/L — SIGNIFICANT CHANGE UP (ref 10–45)
ANION GAP SERPL CALC-SCNC: 11 MMOL/L — SIGNIFICANT CHANGE UP (ref 5–17)
AST SERPL-CCNC: 24 U/L — SIGNIFICANT CHANGE UP (ref 10–40)
BASOPHILS # BLD AUTO: 0 K/UL — SIGNIFICANT CHANGE UP (ref 0–0.2)
BASOPHILS NFR BLD AUTO: 0 % — SIGNIFICANT CHANGE UP (ref 0–2)
BILIRUB SERPL-MCNC: 0.3 MG/DL — SIGNIFICANT CHANGE UP (ref 0.2–1.2)
BLASTS # FLD: 25.6 % — HIGH (ref 0–0)
BUN SERPL-MCNC: 19 MG/DL — SIGNIFICANT CHANGE UP (ref 7–23)
CALCIUM SERPL-MCNC: 9.2 MG/DL — SIGNIFICANT CHANGE UP (ref 8.4–10.5)
CHLORIDE SERPL-SCNC: 101 MMOL/L — SIGNIFICANT CHANGE UP (ref 96–108)
CO2 SERPL-SCNC: 22 MMOL/L — SIGNIFICANT CHANGE UP (ref 22–31)
CREAT SERPL-MCNC: 0.95 MG/DL — SIGNIFICANT CHANGE UP (ref 0.5–1.3)
EGFR: 79 ML/MIN/1.73M2 — SIGNIFICANT CHANGE UP
EOSINOPHIL # BLD AUTO: 0 K/UL — SIGNIFICANT CHANGE UP (ref 0–0.5)
EOSINOPHIL NFR BLD AUTO: 0 % — SIGNIFICANT CHANGE UP (ref 0–6)
GLUCOSE BLDC GLUCOMTR-MCNC: 101 MG/DL — HIGH (ref 70–99)
GLUCOSE BLDC GLUCOMTR-MCNC: 120 MG/DL — HIGH (ref 70–99)
GLUCOSE SERPL-MCNC: 97 MG/DL — SIGNIFICANT CHANGE UP (ref 70–99)
HCT VFR BLD CALC: 24.9 % — LOW (ref 39–50)
HGB BLD-MCNC: 8.1 G/DL — LOW (ref 13–17)
LYMPHOCYTES # BLD AUTO: 0.25 K/UL — LOW (ref 1–3.3)
LYMPHOCYTES # BLD AUTO: 35.9 % — SIGNIFICANT CHANGE UP (ref 13–44)
MAGNESIUM SERPL-MCNC: 1.8 MG/DL — SIGNIFICANT CHANGE UP (ref 1.6–2.6)
MANUAL SMEAR VERIFICATION: SIGNIFICANT CHANGE UP
MCHC RBC-ENTMCNC: 28.8 PG — SIGNIFICANT CHANGE UP (ref 27–34)
MCHC RBC-ENTMCNC: 32.5 GM/DL — SIGNIFICANT CHANGE UP (ref 32–36)
MCV RBC AUTO: 88.6 FL — SIGNIFICANT CHANGE UP (ref 80–100)
MONOCYTES # BLD AUTO: 0.21 K/UL — SIGNIFICANT CHANGE UP (ref 0–0.9)
MONOCYTES NFR BLD AUTO: 30.8 % — HIGH (ref 2–14)
NEUTROPHILS # BLD AUTO: 0.05 K/UL — LOW (ref 1.8–7.4)
NEUTROPHILS NFR BLD AUTO: 5.1 % — LOW (ref 43–77)
NEUTS BAND # BLD: 2.6 % — SIGNIFICANT CHANGE UP (ref 0–8)
PHOSPHATE SERPL-MCNC: 2.2 MG/DL — LOW (ref 2.5–4.5)
PLAT MORPH BLD: NORMAL — SIGNIFICANT CHANGE UP
PLATELET # BLD AUTO: 44 K/UL — LOW (ref 150–400)
POTASSIUM SERPL-MCNC: 3.9 MMOL/L — SIGNIFICANT CHANGE UP (ref 3.5–5.3)
POTASSIUM SERPL-SCNC: 3.9 MMOL/L — SIGNIFICANT CHANGE UP (ref 3.5–5.3)
PROT SERPL-MCNC: 6.4 G/DL — SIGNIFICANT CHANGE UP (ref 6–8.3)
RBC # BLD: 2.81 M/UL — LOW (ref 4.2–5.8)
RBC # FLD: 15.8 % — HIGH (ref 10.3–14.5)
RBC BLD AUTO: SIGNIFICANT CHANGE UP
SODIUM SERPL-SCNC: 134 MMOL/L — LOW (ref 135–145)
WBC # BLD: 0.69 K/UL — CRITICAL LOW (ref 3.8–10.5)
WBC # FLD AUTO: 0.69 K/UL — CRITICAL LOW (ref 3.8–10.5)

## 2024-06-10 PROCEDURE — 82962 GLUCOSE BLOOD TEST: CPT

## 2024-06-10 PROCEDURE — 85027 COMPLETE CBC AUTOMATED: CPT

## 2024-06-10 PROCEDURE — 85025 COMPLETE CBC W/AUTO DIFF WBC: CPT

## 2024-06-10 PROCEDURE — 86900 BLOOD TYPING SEROLOGIC ABO: CPT

## 2024-06-10 PROCEDURE — 85610 PROTHROMBIN TIME: CPT

## 2024-06-10 PROCEDURE — 97161 PT EVAL LOW COMPLEX 20 MIN: CPT

## 2024-06-10 PROCEDURE — 86850 RBC ANTIBODY SCREEN: CPT

## 2024-06-10 PROCEDURE — P9100: CPT

## 2024-06-10 PROCEDURE — 36430 TRANSFUSION BLD/BLD COMPNT: CPT

## 2024-06-10 PROCEDURE — 99285 EMERGENCY DEPT VISIT HI MDM: CPT

## 2024-06-10 PROCEDURE — 84100 ASSAY OF PHOSPHORUS: CPT

## 2024-06-10 PROCEDURE — 85730 THROMBOPLASTIN TIME PARTIAL: CPT

## 2024-06-10 PROCEDURE — 83735 ASSAY OF MAGNESIUM: CPT

## 2024-06-10 PROCEDURE — 96375 TX/PRO/DX INJ NEW DRUG ADDON: CPT

## 2024-06-10 PROCEDURE — P9016: CPT

## 2024-06-10 PROCEDURE — 36415 COLL VENOUS BLD VENIPUNCTURE: CPT

## 2024-06-10 PROCEDURE — 99233 SBSQ HOSP IP/OBS HIGH 50: CPT

## 2024-06-10 PROCEDURE — 80053 COMPREHEN METABOLIC PANEL: CPT

## 2024-06-10 PROCEDURE — 99239 HOSP IP/OBS DSCHRG MGMT >30: CPT

## 2024-06-10 PROCEDURE — 86923 COMPATIBILITY TEST ELECTRIC: CPT

## 2024-06-10 PROCEDURE — P9037: CPT

## 2024-06-10 PROCEDURE — P9040: CPT

## 2024-06-10 PROCEDURE — 96374 THER/PROPH/DIAG INJ IV PUSH: CPT

## 2024-06-10 PROCEDURE — 86901 BLOOD TYPING SEROLOGIC RH(D): CPT

## 2024-06-10 RX ORDER — SODIUM,POTASSIUM PHOSPHATES 278-250MG
1 POWDER IN PACKET (EA) ORAL ONCE
Refills: 0 | Status: COMPLETED | OUTPATIENT
Start: 2024-06-10 | End: 2024-06-10

## 2024-06-10 RX ADMIN — PANTOPRAZOLE SODIUM 40 MILLIGRAM(S): 20 TABLET, DELAYED RELEASE ORAL at 05:02

## 2024-06-10 RX ADMIN — Medication 400 MILLIGRAM(S): at 05:01

## 2024-06-10 RX ADMIN — POLYETHYLENE GLYCOL 3350 17 GRAM(S): 17 POWDER, FOR SOLUTION ORAL at 12:32

## 2024-06-10 RX ADMIN — FLUCONAZOLE 200 MILLIGRAM(S): 150 TABLET ORAL at 12:32

## 2024-06-10 RX ADMIN — Medication 1 PACKET(S): at 09:23

## 2024-06-10 RX ADMIN — Medication 50 MICROGRAM(S): at 05:02

## 2024-06-10 NOTE — PROGRESS NOTE ADULT - PROBLEM SELECTOR PLAN 6
home med fenofibrate 54mg qd    -Hold iso of active GIB.

## 2024-06-10 NOTE — DISCHARGE NOTE NURSING/CASE MANAGEMENT/SOCIAL WORK - NSDCPEFALRISK_GEN_ALL_CORE
For information on Fall & Injury Prevention, visit: [email or web address] OR  [email or web address] OR  https://www.cdc.gov/steadi/patient.html

## 2024-06-10 NOTE — PROGRESS NOTE ADULT - ASSESSMENT
82 yo male presented to ED with c/o several episodes of small volume black emesis/spit-up (confirmed by pt and son), no lilliam hemetemesis. Pt reports feeling weak and fatigues yesterday - denies melena or rectal bleeding and no abdominal pain or nausea.  At ~4am pt felt very weak and called his son - noted by son to be very pale and episodes of "spitting up" dark contents (small volumes, in tissue)  No CP, LOC or syncope. Pt with known PMH of CAD, TAVR, DMII, GI bleed from AVM's, and MDS (No longer on chemo; plans for supportive care only with Q3 week injections of ?neulasta  in attempt to keep pt's counts up). Known transfusion dependent MDS - last outpt transfusion of PRBCs 1 week ago.     Known history of extensive GI AVMs (SB +/- colon; s/p DBE and colonoscopies) - seen and scoped on multiple occasions by Dr Ashton  Most recently admitted here 3 weeks ago and underwent extensive invasive GI work-up in 2023 with course as outlined below:    - s/p VCE (8/10/23)-  red blood in the stomach; hematin/melena (altered blood) in the small bowel; non-diagnostic small bowel study as the capsule terminates in the small bowel at the end of the study  - s/p EGD (8/10/23)-  Normal esophagus; single bleeding angioectasia with adherent clot in the stomach- s/p treatment with APC likely the main source of bleeding and cause for blood clots seen on VCE; few recently bleeding angioectasias in the stomach- s/p treatment with APC; normal examined duodenum.  - s/p push enteroscopy (7/25/23)- normal esophagus; multiple gastric polyps; normal duodenum; normal examined jejunum.   -s/p Colonoscopy (7/26/23)- fair prep, pan-diverticulosis, normal TI, normal rectal retroflexion, no findings to explain melena    #pancytopenia 2/2 MDS  #MDS, no longer on chemo. Supportive care only per pt/family  #GI blood loss anemia; likely 2/2 known GI tract AVMs  suspect pt may have recurrent bleeding/oozing from known GI tract AVMs i/s/o thrombocytopenia  NO invasive evaluations per pt/family.  Wish to have supportive care only    RECS:  -monitor count; transfuse PRN for goal Hgb ~7 and Plt >=40  -no invasive GI evaluations as per pt and family wishes (also i/s/o pancytopenia). Re-Counseled pt and son on the recurrent nature of AVMs and both express understanding stating given's pt's counts, advanced MDS with no plans for further chemo they wish to pursue supportive treatment.  -monitor stools; counseled to expect initial stools to be dark in color 2/2 passage of "old"/retained blood from within GI tract  -PPI BID;  PO BID PPI x 2 weeks then change to daily dosing  -regular ADA/DASH diet     Discussed with pt and son at length. All questions answered  House staff updated  NO GI objection to DC planning       Nikhil Amezcua PA-C  [facility] Non Teaching GI Service  Available on TEAMS Mon-Fri 8a-4p (Nikhil Domingo)  After hours and weekend coverage [phone]  
84 yo male presented to ED with c/o several episodes of small volume black emesis/spit-up (confirmed by pt and son), no lilliam hemetemesis. Pt reports feeling weak and fatigues yesterday - denies melena or rectal bleeding and no abdominal pain or nausea.  At ~4am pt felt very weak and called his son - noted by son to be very pale and episodes of "spitting up" dark contents (small volumes, in tissue)  No CP, LOC or syncope. Pt with known PMH of CAD, TAVR, DMII, GI bleed from AVM's, and MDS (No longer on chemo; plans for supportive care only with Q3 week injections of ?neulasta  in attempt to keep pt's counts up). Known transfusion dependent MDS - last outpt transfusion of PRBCs 1 week ago.     Known history of extensive GI AVMs (SB +/- colon; s/p DBE and colonoscopies) - seen and scoped on multiple occasions by Dr Ashton  Most recently admitted here 3 weeks ago and underwent extensive invasive GI work-up in 2023 with course as outlined below:    - s/p VCE (8/10/23)-  red blood in the stomach; hematin/melena (altered blood) in the small bowel; non-diagnostic small bowel study as the capsule terminates in the small bowel at the end of the study  - s/p EGD (8/10/23)-  Normal esophagus; single bleeding angioectasia with adherent clot in the stomach- s/p treatment with APC likely the main source of bleeding and cause for blood clots seen on VCE; few recently bleeding angioectasias in the stomach- s/p treatment with APC; normal examined duodenum.  - s/p push enteroscopy (7/25/23)- normal esophagus; multiple gastric polyps; normal duodenum; normal examined jejunum.   -s/p Colonoscopy (7/26/23)- fair prep, pan-diverticulosis, normal TI, normal rectal retroflexion, no findings to explain melena    #pancytopenia 2/2 MDS  #MDS, no longer on chemo. Supportive care only per pt/family  #GI blood loss anemia; likely 2/2 known GI tract AVMs  suspect pt may have recurrent bleeding/oozing from known GI tract AVMs i/s/o thrombocytopenia  NO invasive evaluations per pt/family.  Wish to have supportive care only    RECS:  -monitor count; transfuse PRN for goal Hgb ~7 and Plt >=40  -no invasive GI evaluations as per pt and family wishes (also i/s/o pancytopenia). Re-Counseled pt and son on the recurrent nature of AVMs and both express understanding stating given's pt's counts, advanced MDS with no plans for further chemo they wish to pursue supportive treatment.  -monitor stools; counseled to expect initial stools to be dark in color 2/2 passage of "old"/retained blood from within GI tract  -PPI BID; can change to PO dosing on 6/8 please. Plan to discharge on PO BID PPI x 2 weeks then change to daily dosing  -Advance to regular ADA/DASH diet (ordered)    Discussed with pt and son at length. All questions answered  House staff updated  NO GI objection to DC planning over weekend if remains stable  Please call GI service over weekend prn with acute GI concerns   GI service : [phone]      [facility]  Available on TEAMS Mon-Fri 8a-4p (Nikhil Domingo)  After hours and weekend coverage [phone]    
83 M history CAD; TAVR, DMT2, GI bleed from AVM's, and MDS presenting with symptomatic anemia.
83 M history CAD; TAVR, DMT2, GI bleed from AVM's, and MDS presenting with symptomatic anemia and hematemesis with initial Hgb 4.4. S/p total 4u pRBC, 2u plts- stabilized without endoscopic intervention. Plan to DC 6/10 home with home PT/home care if Hgb remains stable.

## 2024-06-10 NOTE — DISCHARGE NOTE NURSING/CASE MANAGEMENT/SOCIAL WORK - NSDCFUADDAPPT_GEN_ALL_CORE_FT
The visiting nurse will contact you to schedule your appointment.     Please make sure to follow up with your primary care doctor after being discharged from the hospital.     Please make sure to follow up with your Hematologist, you have an appt this tuesday.

## 2024-06-10 NOTE — PROGRESS NOTE ADULT - PROBLEM SELECTOR PROBLEM 2
CAD (coronary artery disease)
Pancytopenia

## 2024-06-10 NOTE — PROGRESS NOTE ADULT - PROVIDER SPECIALTY LIST ADULT
Gastroenterology
Internal Medicine
Gastroenterology
Internal Medicine

## 2024-06-10 NOTE — PROGRESS NOTE ADULT - ATTENDING COMMENTS
83M w/ PMHx CAD, TAVR, T2DM, MDS (no longer on chemo), multiple recent admissions for GIB with known AVMs admitted for hematemesis and anemia to 4.4.     Received additional 1U pRBC yesterday, feels well today. Cleared for dc from GI perspective, has scheduled follow up with heme tomorrow for further treatment of anemia. Total time discharge planning 34 minutes    d/w HS1
83M w/ PMHx CAD, TAVR, T2DM, MDS (no longer on chemo), multiple recent admissions for GIB with known AVMs admitted for hematemesis and anemia to 4.4. Now s/p 3U pRBC and 2U platelets with appropriate response. PPI changed from IV to PO for 6/8. Patient feels well, no plan for invasive procedure, advancing diet. Will ASA challenge patient tomorrow and if counts are stable can be discharged on Sunday with outpt follow up with his Hematologist.   Outpt PT on discharge.    d/w HS 1
83M w/ PMHx CAD, TAVR, T2DM, MDS (no longer on chemo), multiple recent admissions for GIB with known AVMs admitted for hematemesis and anemia to 4.4.     1. GIB:  s/p 3U pRBC and 2U platelets with appropriate response. PPI changed from IV to PO for 6/8. Patient feels well, no plan for invasive procedure, advancing diet.  resumed back on ASA . No further BM so far.   Hgb this am 7.5 down from 8's . unclear if the fluctuating H/H is because of active bleeding or his usual MDS   Will give additional unit of PRBC today and possible d/c home tomorrow if stable.   Cont to monitor PLt though level seems low at baseline.   Pt with plans for outpt onc visit on tues for further treatment of anemia   d/w HS. rest of care as above    d/w daughter over phone
83M w/ PMHx CAD, TAVR, T2DM, MDS (no longer on chemo), multiple recent admissions for GIB with known AVMs admitted for hematemesis and anemia to 4.4.     1. GIB: Now s/p 3U pRBC and 2U platelets with appropriate response. PPI changed from IV to PO for 6/8. Patient feels well, no plan for invasive procedure, advancing diet. Will ASA challenge today and if counts are stable can be discharged on Sunday with outpt follow up with his Hematologist.   Outpt PT on discharge.

## 2024-06-10 NOTE — PROGRESS NOTE ADULT - SUBJECTIVE AND OBJECTIVE BOX
SUSANNA BRAND  83y  MRN: [id number]    Patient is a 83y old  Male who presents with a chief complaint of per chart: "83 M history CAD; TAVR, DMT2, GI bleed from AVM's, and MDS presenting with symptomatic anemia."     (09 Jun 2024 11:34)      Subjective: no events ON. Denies fever, CP, SOB, abn pain, N/V, dysuria. Tolerating diet.      MEDICATIONS  (STANDING):  acyclovir   Oral Tab/Cap 400 milliGRAM(s) Oral two times a day  aspirin enteric coated 81 milliGRAM(s) Oral <User Schedule>  dextrose 50% Injectable 25 Gram(s) IV Push once  dextrose 50% Injectable 12.5 Gram(s) IV Push once  dextrose 50% Injectable 25 Gram(s) IV Push once  dextrose Oral Gel 15 Gram(s) Oral once  fluconAZOLE   Tablet 200 milliGRAM(s) Oral daily  glucagon  Injectable 1 milliGRAM(s) IntraMuscular once  insulin lispro (ADMELOG) corrective regimen sliding scale   SubCutaneous three times a day before meals  insulin lispro (ADMELOG) corrective regimen sliding scale   SubCutaneous at bedtime  levoFLOXacin  Tablet 250 milliGRAM(s) Oral every 24 hours  levothyroxine 50 MICROGram(s) Oral daily  pantoprazole    Tablet 40 milliGRAM(s) Oral two times a day  polyethylene glycol 3350 17 Gram(s) Oral daily  senna 2 Tablet(s) Oral at bedtime    MEDICATIONS  (PRN):      Objective:    Vitals: Vital Signs Last 24 Hrs  T(C): 36.8 (06-10-24 @ 04:20), Max: 37.3 (06-09-24 @ 19:29)  T(F): 98.2 (06-10-24 @ 04:20), Max: 99.1 (06-09-24 @ 19:29)  HR: 76 (06-10-24 @ 04:20) (76 - 86)  BP: 131/68 (06-10-24 @ 04:20) (127/72 - 166/67)  BP(mean): --  RR: 18 (06-10-24 @ 04:20) (18 - 18)  SpO2: 97% (06-10-24 @ 04:20) (95% - 99%)            I&O's Summary    08 Jun 2024 07:01  -  09 Jun 2024 07:00  --------------------------------------------------------  IN: 480 mL / OUT: 250 mL / NET: 230 mL    09 Jun 2024 07:01  -  10 Jun 2024 06:14  --------------------------------------------------------  IN: 840 mL / OUT: 0 mL / NET: 840 mL        PHYSICAL EXAM:  GENERAL: NAD  HEAD:  Atraumatic, Normocephalic  EYES: EOMI, conjunctiva and sclera clear  CHEST/LUNG: Clear to auscultation bilaterally; No rales, rhonchi, wheezing, or rubs  HEART: Regular rate and rhythm; No murmurs, rubs, or gallops  ABDOMEN: Soft, Nontender, Nondistended;   SKIN: No rashes or lesions  NERVOUS SYSTEM:  Alert & Oriented X3, no focal deficits    LABS:  06-09    137  |  101  |  22  ----------------------------<  114<H>  3.9   |  24  |  1.03  06-08    139  |  103  |  30<H>  ----------------------------<  97  4.2   |  22  |  0.95  06-07    138  |  105  |  56<H>  ----------------------------<  96  5.0   |  24  |  1.01    Ca    9.3      09 Jun 2024 06:50  Ca    9.6      08 Jun 2024 07:26  Ca    9.4      07 Jun 2024 08:30  Phos  2.6     06-09  Mg     1.8     06-09    TPro  6.7  /  Alb  3.3  /  TBili  0.3  /  DBili  x   /  AST  15  /  ALT  9<L>  /  AlkPhos  60  06-09  TPro  6.5  /  Alb  2.9<L>  /  TBili  0.3  /  DBili  x   /  AST  17  /  ALT  8<L>  /  AlkPhos  54  06-08  TPro  6.0  /  Alb  2.9<L>  /  TBili  0.3  /  DBili  x   /  AST  18  /  ALT  7<L>  /  AlkPhos  47  06-07                    Urinalysis Basic - ( 09 Jun 2024 06:50 )    Color: x / Appearance: x / SG: x / pH: x  Gluc: 114 mg/dL / Ketone: x  / Bili: x / Urobili: x   Blood: x / Protein: x / Nitrite: x   Leuk Esterase: x / RBC: x / WBC x   Sq Epi: x / Non Sq Epi: x / Bacteria: x                              7.5    0.74  )-----------( 50       ( 09 Jun 2024 06:50 )             22.8                         8.3    0.74  )-----------( 74       ( 08 Jun 2024 07:20 )             25.3                         7.9    0.65  )-----------( 73       ( 07 Jun 2024 08:30 )             23.4     CAPILLARY BLOOD GLUCOSE      POCT Blood Glucose.: 148 mg/dL (09 Jun 2024 20:44)  POCT Blood Glucose.: 136 mg/dL (09 Jun 2024 16:47)  POCT Blood Glucose.: 122 mg/dL (09 Jun 2024 12:07)  POCT Blood Glucose.: 93 mg/dL (09 Jun 2024 08:21)      RADIOLOGY & ADDITIONAL TESTS:    Imaging Personally Reviewed:  [x ] YES  [ ] NO    Consultants involved in case:   Consultant(s) Notes Reviewed:  [ x] YES  [ ] NO:   Care Discussed with Consultants/Other Providers [x ] YES  [ ] NO

## 2024-06-10 NOTE — PROGRESS NOTE ADULT - SUBJECTIVE AND OBJECTIVE BOX
INTERVAL HPI/OVERNIGHT EVENTS:  "I feel very good"  planned DC today  has scheduled outpt appt with Hematology 6/11    no BM, nausea or vomiting. Last BM 6/6/ AM (prior to admission, soft brown stool at home)  started on Miralax + senna  appetite good  no coffee ground emesis or hemetemesis  s/p additional unit PRBCs yesterday  Hgb 7.5 ->8.1      MEDICATIONS  (STANDING):  acyclovir   Oral Tab/Cap 400 milliGRAM(s) Oral two times a day  aspirin enteric coated 81 milliGRAM(s) Oral <User Schedule>  dextrose 50% Injectable 25 Gram(s) IV Push once  dextrose 50% Injectable 12.5 Gram(s) IV Push once  dextrose 50% Injectable 25 Gram(s) IV Push once  dextrose Oral Gel 15 Gram(s) Oral once  fluconAZOLE   Tablet 200 milliGRAM(s) Oral daily  glucagon  Injectable 1 milliGRAM(s) IntraMuscular once  insulin lispro (ADMELOG) corrective regimen sliding scale   SubCutaneous three times a day before meals  insulin lispro (ADMELOG) corrective regimen sliding scale   SubCutaneous at bedtime  levoFLOXacin  Tablet 250 milliGRAM(s) Oral every 24 hours  levothyroxine 50 MICROGram(s) Oral daily  pantoprazole    Tablet 40 milliGRAM(s) Oral two times a day  polyethylene glycol 3350 17 Gram(s) Oral daily  senna 2 Tablet(s) Oral at bedtime    MEDICATIONS  (PRN):      Allergies  No Known Allergies    Review of Systems:  see HPI- remainder 10 point ROS negative      Vital Signs Last 24 Hrs  T(C): 36.8 (10 Odn 2024 04:20), Max: 37.3 (09 Jun 2024 19:29)  T(F): 98.2 (10 Don 2024 04:20), Max: 99.1 (09 Jun 2024 19:29)  HR: 76 (10 Don 2024 04:20) (76 - 86)  BP: 131/68 (10 Don 2024 04:20) (127/72 - 166/67)  BP(mean): --  RR: 18 (10 Don 2024 04:20) (18 - 18)  SpO2: 97% (10 Don 2024 04:20) (95% - 99%)    Parameters below as of 10 Don 2024 04:20  Patient On (Oxygen Delivery Method): room air    PHYSICAL EXAM:  Constitutional: NAD, WD WN elderly WM alert and cooperative    Neck: No LAD, supple no JVD  Respiratory: Clear bl no inc WOB  Cardiovascular: S1 and S2, RRR  Gastrointestinal: BS+, soft, NT/ND no caput, neg HSM,+bl inguinal hernias - NT, soft  Extremities: No peripheral edema, neg clubbing, cyanosis  Vascular: 2+ peripheral pulses  Neurological: A/O x 3, no focal deficits or asymmetry   speech clear and fluent  Psychiatric: Normal mood, normal affect  Skin: No rashes, anicteric +few facial telengectasias. No pallor      LABS:                        8.1    0.69  )-----------( 44       ( 10 Don 2024 07:09 )             24.9   s/p 1 unit PRBCs transfused  Hemoglobin: 7.5 g/dL (06.09.24 @ 06:50)   Hemoglobin: 8.3 g/dL (06.08.24 @ 07:20)     06-10    134<L>  |  101  |  19  ----------------------------<  97  3.9   |  22  |  0.95    Ca    9.2      10 Don 2024 07:08  Phos  2.2     06-10  Mg     1.8     06-10    TPro  6.4  /  Alb  2.8<L>  /  TBili  0.3  /  DBili  x   /  AST  24  /  ALT  12  /  AlkPhos  75  06-10          RADIOLOGY & ADDITIONAL TESTS:

## 2024-06-10 NOTE — PROGRESS NOTE ADULT - PROBLEM SELECTOR PLAN 9
DVT prophylaxis: SCD  Diet: CLD   GOC: Full Code

## 2024-06-10 NOTE — PROGRESS NOTE ADULT - NS ATTEND AMEND GEN_ALL_CORE FT
s/p self limited UGI bleed, h/o AVMs in setting of MDS and pancytopenia, s/p chemo  s/p egd/colonoscopy in 2023  Risks of repeat endoscopy outweight benefits at this time given pancytopenia    plan conservative management per patient and family  ppi daily  management as above

## 2024-06-10 NOTE — PROGRESS NOTE ADULT - NUTRITIONAL ASSESSMENT
This patient has been assessed with a concern for Malnutrition and has been determined to have a diagnosis/diagnoses of Moderate protein-calorie malnutrition.    This patient is being managed with:   Diet DASH/TLC-  Sodium & Cholesterol Restricted  Consistent Carbohydrate {No Snacks} (CSTCHO)  Entered: Jun 7 2024 11:05AM

## 2024-06-10 NOTE — PROGRESS NOTE ADULT - PROBLEM SELECTOR PLAN 4
Home meds: Metformin     Plan:  -ISS
Home meds: Metformin     Plan:  -ISS
History of CAD on aspirin every other day, as well as history of TAVR    -Hold aspirin iso of active GIB.
Home meds: Metformin     Plan:  -ISS

## 2024-06-10 NOTE — DISCHARGE NOTE NURSING/CASE MANAGEMENT/SOCIAL WORK - PATIENT PORTAL LINK FT
You can access the FollowMyHealth Patient Portal offered by [facility] by registering at the following website: [email or web address]. By joining [email or web address] FollowMyHealth portal, you will also be able to view your health information using other applications (apps) compatible with our system.

## 2024-06-10 NOTE — PROGRESS NOTE ADULT - PROBLEM SELECTOR PLAN 7
-Continue home Levothyroxine 50mcg qd

## 2024-06-11 ENCOUNTER — HOSPITAL ENCOUNTER (OUTPATIENT)
Dept: HOSPITAL 74 - JONCCHEMO | Age: 84
Discharge: HOME | End: 2024-06-11
Attending: INTERNAL MEDICINE
Payer: COMMERCIAL

## 2024-06-11 VITALS
TEMPERATURE: 98.1 F | RESPIRATION RATE: 18 BRPM | DIASTOLIC BLOOD PRESSURE: 65 MMHG | HEART RATE: 66 BPM | SYSTOLIC BLOOD PRESSURE: 132 MMHG

## 2024-06-11 DIAGNOSIS — D46.9: Primary | ICD-10-CM

## 2024-06-11 LAB
ALBUMIN SERPL-MCNC: 2.3 G/DL (ref 3.4–5)
ALP SERPL-CCNC: 102 U/L (ref 45–117)
ALT SERPL-CCNC: 24 U/L (ref 13–61)
ANION GAP SERPL CALC-SCNC: 7 MMOL/L (ref 4–13)
AST SERPL-CCNC: 51 U/L (ref 15–37)
BILIRUB CONJ SERPL-MCNC: 0.2 MG/DL (ref 0–0.2)
BILIRUB DIRECT SERPL-MCNC: 306 U/L (ref 87–246)
BILIRUB SERPL-MCNC: 0.4 MG/DL (ref 0.2–1)
BUN SERPL-MCNC: 26.4 MG/DL (ref 7–18)
CALCIUM SERPL-MCNC: 8.3 MG/DL (ref 8.5–10.1)
CHLORIDE SERPL-SCNC: 102 MMOL/L (ref 98–107)
CO2 SERPL-SCNC: 25 MMOL/L (ref 21–32)
CREAT SERPL-MCNC: 1.2 MG/DL (ref 0.55–1.3)
DEPRECATED RDW RBC AUTO: 15.6 % (ref 11.9–15.9)
GLUCOSE SERPL-MCNC: 127 MG/DL (ref 74–106)
HCT VFR BLD CALC: 25.8 % (ref 35.4–49)
HGB BLD-MCNC: 8.7 GM/DL (ref 11.7–16.9)
MCH RBC QN AUTO: 29.2 PG (ref 25.7–33.7)
MCHC RBC AUTO-ENTMCNC: 33.7 G/DL (ref 32–35.9)
MCV RBC: 86.5 FL (ref 80–96)
PLATELET # BLD AUTO: 39 10^3/UL (ref 134–434)
PMV BLD: 8.8 FL (ref 7.5–11.1)
POTASSIUM SERPLBLD-SCNC: 4.5 MMOL/L (ref 3.5–5.1)
PROT SERPL-MCNC: 6.4 G/DL (ref 6.4–8.2)
RBC # BLD AUTO: 2.99 M/MM3 (ref 4–5.6)
RETICS # AUTO: 0.84 % (ref 0.5–1.5)
SODIUM SERPL-SCNC: 134 MMOL/L (ref 136–145)
WBC # BLD AUTO: 0.9 K/MM3 (ref 4–10)

## 2024-06-11 PROCEDURE — 3E013GC INTRODUCTION OF OTHER THERAPEUTIC SUBSTANCE INTO SUBCUTANEOUS TISSUE, PERCUTANEOUS APPROACH: ICD-10-PCS | Performed by: INTERNAL MEDICINE

## 2024-06-11 RX ADMIN — LUSPATERCEPT ONE MG: 75 INJECTION, POWDER, LYOPHILIZED, FOR SOLUTION SUBCUTANEOUS at 12:17

## 2024-06-16 ENCOUNTER — INPATIENT (INPATIENT)
Facility: HOSPITAL | Age: 84
LOS: 2 days | Discharge: HOME | DRG: 812 | End: 2024-06-19
Attending: STUDENT IN AN ORGANIZED HEALTH CARE EDUCATION/TRAINING PROGRAM | Admitting: STUDENT IN AN ORGANIZED HEALTH CARE EDUCATION/TRAINING PROGRAM
Payer: MEDICARE

## 2024-06-16 VITALS
TEMPERATURE: 98 F | SYSTOLIC BLOOD PRESSURE: 114 MMHG | DIASTOLIC BLOOD PRESSURE: 65 MMHG | HEART RATE: 87 BPM | OXYGEN SATURATION: 100 % | RESPIRATION RATE: 20 BRPM | HEIGHT: 65 IN | WEIGHT: 160.06 LBS

## 2024-06-16 DIAGNOSIS — E78.5 HYPERLIPIDEMIA, UNSPECIFIED: ICD-10-CM

## 2024-06-16 DIAGNOSIS — N40.0 BENIGN PROSTATIC HYPERPLASIA WITHOUT LOWER URINARY TRACT SYMPTOMS: ICD-10-CM

## 2024-06-16 DIAGNOSIS — I25.10 ATHEROSCLEROTIC HEART DISEASE OF NATIVE CORONARY ARTERY WITHOUT ANGINA PECTORIS: ICD-10-CM

## 2024-06-16 DIAGNOSIS — Z95.2 PRESENCE OF PROSTHETIC HEART VALVE: Chronic | ICD-10-CM

## 2024-06-16 DIAGNOSIS — D61.818 OTHER PANCYTOPENIA: ICD-10-CM

## 2024-06-16 DIAGNOSIS — D64.9 ANEMIA, UNSPECIFIED: ICD-10-CM

## 2024-06-16 DIAGNOSIS — E11.9 TYPE 2 DIABETES MELLITUS WITHOUT COMPLICATIONS: ICD-10-CM

## 2024-06-16 DIAGNOSIS — E03.9 HYPOTHYROIDISM, UNSPECIFIED: ICD-10-CM

## 2024-06-16 DIAGNOSIS — I10 ESSENTIAL (PRIMARY) HYPERTENSION: ICD-10-CM

## 2024-06-16 DIAGNOSIS — Z29.9 ENCOUNTER FOR PROPHYLACTIC MEASURES, UNSPECIFIED: ICD-10-CM

## 2024-06-16 LAB
ALBUMIN SERPL ELPH-MCNC: 2.8 G/DL — LOW (ref 3.3–5)
ALP SERPL-CCNC: 76 U/L — SIGNIFICANT CHANGE UP (ref 40–120)
ALT FLD-CCNC: 12 U/L — SIGNIFICANT CHANGE UP (ref 10–45)
ANION GAP SERPL CALC-SCNC: 18 MMOL/L — HIGH (ref 5–17)
ANISOCYTOSIS BLD QL: SIGNIFICANT CHANGE UP
APTT BLD: 30.8 SEC — SIGNIFICANT CHANGE UP (ref 24.5–35.6)
AST SERPL-CCNC: 20 U/L — SIGNIFICANT CHANGE UP (ref 10–40)
BASOPHILS # BLD AUTO: 0 K/UL — SIGNIFICANT CHANGE UP (ref 0–0.2)
BASOPHILS NFR BLD AUTO: 0 % — SIGNIFICANT CHANGE UP (ref 0–2)
BILIRUB SERPL-MCNC: 0.3 MG/DL — SIGNIFICANT CHANGE UP (ref 0.2–1.2)
BLASTS # FLD: 35.6 % — HIGH (ref 0–0)
BLD GP AB SCN SERPL QL: NEGATIVE — SIGNIFICANT CHANGE UP
BUN SERPL-MCNC: 36 MG/DL — HIGH (ref 7–23)
CALCIUM SERPL-MCNC: 9.2 MG/DL — SIGNIFICANT CHANGE UP (ref 8.4–10.5)
CHLORIDE SERPL-SCNC: 102 MMOL/L — SIGNIFICANT CHANGE UP (ref 96–108)
CO2 SERPL-SCNC: 16 MMOL/L — LOW (ref 22–31)
CREAT SERPL-MCNC: 1.15 MG/DL — SIGNIFICANT CHANGE UP (ref 0.5–1.3)
EGFR: 63 ML/MIN/1.73M2 — SIGNIFICANT CHANGE UP
ELLIPTOCYTES BLD QL SMEAR: SLIGHT — SIGNIFICANT CHANGE UP
EOSINOPHIL # BLD AUTO: 0 K/UL — SIGNIFICANT CHANGE UP (ref 0–0.5)
EOSINOPHIL NFR BLD AUTO: 0 % — SIGNIFICANT CHANGE UP (ref 0–6)
GIANT PLATELETS BLD QL SMEAR: PRESENT — SIGNIFICANT CHANGE UP
GLUCOSE SERPL-MCNC: 151 MG/DL — HIGH (ref 70–99)
HCT VFR BLD CALC: 19.5 % — CRITICAL LOW (ref 39–50)
HCT VFR BLD CALC: 20.2 % — CRITICAL LOW (ref 39–50)
HGB BLD-MCNC: 6.3 G/DL — CRITICAL LOW (ref 13–17)
HGB BLD-MCNC: 6.7 G/DL — CRITICAL LOW (ref 13–17)
INR BLD: 1.58 RATIO — HIGH (ref 0.85–1.18)
LYMPHOCYTES # BLD AUTO: 0.49 K/UL — LOW (ref 1–3.3)
LYMPHOCYTES # BLD AUTO: 38.4 % — SIGNIFICANT CHANGE UP (ref 13–44)
MACROCYTES BLD QL: SIGNIFICANT CHANGE UP
MANUAL SMEAR VERIFICATION: SIGNIFICANT CHANGE UP
MCHC RBC-ENTMCNC: 29.6 PG — SIGNIFICANT CHANGE UP (ref 27–34)
MCHC RBC-ENTMCNC: 30.7 PG — SIGNIFICANT CHANGE UP (ref 27–34)
MCHC RBC-ENTMCNC: 31.2 GM/DL — LOW (ref 32–36)
MCHC RBC-ENTMCNC: 34.4 GM/DL — SIGNIFICANT CHANGE UP (ref 32–36)
MCV RBC AUTO: 89.4 FL — SIGNIFICANT CHANGE UP (ref 80–100)
MCV RBC AUTO: 94.8 FL — SIGNIFICANT CHANGE UP (ref 80–100)
MONOCYTES # BLD AUTO: 0.26 K/UL — SIGNIFICANT CHANGE UP (ref 0–0.9)
MONOCYTES NFR BLD AUTO: 20.5 % — HIGH (ref 2–14)
NEUTROPHILS # BLD AUTO: 0.07 K/UL — LOW (ref 1.8–7.4)
NEUTROPHILS NFR BLD AUTO: 5.5 % — LOW (ref 43–77)
NRBC # BLD: 0 /100 WBCS — SIGNIFICANT CHANGE UP (ref 0–0)
NRBC # BLD: 3 /100 WBCS — HIGH (ref 0–0)
PLAT MORPH BLD: NORMAL — SIGNIFICANT CHANGE UP
PLATELET # BLD AUTO: 32 K/UL — LOW (ref 150–400)
PLATELET # BLD AUTO: 55 K/UL — LOW (ref 150–400)
POIKILOCYTOSIS BLD QL AUTO: SLIGHT — SIGNIFICANT CHANGE UP
POLYCHROMASIA BLD QL SMEAR: SLIGHT — SIGNIFICANT CHANGE UP
POTASSIUM SERPL-MCNC: 4.8 MMOL/L — SIGNIFICANT CHANGE UP (ref 3.5–5.3)
POTASSIUM SERPL-SCNC: 4.8 MMOL/L — SIGNIFICANT CHANGE UP (ref 3.5–5.3)
PROT SERPL-MCNC: 6.9 G/DL — SIGNIFICANT CHANGE UP (ref 6–8.3)
PROTHROM AB SERPL-ACNC: 16.4 SEC — HIGH (ref 9.5–13)
RBC # BLD: 2.13 M/UL — LOW (ref 4.2–5.8)
RBC # BLD: 2.18 M/UL — LOW (ref 4.2–5.8)
RBC # FLD: 15 % — HIGH (ref 10.3–14.5)
RBC # FLD: 15.5 % — HIGH (ref 10.3–14.5)
RBC BLD AUTO: ABNORMAL
RH IG SCN BLD-IMP: POSITIVE — SIGNIFICANT CHANGE UP
SODIUM SERPL-SCNC: 136 MMOL/L — SIGNIFICANT CHANGE UP (ref 135–145)
TROPONIN T, HIGH SENSITIVITY RESULT: 18 NG/L — SIGNIFICANT CHANGE UP (ref 0–51)
TROPONIN T, HIGH SENSITIVITY RESULT: 19 NG/L — SIGNIFICANT CHANGE UP (ref 0–51)
WBC # BLD: 0.89 K/UL — CRITICAL LOW (ref 3.8–10.5)
WBC # BLD: 1.28 K/UL — LOW (ref 3.8–10.5)
WBC # FLD AUTO: 0.89 K/UL — CRITICAL LOW (ref 3.8–10.5)
WBC # FLD AUTO: 1.28 K/UL — LOW (ref 3.8–10.5)

## 2024-06-16 PROCEDURE — 99291 CRITICAL CARE FIRST HOUR: CPT | Mod: GC

## 2024-06-16 PROCEDURE — 99497 ADVNCD CARE PLAN 30 MIN: CPT | Mod: 25

## 2024-06-16 PROCEDURE — 99223 1ST HOSP IP/OBS HIGH 75: CPT | Mod: GC

## 2024-06-16 RX ORDER — LEVOTHYROXINE SODIUM 25 MCG
50 TABLET ORAL DAILY
Refills: 0 | Status: DISCONTINUED | OUTPATIENT
Start: 2024-06-16 | End: 2024-06-19

## 2024-06-16 RX ORDER — PANTOPRAZOLE SODIUM 40 MG/10ML
80 INJECTION, POWDER, FOR SOLUTION INTRAVENOUS ONCE
Refills: 0 | Status: COMPLETED | OUTPATIENT
Start: 2024-06-16 | End: 2024-06-16

## 2024-06-16 RX ORDER — FLUCONAZOLE 200 MG
200 TABLET ORAL DAILY
Refills: 0 | Status: DISCONTINUED | OUTPATIENT
Start: 2024-06-16 | End: 2024-06-19

## 2024-06-16 RX ORDER — PANTOPRAZOLE SODIUM 40 MG/10ML
8 INJECTION, POWDER, FOR SOLUTION INTRAVENOUS
Qty: 80 | Refills: 0 | Status: DISCONTINUED | OUTPATIENT
Start: 2024-06-16 | End: 2024-06-17

## 2024-06-16 RX ORDER — ACYCLOVIR SODIUM 50 MG/ML
400 VIAL (ML) INTRAVENOUS
Refills: 0 | Status: DISCONTINUED | OUTPATIENT
Start: 2024-06-16 | End: 2024-06-19

## 2024-06-16 RX ORDER — DOXAZOSIN MESYLATE 2 MG/1
2 TABLET ORAL AT BEDTIME
Refills: 0 | Status: DISCONTINUED | OUTPATIENT
Start: 2024-06-16 | End: 2024-06-19

## 2024-06-16 RX ORDER — ESCITALOPRAM OXALATE 20 MG/1
10 TABLET, FILM COATED ORAL DAILY
Refills: 0 | Status: DISCONTINUED | OUTPATIENT
Start: 2024-06-16 | End: 2024-06-19

## 2024-06-16 RX ADMIN — PANTOPRAZOLE SODIUM 80 MILLIGRAM(S): 40 INJECTION, POWDER, FOR SOLUTION INTRAVENOUS at 10:44

## 2024-06-16 RX ADMIN — Medication 400 MILLIGRAM(S): at 22:29

## 2024-06-16 RX ADMIN — PANTOPRAZOLE SODIUM 10 MG/HR: 40 INJECTION, POWDER, FOR SOLUTION INTRAVENOUS at 11:34

## 2024-06-16 RX ADMIN — DOXAZOSIN MESYLATE 2 MILLIGRAM(S): 2 TABLET ORAL at 22:29

## 2024-06-17 ENCOUNTER — RESULT REVIEW (OUTPATIENT)
Age: 84
End: 2024-06-17

## 2024-06-17 LAB
ADD ON TEST-SPECIMEN IN LAB: SIGNIFICANT CHANGE UP
ANION GAP SERPL CALC-SCNC: 11 MMOL/L — SIGNIFICANT CHANGE UP (ref 5–17)
BASOPHILS # BLD AUTO: 0 K/UL — SIGNIFICANT CHANGE UP (ref 0–0.2)
BASOPHILS # BLD AUTO: 0 K/UL — SIGNIFICANT CHANGE UP (ref 0–0.2)
BASOPHILS NFR BLD AUTO: 0 % — SIGNIFICANT CHANGE UP (ref 0–2)
BASOPHILS NFR BLD AUTO: 0 % — SIGNIFICANT CHANGE UP (ref 0–2)
BUN SERPL-MCNC: 51 MG/DL — HIGH (ref 7–23)
CALCIUM SERPL-MCNC: 8.9 MG/DL — SIGNIFICANT CHANGE UP (ref 8.4–10.5)
CHLORIDE SERPL-SCNC: 105 MMOL/L — SIGNIFICANT CHANGE UP (ref 96–108)
CO2 SERPL-SCNC: 22 MMOL/L — SIGNIFICANT CHANGE UP (ref 22–31)
CREAT SERPL-MCNC: 0.97 MG/DL — SIGNIFICANT CHANGE UP (ref 0.5–1.3)
EGFR: 77 ML/MIN/1.73M2 — SIGNIFICANT CHANGE UP
EOSINOPHIL # BLD AUTO: 0 K/UL — SIGNIFICANT CHANGE UP (ref 0–0.5)
EOSINOPHIL # BLD AUTO: 0 K/UL — SIGNIFICANT CHANGE UP (ref 0–0.5)
EOSINOPHIL NFR BLD AUTO: 0 % — SIGNIFICANT CHANGE UP (ref 0–6)
EOSINOPHIL NFR BLD AUTO: 0 % — SIGNIFICANT CHANGE UP (ref 0–6)
GLUCOSE SERPL-MCNC: 99 MG/DL — SIGNIFICANT CHANGE UP (ref 70–99)
HCT VFR BLD CALC: 21.6 % — LOW (ref 39–50)
HCT VFR BLD CALC: 21.6 % — LOW (ref 39–50)
HCT VFR BLD CALC: 21.7 % — LOW (ref 39–50)
HGB BLD-MCNC: 7.2 G/DL — LOW (ref 13–17)
HGB BLD-MCNC: 7.3 G/DL — LOW (ref 13–17)
HGB BLD-MCNC: 7.5 G/DL — LOW (ref 13–17)
IMM GRANULOCYTES NFR BLD AUTO: 0.9 % — SIGNIFICANT CHANGE UP (ref 0–0.9)
LYMPHOCYTES # BLD AUTO: 0.34 K/UL — LOW (ref 1–3.3)
LYMPHOCYTES # BLD AUTO: 0.48 K/UL — LOW (ref 1–3.3)
LYMPHOCYTES # BLD AUTO: 36.6 % — SIGNIFICANT CHANGE UP (ref 13–44)
LYMPHOCYTES # BLD AUTO: 44 % — SIGNIFICANT CHANGE UP (ref 13–44)
MAGNESIUM SERPL-MCNC: 1.6 MG/DL — SIGNIFICANT CHANGE UP (ref 1.6–2.6)
MCHC RBC-ENTMCNC: 29.6 PG — SIGNIFICANT CHANGE UP (ref 27–34)
MCHC RBC-ENTMCNC: 29.8 PG — SIGNIFICANT CHANGE UP (ref 27–34)
MCHC RBC-ENTMCNC: 30.5 PG — SIGNIFICANT CHANGE UP (ref 27–34)
MCHC RBC-ENTMCNC: 33.2 GM/DL — SIGNIFICANT CHANGE UP (ref 32–36)
MCHC RBC-ENTMCNC: 33.8 GM/DL — SIGNIFICANT CHANGE UP (ref 32–36)
MCHC RBC-ENTMCNC: 34.7 GM/DL — SIGNIFICANT CHANGE UP (ref 32–36)
MCV RBC AUTO: 87.8 FL — SIGNIFICANT CHANGE UP (ref 80–100)
MCV RBC AUTO: 88.2 FL — SIGNIFICANT CHANGE UP (ref 80–100)
MCV RBC AUTO: 89.3 FL — SIGNIFICANT CHANGE UP (ref 80–100)
MONOCYTES # BLD AUTO: 0.54 K/UL — SIGNIFICANT CHANGE UP (ref 0–0.9)
MONOCYTES # BLD AUTO: 0.54 K/UL — SIGNIFICANT CHANGE UP (ref 0–0.9)
MONOCYTES NFR BLD AUTO: 49.5 % — HIGH (ref 2–14)
MONOCYTES NFR BLD AUTO: 58.1 % — HIGH (ref 2–14)
NEUTROPHILS # BLD AUTO: 0.05 K/UL — LOW (ref 1.8–7.4)
NEUTROPHILS # BLD AUTO: 0.06 K/UL — LOW (ref 1.8–7.4)
NEUTROPHILS NFR BLD AUTO: 5.3 % — LOW (ref 43–77)
NEUTROPHILS NFR BLD AUTO: 5.6 % — LOW (ref 43–77)
NRBC # BLD: 0 /100 WBCS — SIGNIFICANT CHANGE UP (ref 0–0)
PHOSPHATE SERPL-MCNC: 2.3 MG/DL — LOW (ref 2.5–4.5)
PLATELET # BLD AUTO: 47 K/UL — LOW (ref 150–400)
PLATELET # BLD AUTO: 47 K/UL — LOW (ref 150–400)
PLATELET # BLD AUTO: 48 K/UL — LOW (ref 150–400)
POTASSIUM SERPL-MCNC: 4.6 MMOL/L — SIGNIFICANT CHANGE UP (ref 3.5–5.3)
POTASSIUM SERPL-SCNC: 4.6 MMOL/L — SIGNIFICANT CHANGE UP (ref 3.5–5.3)
RBC # BLD: 2.43 M/UL — LOW (ref 4.2–5.8)
RBC # BLD: 2.45 M/UL — LOW (ref 4.2–5.8)
RBC # BLD: 2.46 M/UL — LOW (ref 4.2–5.8)
RBC # FLD: 14.7 % — HIGH (ref 10.3–14.5)
RBC # FLD: 14.8 % — HIGH (ref 10.3–14.5)
RBC # FLD: 14.9 % — HIGH (ref 10.3–14.5)
SODIUM SERPL-SCNC: 138 MMOL/L — SIGNIFICANT CHANGE UP (ref 135–145)
WBC # BLD: 0.84 K/UL — CRITICAL LOW (ref 3.8–10.5)
WBC # BLD: 0.93 K/UL — CRITICAL LOW (ref 3.8–10.5)
WBC # BLD: 1.09 K/UL — LOW (ref 3.8–10.5)
WBC # FLD AUTO: 0.84 K/UL — CRITICAL LOW (ref 3.8–10.5)
WBC # FLD AUTO: 0.93 K/UL — CRITICAL LOW (ref 3.8–10.5)
WBC # FLD AUTO: 1.09 K/UL — LOW (ref 3.8–10.5)

## 2024-06-17 PROCEDURE — 93306 TTE W/DOPPLER COMPLETE: CPT | Mod: 26

## 2024-06-17 PROCEDURE — 99233 SBSQ HOSP IP/OBS HIGH 50: CPT

## 2024-06-17 PROCEDURE — 99223 1ST HOSP IP/OBS HIGH 75: CPT | Mod: GC

## 2024-06-17 RX ORDER — MAGNESIUM SULFATE 100 %
2 POWDER (GRAM) MISCELLANEOUS ONCE
Refills: 0 | Status: COMPLETED | OUTPATIENT
Start: 2024-06-17 | End: 2024-06-17

## 2024-06-17 RX ORDER — PANTOPRAZOLE SODIUM 40 MG/10ML
40 INJECTION, POWDER, FOR SOLUTION INTRAVENOUS
Refills: 0 | Status: DISCONTINUED | OUTPATIENT
Start: 2024-06-17 | End: 2024-06-19

## 2024-06-17 RX ADMIN — PANTOPRAZOLE SODIUM 40 MILLIGRAM(S): 40 INJECTION, POWDER, FOR SOLUTION INTRAVENOUS at 17:04

## 2024-06-17 RX ADMIN — ESCITALOPRAM OXALATE 10 MILLIGRAM(S): 20 TABLET, FILM COATED ORAL at 11:22

## 2024-06-17 RX ADMIN — Medication 25 GRAM(S): at 11:22

## 2024-06-17 RX ADMIN — Medication 200 MILLIGRAM(S): at 11:22

## 2024-06-17 RX ADMIN — DOXAZOSIN MESYLATE 2 MILLIGRAM(S): 2 TABLET ORAL at 22:36

## 2024-06-17 RX ADMIN — Medication 400 MILLIGRAM(S): at 22:36

## 2024-06-17 RX ADMIN — Medication 400 MILLIGRAM(S): at 11:22

## 2024-06-17 RX ADMIN — Medication 50 MICROGRAM(S): at 06:03

## 2024-06-18 LAB
ADD ON TEST-SPECIMEN IN LAB: SIGNIFICANT CHANGE UP
ANION GAP SERPL CALC-SCNC: 14 MMOL/L — SIGNIFICANT CHANGE UP (ref 5–17)
BASOPHILS # BLD AUTO: 0 K/UL — SIGNIFICANT CHANGE UP (ref 0–0.2)
BASOPHILS NFR BLD AUTO: 0 % — SIGNIFICANT CHANGE UP (ref 0–2)
BLASTS # FLD: 40.6 % — HIGH (ref 0–0)
BUN SERPL-MCNC: 34 MG/DL — HIGH (ref 7–23)
CALCIUM SERPL-MCNC: 9.5 MG/DL — SIGNIFICANT CHANGE UP (ref 8.4–10.5)
CHLORIDE SERPL-SCNC: 104 MMOL/L — SIGNIFICANT CHANGE UP (ref 96–108)
CO2 SERPL-SCNC: 22 MMOL/L — SIGNIFICANT CHANGE UP (ref 22–31)
CREAT SERPL-MCNC: 1.02 MG/DL — SIGNIFICANT CHANGE UP (ref 0.5–1.3)
EGFR: 73 ML/MIN/1.73M2 — SIGNIFICANT CHANGE UP
EOSINOPHIL # BLD AUTO: 0 K/UL — SIGNIFICANT CHANGE UP (ref 0–0.5)
EOSINOPHIL NFR BLD AUTO: 0 % — SIGNIFICANT CHANGE UP (ref 0–6)
GIANT PLATELETS BLD QL SMEAR: PRESENT — SIGNIFICANT CHANGE UP
GLUCOSE SERPL-MCNC: 100 MG/DL — HIGH (ref 70–99)
HCT VFR BLD CALC: 22.9 % — LOW (ref 39–50)
HGB BLD-MCNC: 7.5 G/DL — LOW (ref 13–17)
LYMPHOCYTES # BLD AUTO: 0.34 K/UL — LOW (ref 1–3.3)
LYMPHOCYTES # BLD AUTO: 35.2 % — SIGNIFICANT CHANGE UP (ref 13–44)
MAGNESIUM SERPL-MCNC: 1.9 MG/DL — SIGNIFICANT CHANGE UP (ref 1.6–2.6)
MANUAL SMEAR VERIFICATION: SIGNIFICANT CHANGE UP
MCHC RBC-ENTMCNC: 30.1 PG — SIGNIFICANT CHANGE UP (ref 27–34)
MCHC RBC-ENTMCNC: 32.8 GM/DL — SIGNIFICANT CHANGE UP (ref 32–36)
MCV RBC AUTO: 92 FL — SIGNIFICANT CHANGE UP (ref 80–100)
MONOCYTES # BLD AUTO: 0.19 K/UL — SIGNIFICANT CHANGE UP (ref 0–0.9)
MONOCYTES NFR BLD AUTO: 19.8 % — HIGH (ref 2–14)
NEUTROPHILS # BLD AUTO: 0.04 K/UL — LOW (ref 1.8–7.4)
NEUTROPHILS NFR BLD AUTO: 4.4 % — LOW (ref 43–77)
PHOSPHATE SERPL-MCNC: 3.4 MG/DL — SIGNIFICANT CHANGE UP (ref 2.5–4.5)
PLAT MORPH BLD: NORMAL — SIGNIFICANT CHANGE UP
PLATELET # BLD AUTO: 44 K/UL — LOW (ref 150–400)
POTASSIUM SERPL-MCNC: 4.5 MMOL/L — SIGNIFICANT CHANGE UP (ref 3.5–5.3)
POTASSIUM SERPL-SCNC: 4.5 MMOL/L — SIGNIFICANT CHANGE UP (ref 3.5–5.3)
RBC # BLD: 2.49 M/UL — LOW (ref 4.2–5.8)
RBC # FLD: 15.1 % — HIGH (ref 10.3–14.5)
RBC BLD AUTO: SIGNIFICANT CHANGE UP
SODIUM SERPL-SCNC: 140 MMOL/L — SIGNIFICANT CHANGE UP (ref 135–145)
WBC # BLD: 0.97 K/UL — CRITICAL LOW (ref 3.8–10.5)
WBC # FLD AUTO: 0.97 K/UL — CRITICAL LOW (ref 3.8–10.5)

## 2024-06-18 PROCEDURE — 99232 SBSQ HOSP IP/OBS MODERATE 35: CPT | Mod: GC

## 2024-06-18 RX ADMIN — PANTOPRAZOLE SODIUM 40 MILLIGRAM(S): 40 INJECTION, POWDER, FOR SOLUTION INTRAVENOUS at 06:05

## 2024-06-18 RX ADMIN — Medication 400 MILLIGRAM(S): at 21:38

## 2024-06-18 RX ADMIN — DOXAZOSIN MESYLATE 2 MILLIGRAM(S): 2 TABLET ORAL at 21:38

## 2024-06-18 RX ADMIN — Medication 400 MILLIGRAM(S): at 10:02

## 2024-06-18 RX ADMIN — Medication 200 MILLIGRAM(S): at 12:19

## 2024-06-18 RX ADMIN — Medication 50 MICROGRAM(S): at 06:05

## 2024-06-18 RX ADMIN — ESCITALOPRAM OXALATE 10 MILLIGRAM(S): 20 TABLET, FILM COATED ORAL at 12:19

## 2024-06-18 RX ADMIN — PANTOPRAZOLE SODIUM 40 MILLIGRAM(S): 40 INJECTION, POWDER, FOR SOLUTION INTRAVENOUS at 17:56

## 2024-06-19 ENCOUNTER — TRANSCRIPTION ENCOUNTER (OUTPATIENT)
Age: 84
End: 2024-06-19

## 2024-06-19 VITALS
DIASTOLIC BLOOD PRESSURE: 74 MMHG | OXYGEN SATURATION: 98 % | HEART RATE: 64 BPM | SYSTOLIC BLOOD PRESSURE: 158 MMHG | TEMPERATURE: 99 F | RESPIRATION RATE: 18 BRPM | WEIGHT: 163.14 LBS

## 2024-06-19 DIAGNOSIS — Z71.89 OTHER SPECIFIED COUNSELING: ICD-10-CM

## 2024-06-19 DIAGNOSIS — K92.2 GASTROINTESTINAL HEMORRHAGE, UNSPECIFIED: ICD-10-CM

## 2024-06-19 DIAGNOSIS — R53.81 OTHER MALAISE: ICD-10-CM

## 2024-06-19 DIAGNOSIS — Z51.5 ENCOUNTER FOR PALLIATIVE CARE: ICD-10-CM

## 2024-06-19 DIAGNOSIS — D46.9 MYELODYSPLASTIC SYNDROME, UNSPECIFIED: ICD-10-CM

## 2024-06-19 LAB
ANION GAP SERPL CALC-SCNC: 13 MMOL/L — SIGNIFICANT CHANGE UP (ref 5–17)
BASOPHILS # BLD AUTO: 0 K/UL — SIGNIFICANT CHANGE UP (ref 0–0.2)
BASOPHILS NFR BLD AUTO: 0 % — SIGNIFICANT CHANGE UP (ref 0–2)
BLASTS # FLD: 43 % — HIGH (ref 0–0)
BLD GP AB SCN SERPL QL: NEGATIVE — SIGNIFICANT CHANGE UP
BUN SERPL-MCNC: 24 MG/DL — HIGH (ref 7–23)
CALCIUM SERPL-MCNC: 9.1 MG/DL — SIGNIFICANT CHANGE UP (ref 8.4–10.5)
CHLORIDE SERPL-SCNC: 102 MMOL/L — SIGNIFICANT CHANGE UP (ref 96–108)
CO2 SERPL-SCNC: 22 MMOL/L — SIGNIFICANT CHANGE UP (ref 22–31)
CREAT SERPL-MCNC: 1 MG/DL — SIGNIFICANT CHANGE UP (ref 0.5–1.3)
EGFR: 75 ML/MIN/1.73M2 — SIGNIFICANT CHANGE UP
EOSINOPHIL # BLD AUTO: 0 K/UL — SIGNIFICANT CHANGE UP (ref 0–0.5)
EOSINOPHIL NFR BLD AUTO: 0 % — SIGNIFICANT CHANGE UP (ref 0–6)
GLUCOSE SERPL-MCNC: 98 MG/DL — SIGNIFICANT CHANGE UP (ref 70–99)
HCT VFR BLD CALC: 21.3 % — LOW (ref 39–50)
HCT VFR BLD CALC: 22.9 % — LOW (ref 39–50)
HGB BLD-MCNC: 6.8 G/DL — CRITICAL LOW (ref 13–17)
HGB BLD-MCNC: 7.5 G/DL — LOW (ref 13–17)
LYMPHOCYTES # BLD AUTO: 0.26 K/UL — LOW (ref 1–3.3)
LYMPHOCYTES # BLD AUTO: 30.4 % — SIGNIFICANT CHANGE UP (ref 13–44)
MAGNESIUM SERPL-MCNC: 1.8 MG/DL — SIGNIFICANT CHANGE UP (ref 1.6–2.6)
MANUAL SMEAR VERIFICATION: SIGNIFICANT CHANGE UP
MCHC RBC-ENTMCNC: 29.6 PG — SIGNIFICANT CHANGE UP (ref 27–34)
MCHC RBC-ENTMCNC: 29.7 PG — SIGNIFICANT CHANGE UP (ref 27–34)
MCHC RBC-ENTMCNC: 31.9 GM/DL — LOW (ref 32–36)
MCHC RBC-ENTMCNC: 32.8 GM/DL — SIGNIFICANT CHANGE UP (ref 32–36)
MCV RBC AUTO: 90.5 FL — SIGNIFICANT CHANGE UP (ref 80–100)
MCV RBC AUTO: 93 FL — SIGNIFICANT CHANGE UP (ref 80–100)
MONOCYTES # BLD AUTO: 0.21 K/UL — SIGNIFICANT CHANGE UP (ref 0–0.9)
MONOCYTES NFR BLD AUTO: 25.3 % — HIGH (ref 2–14)
NEUTROPHILS # BLD AUTO: 0.01 K/UL — LOW (ref 1.8–7.4)
NEUTROPHILS NFR BLD AUTO: 1.3 % — LOW (ref 43–77)
NRBC # BLD: 0 /100 WBCS — SIGNIFICANT CHANGE UP (ref 0–0)
PHOSPHATE SERPL-MCNC: 3 MG/DL — SIGNIFICANT CHANGE UP (ref 2.5–4.5)
PLAT MORPH BLD: NORMAL — SIGNIFICANT CHANGE UP
PLATELET # BLD AUTO: 28 K/UL — LOW (ref 150–400)
PLATELET # BLD AUTO: 33 K/UL — LOW (ref 150–400)
POTASSIUM SERPL-MCNC: 3.8 MMOL/L — SIGNIFICANT CHANGE UP (ref 3.5–5.3)
POTASSIUM SERPL-SCNC: 3.8 MMOL/L — SIGNIFICANT CHANGE UP (ref 3.5–5.3)
RBC # BLD: 2.29 M/UL — LOW (ref 4.2–5.8)
RBC # BLD: 2.53 M/UL — LOW (ref 4.2–5.8)
RBC # FLD: 14.6 % — HIGH (ref 10.3–14.5)
RBC # FLD: 15 % — HIGH (ref 10.3–14.5)
RBC BLD AUTO: SIGNIFICANT CHANGE UP
RH IG SCN BLD-IMP: POSITIVE — SIGNIFICANT CHANGE UP
SODIUM SERPL-SCNC: 137 MMOL/L — SIGNIFICANT CHANGE UP (ref 135–145)
WBC # BLD: 0.82 K/UL — CRITICAL LOW (ref 3.8–10.5)
WBC # BLD: 0.84 K/UL — CRITICAL LOW (ref 3.8–10.5)
WBC # FLD AUTO: 0.82 K/UL — CRITICAL LOW (ref 3.8–10.5)
WBC # FLD AUTO: 0.84 K/UL — CRITICAL LOW (ref 3.8–10.5)

## 2024-06-19 PROCEDURE — P9073: CPT

## 2024-06-19 PROCEDURE — P9016: CPT

## 2024-06-19 PROCEDURE — 36430 TRANSFUSION BLD/BLD COMPNT: CPT

## 2024-06-19 PROCEDURE — 86923 COMPATIBILITY TEST ELECTRIC: CPT

## 2024-06-19 PROCEDURE — 97161 PT EVAL LOW COMPLEX 20 MIN: CPT

## 2024-06-19 PROCEDURE — 85027 COMPLETE CBC AUTOMATED: CPT

## 2024-06-19 PROCEDURE — 86900 BLOOD TYPING SEROLOGIC ABO: CPT

## 2024-06-19 PROCEDURE — 80053 COMPREHEN METABOLIC PANEL: CPT

## 2024-06-19 PROCEDURE — 84484 ASSAY OF TROPONIN QUANT: CPT

## 2024-06-19 PROCEDURE — 99291 CRITICAL CARE FIRST HOUR: CPT

## 2024-06-19 PROCEDURE — 85730 THROMBOPLASTIN TIME PARTIAL: CPT

## 2024-06-19 PROCEDURE — 96374 THER/PROPH/DIAG INJ IV PUSH: CPT

## 2024-06-19 PROCEDURE — 85025 COMPLETE CBC W/AUTO DIFF WBC: CPT

## 2024-06-19 PROCEDURE — 85610 PROTHROMBIN TIME: CPT

## 2024-06-19 PROCEDURE — 99223 1ST HOSP IP/OBS HIGH 75: CPT

## 2024-06-19 PROCEDURE — 83735 ASSAY OF MAGNESIUM: CPT

## 2024-06-19 PROCEDURE — 36415 COLL VENOUS BLD VENIPUNCTURE: CPT

## 2024-06-19 PROCEDURE — 86901 BLOOD TYPING SEROLOGIC RH(D): CPT

## 2024-06-19 PROCEDURE — 86850 RBC ANTIBODY SCREEN: CPT

## 2024-06-19 PROCEDURE — 99239 HOSP IP/OBS DSCHRG MGMT >30: CPT

## 2024-06-19 PROCEDURE — 84100 ASSAY OF PHOSPHORUS: CPT

## 2024-06-19 PROCEDURE — 80048 BASIC METABOLIC PNL TOTAL CA: CPT

## 2024-06-19 PROCEDURE — C8929: CPT

## 2024-06-19 PROCEDURE — 99497 ADVNCD CARE PLAN 30 MIN: CPT | Mod: 25

## 2024-06-19 RX ORDER — ACETAMINOPHEN 325 MG
1000 TABLET ORAL ONCE
Refills: 0 | Status: COMPLETED | OUTPATIENT
Start: 2024-06-19 | End: 2024-06-19

## 2024-06-19 RX ADMIN — Medication 200 MILLIGRAM(S): at 11:49

## 2024-06-19 RX ADMIN — Medication 400 MILLIGRAM(S): at 11:49

## 2024-06-19 RX ADMIN — Medication 1000 MILLIGRAM(S): at 12:49

## 2024-06-19 RX ADMIN — PANTOPRAZOLE SODIUM 40 MILLIGRAM(S): 40 INJECTION, POWDER, FOR SOLUTION INTRAVENOUS at 17:13

## 2024-06-19 RX ADMIN — ESCITALOPRAM OXALATE 10 MILLIGRAM(S): 20 TABLET, FILM COATED ORAL at 11:49

## 2024-06-19 RX ADMIN — Medication 50 MICROGRAM(S): at 06:36

## 2024-06-19 RX ADMIN — Medication 400 MILLIGRAM(S): at 09:43

## 2024-06-19 RX ADMIN — PANTOPRAZOLE SODIUM 40 MILLIGRAM(S): 40 INJECTION, POWDER, FOR SOLUTION INTRAVENOUS at 06:36

## 2024-06-24 ENCOUNTER — HOSPITAL ENCOUNTER (OUTPATIENT)
Dept: HOSPITAL 74 - JONCCHEMO | Age: 84
Discharge: HOME | End: 2024-06-24
Attending: INTERNAL MEDICINE
Payer: COMMERCIAL

## 2024-06-24 VITALS — SYSTOLIC BLOOD PRESSURE: 145 MMHG | HEART RATE: 71 BPM | TEMPERATURE: 98.3 F | DIASTOLIC BLOOD PRESSURE: 56 MMHG

## 2024-06-24 VITALS — RESPIRATION RATE: 20 BRPM

## 2024-06-24 DIAGNOSIS — D46.9: Primary | ICD-10-CM

## 2024-06-24 LAB
ANISOCYTOSIS BLD QL: 0
DEPRECATED RDW RBC AUTO: 14.8 % (ref 11.9–15.9)
HCT VFR BLD CALC: 22.1 % (ref 35.4–49)
HGB BLD-MCNC: 7.4 GM/DL (ref 11.7–16.9)
MACROCYTES BLD QL: 0
MCH RBC QN AUTO: 29.6 PG (ref 25.7–33.7)
MCHC RBC AUTO-ENTMCNC: 33.4 G/DL (ref 32–35.9)
MCV RBC: 88.8 FL (ref 80–96)
PLATELET # BLD AUTO: 15 10^3/UL (ref 134–434)
PMV BLD: 8.7 FL (ref 7.5–11.1)
RBC # BLD AUTO: 2.49 M/MM3 (ref 4–5.6)
WBC # BLD AUTO: 1.2 K/MM3 (ref 4–10)

## 2024-06-24 PROCEDURE — P9058 RBC, L/R, CMV-NEG, IRRAD: HCPCS

## 2024-06-24 PROCEDURE — P9037 PLATE PHERES LEUKOREDU IRRAD: HCPCS

## 2024-06-24 PROCEDURE — 30233N1 TRANSFUSION OF NONAUTOLOGOUS RED BLOOD CELLS INTO PERIPHERAL VEIN, PERCUTANEOUS APPROACH: ICD-10-PCS | Performed by: INTERNAL MEDICINE

## 2024-06-24 RX ADMIN — SODIUM CHLORIDE ONE MLS/HR: 9 INJECTION, SOLUTION INTRAVENOUS at 11:00

## 2024-06-25 NOTE — H&P ADULT - NSHPPOAPULMEMBOLUS_GEN_A_CORE
Patient called in for refill of gabapentin refill. Last OV with Dr. Mcintosh 05/23/24. Discussed with patient policy that medication only refilled if we have seen a patient within the last year. As this is a long term medication she should obtain future refills from PCP or pain management.     Offered to send in short script in the meantime, however appears Dr. Dockery already placed order for refill. Patient informed.     She is welcome to follow up with us anytime for her spine needs.   no

## 2024-06-29 ENCOUNTER — INPATIENT (INPATIENT)
Facility: HOSPITAL | Age: 84
LOS: 0 days | Discharge: HOME | DRG: 812 | End: 2024-06-30
Attending: STUDENT IN AN ORGANIZED HEALTH CARE EDUCATION/TRAINING PROGRAM | Admitting: HOSPITALIST
Payer: MEDICARE

## 2024-06-29 VITALS
TEMPERATURE: 97 F | RESPIRATION RATE: 16 BRPM | HEIGHT: 65 IN | SYSTOLIC BLOOD PRESSURE: 90 MMHG | HEART RATE: 96 BPM | DIASTOLIC BLOOD PRESSURE: 51 MMHG | OXYGEN SATURATION: 99 % | WEIGHT: 149.91 LBS

## 2024-06-29 DIAGNOSIS — D64.9 ANEMIA, UNSPECIFIED: ICD-10-CM

## 2024-06-29 DIAGNOSIS — I10 ESSENTIAL (PRIMARY) HYPERTENSION: ICD-10-CM

## 2024-06-29 DIAGNOSIS — I25.10 ATHEROSCLEROTIC HEART DISEASE OF NATIVE CORONARY ARTERY WITHOUT ANGINA PECTORIS: ICD-10-CM

## 2024-06-29 DIAGNOSIS — D61.818 OTHER PANCYTOPENIA: ICD-10-CM

## 2024-06-29 DIAGNOSIS — E78.5 HYPERLIPIDEMIA, UNSPECIFIED: ICD-10-CM

## 2024-06-29 DIAGNOSIS — K92.2 GASTROINTESTINAL HEMORRHAGE, UNSPECIFIED: ICD-10-CM

## 2024-06-29 DIAGNOSIS — E11.9 TYPE 2 DIABETES MELLITUS WITHOUT COMPLICATIONS: ICD-10-CM

## 2024-06-29 DIAGNOSIS — Z29.9 ENCOUNTER FOR PROPHYLACTIC MEASURES, UNSPECIFIED: ICD-10-CM

## 2024-06-29 DIAGNOSIS — E03.9 HYPOTHYROIDISM, UNSPECIFIED: ICD-10-CM

## 2024-06-29 DIAGNOSIS — D46.9 MYELODYSPLASTIC SYNDROME, UNSPECIFIED: ICD-10-CM

## 2024-06-29 DIAGNOSIS — Z87.438 PERSONAL HISTORY OF OTHER DISEASES OF MALE GENITAL ORGANS: ICD-10-CM

## 2024-06-29 DIAGNOSIS — Z95.2 PRESENCE OF PROSTHETIC HEART VALVE: Chronic | ICD-10-CM

## 2024-06-29 LAB
ADD ON TEST-SPECIMEN IN LAB: SIGNIFICANT CHANGE UP
ADD ON TEST-SPECIMEN IN LAB: SIGNIFICANT CHANGE UP
ALBUMIN SERPL ELPH-MCNC: 2.6 G/DL — LOW (ref 3.3–5)
ALBUMIN SERPL ELPH-MCNC: 2.8 G/DL — LOW (ref 3.3–5)
ALP SERPL-CCNC: 69 U/L — SIGNIFICANT CHANGE UP (ref 40–120)
ALP SERPL-CCNC: 77 U/L — SIGNIFICANT CHANGE UP (ref 40–120)
ALT FLD-CCNC: 7 U/L — LOW (ref 10–45)
ALT FLD-CCNC: 9 U/L — LOW (ref 10–45)
ANION GAP SERPL CALC-SCNC: 12 MMOL/L — SIGNIFICANT CHANGE UP (ref 5–17)
ANION GAP SERPL CALC-SCNC: 24 MMOL/L — HIGH (ref 5–17)
ANISOCYTOSIS BLD QL: SLIGHT — SIGNIFICANT CHANGE UP
APPEARANCE UR: CLEAR — SIGNIFICANT CHANGE UP
APTT BLD: 25.7 SEC — SIGNIFICANT CHANGE UP (ref 24.5–35.6)
AST SERPL-CCNC: 12 U/L — SIGNIFICANT CHANGE UP (ref 10–40)
AST SERPL-CCNC: 13 U/L — SIGNIFICANT CHANGE UP (ref 10–40)
BACTERIA # UR AUTO: NEGATIVE /HPF — SIGNIFICANT CHANGE UP
BASE EXCESS BLDV CALC-SCNC: -1.6 MMOL/L — SIGNIFICANT CHANGE UP (ref -2–3)
BASE EXCESS BLDV CALC-SCNC: -9.3 MMOL/L — LOW (ref -2–3)
BASOPHILS # BLD AUTO: 0 K/UL — SIGNIFICANT CHANGE UP (ref 0–0.2)
BASOPHILS # BLD AUTO: 0 K/UL — SIGNIFICANT CHANGE UP (ref 0–0.2)
BASOPHILS NFR BLD AUTO: 0 % — SIGNIFICANT CHANGE UP (ref 0–2)
BASOPHILS NFR BLD AUTO: 0 % — SIGNIFICANT CHANGE UP (ref 0–2)
BILIRUB SERPL-MCNC: 0.2 MG/DL — SIGNIFICANT CHANGE UP (ref 0.2–1.2)
BILIRUB SERPL-MCNC: 0.2 MG/DL — SIGNIFICANT CHANGE UP (ref 0.2–1.2)
BILIRUB UR-MCNC: NEGATIVE — SIGNIFICANT CHANGE UP
BLASTS # FLD: 34.5 % — HIGH (ref 0–0)
BUN SERPL-MCNC: 49 MG/DL — HIGH (ref 7–23)
BUN SERPL-MCNC: 52 MG/DL — HIGH (ref 7–23)
CA-I SERPL-SCNC: 1.22 MMOL/L — SIGNIFICANT CHANGE UP (ref 1.15–1.33)
CA-I SERPL-SCNC: 1.29 MMOL/L — SIGNIFICANT CHANGE UP (ref 1.15–1.33)
CALCIUM SERPL-MCNC: 8.9 MG/DL — SIGNIFICANT CHANGE UP (ref 8.4–10.5)
CALCIUM SERPL-MCNC: 9.6 MG/DL — SIGNIFICANT CHANGE UP (ref 8.4–10.5)
CAST: 11 /LPF — HIGH (ref 0–4)
CHLORIDE BLDV-SCNC: 102 MMOL/L — SIGNIFICANT CHANGE UP (ref 96–108)
CHLORIDE BLDV-SCNC: 102 MMOL/L — SIGNIFICANT CHANGE UP (ref 96–108)
CHLORIDE SERPL-SCNC: 102 MMOL/L — SIGNIFICANT CHANGE UP (ref 96–108)
CHLORIDE SERPL-SCNC: 98 MMOL/L — SIGNIFICANT CHANGE UP (ref 96–108)
CO2 BLDV-SCNC: 17 MMOL/L — LOW (ref 22–26)
CO2 BLDV-SCNC: 24 MMOL/L — SIGNIFICANT CHANGE UP (ref 22–26)
CO2 SERPL-SCNC: 14 MMOL/L — LOW (ref 22–31)
CO2 SERPL-SCNC: 22 MMOL/L — SIGNIFICANT CHANGE UP (ref 22–31)
COLOR SPEC: YELLOW — SIGNIFICANT CHANGE UP
CREAT SERPL-MCNC: 1.13 MG/DL — SIGNIFICANT CHANGE UP (ref 0.5–1.3)
CREAT SERPL-MCNC: 1.26 MG/DL — SIGNIFICANT CHANGE UP (ref 0.5–1.3)
DACRYOCYTES BLD QL SMEAR: SLIGHT — SIGNIFICANT CHANGE UP
DIFF PNL FLD: NEGATIVE — SIGNIFICANT CHANGE UP
EGFR: 57 ML/MIN/1.73M2 — LOW
EGFR: 64 ML/MIN/1.73M2 — SIGNIFICANT CHANGE UP
EOSINOPHIL # BLD AUTO: 0 K/UL — SIGNIFICANT CHANGE UP (ref 0–0.5)
EOSINOPHIL # BLD AUTO: 0 K/UL — SIGNIFICANT CHANGE UP (ref 0–0.5)
EOSINOPHIL NFR BLD AUTO: 0 % — SIGNIFICANT CHANGE UP (ref 0–6)
EOSINOPHIL NFR BLD AUTO: 0 % — SIGNIFICANT CHANGE UP (ref 0–6)
FIBRINOGEN PPP-MCNC: 705 MG/DL — HIGH (ref 200–445)
GAS PNL BLDV: 133 MMOL/L — LOW (ref 136–145)
GAS PNL BLDV: 134 MMOL/L — LOW (ref 136–145)
GAS PNL BLDV: SIGNIFICANT CHANGE UP
GIANT PLATELETS BLD QL SMEAR: PRESENT — SIGNIFICANT CHANGE UP
GLUCOSE BLDV-MCNC: 157 MG/DL — HIGH (ref 70–99)
GLUCOSE BLDV-MCNC: 220 MG/DL — HIGH (ref 70–99)
GLUCOSE SERPL-MCNC: 161 MG/DL — HIGH (ref 70–99)
GLUCOSE SERPL-MCNC: 223 MG/DL — HIGH (ref 70–99)
GLUCOSE UR QL: NEGATIVE MG/DL — SIGNIFICANT CHANGE UP
HCO3 BLDV-SCNC: 16 MMOL/L — LOW (ref 22–29)
HCO3 BLDV-SCNC: 23 MMOL/L — SIGNIFICANT CHANGE UP (ref 22–29)
HCT VFR BLD CALC: 15.9 % — CRITICAL LOW (ref 39–50)
HCT VFR BLD CALC: 21.5 % — LOW (ref 39–50)
HCT VFR BLDA CALC: 17 % — CRITICAL LOW (ref 39–51)
HCT VFR BLDA CALC: 22 % — LOW (ref 39–51)
HGB BLD CALC-MCNC: 5.6 G/DL — CRITICAL LOW (ref 12.6–17.4)
HGB BLD CALC-MCNC: 7.3 G/DL — LOW (ref 12.6–17.4)
HGB BLD-MCNC: 5.3 G/DL — CRITICAL LOW (ref 13–17)
HGB BLD-MCNC: 6.9 G/DL — CRITICAL LOW (ref 13–17)
IMM GRANULOCYTES NFR BLD AUTO: 1.1 % — HIGH (ref 0–0.9)
INR BLD: 1.57 RATIO — HIGH (ref 0.85–1.18)
KETONES UR-MCNC: NEGATIVE MG/DL — SIGNIFICANT CHANGE UP
LACTATE BLDV-MCNC: 1.8 MMOL/L — SIGNIFICANT CHANGE UP (ref 0.5–2)
LACTATE BLDV-MCNC: 8.9 MMOL/L — CRITICAL HIGH (ref 0.5–2)
LDH SERPL L TO P-CCNC: 212 U/L — SIGNIFICANT CHANGE UP (ref 50–242)
LEUKOCYTE ESTERASE UR-ACNC: NEGATIVE — SIGNIFICANT CHANGE UP
LYMPHOCYTES # BLD AUTO: 0.3 K/UL — LOW (ref 1–3.3)
LYMPHOCYTES # BLD AUTO: 0.75 K/UL — LOW (ref 1–3.3)
LYMPHOCYTES # BLD AUTO: 33.3 % — SIGNIFICANT CHANGE UP (ref 13–44)
LYMPHOCYTES # BLD AUTO: 34.5 % — SIGNIFICANT CHANGE UP (ref 13–44)
MACROCYTES BLD QL: SLIGHT — SIGNIFICANT CHANGE UP
MANUAL SMEAR VERIFICATION: SIGNIFICANT CHANGE UP
MCHC RBC-ENTMCNC: 28.4 PG — SIGNIFICANT CHANGE UP (ref 27–34)
MCHC RBC-ENTMCNC: 30.6 PG — SIGNIFICANT CHANGE UP (ref 27–34)
MCHC RBC-ENTMCNC: 32.1 GM/DL — SIGNIFICANT CHANGE UP (ref 32–36)
MCHC RBC-ENTMCNC: 33.3 GM/DL — SIGNIFICANT CHANGE UP (ref 32–36)
MCV RBC AUTO: 88.5 FL — SIGNIFICANT CHANGE UP (ref 80–100)
MCV RBC AUTO: 91.9 FL — SIGNIFICANT CHANGE UP (ref 80–100)
METAMYELOCYTES # FLD: 1.1 % — HIGH (ref 0–0)
MONOCYTES # BLD AUTO: 0.5 K/UL — SIGNIFICANT CHANGE UP (ref 0–0.9)
MONOCYTES # BLD AUTO: 0.54 K/UL — SIGNIFICANT CHANGE UP (ref 0–0.9)
MONOCYTES NFR BLD AUTO: 23 % — HIGH (ref 2–14)
MONOCYTES NFR BLD AUTO: 60 % — HIGH (ref 2–14)
NEUTROPHILS # BLD AUTO: 0.05 K/UL — LOW (ref 1.8–7.4)
NEUTROPHILS # BLD AUTO: 0.15 K/UL — LOW (ref 1.8–7.4)
NEUTROPHILS NFR BLD AUTO: 5.6 % — LOW (ref 43–77)
NEUTROPHILS NFR BLD AUTO: 6.9 % — LOW (ref 43–77)
NITRITE UR-MCNC: NEGATIVE — SIGNIFICANT CHANGE UP
NRBC # BLD: 0 /100 WBCS — SIGNIFICANT CHANGE UP (ref 0–0)
PCO2 BLDV: 30 MMHG — LOW (ref 42–55)
PCO2 BLDV: 36 MMHG — LOW (ref 42–55)
PH BLDV: 7.33 — SIGNIFICANT CHANGE UP (ref 7.32–7.43)
PH BLDV: 7.41 — SIGNIFICANT CHANGE UP (ref 7.32–7.43)
PH UR: 5 — SIGNIFICANT CHANGE UP (ref 5–8)
PLAT MORPH BLD: NORMAL — SIGNIFICANT CHANGE UP
PLATELET # BLD AUTO: 13 K/UL — CRITICAL LOW (ref 150–400)
PLATELET # BLD AUTO: 36 K/UL — LOW (ref 150–400)
PO2 BLDV: 19 MMHG — LOW (ref 25–45)
PO2 BLDV: 38 MMHG — SIGNIFICANT CHANGE UP (ref 25–45)
POIKILOCYTOSIS BLD QL AUTO: SLIGHT — SIGNIFICANT CHANGE UP
POLYCHROMASIA BLD QL SMEAR: SLIGHT — SIGNIFICANT CHANGE UP
POTASSIUM BLDV-SCNC: 4.6 MMOL/L — SIGNIFICANT CHANGE UP (ref 3.5–5.1)
POTASSIUM BLDV-SCNC: 5.1 MMOL/L — SIGNIFICANT CHANGE UP (ref 3.5–5.1)
POTASSIUM SERPL-MCNC: 4.6 MMOL/L — SIGNIFICANT CHANGE UP (ref 3.5–5.3)
POTASSIUM SERPL-MCNC: 4.8 MMOL/L — SIGNIFICANT CHANGE UP (ref 3.5–5.3)
POTASSIUM SERPL-SCNC: 4.6 MMOL/L — SIGNIFICANT CHANGE UP (ref 3.5–5.3)
POTASSIUM SERPL-SCNC: 4.8 MMOL/L — SIGNIFICANT CHANGE UP (ref 3.5–5.3)
PROT SERPL-MCNC: 6 G/DL — SIGNIFICANT CHANGE UP (ref 6–8.3)
PROT SERPL-MCNC: 6.8 G/DL — SIGNIFICANT CHANGE UP (ref 6–8.3)
PROT UR-MCNC: NEGATIVE MG/DL — SIGNIFICANT CHANGE UP
PROTHROM AB SERPL-ACNC: 17 SEC — HIGH (ref 9.5–13)
RBC # BLD: 1.73 M/UL — LOW (ref 4.2–5.8)
RBC # BLD: 2.43 M/UL — LOW (ref 4.2–5.8)
RBC # FLD: 14.4 % — SIGNIFICANT CHANGE UP (ref 10.3–14.5)
RBC # FLD: 14.6 % — HIGH (ref 10.3–14.5)
RBC BLD AUTO: ABNORMAL
RBC CASTS # UR COMP ASSIST: 0 /HPF — SIGNIFICANT CHANGE UP (ref 0–4)
REVIEW: SIGNIFICANT CHANGE UP
SAO2 % BLDV: 19.2 % — LOW (ref 67–88)
SAO2 % BLDV: 70.5 % — SIGNIFICANT CHANGE UP (ref 67–88)
SODIUM SERPL-SCNC: 136 MMOL/L — SIGNIFICANT CHANGE UP (ref 135–145)
SODIUM SERPL-SCNC: 136 MMOL/L — SIGNIFICANT CHANGE UP (ref 135–145)
SP GR SPEC: 1.02 — SIGNIFICANT CHANGE UP (ref 1–1.03)
SQUAMOUS # UR AUTO: 0 /HPF — SIGNIFICANT CHANGE UP (ref 0–5)
TROPONIN T, HIGH SENSITIVITY RESULT: 31 NG/L — SIGNIFICANT CHANGE UP (ref 0–51)
UROBILINOGEN FLD QL: 0.2 MG/DL — SIGNIFICANT CHANGE UP (ref 0.2–1)
WBC # BLD: 0.9 K/UL — CRITICAL LOW (ref 3.8–10.5)
WBC # BLD: 2.16 K/UL — LOW (ref 3.8–10.5)
WBC # FLD AUTO: 0.9 K/UL — CRITICAL LOW (ref 3.8–10.5)
WBC # FLD AUTO: 2.16 K/UL — LOW (ref 3.8–10.5)
WBC UR QL: 0 /HPF — SIGNIFICANT CHANGE UP (ref 0–5)

## 2024-06-29 PROCEDURE — 99223 1ST HOSP IP/OBS HIGH 75: CPT | Mod: GC

## 2024-06-29 PROCEDURE — 93308 TTE F-UP OR LMTD: CPT | Mod: 26

## 2024-06-29 PROCEDURE — 99291 CRITICAL CARE FIRST HOUR: CPT | Mod: GC

## 2024-06-29 PROCEDURE — 71045 X-RAY EXAM CHEST 1 VIEW: CPT | Mod: 26

## 2024-06-29 RX ORDER — ACETAMINOPHEN 325 MG
650 TABLET ORAL EVERY 6 HOURS
Refills: 0 | Status: DISCONTINUED | OUTPATIENT
Start: 2024-06-29 | End: 2024-06-30

## 2024-06-29 RX ORDER — FOLIC ACID
1 POWDER (GRAM) MISCELLANEOUS DAILY
Refills: 0 | Status: DISCONTINUED | OUTPATIENT
Start: 2024-06-29 | End: 2024-06-30

## 2024-06-29 RX ORDER — PANTOPRAZOLE SODIUM 40 MG/10ML
80 INJECTION, POWDER, FOR SOLUTION INTRAVENOUS ONCE
Refills: 0 | Status: COMPLETED | OUTPATIENT
Start: 2024-06-29 | End: 2024-06-29

## 2024-06-29 RX ORDER — FLUCONAZOLE 200 MG
200 TABLET ORAL DAILY
Refills: 0 | Status: DISCONTINUED | OUTPATIENT
Start: 2024-06-29 | End: 2024-06-30

## 2024-06-29 RX ORDER — LEVOTHYROXINE SODIUM 25 MCG
50 TABLET ORAL DAILY
Refills: 0 | Status: DISCONTINUED | OUTPATIENT
Start: 2024-06-29 | End: 2024-06-30

## 2024-06-29 RX ORDER — FERROUS SULFATE 325(65) MG
325 TABLET ORAL DAILY
Refills: 0 | Status: DISCONTINUED | OUTPATIENT
Start: 2024-06-29 | End: 2024-06-30

## 2024-06-29 RX ORDER — ESCITALOPRAM OXALATE 20 MG/1
10 TABLET, FILM COATED ORAL DAILY
Refills: 0 | Status: DISCONTINUED | OUTPATIENT
Start: 2024-06-29 | End: 2024-06-30

## 2024-06-29 RX ORDER — ACYCLOVIR SODIUM 50 MG/ML
400 VIAL (ML) INTRAVENOUS
Refills: 0 | Status: DISCONTINUED | OUTPATIENT
Start: 2024-06-29 | End: 2024-06-30

## 2024-06-29 RX ORDER — PANTOPRAZOLE SODIUM 40 MG/10ML
40 INJECTION, POWDER, FOR SOLUTION INTRAVENOUS EVERY 12 HOURS
Refills: 0 | Status: DISCONTINUED | OUTPATIENT
Start: 2024-06-30 | End: 2024-06-30

## 2024-06-29 RX ORDER — LEVOFLOXACIN 25 MG/ML
500 INJECTION INTRAVENOUS EVERY 24 HOURS
Refills: 0 | Status: DISCONTINUED | OUTPATIENT
Start: 2024-06-29 | End: 2024-06-29

## 2024-06-29 RX ADMIN — PANTOPRAZOLE SODIUM 80 MILLIGRAM(S): 40 INJECTION, POWDER, FOR SOLUTION INTRAVENOUS at 15:12

## 2024-06-30 VITALS
RESPIRATION RATE: 18 BRPM | TEMPERATURE: 98 F | DIASTOLIC BLOOD PRESSURE: 66 MMHG | HEART RATE: 68 BPM | SYSTOLIC BLOOD PRESSURE: 145 MMHG | OXYGEN SATURATION: 98 %

## 2024-06-30 LAB
ANION GAP SERPL CALC-SCNC: 12 MMOL/L — SIGNIFICANT CHANGE UP (ref 5–17)
BUN SERPL-MCNC: 37 MG/DL — HIGH (ref 7–23)
CALCIUM SERPL-MCNC: 9 MG/DL — SIGNIFICANT CHANGE UP (ref 8.4–10.5)
CHLORIDE SERPL-SCNC: 106 MMOL/L — SIGNIFICANT CHANGE UP (ref 96–108)
CO2 SERPL-SCNC: 23 MMOL/L — SIGNIFICANT CHANGE UP (ref 22–31)
CREAT SERPL-MCNC: 0.98 MG/DL — SIGNIFICANT CHANGE UP (ref 0.5–1.3)
CULTURE RESULTS: SIGNIFICANT CHANGE UP
EGFR: 77 ML/MIN/1.73M2 — SIGNIFICANT CHANGE UP
GLUCOSE SERPL-MCNC: 84 MG/DL — SIGNIFICANT CHANGE UP (ref 70–99)
HAPTOGLOB SERPL-MCNC: 378 MG/DL — HIGH (ref 34–200)
HCT VFR BLD CALC: 20.8 % — CRITICAL LOW (ref 39–50)
HGB BLD-MCNC: 7.1 G/DL — LOW (ref 13–17)
MAGNESIUM SERPL-MCNC: 2 MG/DL — SIGNIFICANT CHANGE UP (ref 1.6–2.6)
MCHC RBC-ENTMCNC: 28.9 PG — SIGNIFICANT CHANGE UP (ref 27–34)
MCHC RBC-ENTMCNC: 34.1 GM/DL — SIGNIFICANT CHANGE UP (ref 32–36)
MCV RBC AUTO: 84.6 FL — SIGNIFICANT CHANGE UP (ref 80–100)
NRBC # BLD: 0 /100 WBCS — SIGNIFICANT CHANGE UP (ref 0–0)
PHOSPHATE SERPL-MCNC: 2.9 MG/DL — SIGNIFICANT CHANGE UP (ref 2.5–4.5)
PLATELET # BLD AUTO: 55 K/UL — LOW (ref 150–400)
POTASSIUM SERPL-MCNC: 4 MMOL/L — SIGNIFICANT CHANGE UP (ref 3.5–5.3)
POTASSIUM SERPL-SCNC: 4 MMOL/L — SIGNIFICANT CHANGE UP (ref 3.5–5.3)
RBC # BLD: 2.46 M/UL — LOW (ref 4.2–5.8)
RBC # FLD: 14.9 % — HIGH (ref 10.3–14.5)
SODIUM SERPL-SCNC: 141 MMOL/L — SIGNIFICANT CHANGE UP (ref 135–145)
SPECIMEN SOURCE: SIGNIFICANT CHANGE UP
WBC # BLD: 1 K/UL — CRITICAL LOW (ref 3.8–10.5)
WBC # FLD AUTO: 1 K/UL — CRITICAL LOW (ref 3.8–10.5)

## 2024-06-30 PROCEDURE — 80053 COMPREHEN METABOLIC PANEL: CPT

## 2024-06-30 PROCEDURE — 81001 URINALYSIS AUTO W/SCOPE: CPT

## 2024-06-30 PROCEDURE — 80048 BASIC METABOLIC PNL TOTAL CA: CPT

## 2024-06-30 PROCEDURE — 87086 URINE CULTURE/COLONY COUNT: CPT

## 2024-06-30 PROCEDURE — 83880 ASSAY OF NATRIURETIC PEPTIDE: CPT

## 2024-06-30 PROCEDURE — 99239 HOSP IP/OBS DSCHRG MGMT >30: CPT

## 2024-06-30 PROCEDURE — 82947 ASSAY GLUCOSE BLOOD QUANT: CPT

## 2024-06-30 PROCEDURE — P9016: CPT

## 2024-06-30 PROCEDURE — 93308 TTE F-UP OR LMTD: CPT

## 2024-06-30 PROCEDURE — 99285 EMERGENCY DEPT VISIT HI MDM: CPT

## 2024-06-30 PROCEDURE — 83615 LACTATE (LD) (LDH) ENZYME: CPT

## 2024-06-30 PROCEDURE — 85025 COMPLETE CBC W/AUTO DIFF WBC: CPT

## 2024-06-30 PROCEDURE — 36430 TRANSFUSION BLD/BLD COMPNT: CPT

## 2024-06-30 PROCEDURE — 84484 ASSAY OF TROPONIN QUANT: CPT

## 2024-06-30 PROCEDURE — 83605 ASSAY OF LACTIC ACID: CPT

## 2024-06-30 PROCEDURE — 86901 BLOOD TYPING SEROLOGIC RH(D): CPT

## 2024-06-30 PROCEDURE — 86850 RBC ANTIBODY SCREEN: CPT

## 2024-06-30 PROCEDURE — 82435 ASSAY OF BLOOD CHLORIDE: CPT

## 2024-06-30 PROCEDURE — 85384 FIBRINOGEN ACTIVITY: CPT

## 2024-06-30 PROCEDURE — 84100 ASSAY OF PHOSPHORUS: CPT

## 2024-06-30 PROCEDURE — 85014 HEMATOCRIT: CPT

## 2024-06-30 PROCEDURE — 85027 COMPLETE CBC AUTOMATED: CPT

## 2024-06-30 PROCEDURE — 84132 ASSAY OF SERUM POTASSIUM: CPT

## 2024-06-30 PROCEDURE — 96374 THER/PROPH/DIAG INJ IV PUSH: CPT

## 2024-06-30 PROCEDURE — 86900 BLOOD TYPING SEROLOGIC ABO: CPT

## 2024-06-30 PROCEDURE — P9073: CPT

## 2024-06-30 PROCEDURE — 84295 ASSAY OF SERUM SODIUM: CPT

## 2024-06-30 PROCEDURE — 85730 THROMBOPLASTIN TIME PARTIAL: CPT

## 2024-06-30 PROCEDURE — 71045 X-RAY EXAM CHEST 1 VIEW: CPT

## 2024-06-30 PROCEDURE — 85610 PROTHROMBIN TIME: CPT

## 2024-06-30 PROCEDURE — 85018 HEMOGLOBIN: CPT

## 2024-06-30 PROCEDURE — 82803 BLOOD GASES ANY COMBINATION: CPT

## 2024-06-30 PROCEDURE — 82330 ASSAY OF CALCIUM: CPT

## 2024-06-30 PROCEDURE — 36415 COLL VENOUS BLD VENIPUNCTURE: CPT

## 2024-06-30 PROCEDURE — 83010 ASSAY OF HAPTOGLOBIN QUANT: CPT

## 2024-06-30 PROCEDURE — 83735 ASSAY OF MAGNESIUM: CPT

## 2024-06-30 RX ORDER — CALAMINE
1 LOTION (ML) TOPICAL THREE TIMES A DAY
Refills: 0 | Status: DISCONTINUED | OUTPATIENT
Start: 2024-06-30 | End: 2024-06-30

## 2024-06-30 RX ORDER — ASPIRIN/CALCIUM CARB/MAGNESIUM 324 MG
1 TABLET ORAL
Refills: 0 | DISCHARGE

## 2024-06-30 RX ORDER — AMLODIPINE BESYLATE 2.5 MG/1
1 TABLET ORAL
Refills: 0 | DISCHARGE

## 2024-06-30 RX ORDER — LANOLIN ALCOHOL/MO/W.PET/CERES
1 CREAM (GRAM) TOPICAL
Refills: 0 | DISCHARGE

## 2024-06-30 RX ORDER — METOPROLOL TARTRATE 50 MG
1 TABLET ORAL
Refills: 0 | DISCHARGE

## 2024-06-30 RX ADMIN — Medication 1 MILLIGRAM(S): at 12:04

## 2024-06-30 RX ADMIN — Medication 325 MILLIGRAM(S): at 12:04

## 2024-06-30 RX ADMIN — Medication 50 MICROGRAM(S): at 05:15

## 2024-06-30 RX ADMIN — ESCITALOPRAM OXALATE 10 MILLIGRAM(S): 20 TABLET, FILM COATED ORAL at 12:03

## 2024-06-30 RX ADMIN — Medication 200 MILLIGRAM(S): at 12:02

## 2024-06-30 RX ADMIN — PANTOPRAZOLE SODIUM 40 MILLIGRAM(S): 40 INJECTION, POWDER, FOR SOLUTION INTRAVENOUS at 05:15

## 2024-06-30 RX ADMIN — Medication 1 APPLICATION(S): at 12:02

## 2024-06-30 RX ADMIN — Medication 400 MILLIGRAM(S): at 05:15

## 2024-07-02 ENCOUNTER — HOSPITAL ENCOUNTER (OUTPATIENT)
Dept: HOSPITAL 74 - JONCNONCHE | Age: 84
Discharge: HOME | End: 2024-07-02
Attending: INTERNAL MEDICINE
Payer: COMMERCIAL

## 2024-07-02 DIAGNOSIS — D46.9: Primary | ICD-10-CM

## 2024-07-02 LAB
ALBUMIN SERPL-MCNC: 2.4 G/DL (ref 3.4–5)
ALP SERPL-CCNC: 101 U/L (ref 45–117)
ALT SERPL-CCNC: 11 U/L (ref 13–61)
ANION GAP SERPL CALC-SCNC: 8 MMOL/L (ref 4–13)
ANISOCYTOSIS BLD QL: 0
AST SERPL-CCNC: 22 U/L (ref 15–37)
BASO STIPL BLD QL SMEAR: 0
BILIRUB CONJ SERPL-MCNC: 0.1 MG/DL (ref 0–0.2)
BILIRUB DIRECT SERPL-MCNC: 259 U/L (ref 87–246)
BILIRUB SERPL-MCNC: 0.3 MG/DL (ref 0.2–1)
BUN SERPL-MCNC: 20.3 MG/DL (ref 7–18)
CALCIUM SERPL-MCNC: 8.4 MG/DL (ref 8.5–10.1)
CHLORIDE SERPL-SCNC: 103 MMOL/L (ref 98–107)
CO2 SERPL-SCNC: 25 MMOL/L (ref 21–32)
CREAT SERPL-MCNC: 1.2 MG/DL (ref 0.55–1.3)
DACRYOCYTES BLD QL SMEAR: 0
DEPRECATED RDW RBC AUTO: 15 % (ref 11.9–15.9)
DOHLE BOD BLD QL SMEAR: 0
GLUCOSE SERPL-MCNC: 121 MG/DL (ref 74–106)
HCT VFR BLD CALC: 21.9 % (ref 35.4–49)
HELMET CELLS BLD QL SMEAR: 0
HGB BLD-MCNC: 7.6 GM/DL (ref 11.7–16.9)
HOWELL-JOLLY BOD BLD QL SMEAR: 0
MACROCYTES BLD QL: 0
MCH RBC QN AUTO: 29.6 PG (ref 25.7–33.7)
MCHC RBC AUTO-ENTMCNC: 34.6 G/DL (ref 32–35.9)
MCV RBC: 85.5 FL (ref 80–96)
OVALOCYTES BLD QL SMEAR: 0
PLATELET # BLD AUTO: 33 10^3/UL (ref 134–434)
PMV BLD: 8.2 FL (ref 7.5–11.1)
POTASSIUM SERPLBLD-SCNC: 4 MMOL/L (ref 3.5–5.1)
PROT SERPL-MCNC: 6.9 G/DL (ref 6.4–8.2)
RBC # BLD AUTO: 2.56 M/MM3 (ref 4–5.6)
RETICS # AUTO: 0.56 % (ref 0.5–1.5)
ROULEAUX BLD QL SMEAR: 0
SICKLE CELLS BLD QL SMEAR: 0
SODIUM SERPL-SCNC: 136 MMOL/L (ref 136–145)
TARGETS BLD QL SMEAR: 0
TOXIC GRANULES BLD QL SMEAR: 0
WBC # BLD AUTO: 1.1 K/MM3 (ref 4–10)

## 2024-07-02 PROCEDURE — 30233R1 TRANSFUSION OF NONAUTOLOGOUS PLATELETS INTO PERIPHERAL VEIN, PERCUTANEOUS APPROACH: ICD-10-PCS | Performed by: INTERNAL MEDICINE

## 2024-07-02 PROCEDURE — P9037 PLATE PHERES LEUKOREDU IRRAD: HCPCS

## 2024-07-02 PROCEDURE — 3E013GC INTRODUCTION OF OTHER THERAPEUTIC SUBSTANCE INTO SUBCUTANEOUS TISSUE, PERCUTANEOUS APPROACH: ICD-10-PCS | Performed by: INTERNAL MEDICINE

## 2024-07-02 RX ADMIN — LUSPATERCEPT ONE MG: 75 INJECTION, POWDER, LYOPHILIZED, FOR SOLUTION SUBCUTANEOUS at 11:33

## 2024-07-03 VITALS
RESPIRATION RATE: 16 BRPM | TEMPERATURE: 97.7 F | DIASTOLIC BLOOD PRESSURE: 51 MMHG | SYSTOLIC BLOOD PRESSURE: 111 MMHG | HEART RATE: 60 BPM

## 2024-07-05 ENCOUNTER — HOSPITAL ENCOUNTER (EMERGENCY)
Dept: HOSPITAL 74 - JER | Age: 84
Discharge: HOME | End: 2024-07-05
Payer: COMMERCIAL

## 2024-07-05 VITALS — BODY MASS INDEX: 24.1 KG/M2

## 2024-07-05 VITALS — TEMPERATURE: 97.4 F | HEART RATE: 66 BPM

## 2024-07-05 VITALS — SYSTOLIC BLOOD PRESSURE: 124 MMHG | DIASTOLIC BLOOD PRESSURE: 66 MMHG | RESPIRATION RATE: 19 BRPM

## 2024-07-05 DIAGNOSIS — D69.6: Primary | ICD-10-CM

## 2024-07-05 PROCEDURE — P9034 PLATELETS, PHERESIS: HCPCS

## 2024-07-09 ENCOUNTER — HOSPITAL ENCOUNTER (INPATIENT)
Dept: HOSPITAL 74 - JER | Age: 84
LOS: 11 days | Discharge: ANOTHER HOSPITAL | DRG: 834 | End: 2024-07-20
Attending: NURSE PRACTITIONER | Admitting: INTERNAL MEDICINE
Payer: COMMERCIAL

## 2024-07-09 VITALS — BODY MASS INDEX: 24.3 KG/M2

## 2024-07-09 DIAGNOSIS — E87.1: ICD-10-CM

## 2024-07-09 DIAGNOSIS — E11.9: ICD-10-CM

## 2024-07-09 DIAGNOSIS — E87.20: ICD-10-CM

## 2024-07-09 DIAGNOSIS — J18.9: ICD-10-CM

## 2024-07-09 DIAGNOSIS — E88.09: ICD-10-CM

## 2024-07-09 DIAGNOSIS — E43: ICD-10-CM

## 2024-07-09 DIAGNOSIS — D61.818: ICD-10-CM

## 2024-07-09 DIAGNOSIS — K31.819: ICD-10-CM

## 2024-07-09 DIAGNOSIS — I44.4: ICD-10-CM

## 2024-07-09 DIAGNOSIS — I10: ICD-10-CM

## 2024-07-09 DIAGNOSIS — E03.9: ICD-10-CM

## 2024-07-09 DIAGNOSIS — D50.9: ICD-10-CM

## 2024-07-09 DIAGNOSIS — N17.9: ICD-10-CM

## 2024-07-09 DIAGNOSIS — D46.9: ICD-10-CM

## 2024-07-09 DIAGNOSIS — C92.00: Primary | ICD-10-CM

## 2024-07-09 LAB
ALBUMIN SERPL-MCNC: 2 G/DL (ref 3.4–5)
ALP SERPL-CCNC: 97 U/L (ref 45–117)
ALT SERPL-CCNC: 8 U/L (ref 13–61)
ANION GAP SERPL CALC-SCNC: 17 MMOL/L (ref 4–13)
ANISOCYTOSIS BLD QL: (no result)
APPEARANCE UR: CLEAR
APTT BLD: 25.2 SECONDS (ref 25.2–36.5)
AST SERPL-CCNC: 22 U/L (ref 15–37)
BASOPHILS # BLD: 1.4 % (ref 0–2)
BILIRUB SERPL-MCNC: 0.2 MG/DL (ref 0.2–1)
BILIRUB UR STRIP.AUTO-MCNC: NEGATIVE MG/DL
BUN SERPL-MCNC: 55 MG/DL (ref 7–18)
CALCIUM SERPL-MCNC: 8.6 MG/DL (ref 8.5–10.1)
CHLORIDE SERPL-SCNC: 98 MMOL/L (ref 98–107)
CO2 SERPL-SCNC: 16 MMOL/L (ref 21–32)
COLOR UR: YELLOW
CREAT SERPL-MCNC: 1.8 MG/DL (ref 0.55–1.3)
DEPRECATED RDW RBC AUTO: 14.1 % (ref 11.9–15.9)
DEPRECATED RDW RBC AUTO: 15.2 % (ref 11.9–15.9)
EOSINOPHIL # BLD: 0 % (ref 0–4.5)
GLUCOSE SERPL-MCNC: 159 MG/DL (ref 74–106)
HCT VFR BLD CALC: 12.4 % (ref 35.4–49)
HCT VFR BLD CALC: 14.5 % (ref 35.4–49)
HGB BLD-MCNC: 4.1 GM/DL (ref 11.7–16.9)
HGB BLD-MCNC: 4.9 GM/DL (ref 11.7–16.9)
INR BLD: 1.59 (ref 0.83–1.09)
KETONES UR QL STRIP: NEGATIVE
LEUKOCYTE ESTERASE UR QL STRIP.AUTO: NEGATIVE
LYMPHOCYTES # BLD: 61 % (ref 8–40)
MACROCYTES BLD QL: (no result)
MCH RBC QN AUTO: 29.4 PG (ref 25.7–33.7)
MCH RBC QN AUTO: 29.6 PG (ref 25.7–33.7)
MCHC RBC AUTO-ENTMCNC: 33.4 G/DL (ref 32–35.9)
MCHC RBC AUTO-ENTMCNC: 34.1 G/DL (ref 32–35.9)
MCV RBC: 86.7 FL (ref 80–96)
MCV RBC: 88.2 FL (ref 80–96)
MONOCYTES # BLD AUTO: 30.7 % (ref 3.8–10.2)
NEUTROPHILS # BLD: 6.9 % (ref 42.8–82.8)
NITRITE UR QL STRIP: NEGATIVE
OVALOCYTES BLD QL SMEAR: (no result)
PH UR: 5 [PH] (ref 5–8)
PLATELET # BLD AUTO: 43 10^3/UL (ref 134–434)
PLATELET # BLD AUTO: 7 10^3/UL (ref 134–434)
PMV BLD: 10 FL (ref 7.5–11.1)
PMV BLD: 6.5 FL (ref 7.5–11.1)
POTASSIUM SERPLBLD-SCNC: 5 MMOL/L (ref 3.5–5.1)
PROT SERPL-MCNC: 6.5 G/DL (ref 6.4–8.2)
PROT UR QL STRIP: NEGATIVE
PROT UR QL STRIP: NEGATIVE
PT PNL PPP: 18 SEC (ref 9.7–13)
RBC # BLD AUTO: 1.4 M/MM3 (ref 4–5.6)
RBC # BLD AUTO: 1.67 M/MM3 (ref 4–5.6)
SODIUM SERPL-SCNC: 131 MMOL/L (ref 136–145)
SP GR UR: 1.02 (ref 1.01–1.03)
TARGETS BLD QL SMEAR: (no result)
UROBILINOGEN UR STRIP-MCNC: 0.2 MG/DL (ref 0.2–1)
WBC # BLD AUTO: 1.8 K/MM3 (ref 4–10)
WBC # BLD AUTO: 2 K/MM3 (ref 4–10)

## 2024-07-09 PROCEDURE — P9037 PLATE PHERES LEUKOREDU IRRAD: HCPCS

## 2024-07-09 PROCEDURE — P9058 RBC, L/R, CMV-NEG, IRRAD: HCPCS

## 2024-07-09 PROCEDURE — 30233N1 TRANSFUSION OF NONAUTOLOGOUS RED BLOOD CELLS INTO PERIPHERAL VEIN, PERCUTANEOUS APPROACH: ICD-10-PCS | Performed by: NURSE PRACTITIONER

## 2024-07-09 PROCEDURE — P9034 PLATELETS, PHERESIS: HCPCS

## 2024-07-09 PROCEDURE — 30233R1 TRANSFUSION OF NONAUTOLOGOUS PLATELETS INTO PERIPHERAL VEIN, PERCUTANEOUS APPROACH: ICD-10-PCS | Performed by: NURSE PRACTITIONER

## 2024-07-09 PROCEDURE — G0480 DRUG TEST DEF 1-7 CLASSES: HCPCS

## 2024-07-09 PROCEDURE — P9038 RBC IRRADIATED: HCPCS

## 2024-07-09 RX ADMIN — PANTOPRAZOLE SODIUM SCH: 40 INJECTION, POWDER, FOR SOLUTION INTRAVENOUS at 23:11

## 2024-07-09 RX ADMIN — PANTOPRAZOLE SODIUM ONE MG: 40 INJECTION, POWDER, FOR SOLUTION INTRAVENOUS at 11:21

## 2024-07-09 RX ADMIN — VANCOMYCIN HYDROCHLORIDE ONE MLS/HR: 1 INJECTION, POWDER, LYOPHILIZED, FOR SOLUTION INTRAVENOUS at 13:11

## 2024-07-09 RX ADMIN — INSULIN ASPART SCH: 100 INJECTION, SOLUTION INTRAVENOUS; SUBCUTANEOUS at 23:11

## 2024-07-09 RX ADMIN — CEFEPIME HYDROCHLORIDE ONE GM: 2 INJECTION, POWDER, FOR SOLUTION INTRAVENOUS at 11:33

## 2024-07-09 RX ADMIN — ONDANSETRON ONE MG: 2 INJECTION INTRAMUSCULAR; INTRAVENOUS at 11:21

## 2024-07-09 RX ADMIN — ACYCLOVIR SCH: 400 TABLET ORAL at 23:11

## 2024-07-09 RX ADMIN — ACETAMINOPHEN ONE MG: 10 INJECTION, SOLUTION INTRAVENOUS at 11:22

## 2024-07-09 RX ADMIN — CHLORHEXIDINE GLUCONATE SCH APPLIC: 213 SOLUTION TOPICAL at 23:10

## 2024-07-09 RX ADMIN — MUPIROCIN SCH APPLIC: 20 OINTMENT TOPICAL at 23:09

## 2024-07-10 LAB
ALBUMIN SERPL-MCNC: 1.8 G/DL (ref 3.4–5)
ALP SERPL-CCNC: 77 U/L (ref 45–117)
ALT SERPL-CCNC: 8 U/L (ref 13–61)
ANION GAP SERPL CALC-SCNC: 5 MMOL/L (ref 4–13)
ANISOCYTOSIS BLD QL: (no result)
ANISOCYTOSIS BLD QL: 0
ANISOCYTOSIS BLD QL: 0
AST SERPL-CCNC: 26 U/L (ref 15–37)
BILIRUB SERPL-MCNC: 0.5 MG/DL (ref 0.2–1)
BUN SERPL-MCNC: 52.9 MG/DL (ref 7–18)
CALCIUM SERPL-MCNC: 8.2 MG/DL (ref 8.5–10.1)
CHLORIDE SERPL-SCNC: 105 MMOL/L (ref 98–107)
CO2 SERPL-SCNC: 27 MMOL/L (ref 21–32)
CREAT SERPL-MCNC: 1.1 MG/DL (ref 0.55–1.3)
DEPRECATED RDW RBC AUTO: 14 % (ref 11.9–15.9)
DEPRECATED RDW RBC AUTO: 14.2 % (ref 11.9–15.9)
GLUCOSE SERPL-MCNC: 88 MG/DL (ref 74–106)
HCT VFR BLD CALC: 20.5 % (ref 35.4–49)
HCT VFR BLD CALC: 23.8 % (ref 35.4–49)
HGB BLD-MCNC: 7.2 GM/DL (ref 11.7–16.9)
HGB BLD-MCNC: 8.4 GM/DL (ref 11.7–16.9)
MACROCYTES BLD QL: 0
MAGNESIUM SERPL-MCNC: 1.9 MG/DL (ref 1.8–2.4)
MCH RBC QN AUTO: 29.5 PG (ref 25.7–33.7)
MCH RBC QN AUTO: 29.8 PG (ref 25.7–33.7)
MCHC RBC AUTO-ENTMCNC: 35.3 G/DL (ref 32–35.9)
MCHC RBC AUTO-ENTMCNC: 35.4 G/DL (ref 32–35.9)
MCV RBC: 83.7 FL (ref 80–96)
MCV RBC: 84.4 FL (ref 80–96)
PHOSPHATE SERPL-MCNC: 3 MG/DL (ref 2.5–4.9)
PLATELET # BLD AUTO: 26 10^3/UL (ref 134–434)
PLATELET # BLD AUTO: 27 10^3/UL (ref 134–434)
PMV BLD: 6.9 FL (ref 7.5–11.1)
PMV BLD: 7.1 FL (ref 7.5–11.1)
POTASSIUM SERPLBLD-SCNC: 4.4 MMOL/L (ref 3.5–5.1)
PROT SERPL-MCNC: 5.4 G/DL (ref 6.4–8.2)
RBC # BLD AUTO: 2.44 M/MM3 (ref 4–5.6)
RBC # BLD AUTO: 2.82 M/MM3 (ref 4–5.6)
ROULEAUX BLD QL SMEAR: (no result)
SODIUM SERPL-SCNC: 137 MMOL/L (ref 136–145)
WBC # BLD AUTO: 1.7 K/MM3 (ref 4–10)
WBC # BLD AUTO: 1.9 K/MM3 (ref 4–10)

## 2024-07-10 RX ADMIN — CEFEPIME HYDROCHLORIDE SCH MLS/HR: 2 INJECTION, POWDER, FOR SOLUTION INTRAVENOUS at 13:06

## 2024-07-10 RX ADMIN — AZITHROMYCIN DIHYDRATE SCH MLS/HR: 500 INJECTION, POWDER, LYOPHILIZED, FOR SOLUTION INTRAVENOUS at 13:09

## 2024-07-10 RX ADMIN — TAMSULOSIN HYDROCHLORIDE SCH MG: 0.4 CAPSULE ORAL at 08:09

## 2024-07-10 RX ADMIN — FENOFIBRIC ACID SCH MG: 135 CAPSULE, DELAYED RELEASE PELLETS ORAL at 09:46

## 2024-07-10 RX ADMIN — ESCITALOPRAM OXALATE SCH MG: 10 TABLET, FILM COATED ORAL at 09:47

## 2024-07-10 RX ADMIN — LEVOTHYROXINE SODIUM SCH MCG: 50 TABLET ORAL at 06:43

## 2024-07-10 RX ADMIN — Medication SCH EACH: at 21:39

## 2024-07-10 RX ADMIN — FLUCONAZOLE SCH MG: 100 TABLET ORAL at 09:45

## 2024-07-10 RX ADMIN — LIDOCAINE SCH PATCH: 50 PATCH TOPICAL at 13:08

## 2024-07-10 RX ADMIN — CEFEPIME HYDROCHLORIDE SCH: 2 INJECTION, POWDER, FOR SOLUTION INTRAVENOUS at 10:44

## 2024-07-10 RX ADMIN — INSULIN ASPART SCH: 100 INJECTION, SOLUTION INTRAVENOUS; SUBCUTANEOUS at 10:44

## 2024-07-10 RX ADMIN — CEFEPIME HYDROCHLORIDE SCH MLS/HR: 2 INJECTION, POWDER, FOR SOLUTION INTRAVENOUS at 00:04

## 2024-07-10 RX ADMIN — ACETAMINOPHEN PRN MG: 325 TABLET ORAL at 03:13

## 2024-07-10 RX ADMIN — FOLIC ACID SCH MG: 1 TABLET ORAL at 09:47

## 2024-07-10 RX ADMIN — SODIUM CHLORIDE SCH MLS/HR: 9 INJECTION, SOLUTION INTRAVENOUS at 00:27

## 2024-07-10 RX ADMIN — VANCOMYCIN SCH MLS/HR: 1 INJECTION, SOLUTION INTRAVENOUS at 13:08

## 2024-07-10 RX ADMIN — Medication PRN MG: at 00:28

## 2024-07-11 LAB
ANION GAP SERPL CALC-SCNC: 6 MMOL/L (ref 4–13)
BUN SERPL-MCNC: 22.8 MG/DL (ref 7–18)
CALCIUM SERPL-MCNC: 8.2 MG/DL (ref 8.5–10.1)
CHLORIDE SERPL-SCNC: 105 MMOL/L (ref 98–107)
CO2 SERPL-SCNC: 28 MMOL/L (ref 21–32)
CREAT SERPL-MCNC: 0.7 MG/DL (ref 0.55–1.3)
DEPRECATED RDW RBC AUTO: 14.2 % (ref 11.9–15.9)
DEPRECATED RDW RBC AUTO: 14.4 % (ref 11.9–15.9)
GLUCOSE SERPL-MCNC: 85 MG/DL (ref 74–106)
HCT VFR BLD CALC: 23.6 % (ref 35.4–49)
HCT VFR BLD CALC: 24.4 % (ref 35.4–49)
HGB BLD-MCNC: 8.3 GM/DL (ref 11.7–16.9)
HGB BLD-MCNC: 8.8 GM/DL (ref 11.7–16.9)
MAGNESIUM SERPL-MCNC: 1.8 MG/DL (ref 1.8–2.4)
MCH RBC QN AUTO: 29.6 PG (ref 25.7–33.7)
MCH RBC QN AUTO: 30.2 PG (ref 25.7–33.7)
MCHC RBC AUTO-ENTMCNC: 35.1 G/DL (ref 32–35.9)
MCHC RBC AUTO-ENTMCNC: 36.1 G/DL (ref 32–35.9)
MCV RBC: 83.6 FL (ref 80–96)
MCV RBC: 84.6 FL (ref 80–96)
PHOSPHATE SERPL-MCNC: 3 MG/DL (ref 2.5–4.9)
PLATELET # BLD AUTO: 22 10^3/UL (ref 134–434)
PLATELET # BLD AUTO: 38 10^3/UL (ref 134–434)
PMV BLD: 7.3 FL (ref 7.5–11.1)
PMV BLD: 8.1 FL (ref 7.5–11.1)
POTASSIUM SERPLBLD-SCNC: 3.9 MMOL/L (ref 3.5–5.1)
RBC # BLD AUTO: 2.79 M/MM3 (ref 4–5.6)
RBC # BLD AUTO: 2.92 M/MM3 (ref 4–5.6)
SODIUM SERPL-SCNC: 139 MMOL/L (ref 136–145)
WBC # BLD AUTO: 1.6 K/MM3 (ref 4–10)
WBC # BLD AUTO: 1.7 K/MM3 (ref 4–10)

## 2024-07-12 LAB
ALBUMIN SERPL-MCNC: 1.9 G/DL (ref 3.4–5)
ALP SERPL-CCNC: 78 U/L (ref 45–117)
ALT SERPL-CCNC: 7 U/L (ref 13–61)
ANION GAP SERPL CALC-SCNC: 3 MMOL/L (ref 4–13)
ANISOCYTOSIS BLD QL: 0
AST SERPL-CCNC: 21 U/L (ref 15–37)
BILIRUB SERPL-MCNC: 0.4 MG/DL (ref 0.2–1)
BUN SERPL-MCNC: 15.9 MG/DL (ref 7–18)
CALCIUM SERPL-MCNC: 8.4 MG/DL (ref 8.5–10.1)
CHLORIDE SERPL-SCNC: 104 MMOL/L (ref 98–107)
CO2 SERPL-SCNC: 31 MMOL/L (ref 21–32)
CREAT SERPL-MCNC: 0.8 MG/DL (ref 0.55–1.3)
DEPRECATED RDW RBC AUTO: 14.7 % (ref 11.9–15.9)
GLUCOSE SERPL-MCNC: 87 MG/DL (ref 74–106)
HCT VFR BLD CALC: 27.2 % (ref 35.4–49)
HGB BLD-MCNC: 9.5 GM/DL (ref 11.7–16.9)
MACROCYTES BLD QL: 0
MAGNESIUM SERPL-MCNC: 1.8 MG/DL (ref 1.8–2.4)
MCH RBC QN AUTO: 29.8 PG (ref 25.7–33.7)
MCHC RBC AUTO-ENTMCNC: 34.9 G/DL (ref 32–35.9)
MCV RBC: 85.2 FL (ref 80–96)
PHOSPHATE SERPL-MCNC: 2.4 MG/DL (ref 2.5–4.9)
PLATELET # BLD AUTO: 37 10^3/UL (ref 134–434)
PMV BLD: 8 FL (ref 7.5–11.1)
POTASSIUM SERPLBLD-SCNC: 4.4 MMOL/L (ref 3.5–5.1)
PROT SERPL-MCNC: 6 G/DL (ref 6.4–8.2)
RBC # BLD AUTO: 3.19 M/MM3 (ref 4–5.6)
SODIUM SERPL-SCNC: 137 MMOL/L (ref 136–145)
WBC # BLD AUTO: 1.8 K/MM3 (ref 4–10)

## 2024-07-12 RX ADMIN — FENOFIBRIC ACID SCH MG: 135 CAPSULE, DELAYED RELEASE PELLETS ORAL at 09:45

## 2024-07-12 RX ADMIN — ACYCLOVIR SCH MG: 400 TABLET ORAL at 09:45

## 2024-07-12 RX ADMIN — INSULIN ASPART SCH UNITS: 100 INJECTION, SOLUTION INTRAVENOUS; SUBCUTANEOUS at 12:10

## 2024-07-12 RX ADMIN — FLUCONAZOLE SCH MG: 100 TABLET ORAL at 09:45

## 2024-07-12 RX ADMIN — CEFEPIME HYDROCHLORIDE SCH MLS/HR: 2 INJECTION, POWDER, FOR SOLUTION INTRAVENOUS at 09:43

## 2024-07-12 RX ADMIN — ESCITALOPRAM OXALATE SCH MG: 10 TABLET, FILM COATED ORAL at 09:44

## 2024-07-12 RX ADMIN — FOLIC ACID SCH MG: 1 TABLET ORAL at 09:44

## 2024-07-12 RX ADMIN — AZITHROMYCIN DIHYDRATE SCH MLS/HR: 500 INJECTION, POWDER, LYOPHILIZED, FOR SOLUTION INTRAVENOUS at 10:49

## 2024-07-12 RX ADMIN — Medication SCH EACH: at 22:18

## 2024-07-12 RX ADMIN — PANTOPRAZOLE SODIUM SCH MG: 40 INJECTION, POWDER, FOR SOLUTION INTRAVENOUS at 09:44

## 2024-07-12 RX ADMIN — TAMSULOSIN HYDROCHLORIDE SCH MG: 0.4 CAPSULE ORAL at 09:44

## 2024-07-12 RX ADMIN — VANCOMYCIN SCH MLS/HR: 1 INJECTION, SOLUTION INTRAVENOUS at 12:07

## 2024-07-12 RX ADMIN — LIDOCAINE SCH PATCH: 50 PATCH TOPICAL at 09:44

## 2024-07-13 LAB
ALBUMIN SERPL-MCNC: 1.9 G/DL (ref 3.4–5)
ALP SERPL-CCNC: 75 U/L (ref 45–117)
ALT SERPL-CCNC: 10 U/L (ref 13–61)
ANION GAP SERPL CALC-SCNC: 5 MMOL/L (ref 4–13)
ANISOCYTOSIS BLD QL: 0
AST SERPL-CCNC: 23 U/L (ref 15–37)
BILIRUB SERPL-MCNC: 0.4 MG/DL (ref 0.2–1)
BUN SERPL-MCNC: 13.9 MG/DL (ref 7–18)
CALCIUM SERPL-MCNC: 8.4 MG/DL (ref 8.5–10.1)
CHLORIDE SERPL-SCNC: 104 MMOL/L (ref 98–107)
CO2 SERPL-SCNC: 28 MMOL/L (ref 21–32)
CREAT SERPL-MCNC: 0.6 MG/DL (ref 0.55–1.3)
DEPRECATED RDW RBC AUTO: 14.5 % (ref 11.9–15.9)
GLUCOSE SERPL-MCNC: 85 MG/DL (ref 74–106)
HCT VFR BLD CALC: 28.1 % (ref 35.4–49)
HGB BLD-MCNC: 9.6 GM/DL (ref 11.7–16.9)
MACROCYTES BLD QL: 0
MAGNESIUM SERPL-MCNC: 1.8 MG/DL (ref 1.8–2.4)
MCH RBC QN AUTO: 29.3 PG (ref 25.7–33.7)
MCHC RBC AUTO-ENTMCNC: 34.3 G/DL (ref 32–35.9)
MCV RBC: 85.6 FL (ref 80–96)
PLATELET # BLD AUTO: 31 10^3/UL (ref 134–434)
PMV BLD: 8 FL (ref 7.5–11.1)
POTASSIUM SERPLBLD-SCNC: 3.8 MMOL/L (ref 3.5–5.1)
PROT SERPL-MCNC: 6 G/DL (ref 6.4–8.2)
RBC # BLD AUTO: 3.28 M/MM3 (ref 4–5.6)
SODIUM SERPL-SCNC: 136 MMOL/L (ref 136–145)
WBC # BLD AUTO: 1.6 K/MM3 (ref 4–10)

## 2024-07-13 RX ADMIN — LEVOTHYROXINE SODIUM SCH MCG: 50 TABLET ORAL at 06:33

## 2024-07-13 RX ADMIN — AMLODIPINE BESYLATE SCH MG: 10 TABLET ORAL at 10:03

## 2024-07-14 LAB
ALBUMIN SERPL-MCNC: 2 G/DL (ref 3.4–5)
ALP SERPL-CCNC: 82 U/L (ref 45–117)
ALT SERPL-CCNC: 11 U/L (ref 13–61)
ANION GAP SERPL CALC-SCNC: 6 MMOL/L (ref 4–13)
ANISOCYTOSIS BLD QL: 0
AST SERPL-CCNC: 23 U/L (ref 15–37)
BILIRUB SERPL-MCNC: 0.3 MG/DL (ref 0.2–1)
BUN SERPL-MCNC: 13.6 MG/DL (ref 7–18)
CALCIUM SERPL-MCNC: 8.4 MG/DL (ref 8.5–10.1)
CHLORIDE SERPL-SCNC: 103 MMOL/L (ref 98–107)
CO2 SERPL-SCNC: 27 MMOL/L (ref 21–32)
CREAT SERPL-MCNC: 0.7 MG/DL (ref 0.55–1.3)
DEPRECATED RDW RBC AUTO: 14.4 % (ref 11.9–15.9)
GLUCOSE SERPL-MCNC: 87 MG/DL (ref 74–106)
HCT VFR BLD CALC: 26.6 % (ref 35.4–49)
HGB BLD-MCNC: 9.1 GM/DL (ref 11.7–16.9)
MACROCYTES BLD QL: 0
MAGNESIUM SERPL-MCNC: 1.8 MG/DL (ref 1.8–2.4)
MCH RBC QN AUTO: 29.2 PG (ref 25.7–33.7)
MCHC RBC AUTO-ENTMCNC: 34.4 G/DL (ref 32–35.9)
MCV RBC: 85.1 FL (ref 80–96)
PLATELET # BLD AUTO: 19 10^3/UL (ref 134–434)
PMV BLD: 8.2 FL (ref 7.5–11.1)
POTASSIUM SERPLBLD-SCNC: 4 MMOL/L (ref 3.5–5.1)
PROT SERPL-MCNC: 6.4 G/DL (ref 6.4–8.2)
RBC # BLD AUTO: 3.13 M/MM3 (ref 4–5.6)
SODIUM SERPL-SCNC: 136 MMOL/L (ref 136–145)
WBC # BLD AUTO: 1.7 K/MM3 (ref 4–10)

## 2024-07-15 LAB
ALBUMIN SERPL-MCNC: 2.2 G/DL (ref 3.4–5)
ALP SERPL-CCNC: 82 U/L (ref 45–117)
ALT SERPL-CCNC: 10 U/L (ref 13–61)
ANION GAP SERPL CALC-SCNC: 6 MMOL/L (ref 4–13)
ANISOCYTOSIS BLD QL: 0
AST SERPL-CCNC: 23 U/L (ref 15–37)
BILIRUB SERPL-MCNC: 0.3 MG/DL (ref 0.2–1)
BUN SERPL-MCNC: 15.5 MG/DL (ref 7–18)
CALCIUM SERPL-MCNC: 8.6 MG/DL (ref 8.5–10.1)
CHLORIDE SERPL-SCNC: 104 MMOL/L (ref 98–107)
CO2 SERPL-SCNC: 28 MMOL/L (ref 21–32)
CREAT SERPL-MCNC: 0.8 MG/DL (ref 0.55–1.3)
DEPRECATED RDW RBC AUTO: 14 % (ref 11.9–15.9)
GLUCOSE SERPL-MCNC: 110 MG/DL (ref 74–106)
HCT VFR BLD CALC: 27.5 % (ref 35.4–49)
HGB BLD-MCNC: 9.3 GM/DL (ref 11.7–16.9)
MACROCYTES BLD QL: 0
MAGNESIUM SERPL-MCNC: 2 MG/DL (ref 1.8–2.4)
MCH RBC QN AUTO: 29.2 PG (ref 25.7–33.7)
MCHC RBC AUTO-ENTMCNC: 33.9 G/DL (ref 32–35.9)
MCV RBC: 86.3 FL (ref 80–96)
PLATELET # BLD AUTO: 43 10^3/UL (ref 134–434)
PMV BLD: 7.4 FL (ref 7.5–11.1)
POTASSIUM SERPLBLD-SCNC: 4.1 MMOL/L (ref 3.5–5.1)
PROT SERPL-MCNC: 7.1 G/DL (ref 6.4–8.2)
RBC # BLD AUTO: 3.19 M/MM3 (ref 4–5.6)
SODIUM SERPL-SCNC: 138 MMOL/L (ref 136–145)
WBC # BLD AUTO: 1.6 K/MM3 (ref 4–10)

## 2024-07-15 RX ADMIN — Medication PRN MG: at 21:41

## 2024-07-16 LAB
ALBUMIN SERPL-MCNC: 2.2 G/DL (ref 3.4–5)
ALP SERPL-CCNC: 80 U/L (ref 45–117)
ALT SERPL-CCNC: 10 U/L (ref 13–61)
ANION GAP SERPL CALC-SCNC: 5 MMOL/L (ref 4–13)
AST SERPL-CCNC: 23 U/L (ref 15–37)
BILIRUB SERPL-MCNC: 0.3 MG/DL (ref 0.2–1)
BUN SERPL-MCNC: 16 MG/DL (ref 7–18)
CALCIUM SERPL-MCNC: 8.8 MG/DL (ref 8.5–10.1)
CHLORIDE SERPL-SCNC: 103 MMOL/L (ref 98–107)
CO2 SERPL-SCNC: 28 MMOL/L (ref 21–32)
CREAT SERPL-MCNC: 0.8 MG/DL (ref 0.55–1.3)
GLUCOSE SERPL-MCNC: 88 MG/DL (ref 74–106)
POTASSIUM SERPLBLD-SCNC: 4 MMOL/L (ref 3.5–5.1)
PROT SERPL-MCNC: 6.6 G/DL (ref 6.4–8.2)
SODIUM SERPL-SCNC: 136 MMOL/L (ref 136–145)

## 2024-07-17 LAB
ALBUMIN SERPL-MCNC: 2.3 G/DL (ref 3.4–5)
ALP SERPL-CCNC: 83 U/L (ref 45–117)
ALT SERPL-CCNC: 9 U/L (ref 13–61)
ANION GAP SERPL CALC-SCNC: 7 MMOL/L (ref 4–13)
AST SERPL-CCNC: 23 U/L (ref 15–37)
BILIRUB SERPL-MCNC: 0.3 MG/DL (ref 0.2–1)
BUN SERPL-MCNC: 18.6 MG/DL (ref 7–18)
CALCIUM SERPL-MCNC: 9 MG/DL (ref 8.5–10.1)
CHLORIDE SERPL-SCNC: 103 MMOL/L (ref 98–107)
CO2 SERPL-SCNC: 28 MMOL/L (ref 21–32)
CREAT SERPL-MCNC: 0.8 MG/DL (ref 0.55–1.3)
DEPRECATED RDW RBC AUTO: 14.2 % (ref 11.9–15.9)
GLUCOSE SERPL-MCNC: 92 MG/DL (ref 74–106)
HCT VFR BLD CALC: 26.4 % (ref 35.4–49)
HGB BLD-MCNC: 9 GM/DL (ref 11.7–16.9)
MCH RBC QN AUTO: 29.3 PG (ref 25.7–33.7)
MCHC RBC AUTO-ENTMCNC: 34.1 G/DL (ref 32–35.9)
MCV RBC: 86.1 FL (ref 80–96)
PLATELET # BLD AUTO: 19 10^3/UL (ref 134–434)
PMV BLD: 7.5 FL (ref 7.5–11.1)
POTASSIUM SERPLBLD-SCNC: 4.1 MMOL/L (ref 3.5–5.1)
PROT SERPL-MCNC: 7.5 G/DL (ref 6.4–8.2)
RBC # BLD AUTO: 3.07 M/MM3 (ref 4–5.6)
SODIUM SERPL-SCNC: 138 MMOL/L (ref 136–145)
WBC # BLD AUTO: 1.6 K/MM3 (ref 4–10)

## 2024-07-18 LAB
ALBUMIN SERPL-MCNC: 2.2 G/DL (ref 3.4–5)
ALP SERPL-CCNC: 80 U/L (ref 45–117)
ALT SERPL-CCNC: 10 U/L (ref 13–61)
ANION GAP SERPL CALC-SCNC: 5 MMOL/L (ref 4–13)
ANISOCYTOSIS BLD QL: 0
AST SERPL-CCNC: 21 U/L (ref 15–37)
BASO STIPL BLD QL SMEAR: 0
BILIRUB SERPL-MCNC: 0.2 MG/DL (ref 0.2–1)
BUN SERPL-MCNC: 22.3 MG/DL (ref 7–18)
CALCIUM SERPL-MCNC: 8.9 MG/DL (ref 8.5–10.1)
CHLORIDE SERPL-SCNC: 101 MMOL/L (ref 98–107)
CO2 SERPL-SCNC: 28 MMOL/L (ref 21–32)
CREAT SERPL-MCNC: 0.9 MG/DL (ref 0.55–1.3)
DACRYOCYTES BLD QL SMEAR: 0
DEPRECATED RDW RBC AUTO: 13.9 % (ref 11.9–15.9)
DOHLE BOD BLD QL SMEAR: 0
GLUCOSE SERPL-MCNC: 100 MG/DL (ref 74–106)
HCT VFR BLD CALC: 23.8 % (ref 35.4–49)
HELMET CELLS BLD QL SMEAR: 0
HGB BLD-MCNC: 8.3 GM/DL (ref 11.7–16.9)
HOWELL-JOLLY BOD BLD QL SMEAR: 0
MACROCYTES BLD QL: 0
MAGNESIUM SERPL-MCNC: 2 MG/DL (ref 1.8–2.4)
MCH RBC QN AUTO: 29.3 PG (ref 25.7–33.7)
MCHC RBC AUTO-ENTMCNC: 34.7 G/DL (ref 32–35.9)
MCV RBC: 84.5 FL (ref 80–96)
OVALOCYTES BLD QL SMEAR: 0
PLATELET # BLD AUTO: 16 10^3/UL (ref 134–434)
PMV BLD: 8.2 FL (ref 7.5–11.1)
POTASSIUM SERPLBLD-SCNC: 4.2 MMOL/L (ref 3.5–5.1)
PROT SERPL-MCNC: 7 G/DL (ref 6.4–8.2)
RBC # BLD AUTO: 2.82 M/MM3 (ref 4–5.6)
ROULEAUX BLD QL SMEAR: 0
SICKLE CELLS BLD QL SMEAR: 0
SODIUM SERPL-SCNC: 135 MMOL/L (ref 136–145)
TARGETS BLD QL SMEAR: 0
TOXIC GRANULES BLD QL SMEAR: 0
WBC # BLD AUTO: 1.5 K/MM3 (ref 4–10)

## 2024-07-19 LAB
ALBUMIN SERPL-MCNC: 2.6 G/DL (ref 3.4–5)
ALP SERPL-CCNC: 81 U/L (ref 45–117)
ALT SERPL-CCNC: 14 U/L (ref 13–61)
ANION GAP SERPL CALC-SCNC: 6 MMOL/L (ref 4–13)
ANISOCYTOSIS BLD QL: 0
APPEARANCE UR: CLEAR
AST SERPL-CCNC: 23 U/L (ref 15–37)
BACTERIA # UR AUTO: 1 /UL (ref 0–1359)
BILIRUB SERPL-MCNC: 0.2 MG/DL (ref 0.2–1)
BILIRUB UR STRIP.AUTO-MCNC: NEGATIVE MG/DL
BUN SERPL-MCNC: 29.7 MG/DL (ref 7–18)
CALCIUM SERPL-MCNC: 9.4 MG/DL (ref 8.5–10.1)
CASTS URNS QL MICRO: 1 /UL (ref 0–3.1)
CHLORIDE SERPL-SCNC: 103 MMOL/L (ref 98–107)
CO2 SERPL-SCNC: 28 MMOL/L (ref 21–32)
COLOR UR: YELLOW
CREAT SERPL-MCNC: 0.9 MG/DL (ref 0.55–1.3)
DEPRECATED RDW RBC AUTO: 14.2 % (ref 11.9–15.9)
EPITH CASTS URNS QL MICRO: 12 /UL (ref 0–25.1)
GLUCOSE SERPL-MCNC: 110 MG/DL (ref 74–106)
HCT VFR BLD CALC: 21.8 % (ref 35.4–49)
HGB BLD-MCNC: 7.6 GM/DL (ref 11.7–16.9)
KETONES UR QL STRIP: (no result)
LEUKOCYTE ESTERASE UR QL STRIP.AUTO: NEGATIVE
MACROCYTES BLD QL: 0
MAGNESIUM SERPL-MCNC: 2.2 MG/DL (ref 1.8–2.4)
MCH RBC QN AUTO: 29.7 PG (ref 25.7–33.7)
MCHC RBC AUTO-ENTMCNC: 34.8 G/DL (ref 32–35.9)
MCV RBC: 85.4 FL (ref 80–96)
NITRITE UR QL STRIP: NEGATIVE
PH UR: 6.5 [PH] (ref 5–8)
PLATELET # BLD AUTO: 89 10^3/UL (ref 134–434)
PMV BLD: 7.7 FL (ref 7.5–11.1)
POTASSIUM SERPLBLD-SCNC: 4.3 MMOL/L (ref 3.5–5.1)
PROT SERPL-MCNC: 7.4 G/DL (ref 6.4–8.2)
PROT UR QL STRIP: (no result)
PROT UR QL STRIP: NEGATIVE
RBC # BLD AUTO: 19 /UL (ref 0–23.9)
RBC # BLD AUTO: 2.55 M/MM3 (ref 4–5.6)
SODIUM SERPL-SCNC: 138 MMOL/L (ref 136–145)
SP GR UR: 1.02 (ref 1.01–1.03)
UROBILINOGEN UR STRIP-MCNC: 0.2 MG/DL (ref 0.2–1)
WBC # BLD AUTO: 1.6 K/MM3 (ref 4–10)
WBC # UR AUTO: 18 /UL (ref 0–25.8)

## 2024-07-20 VITALS — SYSTOLIC BLOOD PRESSURE: 130 MMHG | HEART RATE: 85 BPM | DIASTOLIC BLOOD PRESSURE: 85 MMHG

## 2024-07-20 VITALS — RESPIRATION RATE: 20 BRPM | TEMPERATURE: 98 F

## 2024-07-20 LAB
ALBUMIN SERPL-MCNC: 2.6 G/DL (ref 3.4–5)
ALP SERPL-CCNC: 81 U/L (ref 45–117)
ALT SERPL-CCNC: 12 U/L (ref 13–61)
ANION GAP SERPL CALC-SCNC: 6 MMOL/L (ref 4–13)
ANISOCYTOSIS BLD QL: 0
AST SERPL-CCNC: 26 U/L (ref 15–37)
BILIRUB SERPL-MCNC: 0.3 MG/DL (ref 0.2–1)
BUN SERPL-MCNC: 29.4 MG/DL (ref 7–18)
CALCIUM SERPL-MCNC: 9.4 MG/DL (ref 8.5–10.1)
CHLORIDE SERPL-SCNC: 102 MMOL/L (ref 98–107)
CO2 SERPL-SCNC: 27 MMOL/L (ref 21–32)
CREAT SERPL-MCNC: 0.9 MG/DL (ref 0.55–1.3)
DEPRECATED RDW RBC AUTO: 13.8 % (ref 11.9–15.9)
GLUCOSE SERPL-MCNC: 104 MG/DL (ref 74–106)
HCT VFR BLD CALC: 21.2 % (ref 35.4–49)
HGB BLD-MCNC: 7.4 GM/DL (ref 11.7–16.9)
MACROCYTES BLD QL: 0
MAGNESIUM SERPL-MCNC: 2.1 MG/DL (ref 1.8–2.4)
MCH RBC QN AUTO: 29.7 PG (ref 25.7–33.7)
MCHC RBC AUTO-ENTMCNC: 34.7 G/DL (ref 32–35.9)
MCV RBC: 85.6 FL (ref 80–96)
PLATELET # BLD AUTO: 64 10^3/UL (ref 134–434)
PMV BLD: 7.6 FL (ref 7.5–11.1)
POTASSIUM SERPLBLD-SCNC: 4.3 MMOL/L (ref 3.5–5.1)
PROT SERPL-MCNC: 7.6 G/DL (ref 6.4–8.2)
RBC # BLD AUTO: 2.48 M/MM3 (ref 4–5.6)
SODIUM SERPL-SCNC: 136 MMOL/L (ref 136–145)
WBC # BLD AUTO: 1.4 K/MM3 (ref 4–10)

## 2024-07-20 RX ADMIN — MULTIVITAMIN TABLET SCH TAB: TABLET at 10:40
